# Patient Record
Sex: MALE | Race: WHITE | NOT HISPANIC OR LATINO | Employment: FULL TIME | URBAN - NONMETROPOLITAN AREA
[De-identification: names, ages, dates, MRNs, and addresses within clinical notes are randomized per-mention and may not be internally consistent; named-entity substitution may affect disease eponyms.]

---

## 2017-03-22 ENCOUNTER — OFFICE VISIT (OUTPATIENT)
Dept: FAMILY MEDICINE CLINIC | Facility: CLINIC | Age: 57
End: 2017-03-22

## 2017-03-22 VITALS
DIASTOLIC BLOOD PRESSURE: 82 MMHG | HEIGHT: 73 IN | RESPIRATION RATE: 12 BRPM | OXYGEN SATURATION: 98 % | BODY MASS INDEX: 27.04 KG/M2 | TEMPERATURE: 97.8 F | SYSTOLIC BLOOD PRESSURE: 126 MMHG | HEART RATE: 81 BPM | WEIGHT: 204 LBS

## 2017-03-22 DIAGNOSIS — Z98.890 H/O COLONOSCOPY: ICD-10-CM

## 2017-03-22 DIAGNOSIS — Z28.21 INFLUENZA VACCINE REFUSED: ICD-10-CM

## 2017-03-22 DIAGNOSIS — Z71.6 ENCOUNTER FOR SMOKING CESSATION COUNSELING: ICD-10-CM

## 2017-03-22 DIAGNOSIS — F17.200 TOBACCO DEPENDENCE: ICD-10-CM

## 2017-03-22 DIAGNOSIS — I10 ESSENTIAL HYPERTENSION: Primary | ICD-10-CM

## 2017-03-22 DIAGNOSIS — N52.8 OTHER MALE ERECTILE DYSFUNCTION: ICD-10-CM

## 2017-03-22 DIAGNOSIS — Z12.11 SCREENING FOR MALIGNANT NEOPLASM OF COLON: ICD-10-CM

## 2017-03-22 DIAGNOSIS — Z28.21 REFUSES TETANUS, DIPHTHERIA, AND ACELLULAR PERTUSSIS (TDAP) VACCINATION: ICD-10-CM

## 2017-03-22 PROBLEM — N52.9 ERECTILE DYSFUNCTION: Status: ACTIVE | Noted: 2017-03-22

## 2017-03-22 PROCEDURE — 99406 BEHAV CHNG SMOKING 3-10 MIN: CPT | Performed by: NURSE PRACTITIONER

## 2017-03-22 PROCEDURE — 99214 OFFICE O/P EST MOD 30 MIN: CPT | Performed by: NURSE PRACTITIONER

## 2017-03-22 RX ORDER — LISINOPRIL AND HYDROCHLOROTHIAZIDE 20; 12.5 MG/1; MG/1
1 TABLET ORAL DAILY
Qty: 90 TABLET | Refills: 1 | Status: SHIPPED | OUTPATIENT
Start: 2017-03-22 | End: 2017-09-27 | Stop reason: SDUPTHER

## 2017-03-22 RX ORDER — SILDENAFIL 100 MG/1
100 TABLET, FILM COATED ORAL DAILY PRN
Qty: 10 TABLET | Refills: 5 | Status: SHIPPED | OUTPATIENT
Start: 2017-03-22 | End: 2017-09-27 | Stop reason: SDUPTHER

## 2017-03-22 NOTE — PROGRESS NOTES
Chief Complaint   Patient presents with   • Hypertension     refill      Allergies   Allergen Reactions   • Penicillins Rash     Childhood allergy     HPI: Michael Issa is a 57 y.o. male presents  today for follow up for HTN.  Says that his bp has been running good, denies any chest pain, shortness of breath or cough.  Says he will return for labs end next week when he gets off shutdown.   Has ED stable with sildenafil.     Past Medical History:   Diagnosis Date   • Allergic rhinitis    • Hx of chest pain    • Hx of compression fracture of spine    • Hypertension      Past Surgical History:   Procedure Laterality Date   • KNEE SURGERY  2014    rt knee     Social History     Social History   • Marital status: Single     Spouse name: N/A   • Number of children: N/A   • Years of education: N/A     Social History Main Topics   • Smoking status: Current Every Day Smoker     Types: Cigarettes   • Smokeless tobacco: Never Used   • Alcohol use Yes      Comment: occassional   • Drug use: No   • Sexual activity: Defer     Other Topics Concern   • None     Social History Narrative     Family History   Problem Relation Age of Onset   • No Known Problems Mother    • Diabetes Father    • Heart disease Father    • No Known Problems Sister    • No Known Problems Brother    • No Known Problems Daughter    • No Known Problems Maternal Grandmother    • No Known Problems Maternal Grandfather    • No Known Problems Paternal Grandmother    • No Known Problems Paternal Grandfather    • Hypertension Brother    • No Known Problems Brother        Current Outpatient Prescriptions on File Prior to Visit   Medication Sig Dispense Refill   • Ascorbic Acid (VITAMIN C PO) Take  by mouth.     • Cetirizine HCl (ZYRTEC ALLERGY PO) Take  by mouth. OTC     • glucosamine-chondroitin 500-400 MG capsule capsule Take 1 capsule by mouth 2 (two) times a day with meals.     • lisinopril-hydrochlorothiazide (ZESTORETIC) 20-12.5 MG per tablet Take 1 tablet  "by mouth daily. 90 tablet 1   • Omega-3 Fatty Acids (OMEGA 3 PO) Take  by mouth.       No current facility-administered medications on file prior to visit.         REVIEW OF SYMPTOMS: (Positives bolded)  General:  weight loss, fever, chills, night sweats, fatigue, appetite loss  HEENT:  blurry vision, eye pain, eye discharge, dry eyes, decreased vision  Respiratory: shortness of breath, cough, hemoptysis, wheezing, pleurisy,   Cardiovascular:  chest pain, PND, palpitation, edema, orthopnea, syncope, swelling of extremities  Gastro: Nausea, vomiting, diarrhea, hematemesis, abdominal pain, constipation  Genito: hematuria, dysuria, glycosuria, hesitancy, frequency, incontinence  \"All other systems reviewed and negative, except as listed above.”      OBJECTIVE:  Constitutional:  Appearance-No acute distress, Consistent with stated age. Orientation- Oriented x 3, alert Posture-Not doubled over. Gait-Normal pace, normal arm movement. Posture- Normal Build and Nutrition-Well developed and well nourished.  General- Patient is pleasant and cooperative with the interview and exam.    Integumentary: General-No rashes, ulcers or lesions. No edema.  Palpation- Normal skin moisture/turgor. Skin is warm to touch, no increased warmth. Capillary refill is normal bilateral Upper and lower extremity.     Head/Neck: Head- normocephalic and atraumatic.  Neck- without visible/palpable lumps or pulsations.  Palpation- No bony tenderness about head/neck along frontal, occiptial, temporal, parietal, mastoid, jawline, zygoma, orbit or any other location.  NO temporal artery tenderness. No TMJ tenderness. Normal cervical ROM.   Neck Supple.  Thyroid-No thyromegaly, no nodules    Eye: Bilaterally PERRLA, EOMI.  No discharge.  Upper and lower eyelids are normal. Sclera/conjunctiva normal without discharge. Cornea is normal and clear. Lens is normal.  Eyeball appears normal. No ciliary flushing, no conjunctival injection.    ENMT:  Pinna- normal " without tenderness or erythema.  External auditory canal Left- normal without erythema or discharge, no excessive cerumen. External auditory canal Right-normal without erythema or discharge, no excessive cerumen. TM left- Grey/pearly, normal light reflex and anatomy TM Right- Grey/pearly, normal light reflex and anatomy Hearing Assessment-normal to conversational speech at 2-5 feet.  Nose and sinus-No sinus tenderness along frontal/maxillary region. External appearance normal and midline. Nares- bilateral quiet airflow, no discharge. Nasal mucosa- No bleeding noted and no ulcerations observed. Pink, moist. Turbinates non boggy. Lips- normal color, moist without cracks/lesions Oral Cavity/Palate- hard/soft palate intact without lesions, oral mucosa pink and moist. Dentition assessed and discussed appropriate oral care. Tongue normal midline.  Oropharynx- no pharyngeal erythema, Uvula midline. No post nasal drip. No exudate. Salivary glands- Non tender to palpation    CHEST/LUNG: Inspection- symmetric chest wall no pectus deformity. Normal effort, no distress, no use of accessory muscles. Palpation- nontender sternum, ribline.  No abnormal pulsations. Auscultation- Breath sounds normal throughout all lung fields.  Normal tracheal sounds, Normal bronchial sounds overlying sternum, Bronchovessicular sounds normal between scapulae posteriorly, Normal vessicular breath sounds heard throughout periphery. Lungs are clear today. Adventitious sounds- No wheezes, rales, rhonchi.     CARDIOVASCULAR:  Carotid artery- normal, no bruits or abnormal pulsations. Jugular vein- no pulsations. Palpation/Percussion- Normal PMI, no palpable thrill  Auscultation- Regular rate and rhythm. No murmur noted in sitting, supine positions. Extremities- no digital clubbing, cyanosis, edema, increased warmth.    ABDOMEN: Inspection- normal and no visible pulsations. Normal contour. Auscultation- Bowel sounds normal, no abdominal bruits.  Palpation/Percussion- soft, non-tender, no rebound tenderness, no rigidity (guarding), no jar tenderness, no masses.  Liver-no hepatomegaly, Spleen - no splenomegaly, Hernias- none. Rectal not examined.     Peripheral Vascular: Upper extremity Left- Normal temperature with pink nailbeds and no ulcerations.  Upper extremity Right- Normal temperature with pink nailbeds and no ulcerations.  Lower extremity- Normal temperature with pink nailbeds and no ulcerations. DP pulses 2+ bilaterally.  Pedal hair intact.  Normal capillary refill. Edema- No edema.    Musculoskeletal: Generalized-No generalized swelling or edema of extremities, no digital clubbing or cyanosis, neurovascularly intact all four extremities.  Upper extremity- Symmetrical posture.  No visible deformity.  Normal sensation along medial and lateral upper extremity proximally and distally.  NO tenderness overlying shoulder, lateral/medial epicondyle.  5/5 and strength 5/5 bilateral UE.  Elbow palpated, no tenderness overlying olecranon.  Normal supination, pronation to active/passive ROM and to resisted rotation. Bicep insertion/tricep insertion appear normal without obvious pathology. Rotator cuff evaluated and intact.  Normal wrist ROM bilaterally. Normal hand movement, intrinsic muscles of hands normal. No tenderness to palpation of hands/wrists/elbows.  Lower extremity- Not tender to palpation, no pain, no swelling, edema or erythema of surrounding tissue, normal strength and tone.  Normal appearing hip ROM bilaterally without pain.  Knee ROM normal at 0-120 degrees. No tenderness overlying trochanters, no tenderness about patella, quad tendon, patellar tendon.  No tenderness at tibial tuberosity. Ankle normal ROM not tender to palpation along medial/lateral malleolus. Normal movement of toes, no tenderness bilateral feet/toes.  Normal foot type. Calves symmetrical.  Stretching demonstrated today.  Spine/Ribs- No deformities, masses or tenderness, no  known fractures, normal strength, Normal ROM. Normal stability No tenderness along C/T/L spine.  Normal appearing ROM about spine.      Neurological: General- Moves all 4 extremities symmetrically. Symmetrical face and body posture. Cranial nerves- individually evaluated II-XII and intact. PERRLA, Normal EOMI, visual/special senses appear intact, Face is symmetrical and normal sensation/movement, normal tongue, normal strength/posture of neck musculature. Balance- Romberg intact.  Reflexes- ntact with DTR 2+ patellar, Achilles, bicep, brachial,tricep. Ankle clonus normal with 2 beats.  Strength- 5/5 bilateral UE and LE. Soft touch- intact bilateral UE and LE.  Temperature sensation- intact bilateral UE and LE. Cerebellar testing-Rapid alternating movements intact.  Heel shin intact. Able to walk normal gait, normal heel toe walking. Neck- supple.        Neuropsych: Oriented- Person, place, time. (AAOx3), Mood/affect- normal and congruent. Able to articulate well. Speech-Normal speech, normal rate, normal tone, normal use of language, volume and coherence.  Thought content- normal with ability to perform basic computations and apply abstract thought/reason. Associations- intact, no SI/HI, no hallucinations, delusions, obsessions.  Judgment/insight- Appropriate. Memory-Recall intact, remote and recent memory intact. Knowledge- Age appropriate fund of knowledge, concentration and attention span normal.    Lymphatic: Head/Neck- normal size and non tender to palpation. Axillary- Head and neck LN are normal size and non tender to palpation. Femoral and Inguinal- normal size and non tender to palpation.      Assessment/Plan:  Michael was seen today for hypertension.    Diagnoses and all orders for this visit:    Essential hypertension  -     lisinopril-hydrochlorothiazide (ZESTORETIC) 20-12.5 MG per tablet; Take 1 tablet by mouth Daily.  -     CBC & Differential  -     Comprehensive Metabolic Panel  -     Lipid  Panel    Other male erectile dysfunction        -     PSA screening for malignant neoplasm colon  -     sildenafil (VIAGRA) 100 MG tablet; Take 1 tablet by mouth Daily As Needed for erectile dysfunction.    Tobacco dependence:  Does not want to quit smoking at this time.        - Tobacco abuse: Smoking Cessation discussed today for  >3 minutes.  I advised the patient regarding the risks of continued tobacco use.  We discussed methods in which to succeed at smoking cessation.  Handouts were offered to the patient regarding tobacco cessation. The patient has expressed a willingness to quit.  We discussed benefits/risks of oral medications to include Chantix/Wellbutrin. Discussed benefits and risks to nicotine patches, gum. Discussed no benefit and no recommendation at this time to switch to E. Cigarettes as health hazards are not yet known.  We discussed lifestyle changes, discussed BH cessation class and handout offered to patient today.  Discussed to consider ration technique to quit with time (Each day set out the number of cigarettes that you allow yourself to smoke.  Each day decrease this number by 1 cigarrette until you get to a point that you feel you need another cigarette.  You can hold at that level x 1 week.  Continue to decrease until you either are tobacco free or until you cannot decline any further). When you cannot decrease this any further you can return and we can discussed medication options again.  Initial goal with reduction technique is 25% in 3 months.   Http://smokefree.gov/smokefreetxt/ and www.heart.org      Preventative Maintenance  Encounter for smoking cessation counseling  Comments:  Done 3/22/17    Refuses tetanus, diphtheria, and acellular pertussis (TDaP) vaccination  Comments:  Declined 3/22/17    Influenza vaccine refused  Comments:  Declined 3/22/17    H/O colonoscopy  Comments:  Done, Says it was in [place] 2013 or 2014: normal      1. HTN: Suspect Essential HTN.Good BP control is  encouraged with Goal BP based on JNC 8 guidelines 2014 <140/90 for patients with known cardiac disease and diabetes. (RAMOS. 2014:322 (5):507-520. doi:10.1001/ramos.2013.24808): general population <60 yr old goal BP <140/90 and for those >60 <150/90.  For patients of all ages with Diabetes, CKD, Known CAD <140/90. Recommended to the patient to obtain electronic home BP machine with upper arm blood pressure cuff and to check regularly as instructed.  Keep BP log and bring to subsequent visits. Stable, at goal.  a. LABS: CBC, CMP  b. Recommend if you do not have a home BP machine to obtain an electronic machine with arm blood pressure cuff.      c. Monitor BP over the next week and keep log to bring back to office. Discussed medication therapy however pt wants to try to control with diet exercise. .  Your provider  has recommended self-monitoring of your blood pressure.  If you do not have a blood pressure cuff you may purchase one from the local pharmacy.  You may ask the pharmacist which brand and model they recommend.  Obtain your blood pressure measurement at least 2x per week.  You should also check your blood pressure if you experience any symptoms of blurred visit, dizziness or headache.  Please record all blood pressure measurements and bring them to next office visit.  If you have any questions about the accuracy of your blood pressure machine please bring it in to the office and our staff will be happy to check accuracy.   d. Encouraged to eat a low sodium heart healthy diet  e. Offered handout on HTN educational topics.  These were provided if patient requested these today.  f. MEDS: as listed below  g. Risks/benefits of current and new medications discussed with the patient and or family today.  The patient/family are aware and accept that if there any side effects they should call or return to clinic as soon as possible.  Appropriate F/U discussed for topics addressed today. All questions were answered to  the  satisfactory state of patient/family.  Should symptoms fail to improve or worsen they agree to call or return to clinic or to go to the ER. Education handouts were offered on any new Rx if requested.  Discussed the importance of following up with any needed screening tests/labs/specialist appointments and any requested follow-up recommended by me today.  Importance of maintaining follow-up discussed and patient accepts that missed appointments can delay diagnosis and potentially lead to worsening of conditions.  h. ADA diet encouraged      Return in about 6 months (around 9/22/2017).    Mansi Zapata, DNP, APRN-BC  03/22/2017

## 2017-03-22 NOTE — PATIENT INSTRUCTIONS
Steps to Quit Smoking   Smoking tobacco can be harmful to your health and can affect almost every organ in your body. Smoking puts you, and those around you, at risk for developing many serious chronic diseases. Quitting smoking is difficult, but it is one of the best things that you can do for your health. It is never too late to quit.  WHAT ARE THE BENEFITS OF QUITTING SMOKING?  When you quit smoking, you lower your risk of developing serious diseases and conditions, such as:  · Lung cancer or lung disease, such as COPD.  · Heart disease.  · Stroke.  · Heart attack.  · Infertility.  · Osteoporosis and bone fractures.  Additionally, symptoms such as coughing, wheezing, and shortness of breath may get better when you quit. You may also find that you get sick less often because your body is stronger at fighting off colds and infections. If you are pregnant, quitting smoking can help to reduce your chances of having a baby of low birth weight.  HOW DO I GET READY TO QUIT?  When you decide to quit smoking, create a plan to make sure that you are successful. Before you quit:  · Pick a date to quit. Set a date within the next two weeks to give you time to prepare.  · Write down the reasons why you are quitting. Keep this list in places where you will see it often, such as on your bathroom mirror or in your car or wallet.  · Identify the people, places, things, and activities that make you want to smoke (triggers) and avoid them. Make sure to take these actions:    Throw away all cigarettes at home, at work, and in your car.    Throw away smoking accessories, such as ashtrays and lighters.    Clean your car and make sure to empty the ashtray.    Clean your home, including curtains and carpets.  · Tell your family, friends, and coworkers that you are quitting. Support from your loved ones can make quitting easier.  · Talk with your health care provider about your options for quitting smoking.  · Find out what treatment  "options are covered by your health insurance.  WHAT STRATEGIES CAN I USE TO QUIT SMOKING?   Talk with your healthcare provider about different strategies to quit smoking. Some strategies include:  · Quitting smoking altogether instead of gradually lessening how much you smoke over a period of time. Research shows that quitting \"cold turkey\" is more successful than gradually quitting.  · Attending in-person counseling to help you build problem-solving skills. You are more likely to have success in quitting if you attend several counseling sessions. Even short sessions of 10 minutes can be effective.  · Finding resources and support systems that can help you to quit smoking and remain smoke-free after you quit. These resources are most helpful when you use them often. They can include:    Online chats with a counselor.    Telephone quitlines.    Printed self-help materials.    Support groups or group counseling.    Text messaging programs.    Mobile phone applications.  · Taking medicines to help you quit smoking. (If you are pregnant or breastfeeding, talk with your health care provider first.) Some medicines contain nicotine and some do not. Both types of medicines help with cravings, but the medicines that include nicotine help to relieve withdrawal symptoms. Your health care provider may recommend:    Nicotine patches, gum, or lozenges.    Nicotine inhalers or sprays.    Non-nicotine medicine that is taken by mouth.  Talk with your health care provider about combining strategies, such as taking medicines while you are also receiving in-person counseling. Using these two strategies together makes you more likely to succeed in quitting than if you used either strategy on its own.  If you are pregnant or breastfeeding, talk with your health care provider about finding counseling or other support strategies to quit smoking. Do not take medicine to help you quit smoking unless told to do so by your health care " provider.  WHAT THINGS CAN I DO TO MAKE IT EASIER TO QUIT?  Quitting smoking might feel overwhelming at first, but there is a lot that you can do to make it easier. Take these important actions:  · Reach out to your family and friends and ask that they support and encourage you during this time. Call telephone quitlines, reach out to support groups, or work with a counselor for support.  · Ask people who smoke to avoid smoking around you.  · Avoid places that trigger you to smoke, such as bars, parties, or smoke-break areas at work.  · Spend time around people who do not smoke.  · Lessen stress in your life, because stress can be a smoking trigger for some people. To lessen stress, try:    Exercising regularly.    Deep-breathing exercises.    Yoga.    Meditating.    Performing a body scan. This involves closing your eyes, scanning your body from head to toe, and noticing which parts of your body are particularly tense. Purposefully relax the muscles in those areas.  · Download or purchase mobile phone or tablet apps (applications) that can help you stick to your quit plan by providing reminders, tips, and encouragement. There are many free apps, such as QuitGuide from the CDC (Centers for Disease Control and Prevention). You can find other support for quitting smoking (smoking cessation) through smokefree.gov and other websites.  HOW WILL I FEEL WHEN I QUIT SMOKING?  Within the first 24 hours of quitting smoking, you may start to feel some withdrawal symptoms. These symptoms are usually most noticeable 2-3 days after quitting, but they usually do not last beyond 2-3 weeks. Changes or symptoms that you might experience include:  · Mood swings.  · Restlessness, anxiety, or irritation.  · Difficulty concentrating.  · Dizziness.  · Strong cravings for sugary foods in addition to nicotine.  · Mild weight gain.  · Constipation.  · Nausea.  · Coughing or a sore throat.  · Changes in how your medicines work in your  body.  · A depressed mood.  · Difficulty sleeping (insomnia).  After the first 2-3 weeks of quitting, you may start to notice more positive results, such as:  · Improved sense of smell and taste.  · Decreased coughing and sore throat.  · Slower heart rate.  · Lower blood pressure.  · Clearer skin.  · The ability to breathe more easily.  · Fewer sick days.  Quitting smoking is very challenging for most people. Do not get discouraged if you are not successful the first time. Some people need to make many attempts to quit before they achieve long-term success. Do your best to stick to your quit plan, and talk with your health care provider if you have any questions or concerns.     This information is not intended to replace advice given to you by your health care provider. Make sure you discuss any questions you have with your health care provider.     Document Released: 12/12/2002 Document Revised: 05/03/2016 Document Reviewed: 05/03/2016  better. Interactive Patient Education ©2016 [employer]

## 2017-06-27 DIAGNOSIS — I10 ESSENTIAL HYPERTENSION: ICD-10-CM

## 2017-06-27 RX ORDER — LISINOPRIL AND HYDROCHLOROTHIAZIDE 20; 12.5 MG/1; MG/1
TABLET ORAL
Qty: 90 TABLET | Refills: 0 | OUTPATIENT
Start: 2017-06-27

## 2017-09-08 ENCOUNTER — OFFICE VISIT (OUTPATIENT)
Dept: FAMILY MEDICINE CLINIC | Facility: CLINIC | Age: 57
End: 2017-09-08

## 2017-09-08 VITALS
HEART RATE: 77 BPM | SYSTOLIC BLOOD PRESSURE: 122 MMHG | RESPIRATION RATE: 12 BRPM | BODY MASS INDEX: 25.58 KG/M2 | TEMPERATURE: 97.7 F | HEIGHT: 73 IN | WEIGHT: 193 LBS | DIASTOLIC BLOOD PRESSURE: 74 MMHG | OXYGEN SATURATION: 98 %

## 2017-09-08 DIAGNOSIS — K46.9 HERNIA: ICD-10-CM

## 2017-09-08 DIAGNOSIS — J40 BRONCHITIS: ICD-10-CM

## 2017-09-08 DIAGNOSIS — J01.90 ACUTE NON-RECURRENT SINUSITIS, UNSPECIFIED LOCATION: Primary | ICD-10-CM

## 2017-09-08 PROCEDURE — 99214 OFFICE O/P EST MOD 30 MIN: CPT | Performed by: NURSE PRACTITIONER

## 2017-09-08 PROCEDURE — 96372 THER/PROPH/DIAG INJ SC/IM: CPT | Performed by: NURSE PRACTITIONER

## 2017-09-08 RX ORDER — DEXAMETHASONE SODIUM PHOSPHATE 10 MG/ML
10 INJECTION INTRAMUSCULAR; INTRAVENOUS ONCE
Status: COMPLETED | OUTPATIENT
Start: 2017-09-08 | End: 2017-09-08

## 2017-09-08 RX ORDER — AZITHROMYCIN 250 MG/1
TABLET, FILM COATED ORAL
Qty: 6 TABLET | Refills: 0 | Status: SHIPPED | OUTPATIENT
Start: 2017-09-08 | End: 2017-09-18

## 2017-09-08 RX ADMIN — DEXAMETHASONE SODIUM PHOSPHATE 10 MG: 10 INJECTION INTRAMUSCULAR; INTRAVENOUS at 10:51

## 2017-09-08 NOTE — PROGRESS NOTES
Chief Complaint   Patient presents with   • URI   • Hernia     Allergies   Allergen Reactions   • Penicillins Rash     Childhood allergy     HPI: Michael Issa is a 57 y.o. male presents today with complaints of being sick for 3 weeks, and getting progressively worse. It started out allergies and he has been taking zyrtec, clariten and nothing wo. rks.  Now her is hoarse, blowing green and just feels bad.  Last weekend he was in bed all weekend and that got better but it hit again.   Also has a hernia that keeps pouching out in the right groin Has chronic problem of HTN stable with lisinopril-hctz, ED stable with sidenafil, hyperlipidemia with omega 3 fatty acids             Past Medical History:   Diagnosis Date   • Allergic rhinitis    • Hx of chest pain    • Hx of compression fracture of spine    • Hypertension      Past Surgical History:   Procedure Laterality Date   • KNEE SURGERY  2014    rt knee     Social History     Social History   • Marital status: Single     Spouse name: N/A   • Number of children: N/A   • Years of education: N/A     Social History Main Topics   • Smoking status: Current Every Day Smoker     Types: Cigarettes   • Smokeless tobacco: Never Used   • Alcohol use Yes      Comment: occassional   • Drug use: No   • Sexual activity: Defer     Other Topics Concern   • None     Social History Narrative     Family History   Problem Relation Age of Onset   • No Known Problems Mother    • Diabetes Father    • Heart disease Father    • No Known Problems Sister    • No Known Problems Brother    • No Known Problems Daughter    • No Known Problems Maternal Grandmother    • No Known Problems Maternal Grandfather    • No Known Problems Paternal Grandmother    • No Known Problems Paternal Grandfather    • Hypertension Brother    • No Known Problems Brother        Current Outpatient Prescriptions on File Prior to Visit   Medication Sig Dispense Refill   • Ascorbic Acid (VITAMIN C PO) Take  by mouth.     •  "Cetirizine HCl (ZYRTEC ALLERGY PO) Take  by mouth. OTC     • glucosamine-chondroitin 500-400 MG capsule capsule Take 1 capsule by mouth 2 (two) times a day with meals.     • lisinopril-hydrochlorothiazide (ZESTORETIC) 20-12.5 MG per tablet Take 1 tablet by mouth Daily. 90 tablet 1   • Omega-3 Fatty Acids (OMEGA 3 PO) Take  by mouth.     • sildenafil (VIAGRA) 100 MG tablet Take 1 tablet by mouth Daily As Needed for erectile dysfunction. 10 tablet 5     No current facility-administered medications on file prior to visit.       ROS:  REVIEW OF SYMPTOMS: (Positives bolded)  General:  weight loss, fever, chills, night sweats, fatigue, appetite loss  HEENT:  sore throat tinnitus, bloody nose, hearing loss, sinus pain/pressure, ear pain/pressure.   Respiratory: shortness of breath, cough, hemoptysis, wheezing, pleurisy,   Cardiovascular:  chest pain, PND, palpitation, edema, orthopnea, syncope, swelling of extremities  Gastro: Nausea, vomiting, diarrhea, hematemesis, abdominal pain, constipation  Neuro:  Migraine, numbness, ataxia, tremor, vertigo, weakness, memory loss, Irritability, dizziness  Allergic/immune: allergic rhinitis, hay fever, asthma, hives  \"All other systems reviewed and negative, except as listed above.”      OBJECTIVE:  Constitutional:  Alert, oriented x 3, well developed, well nourished. Consistent with stated age. Not in acute distress.  Has normal posture. Gait and station normal. .  Behavior appropriate. Patient is pleasant and cooperative with the interview and exam.    Skin: No rashes, no visible scars or suspicious moles noted.  Skin is warm to touch. Normal appropriate skin turgor.  Capillary refill is normal bilateral Upper and lower extremity.     Head/Neck: Head is normocephalic and atraumatic.  Neck without visible/palpable lumps.  No thyromegaly.    Eye: Bilaterally EOMI.  PERRLA.  Sclera/conjunctiva is normal, no discharge. Cornea is normal and clear. Lens is normal.  Eyeball normal. Upper " eyelid normal.  Lower eyelid normal.   Abnormal: allergic shiners, maira-orbital puffiness    OROPHARYNX: without redness or post nasal drip, no exudate, no irregularities, tonsils normal  Mucosa pink and moist, posterior pharynx erythematous,  Dentition average for age. No obvious dental carries. No lesions. Tongue normal.    EARS: Bilateral auditory canal normal, without redness or cerumen impaction. Bilateral Tympanic membrane Normal, pearly gray with good cone of light and landmarks.  Hearing grossly intact to normal conversation.     NOSE: Purulent drainage, mucosa is erythematous, edematous and congested, nares patent    SINUS:  Frontal and maxillary sinus tenderness on palpation.      CHEST/LUNG: No use of accessory muscles, chest non-tender on palpation.  Breath sounds normal throughout all lung fields.  No wheezes, rales, rhonchi.    CARDIOVASCULAR:  Inspection: Carotid artery bilateral normal, no bruits, pulse regular.  Palpation/Percussion Bilateral normal pulsations.  Auscultation: Regular rate and rhythm. No murmur noted in sitting, supine, standing or squatting positions.    LYMPH: Cervical Nodes-normal, size; non-tender to palpation. Axillary Nodes- normal size; non-tender to palpation.     Assessment:  Michael was seen today for uri and hernia.    Diagnoses and all orders for this visit:    Acute non-recurrent sinusitis, unspecified location    Bronchitis  -     dexamethasone (DECADRON) injection 10 mg; Inject 1 mL into the shoulder, thigh, or buttocks 1 (One) Time.  -     azithromycin (ZITHROMAX) 250 MG tablet; Take 2 tablets the first day, then 1 tablet daily for 4 days.    Hernia  -     Ambulatory Referral to General Surgery    Definition:  Acute, subacute, or chronic inflammation of the mucous membranes that line the paranasal                       sinuses and concomitant inflammation of nasal mucosa  Acute rhinosinusitis: abrupt onset of infection with duration of symptoms <4  weeks    Pathogenesis:    • Most common viruses are more common than bacteria  • Streptococcus pneumoniae; penicillin-resistant S. pneumonia is becoming common   • Haemophilus influenza (may be B-lactaase producing)  • Moraxella catarrhalis (may be B-lactamase producing) prevalence greater among children than adults  • Streptococcus pyogenes  • Other: Streptococcus species, chlamydia pneumonia, starpylococcus aureus, fungi            Differential:   URI, Allergic rhinitis, neoplasms, dental abscess, [place], Foreign body    Diagnostic: none typically     • Adjunctive Therapy  a. Intranasal saline irrigation with either physiologic or hypertonic saline is recommended  b. Intranasal corticosteroids in addition to antibiotics  c. Netti pot  d. Good handwashing  e. If smoke-recommend smoking cessation  f.  Humidify the air; steam inhalation and warm compresses often help relieve pressure  g. Increase fluid intake  h. Sleep with bed elevated  i. Avoid allergens and excessively dry heat  j. Avoid swimming/diving and air travel during acute period  k. Avoid use of antihistamines unless there is an allergic basis   l. Teach proper application of nasal sprays           Return in about 1 week (around 9/15/2017), or if symptoms worsen or fail to improve.    Mansi Zapata, DNP, APRN-BC  09/08/2017

## 2017-09-18 ENCOUNTER — APPOINTMENT (OUTPATIENT)
Dept: PREADMISSION TESTING | Facility: HOSPITAL | Age: 57
End: 2017-09-18

## 2017-09-18 ENCOUNTER — HOSPITAL ENCOUNTER (OUTPATIENT)
Dept: GENERAL RADIOLOGY | Facility: HOSPITAL | Age: 57
Discharge: HOME | End: 2017-09-18
Admitting: SPECIALIST

## 2017-09-18 VITALS
BODY MASS INDEX: 26.11 KG/M2 | SYSTOLIC BLOOD PRESSURE: 125 MMHG | HEIGHT: 73 IN | HEART RATE: 60 BPM | DIASTOLIC BLOOD PRESSURE: 84 MMHG | WEIGHT: 197 LBS | OXYGEN SATURATION: 100 %

## 2017-09-18 LAB
ALBUMIN SERPL-MCNC: 4 G/DL (ref 3.5–5)
ALBUMIN/GLOB SERPL: 1.4 G/DL (ref 1.1–2.5)
ALP SERPL-CCNC: 37 U/L (ref 24–120)
ALT SERPL W P-5'-P-CCNC: 39 U/L (ref 0–54)
ANION GAP SERPL CALCULATED.3IONS-SCNC: 9 MMOL/L (ref 4–13)
AST SERPL-CCNC: 33 U/L (ref 7–45)
BASOPHILS # BLD AUTO: 0.07 10*3/MM3 (ref 0–0.2)
BASOPHILS NFR BLD AUTO: 1 % (ref 0–2)
BILIRUB SERPL-MCNC: 0.4 MG/DL (ref 0.1–1)
BUN BLD-MCNC: 17 MG/DL (ref 5–21)
BUN/CREAT SERPL: 22.4 (ref 7–25)
CALCIUM SPEC-SCNC: 8.8 MG/DL (ref 8.4–10.4)
CHLORIDE SERPL-SCNC: 102 MMOL/L (ref 98–110)
CO2 SERPL-SCNC: 29 MMOL/L (ref 24–31)
CREAT BLD-MCNC: 0.76 MG/DL (ref 0.5–1.4)
DEPRECATED RDW RBC AUTO: 43.9 FL (ref 40–54)
EOSINOPHIL # BLD AUTO: 0.11 10*3/MM3 (ref 0–0.7)
EOSINOPHIL NFR BLD AUTO: 1.6 % (ref 0–4)
ERYTHROCYTE [DISTWIDTH] IN BLOOD BY AUTOMATED COUNT: 13.1 % (ref 12–15)
GFR SERPL CREATININE-BSD FRML MDRD: 106 ML/MIN/1.73
GLOBULIN UR ELPH-MCNC: 2.8 GM/DL
GLUCOSE BLD-MCNC: 92 MG/DL (ref 70–100)
HCT VFR BLD AUTO: 41 % (ref 40–52)
HGB BLD-MCNC: 13.6 G/DL (ref 14–18)
IMM GRANULOCYTES # BLD: 0.01 10*3/MM3 (ref 0–0.03)
IMM GRANULOCYTES NFR BLD: 0.1 % (ref 0–5)
LYMPHOCYTES # BLD AUTO: 1.72 10*3/MM3 (ref 0.72–4.86)
LYMPHOCYTES NFR BLD AUTO: 24.5 % (ref 15–45)
MCH RBC QN AUTO: 30.8 PG (ref 28–32)
MCHC RBC AUTO-ENTMCNC: 33.2 G/DL (ref 33–36)
MCV RBC AUTO: 92.8 FL (ref 82–95)
MONOCYTES # BLD AUTO: 0.79 10*3/MM3 (ref 0.19–1.3)
MONOCYTES NFR BLD AUTO: 11.3 % (ref 4–12)
NEUTROPHILS # BLD AUTO: 4.32 10*3/MM3 (ref 1.87–8.4)
NEUTROPHILS NFR BLD AUTO: 61.5 % (ref 39–78)
PLATELET # BLD AUTO: 248 10*3/MM3 (ref 130–400)
PMV BLD AUTO: 11 FL (ref 6–12)
POTASSIUM BLD-SCNC: 5.1 MMOL/L (ref 3.5–5.3)
PROT SERPL-MCNC: 6.8 G/DL (ref 6.3–8.7)
RBC # BLD AUTO: 4.42 10*6/MM3 (ref 4.8–5.9)
SODIUM BLD-SCNC: 140 MMOL/L (ref 135–145)
WBC NRBC COR # BLD: 7.02 10*3/MM3 (ref 4.8–10.8)

## 2017-09-18 PROCEDURE — 93010 ELECTROCARDIOGRAM REPORT: CPT | Performed by: INTERNAL MEDICINE

## 2017-09-18 PROCEDURE — 80053 COMPREHEN METABOLIC PANEL: CPT | Performed by: SPECIALIST

## 2017-09-18 PROCEDURE — 71020 HC CHEST PA AND LATERAL: CPT

## 2017-09-18 PROCEDURE — 93005 ELECTROCARDIOGRAM TRACING: CPT

## 2017-09-18 PROCEDURE — 85025 COMPLETE CBC W/AUTO DIFF WBC: CPT | Performed by: SPECIALIST

## 2017-09-18 PROCEDURE — 36415 COLL VENOUS BLD VENIPUNCTURE: CPT

## 2017-09-18 NOTE — DISCHARGE INSTRUCTIONS
DAY OF SURGERY INSTRUCTIONS    Bilateral Preperitoneal Inguinal Hernia Repair Laparoscopic With Mesh-Bilateral    Rambo Church MD    Admission Date:   9/20/2017    ARRIVAL TIME: AS DIRECTED BY OFFICE    DAY OF SURGERY TAKE ONLY THESE MEDICATIONS: NONE        BEFORE YOU COME TO THE HOSPITAL  (Pre-op instructions)  • Do not eat, drink, smoke or chew gum after midnight the night before surgery.  This also includes no mints.  • Morning of surgery take only the medicines you have been instructed with a sip of water unless otherwise instructed  by your physician.  • Do not shave, wear makeup or dark nail polish.  • Remove all jewelry including rings.  • Leave anything you consider valuable at home.  • Leave your suitcase in the car until after your surgery.  • Bring the following with you if applicable:  o Picture ID and insurance, Medicare or Medicaid cards  o Co-pay/deductible required by insurance (cash, check, credit card)  o Copy of advance directive, living will or power-of- documents if not brought to PAT  o CPAP or BIPAP mask and tubing  o Relaxation aids (MP3 player, book, magazine)  • On the day of surgery check in at registration located at the main entrance of the hospital.       Outpatient Surgery Guidelines, Adult  Outpatient procedures are those for which the person having the procedure is allowed to go home the same day as the procedure. Various procedures are done on an outpatient basis. You should follow some general guidelines if you will be having an outpatient procedure.  LET YOUR HEALTH CARE PROVIDER KNOW ABOUT:  · Any allergies you have.  · All medicines you are taking, including vitamins, herbs, eye drops, creams, and over-the-counter medicines.  · Previous problems you or members of your family have had with the use of anesthetics.  · Any blood disorders you have.  · Previous surgeries you have had.  · Medical conditions you have.  RISKS AND COMPLICATIONS  Your health care provider  will discuss possible risks and complications with you before surgery. Common risks and complications include:    · Problems due to the use of anesthetics.  · Blood loss and replacement (does not apply to minor surgical procedures).  · Temporary increase in pain due to surgery.  · Uncorrected pain or problems that the surgery was meant to correct.  · Infection.  · New damage.  BEFORE THE PROCEDURE  · Ask your health care provider about changing or stopping your regular medicines. You may need to stop taking certain medicines in the days or weeks before the procedure.  · Stop smoking at least 2 weeks before surgery. This lowers your risk for complications during and after surgery. Ask your health care provider for help with this if needed.  · Eat your usual meals and a light supper the day before surgery. Continue fluid intake. Do not drink alcohol.  · Do not eat or drink after midnight the night before your surgery.   · Arrange for someone to take you home and to stay with you for 24 hours after the procedure. Medicine given for your procedure may affect your ability to drive or to care for yourself.  · Call your health care provider's office if you develop an illness or problem that may prevent you from safely having your procedure.  AFTER THE PROCEDURE  After surgery, you will be taken to a recovery area, where your progress will be monitored. If there are no complications, you will be allowed to go home when you are awake, stable, and taking fluids well. You may have numbness around the surgical site. Healing will take some time. You will have tenderness at the surgical site and may have some swelling and bruising. You may also have some nausea.  HOME CARE INSTRUCTIONS  · Do not drive for 24 hours, or as directed by your health care provider. Do not drive while taking prescription pain medicines.  · Do not drink alcohol for 24 hours.  · Do not make important decisions or sign legal documents for 24 hours.  · You  may resume a normal diet and activities as directed.  · Do not lift anything heavier than 10 pounds (4.5 kg) or play contact sports until your health care provider says it is okay.  · Change your bandages (dressings) as directed.  · Only take over-the-counter or prescription medicines as directed by your health care provider.  · Follow up with your health care provider as directed.  SEEK MEDICAL CARE IF:  · You have increased bleeding (more than a small spot) from the surgical site.  · You have redness, swelling, or increasing pain in the wound.  · You see pus coming from the wound.  · You have a fever.  · You notice a bad smell coming from the wound or dressing.  · You feel lightheaded or faint.  · You develop a rash.  · You have trouble breathing.  · You develop allergies.  MAKE SURE YOU:  · Understand these instructions.  · Will watch your condition.  · Will get help right away if you are not doing well or get worse.     This information is not intended to replace advice given to you by your health care provider. Make sure you discuss any questions you have with your health care provider.     Document Released: 09/12/2002 Document Revised: 05/03/2016 Document Reviewed: 05/22/2014  [employer] Interactive Patient Education ©2016 [employer]       Fall Prevention in Hospitals, Adult  As a hospital patient, your condition and the treatments you receive can increase your risk for falls. Some additional risk factors for falls in a hospital include:  · Being in an unfamiliar environment.  · Being on bed rest.  · Your surgery.  · Taking certain medicines.  · Your tubing requirements, such as intravenous (IV) therapy or catheters.  It is important that you learn how to decrease fall risks while at the hospital. Below are important tips that can help prevent falls.  SAFETY TIPS FOR PREVENTING FALLS  Talk about your risk of falling.  · Ask your health care provider why you are at risk for falling. Is it your medicine,  illness, tubing placement, or something else?  · Make a plan with your health care provider to keep you safe from falls.  · Ask your health care provider or pharmacist about side effects of your medicines. Some medicines can make you dizzy or affect your coordination.  Ask for help.  · Ask for help before getting out of bed. You may need to press your call button.  · Ask for assistance in getting safely to the toilet.  · Ask for a walker or cane to be put at your bedside. Ask that most of the side rails on your bed be placed up before your health care provider leaves the room.  · Ask family or friends to sit with you.  · Ask for things that are out of your reach, such as your glasses, hearing aids, telephone, bedside table, or call button.  Follow these tips to avoid falling:  · Stay lying or seated, rather than standing, while waiting for help.  · Wear rubber-soled slippers or shoes whenever you walk in the hospital.  · Avoid quick, sudden movements.  ¨ Change positions slowly.  ¨ Sit on the side of your bed before standing.  ¨ Stand up slowly and wait before you start to walk.  · Let your health care provider know if there is a spill on the floor.  · Pay careful attention to the medical equipment, electrical cords, and tubes around you.  · When you need help, use your call button by your bed or in the bathroom. Wait for one of your health care providers to help you.  · If you feel dizzy or unsure of your footing, return to bed and wait for assistance.  · Avoid being distracted by the TV, telephone, or another person in your room.  · Do not lean or support yourself on rolling objects, such as IV poles or bedside tables.     This information is not intended to replace advice given to you by your health care provider. Make sure you discuss any questions you have with your health care provider.     Document Released: 12/15/2001 Document Revised: 01/08/2016 Document Reviewed: 08/25/2013  [employer] Patient  Education ©2016 [employer]       Surgical Site Infections FAQs  What is a Surgical Site Infection (SSI)?  A surgical site infection is an infection that occurs after surgery in the part of the body where the surgery took place. Most patients who have surgery do not develop an infection. However, infections develop in about 1 to 3 out of every 100 patients who have surgery.  Some of the common symptoms of a surgical site infection are:  · Redness and pain around the area where you had surgery  · Drainage of cloudy fluid from your surgical wound  · Fever  Can SSIs be treated?  Yes. Most surgical site infections can be treated with antibiotics. The antibiotic given to you depends on the bacteria (germs) causing the infection. Sometimes patients with SSIs also need another surgery to treat the infection.  What are some of the things that hospitals are doing to prevent SSIs?  To prevent SSIs, doctors, nurses, and other healthcare providers:  · Clean their hands and arms up to their elbows with an antiseptic agent just before the surgery.  · Clean their hands with soap and water or an alcohol-based hand rub before and after caring for each patient.  · May remove some of your hair immediately before your surgery using electric clippers if the hair is in the same area where the procedure will occur. They should not shave you with a razor.  · Wear special hair covers, masks, gowns, and gloves during surgery to keep the surgery area clean.  · Give you antibiotics before your surgery starts. In most cases, you should get antibiotics within 60 minutes before the surgery starts and the antibiotics should be stopped within 24 hours after surgery.  · Clean the skin at the site of your surgery with a special soap that kills germs.  What can I do to help prevent SSIs?  Before your surgery:  · Tell your doctor about other medical problems you may have. Health problems such as allergies, diabetes, and obesity could affect your  surgery and your treatment.  · Quit smoking. Patients who smoke get more infections. Talk to your doctor about how you can quit before your surgery.  · Do not shave near where you will have surgery. Shaving with a razor can irritate your skin and make it easier to develop an infection.  At the time of your surgery:  · Speak up if someone tries to shave you with a razor before surgery. Ask why you need to be shaved and talk with your surgeon if you have any concerns.  · Ask if you will get antibiotics before surgery.  After your surgery:  · Make sure that your healthcare providers clean their hands before examining you, either with soap and water or an alcohol-based hand rub.  · If you do not see your providers clean their hands, please ask them to do so.  · Family and friends who visit you should not touch the surgical wound or dressings.  · Family and friends should clean their hands with soap and water or an alcohol-based hand rub before and after visiting you. If you do not see them clean their hands, ask them to clean their hands.  What do I need to do when I go home from the hospital?  · Before you go home, your doctor or nurse should explain everything you need to know about taking care of your wound. Make sure you understand how to care for your wound before you leave the hospital.  · Always clean your hands before and after caring for your wound.  · Before you go home, make sure you know who to contact if you have questions or problems after you get home.  · If you have any symptoms of an infection, such as redness and pain at the surgery site, drainage, or fever, call your doctor immediately.  If you have additional questions, please ask your doctor or nurse.  Developed and co-sponsored by The Society for Healthcare Epidemiology of [place] (SHEA); Infectious Diseases Society of [place] (IDSA); American Hospital Association; Association for Professionals in Infection Control and Epidemiology (APIC); [facility]  for Disease Control and Prevention (CDC); and The Joint Commission.     This information is not intended to replace advice given to you by your health care provider. Make sure you discuss any questions you have with your health care provider.     Document Released: 12/23/2014 Document Revised: 01/08/2016 Document Reviewed: 03/02/2016  [employer] Interactive Patient Education ©2016 [employer]       [facility]  CHG 4% Patient Instruction Sheet    Preparing the Skin Before Surgery  Preparing or “prepping” skin before surgery can reduce the risk of infection at the surgical site. To make the process easier,[facility] has chosen 4% Chlorhexidine Gluconate (CHG) antiseptic solution.   The steps below outline the prepping process and should be carefully followed.                                                                                                                                                      Prep the skin at the following time(s):                                                      We recommend you shower the night before surgery, and again the morning of surgery with the 4% CHG antiseptic solution using half of the bottle and a cloth each time.  Dress in clean clothes/sleepwear after showering.  See instructions below for application.          Do not apply any lotions or moisturizers.       Do not shave the area to be prepped for at least 2 days prior to surgery.    Clipping the hair may be done immediately prior to your surgery at the hospital    if needed.    Directions:  Thoroughly rinse your body with water.  Apply 4% CHG to a cloth and wash skin gently, paying special attention to the operative site.  Rinse again thoroughly.  Once you have begun using this product do not apply anything else to your skin. If itching or redness persists, rinse affected areas and discontinue use.    When using this product:  • Keep out of eyes, ears, and mouth.  • If solution should contact these areas, rinse  out promptly and thoroughly with water.  • For external use only.  • Do not use in genital area, on your face or head.      PATIENT/FAMILY/RESPONSIBLE PARTY VERBALIZES UNDERSTANDING OF ABOVE EDUCATION.  COPY OF PAIN SCALE GIVEN AND REVIEWED WITH VERBALIZED UNDERSTANDING.

## 2017-09-20 ENCOUNTER — ANESTHESIA (OUTPATIENT)
Dept: PERIOP | Facility: HOSPITAL | Age: 57
End: 2017-09-20

## 2017-09-20 ENCOUNTER — ANESTHESIA EVENT (OUTPATIENT)
Dept: PERIOP | Facility: HOSPITAL | Age: 57
End: 2017-09-20

## 2017-09-20 ENCOUNTER — HOSPITAL ENCOUNTER (OUTPATIENT)
Facility: HOSPITAL | Age: 57
Setting detail: HOSPITAL OUTPATIENT SURGERY
Discharge: HOME | End: 2017-09-20
Attending: SPECIALIST | Admitting: SPECIALIST

## 2017-09-20 VITALS
RESPIRATION RATE: 16 BRPM | SYSTOLIC BLOOD PRESSURE: 133 MMHG | HEART RATE: 71 BPM | DIASTOLIC BLOOD PRESSURE: 89 MMHG | OXYGEN SATURATION: 94 % | TEMPERATURE: 98.1 F

## 2017-09-20 DIAGNOSIS — J98.4 LUNG ABNORMALITY: Primary | ICD-10-CM

## 2017-09-20 PROCEDURE — C1781 MESH (IMPLANTABLE): HCPCS | Performed by: SPECIALIST

## 2017-09-20 PROCEDURE — 25010000002 MIDAZOLAM PER 1 MG: Performed by: ANESTHESIOLOGY

## 2017-09-20 PROCEDURE — 25010000002 VANCOMYCIN PER 500 MG: Performed by: SPECIALIST

## 2017-09-20 PROCEDURE — 25010000002 DEXAMETHASONE PER 1 MG: Performed by: ANESTHESIOLOGY

## 2017-09-20 PROCEDURE — 25010000002 SUCCINYLCHOLINE PER 20 MG: Performed by: NURSE ANESTHETIST, CERTIFIED REGISTERED

## 2017-09-20 PROCEDURE — 25010000002 DEXAMETHASONE PER 1 MG: Performed by: NURSE ANESTHETIST, CERTIFIED REGISTERED

## 2017-09-20 PROCEDURE — 25010000002 KETOROLAC TROMETHAMINE PER 15 MG: Performed by: NURSE ANESTHETIST, CERTIFIED REGISTERED

## 2017-09-20 PROCEDURE — 25010000002 MORPHINE SULFATE (PF) 2 MG/ML SOLUTION: Performed by: ANESTHESIOLOGY

## 2017-09-20 PROCEDURE — 25010000002 PROPOFOL 10 MG/ML EMULSION: Performed by: NURSE ANESTHETIST, CERTIFIED REGISTERED

## 2017-09-20 PROCEDURE — 25010000002 ONDANSETRON PER 1 MG: Performed by: NURSE ANESTHETIST, CERTIFIED REGISTERED

## 2017-09-20 PROCEDURE — 25010000002 FENTANYL CITRATE (PF) 100 MCG/2ML SOLUTION: Performed by: ANESTHESIOLOGY

## 2017-09-20 PROCEDURE — 25010000002 HYDROMORPHONE PER 4 MG: Performed by: ANESTHESIOLOGY

## 2017-09-20 DEVICE — BARD MESH
Type: IMPLANTABLE DEVICE | Status: FUNCTIONAL
Brand: BARD MESH

## 2017-09-20 RX ORDER — DOCUSATE SODIUM 250 MG
250 CAPSULE ORAL 2 TIMES DAILY PRN
Qty: 100 CAPSULE | Refills: 4 | Status: SHIPPED | OUTPATIENT
Start: 2017-09-20 | End: 2018-04-23

## 2017-09-20 RX ORDER — MIDAZOLAM HYDROCHLORIDE 1 MG/ML
1 INJECTION INTRAMUSCULAR; INTRAVENOUS
Status: DISCONTINUED | OUTPATIENT
Start: 2017-09-20 | End: 2017-09-20 | Stop reason: HOSPADM

## 2017-09-20 RX ORDER — HYDROCODONE BITARTRATE AND ACETAMINOPHEN 7.5; 325 MG/1; MG/1
1 TABLET ORAL EVERY 4 HOURS PRN
Qty: 40 TABLET | Refills: 0 | Status: SHIPPED | OUTPATIENT
Start: 2017-09-20 | End: 2018-01-22

## 2017-09-20 RX ORDER — ONDANSETRON 2 MG/ML
INJECTION INTRAMUSCULAR; INTRAVENOUS AS NEEDED
Status: DISCONTINUED | OUTPATIENT
Start: 2017-09-20 | End: 2017-09-20 | Stop reason: SURG

## 2017-09-20 RX ORDER — PROPOFOL 10 MG/ML
VIAL (ML) INTRAVENOUS AS NEEDED
Status: DISCONTINUED | OUTPATIENT
Start: 2017-09-20 | End: 2017-09-20 | Stop reason: SURG

## 2017-09-20 RX ORDER — ROCURONIUM BROMIDE 10 MG/ML
INJECTION, SOLUTION INTRAVENOUS AS NEEDED
Status: DISCONTINUED | OUTPATIENT
Start: 2017-09-20 | End: 2017-09-20 | Stop reason: SURG

## 2017-09-20 RX ORDER — ONDANSETRON HCL 8 MG
8 TABLET ORAL EVERY 8 HOURS PRN
Qty: 10 TABLET | Refills: 1 | Status: SHIPPED | OUTPATIENT
Start: 2017-09-20 | End: 2018-01-22

## 2017-09-20 RX ORDER — MORPHINE SULFATE 2 MG/ML
2 INJECTION, SOLUTION INTRAMUSCULAR; INTRAVENOUS
Status: DISCONTINUED | OUTPATIENT
Start: 2017-09-20 | End: 2017-09-20 | Stop reason: HOSPADM

## 2017-09-20 RX ORDER — LABETALOL HYDROCHLORIDE 5 MG/ML
5 INJECTION, SOLUTION INTRAVENOUS
Status: DISCONTINUED | OUTPATIENT
Start: 2017-09-20 | End: 2017-09-20 | Stop reason: HOSPADM

## 2017-09-20 RX ORDER — HYDROCODONE BITARTRATE AND ACETAMINOPHEN 7.5; 325 MG/1; MG/1
1 TABLET ORAL ONCE AS NEEDED
Status: DISCONTINUED | OUTPATIENT
Start: 2017-09-20 | End: 2017-09-20 | Stop reason: HOSPADM

## 2017-09-20 RX ORDER — ONDANSETRON 2 MG/ML
4 INJECTION INTRAMUSCULAR; INTRAVENOUS ONCE AS NEEDED
Status: DISCONTINUED | OUTPATIENT
Start: 2017-09-20 | End: 2017-09-20 | Stop reason: HOSPADM

## 2017-09-20 RX ORDER — METHYLCELLULOSE 2 G/19G
2 POWDER, FOR SOLUTION ORAL DAILY
Qty: 60 G | Refills: 6 | Status: SHIPPED | OUTPATIENT
Start: 2017-09-20 | End: 2017-10-20

## 2017-09-20 RX ORDER — DEXTROSE MONOHYDRATE 25 G/50ML
12.5 INJECTION, SOLUTION INTRAVENOUS AS NEEDED
Status: DISCONTINUED | OUTPATIENT
Start: 2017-09-20 | End: 2017-09-20 | Stop reason: HOSPADM

## 2017-09-20 RX ORDER — DEXAMETHASONE SODIUM PHOSPHATE 4 MG/ML
INJECTION, SOLUTION INTRA-ARTICULAR; INTRALESIONAL; INTRAMUSCULAR; INTRAVENOUS; SOFT TISSUE AS NEEDED
Status: DISCONTINUED | OUTPATIENT
Start: 2017-09-20 | End: 2017-09-20 | Stop reason: SURG

## 2017-09-20 RX ORDER — SODIUM CHLORIDE 9 MG/ML
INJECTION, SOLUTION INTRAVENOUS CONTINUOUS PRN
Status: DISCONTINUED | OUTPATIENT
Start: 2017-09-20 | End: 2017-09-20 | Stop reason: HOSPADM

## 2017-09-20 RX ORDER — SODIUM CHLORIDE, SODIUM LACTATE, POTASSIUM CHLORIDE, CALCIUM CHLORIDE 600; 310; 30; 20 MG/100ML; MG/100ML; MG/100ML; MG/100ML
1000 INJECTION, SOLUTION INTRAVENOUS CONTINUOUS
Status: DISCONTINUED | OUTPATIENT
Start: 2017-09-20 | End: 2017-09-20 | Stop reason: HOSPADM

## 2017-09-20 RX ORDER — BUPIVACAINE HYDROCHLORIDE AND EPINEPHRINE 5; 5 MG/ML; UG/ML
INJECTION, SOLUTION PERINEURAL AS NEEDED
Status: DISCONTINUED | OUTPATIENT
Start: 2017-09-20 | End: 2017-09-20 | Stop reason: HOSPADM

## 2017-09-20 RX ORDER — SODIUM CHLORIDE, SODIUM LACTATE, POTASSIUM CHLORIDE, CALCIUM CHLORIDE 600; 310; 30; 20 MG/100ML; MG/100ML; MG/100ML; MG/100ML
9 INJECTION, SOLUTION INTRAVENOUS CONTINUOUS
Status: DISCONTINUED | OUTPATIENT
Start: 2017-09-20 | End: 2017-09-20 | Stop reason: HOSPADM

## 2017-09-20 RX ORDER — MEPERIDINE HYDROCHLORIDE 25 MG/ML
12.5 INJECTION INTRAMUSCULAR; INTRAVENOUS; SUBCUTANEOUS
Status: DISCONTINUED | OUTPATIENT
Start: 2017-09-20 | End: 2017-09-20 | Stop reason: HOSPADM

## 2017-09-20 RX ORDER — LIDOCAINE HYDROCHLORIDE 20 MG/ML
INJECTION, SOLUTION INFILTRATION; PERINEURAL AS NEEDED
Status: DISCONTINUED | OUTPATIENT
Start: 2017-09-20 | End: 2017-09-20 | Stop reason: SURG

## 2017-09-20 RX ORDER — SODIUM CHLORIDE 0.9 % (FLUSH) 0.9 %
1-10 SYRINGE (ML) INJECTION AS NEEDED
Status: DISCONTINUED | OUTPATIENT
Start: 2017-09-20 | End: 2017-09-20 | Stop reason: HOSPADM

## 2017-09-20 RX ORDER — DEXAMETHASONE SODIUM PHOSPHATE 4 MG/ML
4 INJECTION, SOLUTION INTRA-ARTICULAR; INTRALESIONAL; INTRAMUSCULAR; INTRAVENOUS; SOFT TISSUE ONCE AS NEEDED
Status: COMPLETED | OUTPATIENT
Start: 2017-09-20 | End: 2017-09-20

## 2017-09-20 RX ORDER — MIDAZOLAM HYDROCHLORIDE 1 MG/ML
2 INJECTION INTRAMUSCULAR; INTRAVENOUS
Status: DISCONTINUED | OUTPATIENT
Start: 2017-09-20 | End: 2017-09-20 | Stop reason: HOSPADM

## 2017-09-20 RX ORDER — SUCCINYLCHOLINE CHLORIDE 20 MG/ML
INJECTION INTRAMUSCULAR; INTRAVENOUS AS NEEDED
Status: DISCONTINUED | OUTPATIENT
Start: 2017-09-20 | End: 2017-09-20 | Stop reason: SURG

## 2017-09-20 RX ORDER — HYDRALAZINE HYDROCHLORIDE 20 MG/ML
5 INJECTION INTRAMUSCULAR; INTRAVENOUS
Status: DISCONTINUED | OUTPATIENT
Start: 2017-09-20 | End: 2017-09-20 | Stop reason: HOSPADM

## 2017-09-20 RX ORDER — NALOXONE HCL 0.4 MG/ML
0.4 VIAL (ML) INJECTION AS NEEDED
Status: DISCONTINUED | OUTPATIENT
Start: 2017-09-20 | End: 2017-09-20 | Stop reason: HOSPADM

## 2017-09-20 RX ORDER — KETOROLAC TROMETHAMINE 30 MG/ML
INJECTION, SOLUTION INTRAMUSCULAR; INTRAVENOUS AS NEEDED
Status: DISCONTINUED | OUTPATIENT
Start: 2017-09-20 | End: 2017-09-20 | Stop reason: SURG

## 2017-09-20 RX ORDER — DIPHENHYDRAMINE HYDROCHLORIDE 50 MG/ML
12.5 INJECTION INTRAMUSCULAR; INTRAVENOUS
Status: DISCONTINUED | OUTPATIENT
Start: 2017-09-20 | End: 2017-09-20 | Stop reason: HOSPADM

## 2017-09-20 RX ORDER — IPRATROPIUM BROMIDE AND ALBUTEROL SULFATE 2.5; .5 MG/3ML; MG/3ML
3 SOLUTION RESPIRATORY (INHALATION) ONCE AS NEEDED
Status: DISCONTINUED | OUTPATIENT
Start: 2017-09-20 | End: 2017-09-20 | Stop reason: HOSPADM

## 2017-09-20 RX ORDER — FENTANYL CITRATE 50 UG/ML
25 INJECTION, SOLUTION INTRAMUSCULAR; INTRAVENOUS
Status: DISCONTINUED | OUTPATIENT
Start: 2017-09-20 | End: 2017-09-20 | Stop reason: HOSPADM

## 2017-09-20 RX ORDER — SODIUM CHLORIDE 0.9 % (FLUSH) 0.9 %
3 SYRINGE (ML) INJECTION AS NEEDED
Status: DISCONTINUED | OUTPATIENT
Start: 2017-09-20 | End: 2017-09-20 | Stop reason: HOSPADM

## 2017-09-20 RX ADMIN — HYDROCODONE BITARTRATE AND ACETAMINOPHEN 1 TABLET: 7.5; 325 TABLET ORAL at 17:03

## 2017-09-20 RX ADMIN — VANCOMYCIN HYDROCHLORIDE 1000 MG: 1 INJECTION, POWDER, LYOPHILIZED, FOR SOLUTION INTRAVENOUS at 13:09

## 2017-09-20 RX ADMIN — ROCURONIUM BROMIDE 5 MG: 10 INJECTION INTRAVENOUS at 13:13

## 2017-09-20 RX ADMIN — ONDANSETRON HYDROCHLORIDE 4 MG: 2 SOLUTION INTRAMUSCULAR; INTRAVENOUS at 14:40

## 2017-09-20 RX ADMIN — SODIUM CHLORIDE, POTASSIUM CHLORIDE, SODIUM LACTATE AND CALCIUM CHLORIDE 1000 ML: 600; 310; 30; 20 INJECTION, SOLUTION INTRAVENOUS at 11:05

## 2017-09-20 RX ADMIN — HYDROMORPHONE HYDROCHLORIDE 0.5 MG: 1 INJECTION, SOLUTION INTRAMUSCULAR; INTRAVENOUS; SUBCUTANEOUS at 15:44

## 2017-09-20 RX ADMIN — LIDOCAINE HYDROCHLORIDE 60 MG: 20 INJECTION, SOLUTION INFILTRATION; PERINEURAL at 13:13

## 2017-09-20 RX ADMIN — MORPHINE SULFATE 2 MG: 2 INJECTION, SOLUTION INTRAMUSCULAR; INTRAVENOUS at 16:05

## 2017-09-20 RX ADMIN — FENTANYL CITRATE 150 MCG: 50 INJECTION, SOLUTION INTRAMUSCULAR; INTRAVENOUS at 13:13

## 2017-09-20 RX ADMIN — PROPOFOL 150 MG: 10 INJECTION, EMULSION INTRAVENOUS at 13:10

## 2017-09-20 RX ADMIN — LIDOCAINE HYDROCHLORIDE 0.5 ML: 10 INJECTION, SOLUTION EPIDURAL; INFILTRATION; INTRACAUDAL; PERINEURAL at 11:04

## 2017-09-20 RX ADMIN — HYDROMORPHONE HYDROCHLORIDE 0.5 MG: 1 INJECTION, SOLUTION INTRAMUSCULAR; INTRAVENOUS; SUBCUTANEOUS at 15:28

## 2017-09-20 RX ADMIN — HYDROMORPHONE HYDROCHLORIDE 0.5 MG: 1 INJECTION, SOLUTION INTRAMUSCULAR; INTRAVENOUS; SUBCUTANEOUS at 15:39

## 2017-09-20 RX ADMIN — KETOROLAC TROMETHAMINE 30 MG: 30 INJECTION, SOLUTION INTRAMUSCULAR at 14:57

## 2017-09-20 RX ADMIN — FENTANYL CITRATE 100 MCG: 50 INJECTION, SOLUTION INTRAMUSCULAR; INTRAVENOUS at 13:15

## 2017-09-20 RX ADMIN — DEXAMETHASONE SODIUM PHOSPHATE 4 MG: 4 INJECTION, SOLUTION INTRAMUSCULAR; INTRAVENOUS at 14:40

## 2017-09-20 RX ADMIN — SUCCINYLCHOLINE CHLORIDE 80 MG: 20 INJECTION, SOLUTION INTRAMUSCULAR; INTRAVENOUS at 13:10

## 2017-09-20 RX ADMIN — MIDAZOLAM HYDROCHLORIDE 2 MG: 1 INJECTION, SOLUTION INTRAMUSCULAR; INTRAVENOUS at 12:38

## 2017-09-20 RX ADMIN — ROCURONIUM BROMIDE 35 MG: 10 INJECTION INTRAVENOUS at 13:19

## 2017-09-20 RX ADMIN — DEXAMETHASONE SODIUM PHOSPHATE 4 MG: 4 INJECTION, SOLUTION INTRAMUSCULAR; INTRAVENOUS at 12:38

## 2017-09-20 RX ADMIN — SODIUM CHLORIDE, POTASSIUM CHLORIDE, SODIUM LACTATE AND CALCIUM CHLORIDE: 600; 310; 30; 20 INJECTION, SOLUTION INTRAVENOUS at 14:15

## 2017-09-20 RX ADMIN — HYDROMORPHONE HYDROCHLORIDE 0.5 MG: 1 INJECTION, SOLUTION INTRAMUSCULAR; INTRAVENOUS; SUBCUTANEOUS at 15:33

## 2017-09-20 NOTE — PLAN OF CARE
Problem: Perioperative Period (Adult)  Goal: Signs and Symptoms of Listed Potential Problems Will be Absent or Manageable (Perioperative Period)  Outcome: Ongoing (interventions implemented as appropriate)    09/20/17 8695   Perioperative Period   Problems Assessed (Perioperative Period) pain;perioperative injury;infection;situational response   Problems Present (Perioperative Period) situational response

## 2017-09-20 NOTE — OP NOTE
PREPERITONEAL HERNIA REPAIR LAPAROSCOPIC  Procedure Note    Michael Issa  9/20/2017    Pre-op Diagnosis:   BILATERAL INGUINAL HERNIAS    Post-op Diagnosis:     Bilateral inguinal hernia    Procedure/CPT® Codes:      Procedure(s):  BILATERAL PREPERITONEAL INGUINAL HERNIA REPAIR LAPAROSCOPIC WITH MESH    Surgeon(s):  Rambo Church MD    Anesthesia: General    Staff:   Circulator: Leonard Mehta RN; Scooby Park RN; Pat Velazquez RN  Scrub Person: Jacquelin Gonzalez; Elodia Brantley  Assistant: Owen Arthur; Elodia Serrano    Estimated Blood Loss: 10 mL    Specimens:                * No specimens in log *      Drains:       [REMOVED] Urethral Catheter 09/20/17 1321 16 10 10 (Removed)   Removed 09/20/17 1502   Irrigation Return clear;yellow 9/20/2017  1:44 PM   Irrigation Ease no resistance met 9/20/2017  1:44 PM   Foreskin circumcised 9/20/2017  1:44 PM   Catheter care done Yes 9/20/2017  1:44 PM   Tolerance no signs/symptoms of discomfort 9/20/2017  1:44 PM   Urine Output (mL) 25 9/20/2017  1:44 PM       Indications: Mr. Michael Issa is a 57-year-old gentleman who has a increasingly symptomatic right inguinal hernia.  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  No history of any incarceration no history of any abnormalities on the left but there is a very small defect on the left.  With the above problem we discussed options available he desires a laparoscopic bilateral preperitoneal hernia in treatment of these hernias is aware the risk and benefits and with full knowledge of this an apparent understanding he gives his informed consent for surgery.    Findings: Laparoscopic bilateral preperitoneal hernia accomplished there was no indirect component on the right or left there is a moderate size direct inguinal hernia on the right and a small  hernia on the left.  Bilateral preperitoneal hernia accomplished.  No cord lipoma.    Complications: Blood loss less than 25 mL    Procedure:Patient was placed in a supine position in the surgical suite and after a Daniels catheter had been placed, a timeout had been accomplished the area was scrubbed prepped and draped with alcohol and Betadine.  Following this and Ioban was applied a infraumbilical incision was completed incising through the skin and subcutaneous tissue to the fascia.  Once of the fascia the fascia was opened and the rectus muscle was identified.  A retro-rectus, preperitoneal dissection was begun dissection was completed retrorectus to the pubic symphysis with the [place] instrument and then a blunt dissection was further completed with the scope.  Following this a dissecting balloon was placed in this cavity and 35 pumps were used under direct visualization.  With the balloon in the preperitoneal area we then evacuated this and change to a structural balloon and insufflation with CO2 was completed to maintain the abdominal pressure between 10 and 14 mm.  Following this using the scope to dissect were able to place a 5 mm trocar lateral to the umbilicus lateral to the inferior epigastric vessels and then using the 5 mm trocar and a 10 mm scope were able to dissect the left side.  In the same fashion we placed a 5 mm trocar lateral to the umbilicus lateral to the inferior epigastric vessels and then with these 2  5 mm trochars and one 10 the procedure began.  Initially we dissected on the right side dissecting superior to the anterior superior iliac spine medially to the pubis inferior to the umbilicus looking down superior to the umbilicus looking down.  There was no indirect component noted on the right or left, and we stayed in a preperitoneal fashion.  With the right side dissected we then changed focused to the left side once again dissecting medially to the pubis laterally to the anterior  "superior iliac spine, inferior to the umbilicus looking down and superior to the umbilicus.  There was no indirect component on the left there was a small direct inguinal hernia there is no cord lipoma.  With this accomplished a 4-1/2 inch x 6\" piece of Marlex mesh was then cut to be placed on either side was placed and antibiotic irrigation and the left side was attacked first.  The 41/2 inch by 6 inch piece was deployed in the preperitoneal area he was affixed at the pubic symphysis and at the Grant's ligament ×3.  The superior margin was tagged and replaced in the inferior aspect was tucked under the peritoneal reflection.  This completed attention was then turned toward the right side once again a 4.5\" by 6 inch piece of Marlex mesh was brought into the field and it was affixed medially to the pubic symphysis medial and inferior to Grant's ligament and superior to the posterior rectus muscle.  With both areas deployed the inferior aspect of the mesh was then placed underneath the peritoneal reflection and under direct visualization with a grasper on the inferior aspect to prevent any movement the scope was removed and the CO2 was released.  With this accomplished excess gas was relieved trochars were removed the infraumbilical fascial defect was closed using 4 figure-of-eight sutures of 0 Vicryl the deep dermis was reapproximated using simple inverted interrupted sutures of 3-0 Vicryl and the skin was enclosed using a running subcuticular suture of 4-0 Vicryl.  Following this Mastisol, Steri-Strips, 2 x 2 and Tegaderm applied the patient was then extubated and transferred to the recovery room in good condition.    Rambo Church MD     Date: 9/20/2017  Time: 3:14 PM    EMR Dragon/Transcription disclaimer: Much of this encounter note is an electronic transcription/translation of spoken language to printed text. The electronic translation of spoken language may permit erroneous, or at times, nonsensical words " or phrases to be inadvertently transcribed; although I have reviewed the note for such errors, some may still exist.

## 2017-09-20 NOTE — ANESTHESIA PROCEDURE NOTES
Airway  Urgency: elective    Airway not difficult    General Information and Staff    Patient location during procedure: OR  CRNA: RICKEY ARGUELLES    Indications and Patient Condition  Indications for airway management: airway protection    Preoxygenated: yes  Mask difficulty assessment: 1 - vent by mask    Final Airway Details  Final airway type: endotracheal airway      Successful airway: ETT    Successful intubation technique: direct laryngoscopy  Endotracheal tube insertion site: oral  Blade: Ashleigh  Blade size: 3.5.  ETT size: 7.5 mm  Cormack-Lehane Classification: grade I - full view of glottis  Placement verified by: chest auscultation and capnometry   Measured from: gums  ETT to gums (cm): 22  Number of attempts at approach: 1

## 2017-09-20 NOTE — PLAN OF CARE
Problem: Patient Care Overview (Adult)  Goal: Plan of Care Review  Outcome: Ongoing (interventions implemented as appropriate)    09/20/17 7548   Coping/Psychosocial Response Interventions   Plan Of Care Reviewed With patient   Patient Care Overview   Progress improving   Outcome Evaluation   Outcome Summary/Follow up Plan patient states pain iis tolerable, pacu discharge critera met         Problem: Perioperative Period (Adult)  Goal: Signs and Symptoms of Listed Potential Problems Will be Absent or Manageable (Perioperative Period)  Outcome: Ongoing (interventions implemented as appropriate)

## 2017-09-20 NOTE — DISCHARGE INSTRUCTIONS

## 2017-09-20 NOTE — PLAN OF CARE
Problem: Patient Care Overview (Adult)  Goal: Plan of Care Review  Outcome: Outcome(s) achieved Date Met:  09/20/17 09/20/17 5541   Coping/Psychosocial Response Interventions   Plan Of Care Reviewed With patient;spouse   Patient Care Overview   Progress improving   Outcome Evaluation   Outcome Summary/Follow up Plan Patient meets discharge criteria and is ready for discharge.       Goal: Adult Individualization and Mutuality  Outcome: Outcome(s) achieved Date Met:  09/20/17  Goal: Discharge Needs Assessment  Outcome: Outcome(s) achieved Date Met:  09/20/17    Problem: Perioperative Period (Adult)  Goal: Signs and Symptoms of Listed Potential Problems Will be Absent or Manageable (Perioperative Period)  Outcome: Outcome(s) achieved Date Met:  09/20/17

## 2017-09-20 NOTE — PLAN OF CARE
Problem: Patient Care Overview (Adult)  Goal: Plan of Care Review  Outcome: Ongoing (interventions implemented as appropriate)    09/20/17 1235   Coping/Psychosocial Response Interventions   Plan Of Care Reviewed With patient   Patient Care Overview   Progress no change

## 2017-09-20 NOTE — ANESTHESIA PREPROCEDURE EVALUATION
Anesthesia Evaluation     Patient summary reviewed   no history of anesthetic complications:  NPO Solid Status: > 8 hours       Airway   Mallampati: II  TM distance: >3 FB  Neck ROM: full  Dental    (+) upper dentures    Pulmonary    (+) a smoker,   (-) COPD, asthma, sleep apnea  Cardiovascular   Exercise tolerance: excellent (>7 METS)    ECG reviewed    (+) hypertension,   (-) pacemaker, past MI, angina, cardiac stents      Neuro/Psych  (-) seizures, TIA, CVA  GI/Hepatic/Renal/Endo    (-) GERD, liver disease, no renal disease, diabetes    Musculoskeletal     Abdominal    Substance History      OB/GYN          Other                                        Anesthesia Plan    ASA 2     general     intravenous induction   Anesthetic plan and risks discussed with patient.

## 2017-09-21 NOTE — ANESTHESIA POSTPROCEDURE EVALUATION
Patient: Michael Issa    Procedure Summary     Date Anesthesia Start Anesthesia Stop Room / Location    09/20/17 1313 1509 [facility] OR 09 / [facility] OR       Procedure Diagnosis Surgeon Provider    BILATERAL PREPERITONEAL INGUINAL HERNIA REPAIR LAPAROSCOPIC WITH MESH (Bilateral Abdomen) (BILATERAL INGUINAL HERNIAS) MD Elodia Schroeder [facility], CRNA          Anesthesia Type: general  Last vitals  BP        Temp        Pulse       Resp        SpO2          Post Anesthesia Care and Evaluation      Comments: Patient discharged from PACU prior to anesthesia evaluation based on Narayan Score.  For details, see RN note.     /89 (BP Location: Right arm, Patient Position: Lying)  Pulse 71  Temp 98.1 °F (36.7 °C)  Resp 16  SpO2 94%

## 2017-09-25 ENCOUNTER — HOSPITAL ENCOUNTER (OUTPATIENT)
Dept: CT IMAGING | Facility: HOSPITAL | Age: 57
Discharge: HOME | End: 2017-09-25
Attending: SPECIALIST | Admitting: SPECIALIST

## 2017-09-25 DIAGNOSIS — J98.4 LUNG ABNORMALITY: ICD-10-CM

## 2017-09-25 LAB — CREAT BLDA-MCNC: 0.9 MG/DL (ref 0.6–1.3)

## 2017-09-25 PROCEDURE — 82565 ASSAY OF CREATININE: CPT

## 2017-09-25 PROCEDURE — 71260 CT THORAX DX C+: CPT

## 2017-09-25 PROCEDURE — 0 IOPAMIDOL 61 % SOLUTION: Performed by: SPECIALIST

## 2017-09-25 RX ADMIN — IOPAMIDOL 100 ML: 612 INJECTION, SOLUTION INTRAVENOUS at 15:00

## 2017-09-27 ENCOUNTER — OFFICE VISIT (OUTPATIENT)
Dept: FAMILY MEDICINE CLINIC | Facility: CLINIC | Age: 57
End: 2017-09-27

## 2017-09-27 VITALS
RESPIRATION RATE: 12 BRPM | SYSTOLIC BLOOD PRESSURE: 114 MMHG | TEMPERATURE: 98.1 F | WEIGHT: 190.8 LBS | HEIGHT: 73 IN | DIASTOLIC BLOOD PRESSURE: 74 MMHG | HEART RATE: 65 BPM | OXYGEN SATURATION: 95 % | BODY MASS INDEX: 25.29 KG/M2

## 2017-09-27 DIAGNOSIS — N52.8 OTHER MALE ERECTILE DYSFUNCTION: ICD-10-CM

## 2017-09-27 DIAGNOSIS — I10 ESSENTIAL HYPERTENSION: ICD-10-CM

## 2017-09-27 PROCEDURE — 99496 TRANSJ CARE MGMT HIGH F2F 7D: CPT | Performed by: NURSE PRACTITIONER

## 2017-09-27 RX ORDER — SILDENAFIL 100 MG/1
100 TABLET, FILM COATED ORAL DAILY PRN
Qty: 10 TABLET | Refills: 5 | Status: SHIPPED | OUTPATIENT
Start: 2017-09-27 | End: 2018-04-23 | Stop reason: SDUPTHER

## 2017-09-27 RX ORDER — LISINOPRIL AND HYDROCHLOROTHIAZIDE 20; 12.5 MG/1; MG/1
1 TABLET ORAL DAILY
Qty: 90 TABLET | Refills: 1 | Status: SHIPPED | OUTPATIENT
Start: 2017-09-27 | End: 2018-03-27 | Stop reason: SDUPTHER

## 2017-09-28 DIAGNOSIS — R74.8 ELEVATED LIVER ENZYMES: Primary | ICD-10-CM

## 2017-09-28 LAB
ALBUMIN SERPL-MCNC: 4 G/DL (ref 3.5–5)
ALBUMIN/GLOB SERPL: 1.4 G/DL (ref 1.1–2.5)
ALP SERPL-CCNC: 49 U/L (ref 24–120)
ALT SERPL-CCNC: 62 U/L (ref 0–54)
AST SERPL-CCNC: 48 U/L (ref 7–45)
BASOPHILS # BLD AUTO: 0.06 10*3/MM3 (ref 0–0.2)
BASOPHILS NFR BLD AUTO: 0.8 % (ref 0–2)
BILIRUB SERPL-MCNC: 0.4 MG/DL (ref 0.1–1)
BUN SERPL-MCNC: 15 MG/DL (ref 5–21)
BUN/CREAT SERPL: 17.9 (ref 7–25)
CALCIUM SERPL-MCNC: 9.1 MG/DL (ref 8.4–10.4)
CHLORIDE SERPL-SCNC: 100 MMOL/L (ref 98–110)
CHOLEST SERPL-MCNC: 165 MG/DL (ref 130–200)
CO2 SERPL-SCNC: 31 MMOL/L (ref 24–31)
CREAT SERPL-MCNC: 0.84 MG/DL (ref 0.5–1.4)
EOSINOPHIL # BLD AUTO: 0.51 10*3/MM3 (ref 0–0.7)
EOSINOPHIL NFR BLD AUTO: 6.7 % (ref 0–4)
ERYTHROCYTE [DISTWIDTH] IN BLOOD BY AUTOMATED COUNT: 13.7 % (ref 12–15)
GLOBULIN SER CALC-MCNC: 2.8 GM/DL
GLUCOSE SERPL-MCNC: 86 MG/DL (ref 70–100)
HCT VFR BLD AUTO: 46 % (ref 40–52)
HDLC SERPL-MCNC: 65 MG/DL
HGB BLD-MCNC: 15 G/DL (ref 14–18)
IMM GRANULOCYTES # BLD: 0.05 10*3/MM3 (ref 0–0.03)
IMM GRANULOCYTES NFR BLD: 0.7 % (ref 0–5)
LDLC SERPL CALC-MCNC: 86 MG/DL (ref 0–99)
LYMPHOCYTES # BLD AUTO: 1.51 10*3/MM3 (ref 0.72–4.86)
LYMPHOCYTES NFR BLD AUTO: 20 % (ref 15–45)
MCH RBC QN AUTO: 31.1 PG (ref 28–32)
MCHC RBC AUTO-ENTMCNC: 32.6 G/DL (ref 33–36)
MCV RBC AUTO: 95.4 FL (ref 82–95)
MONOCYTES # BLD AUTO: 0.68 10*3/MM3 (ref 0.19–1.3)
MONOCYTES NFR BLD AUTO: 9 % (ref 4–12)
NEUTROPHILS # BLD AUTO: 4.75 10*3/MM3 (ref 1.87–8.4)
NEUTROPHILS NFR BLD AUTO: 62.8 % (ref 39–78)
PLATELET # BLD AUTO: 234 10*3/MM3 (ref 130–400)
POTASSIUM SERPL-SCNC: 4.9 MMOL/L (ref 3.5–5.3)
PROT SERPL-MCNC: 6.8 G/DL (ref 6.3–8.7)
RBC # BLD AUTO: 4.82 10*6/MM3 (ref 4.8–5.9)
SODIUM SERPL-SCNC: 140 MMOL/L (ref 135–145)
TRIGL SERPL-MCNC: 71 MG/DL (ref 0–149)
VLDLC SERPL CALC-MCNC: 14.2 MG/DL
WBC # BLD AUTO: 7.56 10*3/MM3 (ref 4.8–10.8)

## 2017-10-02 PROBLEM — J84.10 CALCIFIED GRANULOMA OF LUNG (HCC): Status: ACTIVE | Noted: 2017-10-02

## 2017-10-02 PROBLEM — I71.9 AORTA ANEURYSM (HCC): Status: ACTIVE | Noted: 2017-10-02

## 2017-10-02 PROBLEM — J98.4 CALCIFIED GRANULOMA OF LUNG: Status: ACTIVE | Noted: 2017-10-02

## 2017-10-02 NOTE — PROGRESS NOTES
Chief Complaint   Patient presents with   • Hypertension     refills         Allergies   Allergen Reactions   • Penicillins Rash     Childhood allergy       HPI:  Michael Issa is a 57 y.o. male presents today for chronic problems. He had double hernia surgery 9/20/17 and says he was discharged after surgery.  He is doing fine.  Says they did a chest xray prior to surgery and it was abnormal so they did a chest ct that showed some things.   (I have pulled the reports and listed below).  He has chronic problems of HTn stable with lisinopirl/hctz and ED stable with sildenafil.        XR CHEST PA AND LATERAL- 9/18/2017 9:27 AM CST:    IMPRESSION:  1. LEFT lower lobe lung nodule. Further evaluation with CT is Recommended.    CT Chest:  IMPRESSION: 1. A calcified granuloma is seen in the LEFT lower lobe. This corresponds to the chest x-ray. There is no evidence of malignancy in the chest. 2. Aneurysmal dilation of the ascending aorta up to 4.4 cm. 3. Findings consistent with pulmonary arterial hypertension. This report was finalized on 09/25/2017 15:27 by Dr. Tim Ledezma MD.      Past Medical History:   Diagnosis Date   • Allergic rhinitis    • Arthritis    • Disease of thyroid gland     NODULE PRESENT    • Hayfever    • Hx of chest pain    • Hx of compression fracture of spine    • Hypertension    • Inguinal hernia     BILATERAL     Past Surgical History:   Procedure Laterality Date   • KNEE SURGERY  2014    rt knee MENISCUS   • PREPERITONEAL HERNIA REPAIR Bilateral 9/20/2017    Procedure: BILATERAL PREPERITONEAL INGUINAL HERNIA REPAIR LAPAROSCOPIC WITH MESH;  Surgeon: Rambo Church MD;  Location: [facility] OR;  Service:      Social History     Social History   • Marital status:      Spouse name: N/A   • Number of children: N/A   • Years of education: N/A     Social History Main Topics   • Smoking status: Former Smoker     Types: Cigarettes     Quit date: 4/18/2017   • Smokeless tobacco: Never Used   •  Alcohol use No   • Drug use: No   • Sexual activity: Defer     Other Topics Concern   • None     Social History Narrative     Family History   Problem Relation Age of Onset   • No Known Problems Mother    • Diabetes Father    • Heart disease Father    • No Known Problems Sister    • No Known Problems Brother    • No Known Problems Daughter    • No Known Problems Maternal Grandmother    • No Known Problems Maternal Grandfather    • No Known Problems Paternal Grandmother    • No Known Problems Paternal Grandfather    • Hypertension Brother    • No Known Problems Brother        Current Outpatient Prescriptions on File Prior to Visit   Medication Sig Dispense Refill   • Ascorbic Acid (VITAMIN C PO) Take 1,000 mg by mouth Daily. ON HOLD 9-15-17     • CITRUCEL oral powder Take 2 application by mouth Daily for 30 doses. 60 g 6   • docusate sodium (COLACE) 250 MG capsule Take 1 capsule by mouth 2 (Two) Times a Day As Needed for Constipation. 100 capsule 4   • HYDROcodone-acetaminophen (NORCO) 7.5-325 MG per tablet Take 1 tablet by mouth Every 4 (Four) Hours As Needed for Moderate Pain  for up to 40 doses. 40 tablet 0   • ZOFRAN 8 MG tablet Take 1 tablet by mouth Every 8 (Eight) Hours As Needed for Nausea or Vomiting for up to 10 doses. 10 tablet 1     No current facility-administered medications on file prior to visit.         REVIEW OF SYMPTOMS: (Positives bolded)  General:  weight loss, fever, chills, night sweats, fatigue, appetite loss  Respiratory: shortness of breath, cough, hemoptysis, wheezing, pleurisy,   Cardiovascular:  chest pain, PND, palpitation, edema, orthopnea, syncope, swelling of extremities  Gastro: Nausea, vomiting, diarrhea, hematemesis, abdominal pain, constipation  Genito: hematuria, dysuria, glycosuria, hesitancy, frequency, incontinence  Musckelo: Arthralgia, myalgia, muscle weakness, joint swelling, NSAID use  Skin: rash, pruritis, sores, nail changes, skin thickening, Healing incisions  Neuro:   "Migraine, numbness, ataxia, tremor, vertigo, weakness, memory loss,  \"All other systems reviewed and negative, except as listed above.”      OBJECTIVE:  Constitutional:  Appearance-No acute distress, Consistent with stated age. Orientation- Oriented x 3, alert Posture-Not doubled over. Gait-Normal pace, normal arm movement. Posture- Normal Build and Nutrition-Well developed and well nourished.  General- Patient is pleasant and cooperative with the interview and exam.    Integumentary: General-No rashes, ulcers or lesions. No edema.  Palpation- Normal skin moisture/turgor. Skin is warm to touch, no increased warmth. Capillary refill is normal bilateral Upper and lower extremity.     Head/Neck: Head- normocephalic and atraumatic.  Neck- without visible/palpable lumps or pulsations.  Palpation- No bony tenderness about head/neck along frontal, occiptial, temporal, parietal, mastoid, jawline, zygoma, orbit or any other location.  NO temporal artery tenderness. No TMJ tenderness. Normal cervical ROM.   Neck Supple.  Thyroid-No thyromegaly, no nodules    Eye: Bilaterally PERRLA, EOMI.  No discharge.  Upper and lower eyelids are normal. Sclera/conjunctiva normal without discharge. Cornea is normal and clear. Lens is normal.  Eyeball appears normal. No ciliary flushing, no conjunctival injection.    ENMT:  Pinna- normal without tenderness or erythema.  External auditory canal Left- normal without erythema or discharge, no excessive cerumen. External auditory canal Right-normal without erythema or discharge, no excessive cerumen. TM left- Grey/pearly, normal light reflex and anatomy TM Right- Grey/pearly, normal light reflex and anatomy Hearing Assessment-normal to conversational speech at 2-5 feet.  Nose and sinus-No sinus tenderness along frontal/maxillary region. External appearance normal and midline. Nares- bilateral quiet airflow, no discharge. Nasal mucosa- No bleeding noted and no ulcerations observed. Pink, moist. " Turbinates non boggy. Lips- normal color, moist without cracks/lesions Oral Cavity/Palate- hard/soft palate intact without lesions, oral mucosa pink and moist. Dentition assessed and discussed appropriate oral care. Tongue normal midline.  Oropharynx- no pharyngeal erythema, Uvula midline. No post nasal drip. No exudate. Salivary glands- Non tender to palpation    CHEST/LUNG: Inspection- symmetric chest wall no pectus deformity. Normal effort, no distress, no use of accessory muscles. Palpation- nontender sternum, ribline.  No abnormal pulsations. Auscultation- Breath sounds normal throughout all lung fields.  Normal tracheal sounds, Normal bronchial sounds overlying sternum, Bronchovessicular sounds normal between scapulae posteriorly, Normal vessicular breath sounds heard throughout periphery. Lungs are clear today. Adventitious sounds- No wheezes, rales, rhonchi.     CARDIOVASCULAR:  Carotid artery- normal, no bruits or abnormal pulsations. Jugular vein- no pulsations. Palpation/Percussion- Normal PMI, no palpable thrill  Auscultation- Regular rate and rhythm. No murmur noted in sitting, supine positions. Extremities- no digital clubbing, cyanosis, edema, increased warmth.    ABDOMEN: Inspection- Has 3 healing incisions from previous hernia surgery that have steri strips intact.  Normal contour. Auscultation- Bowel sounds normal, no abdominal bruits. Palpation/Percussion- soft, non-tender, no rebound tenderness, no rigidity (guarding), no jar tenderness, no masses.  Liver-no hepatomegaly, Spleen - no splenomegaly, Hernias- none. Rectal not examined.     Peripheral Vascular: Upper extremity Left- Normal temperature with pink nailbeds and no ulcerations.  Upper extremity Right- Normal temperature with pink nailbeds and no ulcerations.  Lower extremity- Normal temperature with pink nailbeds and no ulcerations. DP pulses 2+ bilaterally.  Pedal hair intact.  Normal capillary refill. Edema- No  edema.    Musculoskeletal: Generalized-No generalized swelling or edema of extremities, no digital clubbing or cyanosis, neurovascularly intact all four extremities.  Upper extremity- Symmetrical posture.  No visible deformity.  Normal sensation along medial and lateral upper extremity proximally and distally.  NO tenderness overlying shoulder, lateral/medial epicondyle.  5/5 and strength 5/5 bilateral UE.  Elbow palpated, no tenderness overlying olecranon.  Normal supination, pronation to active/passive ROM and to resisted rotation. Bicep insertion/tricep insertion appear normal without obvious pathology. Rotator cuff evaluated and intact.  Normal wrist ROM bilaterally. Normal hand movement, intrinsic muscles of hands normal. No tenderness to palpation of hands/wrists/elbows.  Lower extremity- Not tender to palpation, no pain, no swelling, edema or erythema of surrounding tissue, normal strength and tone.  Normal appearing hip ROM bilaterally without pain.  Knee ROM normal at 0-120 degrees. No tenderness overlying trochanters, no tenderness about patella, quad tendon, patellar tendon.  No tenderness at tibial tuberosity. Ankle normal ROM not tender to palpation along medial/lateral malleolus. Normal movement of toes, no tenderness bilateral feet/toes.  Normal foot type. Calves symmetrical.  Stretching demonstrated today.  Spine/Ribs- No deformities, masses or tenderness, no known fractures, normal strength, Normal ROM. Normal stability No tenderness along C/T/L spine.  Normal appearing ROM about spine.      Neurological: General- Moves all 4 extremities symmetrically. Symmetrical face and body posture. Cranial nerves- individually evaluated II-XII and intact. PERRLA, Normal EOMI, visual/special senses appear intact, Face is symmetrical and normal sensation/movement, normal tongue, normal strength/posture of neck musculature. Balance- Romberg intact.  Reflexes- ntact with DTR 2+ patellar, Achilles, bicep,  brachial,tricep. Ankle clonus normal with 2 beats.  Strength- 5/5 bilateral UE and LE. Soft touch- intact bilateral UE and LE.  Temperature sensation- intact bilateral UE and LE. Cerebellar testing-Rapid alternating movements intact.  Heel shin intact. Able to walk normal gait, normal heel toe walking. Neck- supple.      Neuropsych: Oriented- Person, place, time. (AAOx3), Mood/affect- normal and congruent. Able to articulate well. Speech-Normal speech, normal rate, normal tone, normal use of language, volume and coherence.  Thought content- normal with ability to perform basic computations and apply abstract thought/reason. Associations- intact, no SI/HI, no hallucinations, delusions, obsessions.  Judgment/insight- Appropriate. Memory-Recall intact, remote and recent memory intact. Knowledge- Age appropriate fund of knowledge, concentration and attention span normal.    Lymphatic: Head/Neck- normal size and non tender to palpation. Axillary- Head and neck LN are normal size and non tender to palpation. Femoral and Inguinal- normal size and non tender to palpation.      Assessment/Plan:  Michael was seen today for hypertension.    Diagnoses and all orders for this visit:    Essential hypertension  -     lisinopril-hydrochlorothiazide (ZESTORETIC) 20-12.5 MG per tablet; Take 1 tablet by mouth Daily.  -     CBC & Differential  -     Comprehensive Metabolic Panel  -     Lipid Panel    HTN: Suspect Essential HTN.Good BP control is encouraged with Goal BP based on JNC 8 guidelines 2014 <140/90 for patients with known cardiac disease and diabetes. (EVELYN. 2014:322 (5):507-520. doi:10.1001/evelyn.2013.06587): general population <60 yr old goal BP <140/90 and for those >60 <150/90.  For patients of all ages with Diabetes, CKD, Known CAD <140/90. Recommended to the patient to obtain electronic home BP machine with upper arm blood pressure cuff and to check regularly as instructed.  Keep BP log and bring to subsequent visits.  Stable, at goal.    a. Recommend if you do not have a home BP machine to obtain an electronic machine with arm blood pressure cuff.      b. Monitor BP over the next week and keep log to bring back to office. Discussed medication therapy however pt wants to try to control with diet exercise. .  Your provider  has recommended self-monitoring of your blood pressure.  If you do not have a blood pressure cuff you may purchase one from the local pharmacy.  You may ask the pharmacist which brand and model they recommend.  Obtain your blood pressure measurement at least 2x per week.  You should also check your blood pressure if you experience any symptoms of blurred visit, dizziness or headache.  Please record all blood pressure measurements and bring them to next office visit.  If you have any questions about the accuracy of your blood pressure machine please bring it in to the office and our staff will be happy to check accuracy.   c. Encouraged to eat a low sodium heart healthy diet  d. Offered handout on HTN educational topics.  These were provided if patient requested these today.  e. MEDS: as listed below  f. Risks/benefits of current and new medications discussed with the patient and or family today.  The patient/family are aware and accept that if there any side effects they should call or return to clinic as soon as possible.  Appropriate F/U discussed for topics addressed today. All questions were answered to the  satisfactory state of patient/family.  Should symptoms fail to improve or worsen they agree to call or return to clinic or to go to the ER. Education handouts were offered on any new Rx if requested.  Discussed the importance of following up with any needed screening tests/labs/specialist appointments and any requested follow-up recommended by me today.  Importance of maintaining follow-up discussed and patient accepts that missed appointments can delay diagnosis and potentially lead to worsening of  conditions.  g. ADA diet encouraged    Other male erectile dysfunction  -     sildenafil (VIAGRA) 100 MG tablet; Take 1 tablet by mouth Daily As Needed for erectile dysfunction.      Declines flu vaccine today: Discussed risks/benefits to vaccination, reviewed components of the vaccine, discussed VIS, discussed informed consent, informed consent obtained.  Patient was allowed to accept or refuse vaccine. Questions answered to satisfactory state of patient.  We reviewed typical age appropriate and seasonally appropriate vaccinations.  Reviewed immunization history and updated state vaccination form as needed    Declines Colonoscopy today:  Discussed risks/benefits to colonoscopy, Patient was allowed to accept or refuse scheduling of colonoscopy. Questions answered to satisfactory state of patient.      Return in about 6 months (around 3/27/2018), or if symptoms worsen or fail to improve.    Mansi Zapata, DNP, APRN-BC  09/27/2017

## 2017-10-30 ENCOUNTER — RESULTS ENCOUNTER (OUTPATIENT)
Dept: FAMILY MEDICINE CLINIC | Facility: CLINIC | Age: 57
End: 2017-10-30

## 2017-10-30 DIAGNOSIS — R74.8 ELEVATED LIVER ENZYMES: ICD-10-CM

## 2018-01-22 ENCOUNTER — OFFICE VISIT (OUTPATIENT)
Dept: FAMILY MEDICINE CLINIC | Facility: CLINIC | Age: 58
End: 2018-01-22

## 2018-01-22 VITALS
RESPIRATION RATE: 16 BRPM | WEIGHT: 208 LBS | DIASTOLIC BLOOD PRESSURE: 78 MMHG | HEART RATE: 78 BPM | BODY MASS INDEX: 27.57 KG/M2 | HEIGHT: 73 IN | SYSTOLIC BLOOD PRESSURE: 124 MMHG | TEMPERATURE: 98 F | OXYGEN SATURATION: 98 %

## 2018-01-22 DIAGNOSIS — Z72.0 DECLINED SMOKING CESSATION: ICD-10-CM

## 2018-01-22 DIAGNOSIS — J30.2 CHRONIC SEASONAL ALLERGIC RHINITIS, UNSPECIFIED TRIGGER: Primary | ICD-10-CM

## 2018-01-22 DIAGNOSIS — J01.00 ACUTE NON-RECURRENT MAXILLARY SINUSITIS: ICD-10-CM

## 2018-01-22 DIAGNOSIS — Z72.0 TOBACCO ABUSE: ICD-10-CM

## 2018-01-22 PROCEDURE — 99214 OFFICE O/P EST MOD 30 MIN: CPT | Performed by: NURSE PRACTITIONER

## 2018-01-22 PROCEDURE — 96372 THER/PROPH/DIAG INJ SC/IM: CPT | Performed by: NURSE PRACTITIONER

## 2018-01-22 RX ORDER — AZITHROMYCIN 250 MG/1
TABLET, FILM COATED ORAL
Qty: 6 TABLET | Refills: 0 | Status: SHIPPED | OUTPATIENT
Start: 2018-01-22 | End: 2018-04-23

## 2018-01-22 RX ORDER — FEXOFENADINE HCL AND PSEUDOEPHEDRINE HCI 180; 240 MG/1; MG/1
1 TABLET, EXTENDED RELEASE ORAL DAILY
Qty: 30 TABLET | Refills: 11 | Status: SHIPPED | OUTPATIENT
Start: 2018-01-22 | End: 2018-04-23 | Stop reason: SDUPTHER

## 2018-01-22 RX ORDER — DEXAMETHASONE SODIUM PHOSPHATE 10 MG/ML
10 INJECTION INTRAMUSCULAR; INTRAVENOUS ONCE
Status: COMPLETED | OUTPATIENT
Start: 2018-01-22 | End: 2018-01-22

## 2018-01-22 RX ADMIN — DEXAMETHASONE SODIUM PHOSPHATE 10 MG: 10 INJECTION INTRAMUSCULAR; INTRAVENOUS at 14:37

## 2018-01-22 NOTE — PROGRESS NOTES
Chief Complaint   Patient presents with   • Allergies     Requesting Rx for Allegra D   • Nasal Congestion     watery eyes        Allergies   Allergen Reactions   • Penicillins Rash     Childhood allergy       HPI:  Michael Issa is a 57 year old male here for complaints of allergies and sinusitis for the last 2 weeks.  Was taking allegra D however, pharmacist told him he can't get anymore without a prescription so he is here for that but no because he has been out, now he has a sinus infection.  Has had pain and pressure in sinuses, denies any fever or cough, does complain about head congestion.  Pt rates pain 5/10.  Chronic problems of ED stable with sildenafil, constipation stable with docusate sodium, HTN stable with lisinpril-HCTZ, and chronic seasonal allergies stable with fexofenadine-pseudoephedrine    Past Medical History:   Diagnosis Date   • Allergic rhinitis    • Arthritis    • Disease of thyroid gland     NODULE PRESENT    • Hayfever    • Hx of chest pain    • Hx of compression fracture of spine    • Hypertension    • Inguinal hernia     BILATERAL     Past Surgical History:   Procedure Laterality Date   • KNEE SURGERY  2014    rt knee MENISCUS   • PREPERITONEAL HERNIA REPAIR Bilateral 9/20/2017    Procedure: BILATERAL PREPERITONEAL INGUINAL HERNIA REPAIR LAPAROSCOPIC WITH MESH;  Surgeon: Rambo Church MD;  Location: [facility] OR;  Service:      Social History     Social History   • Marital status: Unknown     Spouse name: N/A   • Number of children: N/A   • Years of education: N/A     Social History Main Topics   • Smoking status: Former Smoker     Types: Cigarettes     Quit date: 4/18/2017   • Smokeless tobacco: Never Used   • Alcohol use No   • Drug use: No   • Sexual activity: Defer     Other Topics Concern   • None     Social History Narrative     Family History   Problem Relation Age of Onset   • No Known Problems Mother    • Diabetes Father    • Heart disease Father    • No Known Problems Sister   "  • No Known Problems Brother    • No Known Problems Daughter    • No Known Problems Maternal Grandmother    • No Known Problems Maternal Grandfather    • No Known Problems Paternal Grandmother    • No Known Problems Paternal Grandfather    • Hypertension Brother    • No Known Problems Brother        Current Outpatient Prescriptions on File Prior to Visit   Medication Sig Dispense Refill   • Ascorbic Acid (VITAMIN C PO) Take 1,000 mg by mouth Daily. ON HOLD 9-15-17     • docusate sodium (COLACE) 250 MG capsule Take 1 capsule by mouth 2 (Two) Times a Day As Needed for Constipation. 100 capsule 4   • lisinopril-hydrochlorothiazide (ZESTORETIC) 20-12.5 MG per tablet Take 1 tablet by mouth Daily. 90 tablet 1   • sildenafil (VIAGRA) 100 MG tablet Take 1 tablet by mouth Daily As Needed for erectile dysfunction. 10 tablet 5   • [DISCONTINUED] HYDROcodone-acetaminophen (NORCO) 7.5-325 MG per tablet Take 1 tablet by mouth Every 4 (Four) Hours As Needed for Moderate Pain  for up to 40 doses. 40 tablet 0   • [DISCONTINUED] ZOFRAN 8 MG tablet Take 1 tablet by mouth Every 8 (Eight) Hours As Needed for Nausea or Vomiting for up to 10 doses. 10 tablet 1     No current facility-administered medications on file prior to visit.         ROS:  REVIEW OF SYMPTOMS: (Positives bolded)  General: decreased vision, sore throat tinnitus, bloody nose, hearing loss, sinus pain/pressure, ear pain/pressure.   Respiratory: shortness of breath, cough, hemoptysis, wheezing, pleurisy,   Cardiovascular:  chest pain, PND, palpitation, edema, orthopnea, syncope, swelling of extremities  Gastro: Nausea, vomiting, diarrhea, hematemesis, abdominal pain, constipation  Neuro:  Migraine, numbness, ataxia, tremor, vertigo, weakness, memory loss, Irritability, dizziness  Allergic/immune: allergic rhinitis, hay fever, asthma, hives  \"All other systems reviewed and negative, except as listed above.”      OBJECTIVE:  Constitutional:  Alert, oriented x 3, well " developed, well nourished. Consistent with stated age. Not in acute distress.  Has normal posture. Gait and station normal. .  Behavior appropriate. Patient is pleasant and cooperative with the interview and exam.    Skin: No rashes, no visible scars or suspicious moles noted.  Skin is warm to touch. Normal appropriate skin turgor.  Capillary refill is normal bilateral Upper and lower extremity.     Head/Neck: Head is normocephalic and atraumatic.  Neck without visible/palpable lumps.  No thyromegaly.    Eye: Bilaterally EOMI.  PERRLA.  Sclera/conjunctiva is normal, no discharge. Cornea is normal and clear. Lens is normal.  Eyeball normal. Upper eyelid normal.  Lower eyelid normal.   Abnormal: allergic shiners, maira-orbital puffiness    OROPHARYNX: without redness or post nasal drip, no exudate, no irregularities, tonsils normal, Mucosa pink and moist, posterior pharynx erythematous. Dentition average for age. No obvious dental carries. No lesions. Tongue normal.    EARS: Bilateral auditory canal normal, without redness or cerumen impaction. Bilateral Tympanic membrane Normal, pearly gray with good cone of light and landmarks.  Hearing grossly intact to normal conversation.     NOSE: Purulent drainage, mucosa is erythematous, edematous and congested, nares patent    SINUS:  Frontal and maxillary sinus tenderness on palpation.       CHEST/LUNG: No use of accessory muscles, chest non-tender on palpation.  Breath sounds normal throughout all lung fields.  No wheezes, rales, rhonchi.    CARDIOVASCULAR:  Inspection: Carotid artery bilateral normal, no bruits, pulse regular.  Palpation/Percussion Bilateral normal pulsations.  Auscultation: Regular rate and rhythm. No murmur noted in sitting, supine, standing or squatting positions.    LYMPH: Cervical Nodes-normal, size; non-tender to palpation. Axillary Nodes- normal size; non-tender to palpation.     Assessment:  Michael was seen today for allergies and nasal  congestion.    Diagnoses and all orders for this visit:    Chronic seasonal allergic rhinitis, unspecified trigger  -     fexofenadine-pseudoephedrine (ALLEGRA-D 24) 180-240 MG per 24 hr tablet; Take 1 tablet by mouth Daily.  -     dexamethasone (DECADRON) injection 10 mg; Inject 1 mL into the shoulder, thigh, or buttocks 1 (One) Time.    Acute non-recurrent maxillary sinusitis  -     azithromycin (ZITHROMAX) 250 MG tablet; Take 2 tablets the first day, then 1 tablet daily for 4 days.    Smoking Cessation: Declines smoking    Definition:  Acute, subacute, or chronic inflammation of the mucous membranes that line the paranasal sinuses and concomitant inflammation of nasal mucosa    Pathogenesis:    • Most common viruses are more common than bacteria  • Streptococcus pneumoniae; penicillin-resistant S. pneumonia is becoming common   • Haemophilus influenza (may be B-lactaase producing)  • Moraxella catarrhalis (may be B-lactamase producing) prevalence greater among children than adults  • Streptococcus pyogenes  • Other: Streptococcus species, chlamydia pneumonia, starpylococcus aureus, fungi            Differential:   URI, Allergic rhinitis, neoplasms, dental abscess, [place], Foreign body    Diagnostic: none typically            • Adjunctive Therapy  a. Intranasal saline irrigation with either physiologic or hypertonic saline is recommended  b. Intranasal corticosteroids in addition to antibiotics  c. Netti pot  d. Good handwashing  e. If smoke-recommend smoking cessation  f.  Humidify the air; steam inhalation and warm compresses often help relieve pressure  g. Increase fluid intake  h. Sleep with bed elevated  i. Avoid allergens and excessively dry heat  j. Avoid swimming/diving and air travel during acute period  k. Avoid use of antihistamines unless there is an allergic basis   l. Teach proper application of nasal sprays      I spoke with the patient about the benefits and risks associated with antibiotic therapy;  the benefits include treating a bacterial infection, preventing the spread of disease, and minimizing serious complications associated with bacterial diseases; the risks include antibiotic resistance, allergic reactions, oral and/ or vaginal candidia, and GI related side effects such as an upset stomach and diarrhea, as well as placing the patient at an increase risk for C-diff; verbal acknowledgement of these risk were obtained; based on the patients complaint and clinical finding, no antibiotics are required at this time.             Return in about 1 week (around 1/29/2018), or if symptoms worsen or fail to improve.    Mansi Zapata, DNP, APRN-BC  01/22/2018

## 2018-01-22 NOTE — PATIENT INSTRUCTIONS

## 2018-03-27 DIAGNOSIS — I10 ESSENTIAL HYPERTENSION: ICD-10-CM

## 2018-03-27 RX ORDER — LISINOPRIL AND HYDROCHLOROTHIAZIDE 20; 12.5 MG/1; MG/1
1 TABLET ORAL DAILY
Qty: 30 TABLET | Refills: 0 | Status: SHIPPED | OUTPATIENT
Start: 2018-03-27 | End: 2018-04-23 | Stop reason: SDUPTHER

## 2018-03-27 NOTE — TELEPHONE ENCOUNTER
Pt needs to be seen for chronic visit and labs.  Will give him 30 days to get in here since I will be on vacation

## 2018-04-23 ENCOUNTER — OFFICE VISIT (OUTPATIENT)
Dept: FAMILY MEDICINE CLINIC | Facility: CLINIC | Age: 58
End: 2018-04-23

## 2018-04-23 VITALS
RESPIRATION RATE: 18 BRPM | HEART RATE: 81 BPM | DIASTOLIC BLOOD PRESSURE: 84 MMHG | HEIGHT: 73 IN | TEMPERATURE: 97.6 F | BODY MASS INDEX: 28.49 KG/M2 | SYSTOLIC BLOOD PRESSURE: 110 MMHG | WEIGHT: 215 LBS | OXYGEN SATURATION: 99 %

## 2018-04-23 DIAGNOSIS — I10 ESSENTIAL HYPERTENSION: Primary | ICD-10-CM

## 2018-04-23 DIAGNOSIS — N52.8 OTHER MALE ERECTILE DYSFUNCTION: ICD-10-CM

## 2018-04-23 DIAGNOSIS — E66.3 OVERWEIGHT (BMI 25.0-29.9): ICD-10-CM

## 2018-04-23 DIAGNOSIS — J30.1 CHRONIC SEASONAL ALLERGIC RHINITIS DUE TO POLLEN: ICD-10-CM

## 2018-04-23 PROCEDURE — 99214 OFFICE O/P EST MOD 30 MIN: CPT | Performed by: NURSE PRACTITIONER

## 2018-04-23 PROCEDURE — 96372 THER/PROPH/DIAG INJ SC/IM: CPT | Performed by: NURSE PRACTITIONER

## 2018-04-23 RX ORDER — FEXOFENADINE HCL AND PSEUDOEPHEDRINE HCI 180; 240 MG/1; MG/1
1 TABLET, EXTENDED RELEASE ORAL DAILY
Qty: 30 TABLET | Refills: 6 | Status: SHIPPED | OUTPATIENT
Start: 2018-04-23 | End: 2019-01-02 | Stop reason: SDUPTHER

## 2018-04-23 RX ORDER — DEXAMETHASONE SODIUM PHOSPHATE 10 MG/ML
10 INJECTION INTRAMUSCULAR; INTRAVENOUS ONCE
Status: COMPLETED | OUTPATIENT
Start: 2018-04-23 | End: 2018-04-23

## 2018-04-23 RX ORDER — SILDENAFIL 100 MG/1
100 TABLET, FILM COATED ORAL DAILY PRN
Qty: 10 TABLET | Refills: 6 | Status: SHIPPED | OUTPATIENT
Start: 2018-04-23 | End: 2019-02-01

## 2018-04-23 RX ORDER — LISINOPRIL AND HYDROCHLOROTHIAZIDE 20; 12.5 MG/1; MG/1
1 TABLET ORAL DAILY
Qty: 30 TABLET | Refills: 6 | Status: SHIPPED | OUTPATIENT
Start: 2018-04-23 | End: 2018-12-03 | Stop reason: SDUPTHER

## 2018-04-23 RX ORDER — FLUTICASONE PROPIONATE 50 MCG
2 SPRAY, SUSPENSION (ML) NASAL DAILY
Qty: 1 BOTTLE | Refills: 5 | Status: SHIPPED | OUTPATIENT
Start: 2018-04-23 | End: 2019-01-02 | Stop reason: SDUPTHER

## 2018-04-23 RX ADMIN — DEXAMETHASONE SODIUM PHOSPHATE 10 MG: 10 INJECTION INTRAMUSCULAR; INTRAVENOUS at 10:23

## 2018-04-23 NOTE — PATIENT INSTRUCTIONS
How to Take Your Blood Pressure  Blood pressure is a measurement of how strongly your blood is pressing against the walls of your arteries. Arteries are blood vessels that carry blood from your heart throughout your body. Your health care provider takes your blood pressure at each office visit. You can also take your own blood pressure at home with a blood pressure machine. You may need to take your own blood pressure:  · To confirm a diagnosis of high blood pressure (hypertension).  · To monitor your blood pressure over time.  · To make sure your blood pressure medicine is working.  Supplies needed:  To take your blood pressure, you will need a blood pressure machine. You can buy a blood pressure machine, or blood pressure monitor, at most [email or web address] or online. There are several types of home blood pressure monitors. When choosing one, consider the following:  · Choose a monitor that has an arm cuff.  · Choose a monitor that wraps snugly around your upper arm. You should be able to fit only one finger between your arm and the cuff.  · Do not choose a monitor that measures your blood pressure from your wrist or finger.  Your health care provider can suggest a reliable monitor that will meet your needs.  How to prepare  To get the most accurate reading, avoid the following for 30 minutes before you check your blood pressure:  · Drinking caffeine.  · Drinking alcohol.  · Eating.  · Smoking.  · Exercising.  Five minutes before you check your blood pressure:  · Empty your bladder.  · Sit quietly without talking in a dining chair, rather than in a soft couch or armchair.  How to take your blood pressure  To check your blood pressure, follow the instructions in the manual that came with your blood pressure monitor. If you have a digital blood pressure monitor, the instructions may be as follows:  1. Sit up straight.  2. Place your feet on the floor. Do not cross your ankles or legs.  3. Rest your left arm at the level of  "your heart on a table or desk or on the arm of a chair.  4. Pull up your shirt sleeve.  5. Wrap the blood pressure cuff around the upper part of your left arm, 1 inch (2.5 cm) above your elbow. It is best to wrap the cuff around bare skin.  6. Fit the cuff snugly around your arm. You should be able to place only one finger between the cuff and your arm.  7. Position the cord inside the groove of your elbow.  8. Press the power button.  9. Sit quietly while the cuff inflates and deflates.  10. Read the digital reading on the monitor screen and write it down (record it).  11. Wait 2-3 minutes, then repeat the steps, starting at step 1.  What does my blood pressure reading mean?  A blood pressure reading consists of a higher number over a lower number. Ideally, your blood pressure should be below 120/80. The first (\"top\") number is called the systolic pressure. It is a measure of the pressure in your arteries as your heart beats. The second (\"bottom\") number is called the diastolic pressure. It is a measure of the pressure in your arteries as the heart relaxes.  Blood pressure is classified into four stages. The following are the stages for adults who do not have a short-term serious illness or a chronic condition. Systolic pressure and diastolic pressure are measured in a unit called mm Hg.  Normal   · Systolic pressure: below 120.  · Diastolic pressure: below 80.  Elevated   · Systolic pressure: 120-129.  · Diastolic pressure: below 80.  Hypertension stage 1   · Systolic pressure: 130-139.  · Diastolic pressure: 80-89.  Hypertension stage 2   · Systolic pressure: 140 or above.  · Diastolic pressure: 90 or above.  You can have prehypertension or hypertension even if only the systolic or only the diastolic number in your reading is higher than normal.  Follow these instructions at home:  · Check your blood pressure as often as recommended by your health care provider.  · Take your monitor to the next appointment with " "your health care provider to make sure:  ¨ That you are using it correctly.  ¨ That it provides accurate readings.  · Be sure you understand what your goal blood pressure numbers are.  · Tell your health care provider if you are having any side effects from blood pressure medicine.  Contact a health care provider if:  · Your blood pressure is consistently high.  Get help right away if:  · Your systolic blood pressure is higher than 180.  · Your diastolic blood pressure is higher than 110.  This information is not intended to replace advice given to you by your health care provider. Make sure you discuss any questions you have with your health care provider.  Document Released: 05/26/2017 Document Revised: 08/08/2017 Document Reviewed: 05/26/2017  [employer] Interactive Patient Education © 2017 [employer]  DASH Eating Plan  DASH stands for \"Dietary Approaches to Stop Hypertension.\" The DASH eating plan is a healthy eating plan that has been shown to reduce high blood pressure (hypertension). It may also reduce your risk for type 2 diabetes, heart disease, and stroke. The DASH eating plan may also help with weight loss.  What are tips for following this plan?  General guidelines   · Avoid eating more than 2,300 mg (milligrams) of salt (sodium) a day. If you have hypertension, you may need to reduce your sodium intake to 1,500 mg a day.  · Limit alcohol intake to no more than 1 drink a day for nonpregnant women and 2 drinks a day for men. One drink equals 12 oz of beer, 5 oz of wine, or 1½ oz of hard liquor.  · Work with your health care provider to maintain a healthy body weight or to lose weight. Ask what an ideal weight is for you.  · Get at least 30 minutes of exercise that causes your heart to beat faster (aerobic exercise) most days of the week. Activities may include walking, swimming, or biking.  · Work with your health care provider or diet and nutrition specialist (dietitian) to adjust your eating plan to " "your individual calorie needs.  Reading food labels   · Check food labels for the amount of sodium per serving. Choose foods with less than 5 percent of the Daily Value of sodium. Generally, foods with less than 300 mg of sodium per serving fit into this eating plan.  · To find whole grains, look for the word \"whole\" as the first word in the ingredient list.  Shopping   · Buy products labeled as \"low-sodium\" or \"no salt added.\"  · Buy fresh foods. Avoid canned foods and premade or frozen meals.  Cooking   · Avoid adding salt when cooking. Use salt-free seasonings or herbs instead of table salt or sea salt. Check with your health care provider or pharmacist before using salt substitutes.  · Do not harrison foods. Cook foods using healthy methods such as baking, boiling, grilling, and broiling instead.  · Cook with heart-healthy oils, such as olive, canola, soybean, or sunflower oil.  Meal planning     · Eat a balanced diet that includes:  ¨ 5 or more servings of fruits and vegetables each day. At each meal, try to fill half of your plate with fruits and vegetables.  ¨ Up to 6-8 servings of whole grains each day.  ¨ Less than 6 oz of lean meat, poultry, or fish each day. A 3-oz serving of meat is about the same size as a deck of cards. One egg equals 1 oz.  ¨ 2 servings of low-fat dairy each day.  ¨ A serving of nuts, seeds, or beans 5 times each week.  ¨ Heart-healthy fats. Healthy fats called Omega-3 fatty acids are found in foods such as flaxseeds and coldwater fish, like sardines, salmon, and mackerel.  · Limit how much you eat of the following:  ¨ Canned or prepackaged foods.  ¨ Food that is high in trans fat, such as fried foods.  ¨ Food that is high in saturated fat, such as fatty meat.  ¨ Sweets, desserts, sugary drinks, and other foods with added sugar.  ¨ Full-fat dairy products.  · Do not salt foods before eating.  · Try to eat at least 2 vegetarian meals each week.  · Eat more home-cooked food and less " restaurant, buffet, and fast food.  · When eating at a restaurant, ask that your food be prepared with less salt or no salt, if possible.  What foods are recommended?  The items listed may not be a complete list. Talk with your dietitian about what dietary choices are best for you.  Grains   Whole-grain or whole-wheat bread. Whole-grain or whole-wheat pasta. Brown rice. Oatmeal. Quinoa. Bulgur. Whole-grain and low-sodium cereals. Alanna bread. Low-fat, low-sodium crackers. Whole-wheat flour tortillas.  Vegetables   Fresh or frozen vegetables (raw, steamed, roasted, or grilled). Low-sodium or reduced-sodium tomato and vegetable juice. Low-sodium or reduced-sodium tomato sauce and tomato paste. Low-sodium or reduced-sodium canned vegetables.  Fruits   All fresh, dried, or frozen fruit. Canned fruit in natural juice (without added sugar).  Meat and other protein foods   Skinless chicken or turkey. Ground chicken or turkey. Pork with fat trimmed off. Fish and seafood. Egg whites. Dried beans, peas, or lentils. Unsalted nuts, nut butters, and seeds. Unsalted canned beans. Lean cuts of beef with fat trimmed off. Low-sodium, lean deli meat.  Dairy   Low-fat (1%) or fat-free (skim) milk. Fat-free, low-fat, or reduced-fat cheeses. Nonfat, low-sodium ricotta or cottage cheese. Low-fat or nonfat yogurt. Low-fat, low-sodium cheese.  Fats and oils   Soft margarine without trans fats. Vegetable oil. Low-fat, reduced-fat, or light mayonnaise and salad dressings (reduced-sodium). Canola, safflower, olive, soybean, and sunflower oils. Avocado.  Seasoning and other foods   Herbs. Spices. Seasoning mixes without salt. Unsalted popcorn and pretzels. Fat-free sweets.  What foods are not recommended?  The items listed may not be a complete list. Talk with your dietitian about what dietary choices are best for you.  Grains   Baked goods made with fat, such as croissants, muffins, or some breads. Dry pasta or rice meal packs.  Vegetables    Creamed or fried vegetables. Vegetables in a cheese sauce. Regular canned vegetables (not low-sodium or reduced-sodium). Regular canned tomato sauce and paste (not low-sodium or reduced-sodium). Regular tomato and vegetable juice (not low-sodium or reduced-sodium). Pickles. Olives.  Fruits   Canned fruit in a light or heavy syrup. Fried fruit. Fruit in cream or butter sauce.  Meat and other protein foods   Fatty cuts of meat. Ribs. Fried meat. Haney. Sausage. Bologna and other processed lunch meats. Salami. Fatback. Hotdogs. Bratwurst. Salted nuts and seeds. Canned beans with added salt. Canned or smoked fish. Whole eggs or egg yolks. Chicken or turkey with skin.  Dairy   Whole or 2% milk, cream, and half-and-half. Whole or full-fat cream cheese. Whole-fat or sweetened yogurt. Full-fat cheese. Nondairy creamers. Whipped toppings. Processed cheese and cheese spreads.  Fats and oils   Butter. Stick margarine. Lard. Shortening. Ghee. Haney fat. Tropical oils, such as coconut, palm kernel, or palm oil.  Seasoning and other foods   Salted popcorn and pretzels. Onion salt, garlic salt, seasoned salt, table salt, and sea salt. Worcestershire sauce. Tartar sauce. Barbecue sauce. Teriyaki sauce. Soy sauce, including reduced-sodium. Steak sauce. Canned and packaged gravies. Fish sauce. Oyster sauce. Cocktail sauce. Horseradish that you find on the shelf. Ketchup. Mustard. Meat flavorings and tenderizers. Bouillon cubes. Hot sauce and Tabasco sauce. Premade or packaged marinades. Premade or packaged taco seasonings. Relishes. Regular salad dressings.  Where to find more information:  · National [employer], [employer], and [employer] [place]: www.nhlbi.nih.gov  · American Heart Association: www.heart.org  Summary  · The DASH eating plan is a healthy eating plan that has been shown to reduce high blood pressure (hypertension). It may also reduce your risk for type 2 diabetes, heart disease, and stroke.  · With the DASH eating plan, you  should limit salt (sodium) intake to 2,300 mg a day. If you have hypertension, you may need to reduce your sodium intake to 1,500 mg a day.  · When on the DASH eating plan, aim to eat more fresh fruits and vegetables, whole grains, lean proteins, low-fat dairy, and heart-healthy fats.  · Work with your health care provider or diet and nutrition specialist (dietitian) to adjust your eating plan to your individual calorie needs.  This information is not intended to replace advice given to you by your health care provider. Make sure you discuss any questions you have with your health care provider.  Document Released: 12/06/2012 Document Revised: 12/11/2017 Document Reviewed: 12/11/2017  [employer] Interactive Patient Education © 2017 [employer]  Allergic Rhinitis  Allergic rhinitis is when the mucous membranes in the nose respond to allergens. Allergens are particles in the air that cause your body to have an allergic reaction. This causes you to release allergic antibodies. Through a chain of events, these eventually cause you to release histamine into the blood stream. Although meant to protect the body, it is this release of histamine that causes your discomfort, such as frequent sneezing, congestion, and an itchy, runny nose.  What are the causes?  Seasonal allergic rhinitis (hay fever) is caused by pollen allergens that may come from grasses, trees, and weeds. Year-round allergic rhinitis (perennial allergic rhinitis) is caused by allergens such as house dust mites, pet dander, and mold spores.  What are the signs or symptoms?  · Nasal stuffiness (congestion).  · Itchy, runny nose with sneezing and tearing of the eyes.  How is this diagnosed?  Your health care provider can help you determine the allergen or allergens that trigger your symptoms. If you and your health care provider are unable to determine the allergen, skin or blood testing may be used. Your health care provider will diagnose your condition after  taking your health history and performing a physical exam. Your health care provider may assess you for other related conditions, such as asthma, pink eye, or an ear infection.  How is this treated?  Allergic rhinitis does not have a cure, but it can be controlled by:  · Medicines that block allergy symptoms. These may include allergy shots, nasal sprays, and oral antihistamines.  · Avoiding the allergen.  Hay fever may often be treated with antihistamines in pill or nasal spray forms. Antihistamines block the effects of histamine. There are over-the-counter medicines that may help with nasal congestion and swelling around the eyes. Check with your health care provider before taking or giving this medicine.  If avoiding the allergen or the medicine prescribed do not work, there are many new medicines your health care provider can prescribe. Stronger medicine may be used if initial measures are ineffective. Desensitizing injections can be used if medicine and avoidance does not work. Desensitization is when a patient is given ongoing shots until the body becomes less sensitive to the allergen. Make sure you follow up with your health care provider if problems continue.  Follow these instructions at home:  It is not possible to completely avoid allergens, but you can reduce your symptoms by taking steps to limit your exposure to them. It helps to know exactly what you are allergic to so that you can avoid your specific triggers.  Contact a health care provider if:  · You have a fever.  · You develop a cough that does not stop easily (persistent).  · You have shortness of breath.  · You start wheezing.  · Symptoms interfere with normal daily activities.  This information is not intended to replace advice given to you by your health care provider. Make sure you discuss any questions you have with your health care provider.  Document Released: 09/12/2002 Document Revised: 08/18/2017 Document Reviewed:  08/25/2014  [employer] Interactive Patient Education © 2017 [employer]

## 2018-04-23 NOTE — PROGRESS NOTES
"Chief Complaint   Patient presents with   • Allergies     Patient is here today for allergies that started yesterday evening with sneezing and runny nose.   • Med Refill     Patient is here for medication refills for sildenafil, allegra D and lisinopril-hydrochlorothiazide.      HISTORY/ HPI:   Michael Issa is a 58 y.o.  male who presents today for a follow-up of chronic conditions, hypertension and chronic seasonal allergic rhinitis; reports no change in health status from previous visit; is compliant with currently prescribed medication(s); reports allergy to penicillin; overall believes health status to be \"good\"; former smoker; denies use of ETOH or illicit drugs; denies recent illnesses and has no additional concerns at this time.    HYPERTENSION  Currently takes lisinopril- HCTZ for treatment of hypertension; take medications as prescribed without missed doses; reports no episodes of hypotension or any additional adverse effects from medication(s); check blood pressure at home occasionally; unsure of last reading; blood pressure is reported to be well controlled at home; blood pressure today is 110/84; weight today is 215 LBS with a BMI of 28.4; does not exercise daily; attempts to eat a well balanced diet; attempt to avoid adding salt to food; consumes 2 cups of coffee daily; last eye exam was approximately 9 month(s) ago; reports no intermittent symptoms; denies chest pain, chest pressure/discomfort, claudication, cough, dyspnea, exertional chest pressure/discomfort, fatigue, irregular heart beat, lower extremity edema, near-syncope, orthopnea, palpitations, paroxysmal nocturnal dyspnea, syncope, tachypnea and weight gain.     ALLERGIC RHINITIS  Michael Issa is here for evaluation of chronic allergic rhinitis. Patient's symptoms include clear rhinorrhea, itchy eyes, itchy nose, pressure sensation in ears, sneezing and watery eyes. These symptoms are seasonal.  Current triggers include exposure " to pollens, dust and mold. The patient has been suffering from these symptoms for approximately 3 years. The patient has tried prescription antihistamines with excellent relief of symptoms.    Michael Issa  has a past medical history of Allergic; Allergic rhinitis; Arthritis; Disease of thyroid gland; Hayfever; chest pain; compression fracture of spine; Hypertension; and Inguinal hernia.    Allergies   Allergen Reactions   • Penicillins Rash     Childhood allergy       Current Outpatient Prescriptions:   •  Ascorbic Acid (VITAMIN C PO), Take 1,000 mg by mouth Daily. ON HOLD 9-15-17, Disp: , Rfl:   •  fexofenadine-pseudoephedrine (ALLEGRA-D 24) 180-240 MG per 24 hr tablet, Take 1 tablet by mouth Daily., Disp: 30 tablet, Rfl: 11  •  lisinopril-hydrochlorothiazide (PRINZIDE,ZESTORETIC) 20-12.5 MG per tablet, TAKE 1 TABLET BY MOUTH DAILY., Disp: 30 tablet, Rfl: 0  •  sildenafil (VIAGRA) 100 MG tablet, Take 1 tablet by mouth Daily As Needed for erectile dysfunction., Disp: 10 tablet, Rfl: 5  Past Medical History:   Diagnosis Date   • Allergic    • Allergic rhinitis    • Arthritis    • Disease of thyroid gland     NODULE PRESENT    • Hayfever    • Hx of chest pain    • Hx of compression fracture of spine    • Hypertension    • Inguinal hernia     BILATERAL     Past Surgical History:   Procedure Laterality Date   • KNEE SURGERY  2014    rt knee MENISCUS   • PREPERITONEAL HERNIA REPAIR Bilateral 9/20/2017    Procedure: BILATERAL PREPERITONEAL INGUINAL HERNIA REPAIR LAPAROSCOPIC WITH MESH;  Surgeon: Rambo Church MD;  Location: [facility] OR;  Service:      Social History     Social History   • Marital status: Unknown     Social History Main Topics   • Smoking status: Former Smoker     Types: Cigarettes     Quit date: 4/18/2017   • Smokeless tobacco: Never Used   • Alcohol use No   • Drug use: No   • Sexual activity: Defer     Other Topics Concern   • Not on file     Family History   Problem Relation Age of Onset   • No  Known Problems Mother    • Diabetes Father    • Heart disease Father    • No Known Problems Sister    • No Known Problems Brother    • No Known Problems Daughter    • No Known Problems Maternal Grandmother    • No Known Problems Maternal Grandfather    • No Known Problems Paternal Grandmother    • No Known Problems Paternal Grandfather    • Hypertension Brother    • No Known Problems Brother      Family history, surgical history, past medical history, Allergies and meds reviewed with patient today and updated in [facility] EMR.     ROS:  Review of Systems   Constitutional: Negative.  Negative for activity change, appetite change, chills, diaphoresis, fatigue, fever and unexpected weight change.   HENT: Positive for ear pain (right ear pressure), rhinorrhea and sneezing. Negative for congestion, ear discharge, facial swelling, hearing loss, mouth sores, postnasal drip, sinus pain, sinus pressure, sore throat, tinnitus and trouble swallowing.    Eyes: Positive for discharge (clear) and itching. Negative for photophobia, pain, redness and visual disturbance.   Respiratory: Negative.  Negative for apnea, cough, chest tightness, wheezing and stridor.    Cardiovascular: Negative.  Negative for chest pain, palpitations and leg swelling.   Gastrointestinal: Negative.  Negative for abdominal distention, abdominal pain, blood in stool, constipation, diarrhea, nausea and vomiting.   Endocrine: Negative.  Negative for cold intolerance, heat intolerance and polyuria.   Genitourinary: Negative.  Negative for decreased urine volume, difficulty urinating, frequency, hematuria and urgency.   Musculoskeletal: Negative.  Negative for arthralgias, joint swelling and myalgias.   Skin: Negative.  Negative for color change, pallor, rash and wound.   Allergic/Immunologic: Positive for environmental allergies (chronic).   Neurological: Negative.  Negative for dizziness, syncope, weakness, light-headedness and headaches.   Hematological: Negative.  " Does not bruise/bleed easily.   Psychiatric/Behavioral: Negative.  Negative for agitation and dysphoric mood.   All other systems reviewed and are negative.    OBJECTIVE:  Vitals:    04/23/18 0940   BP: 110/84   BP Location: Left arm   Patient Position: Sitting   Cuff Size: Adult   Pulse: 81   Resp: 18   Temp: 97.6 °F (36.4 °C)   TempSrc: Oral   SpO2: 99%   Weight: 97.5 kg (215 lb)   Height: 185.4 cm (73\")     Physical Exam   Constitutional: He is oriented to person, place, and time. He appears well-developed and well-nourished. He is cooperative.  Non-toxic appearance. He does not have a sickly appearance. He does not appear ill. No distress.   Weight 215 LBS; BMI 28.4   HENT:   Head: Normocephalic.   Right Ear: Hearing, tympanic membrane, external ear and ear canal normal. No drainage, swelling or tenderness. No foreign bodies. No mastoid tenderness. Tympanic membrane is not injected, not scarred, not perforated, not erythematous, not retracted and not bulging. No middle ear effusion. No decreased hearing is noted.   Left Ear: Hearing, tympanic membrane, external ear and ear canal normal. No drainage, swelling or tenderness. No foreign bodies. No mastoid tenderness. Tympanic membrane is not injected, not scarred, not perforated, not erythematous, not retracted and not bulging.  No middle ear effusion. No decreased hearing is noted.   Nose: Mucosal edema (boggy) and rhinorrhea (clear) present. Right sinus exhibits no maxillary sinus tenderness and no frontal sinus tenderness. Left sinus exhibits no maxillary sinus tenderness and no frontal sinus tenderness.   Mouth/Throat: Uvula is midline and mucous membranes are normal. No oral lesions. No uvula swelling or dental caries. Posterior oropharyngeal erythema (mildly injected) present. No oropharyngeal exudate, posterior oropharyngeal edema or tonsillar abscesses. Tonsils are 0 on the right. Tonsils are 0 on the left.   Eyes: EOM and lids are normal. Pupils are " equal, round, and reactive to light. Right eye exhibits no discharge, no exudate and no hordeolum. No foreign body present in the right eye. Left eye exhibits no discharge, no exudate and no hordeolum. No foreign body present in the left eye. Right conjunctiva is injected. Right conjunctiva has no hemorrhage. Left conjunctiva is injected. Left conjunctiva has no hemorrhage. No scleral icterus. Right eye exhibits no nystagmus. Left eye exhibits no nystagmus.   Neck: Trachea normal and full passive range of motion without pain. Neck supple. No tracheal tenderness, no spinous process tenderness and no muscular tenderness present. No no neck rigidity. Normal range of motion present. No Brudzinski's sign noted. No thyroid mass and no thyromegaly present.   Cardiovascular: Normal rate, regular rhythm, normal heart sounds and normal pulses.  Exam reveals no gallop, no distant heart sounds and no friction rub.    No murmur heard.  Pulses:       Carotid pulses are 2+ on the right side, and 2+ on the left side.       Radial pulses are 2+ on the right side, and 2+ on the left side.        Posterior tibial pulses are 2+ on the right side, and 2+ on the left side.   Pulmonary/Chest: Effort normal and breath sounds normal. No respiratory distress. He has no decreased breath sounds. He has no wheezes. He has no rhonchi. He has no rales. He exhibits no tenderness.   Lymphadenopathy:     He has no cervical adenopathy.   Neurological: He is alert and oriented to person, place, and time. No sensory deficit. GCS eye subscore is 4. GCS verbal subscore is 5. GCS motor subscore is 6.   Skin: Skin is warm, dry and intact. Capillary refill takes less than 2 seconds. No rash noted. He is not diaphoretic. No cyanosis. No pallor. Nails show no clubbing.   Psychiatric: He has a normal mood and affect. His speech is normal and behavior is normal. Thought content normal.   Nursing note and vitals reviewed.    ASSESSMENT/ PLAN:  Michael was seen  today for allergies and med refill.    Diagnoses and all orders for this visit:    Essential hypertension  -     lisinopril-hydrochlorothiazide (PRINZIDE,ZESTORETIC) 20-12.5 MG per tablet; Take 1 tablet by mouth Daily.  -     Comprehensive Metabolic Panel  -     CBC Auto Differential    Chronic seasonal allergic rhinitis due to pollen  -     fexofenadine-pseudoephedrine (ALLEGRA-D 24) 180-240 MG per 24 hr tablet; Take 1 tablet by mouth Daily.  -     fluticasone (FLONASE) 50 MCG/ACT nasal spray; 2 sprays into each nostril Daily.  -     dexamethasone (DECADRON) injection 10 mg; Inject 1 mL into the shoulder, thigh, or buttocks 1 (One) Time.    Other male erectile dysfunction  -     sildenafil (VIAGRA) 100 MG tablet; Take 1 tablet by mouth Daily As Needed for erectile dysfunction.    Overweight (BMI 25.0-29.9)  -     Lipid Panel    Orders Placed Today:   New Medications Ordered This Visit   Medications   • fexofenadine-pseudoephedrine (ALLEGRA-D 24) 180-240 MG per 24 hr tablet     Sig: Take 1 tablet by mouth Daily.     Dispense:  30 tablet     Refill:  6   • lisinopril-hydrochlorothiazide (PRINZIDE,ZESTORETIC) 20-12.5 MG per tablet     Sig: Take 1 tablet by mouth Daily.     Dispense:  30 tablet     Refill:  6   • sildenafil (VIAGRA) 100 MG tablet     Sig: Take 1 tablet by mouth Daily As Needed for erectile dysfunction.     Dispense:  10 tablet     Refill:  6   • fluticasone (FLONASE) 50 MCG/ACT nasal spray     Si sprays into each nostril Daily.     Dispense:  1 bottle     Refill:  5   • dexamethasone (DECADRON) injection 10 mg      Management Plan:      1.  HTN  Essential HTN. Good BP control is encouraged with Goal BP based on JNC 8 guidelines:  For the general population <60 yr old goal BP <140/90 and for those >60 <150/90.  For patients of all ages with Diabetes, CKD, Known CAD the goal is <140/90. Reviewed typical first line agents to include thiazide diuretic or ace-I or ARB or CCB alone or in combo. At goal  yes.  Rx provided today for Lisinopril- HCTZ.  Use and the potential adverse effects of this medication were discussed in detail.  For any concerns of adverse effects of this medication I advised the patient to return to the clinic or seek immediate care at a local ED of choice.  I recommended to the patient to obtain electronic home BP machine with upper arm blood pressure cuff and to check regularly as instructed.  Keep BP log and bring to subsequent visits.  Checking BP at home can help to lower BP.  Certain medications and substances can increase BP and should be avoided: NSAIDS, caffeine, decongestants, nicotine.   Routine labs ordered today, CBC, CMP, and Lipid panel; the patient will be notified of these results; additional labs and/ or medications may be required.  Follow-up as scheduled with RODERICK INGRAM in 6 months; return to the clinic as needed for any new or worsening conditions.     2.  Chronic seasonal allergic rhinitis  Acute on chronic allergic rhinitis versus early rhinosinusitis by history and exam. Discussed antibiotics not typically recommended for this process.  Rx provided today for Allegra D and Flonase.  Use and the potential adverse effects of this medication were discussed in detail.  For any concerns of adverse effects of this medication I advised the patient to return to the clinic or seek immediate care at a local ED of choice.  Discussed pros and cons of IM steroids.  DECADRON, dexamethasone, methylprednisolone- Pt notified of potential pros/risks of steroid treatment including rapid improvement of condition; allergic reaction, psychologic reaction (depression, anxiety & insomnia), hyperglycemia, skin change at injection site (color, dimpling), muscle weakness.  Pt is aware they may refuse treatment.  IM Decadron provided in office today.   Hand washing encouraged.  Any pets in the home should remain outside of the bedroom of the patient.  Can use cool mist humidifier in room of  sleeping to moisten air.  Return to the clinic if symptoms continue or worsen.    3.  Overweight (BMI 28.4)  The patient is ill today, we will continue to educate the patient on the topics of diet and exercise with the goal of weight loss and preventative illness with each scheduled appointment.  Information about the DASH diet provided in AVS.    Risks/benefits of current and new medications discussed with the patient and or family today.  The patient/family are aware and accept that if there any side effects they should call or return to clinic as soon as possible.  Appropriate F/U discussed for topics addressed today. All questions were answered to the satisfactory state of patient/family.  Should symptoms fail to improve or worsen they agree to call or return to clinic or to go to the ER. Education handouts were offered on any new Rx if requested.  Discussed the importance of following up with any needed screening tests/labs/specialist appointments and any requested follow-up recommended by me today.  Importance of maintaining follow-up discussed and patient accepts that missed appointments can delay diagnosis and potentially lead to worsening of conditions.    An After Visit Summary was printed and given to the patient at discharge.    Follow-up: Return in about 6 months (around 10/23/2018), or if symptoms worsen or fail to improve, for Recheck.    Yamil Orourke, APRN 4/23/2018 9:53 AM  This note was electronically signed.

## 2018-04-24 LAB
ALBUMIN SERPL-MCNC: 4.7 G/DL (ref 3.5–5.5)
ALBUMIN/GLOB SERPL: 1.6 {RATIO} (ref 1.2–2.2)
ALP SERPL-CCNC: 44 IU/L (ref 39–117)
ALT SERPL-CCNC: 48 IU/L (ref 0–44)
AST SERPL-CCNC: 33 IU/L (ref 0–40)
BASOPHILS # BLD AUTO: 0.1 X10E3/UL (ref 0–0.2)
BASOPHILS NFR BLD AUTO: 1 %
BILIRUB SERPL-MCNC: 0.3 MG/DL (ref 0–1.2)
BUN SERPL-MCNC: 15 MG/DL (ref 6–24)
BUN/CREAT SERPL: 16 (ref 9–20)
CALCIUM SERPL-MCNC: 9.5 MG/DL (ref 8.7–10.2)
CHLORIDE SERPL-SCNC: 98 MMOL/L (ref 96–106)
CHOLEST SERPL-MCNC: 175 MG/DL (ref 100–199)
CO2 SERPL-SCNC: 25 MMOL/L (ref 18–29)
CREAT SERPL-MCNC: 0.96 MG/DL (ref 0.76–1.27)
EOSINOPHIL # BLD AUTO: 0.2 X10E3/UL (ref 0–0.4)
EOSINOPHIL NFR BLD AUTO: 2 %
ERYTHROCYTE [DISTWIDTH] IN BLOOD BY AUTOMATED COUNT: 13.4 % (ref 12.3–15.4)
GFR SERPLBLD CREATININE-BSD FMLA CKD-EPI: 102 ML/MIN/1.73
GFR SERPLBLD CREATININE-BSD FMLA CKD-EPI: 88 ML/MIN/1.73
GLOBULIN SER CALC-MCNC: 3 G/DL (ref 1.5–4.5)
GLUCOSE SERPL-MCNC: 114 MG/DL (ref 65–99)
HCT VFR BLD AUTO: 45.1 % (ref 37.5–51)
HDLC SERPL-MCNC: 55 MG/DL
HGB BLD-MCNC: 15.4 G/DL (ref 13–17.7)
IMM GRANULOCYTES # BLD: 0 X10E3/UL (ref 0–0.1)
IMM GRANULOCYTES NFR BLD: 0 %
LDLC SERPL CALC-MCNC: 101 MG/DL (ref 0–99)
LYMPHOCYTES # BLD AUTO: 1.9 X10E3/UL (ref 0.7–3.1)
LYMPHOCYTES NFR BLD AUTO: 28 %
MCH RBC QN AUTO: 30.7 PG (ref 26.6–33)
MCHC RBC AUTO-ENTMCNC: 34.1 G/DL (ref 31.5–35.7)
MCV RBC AUTO: 90 FL (ref 79–97)
MONOCYTES # BLD AUTO: 0.6 X10E3/UL (ref 0.1–0.9)
MONOCYTES NFR BLD AUTO: 9 %
NEUTROPHILS # BLD AUTO: 4 X10E3/UL (ref 1.4–7)
NEUTROPHILS NFR BLD AUTO: 60 %
PLATELET # BLD AUTO: 262 X10E3/UL (ref 150–379)
POTASSIUM SERPL-SCNC: 4.3 MMOL/L (ref 3.5–5.2)
PROT SERPL-MCNC: 7.7 G/DL (ref 6–8.5)
RBC # BLD AUTO: 5.01 X10E6/UL (ref 4.14–5.8)
SODIUM SERPL-SCNC: 141 MMOL/L (ref 134–144)
TRIGL SERPL-MCNC: 96 MG/DL (ref 0–149)
VLDLC SERPL CALC-MCNC: 19 MG/DL (ref 5–40)
WBC # BLD AUTO: 6.8 X10E3/UL (ref 3.4–10.8)

## 2018-04-28 DIAGNOSIS — I10 ESSENTIAL HYPERTENSION: ICD-10-CM

## 2018-04-30 RX ORDER — LISINOPRIL AND HYDROCHLOROTHIAZIDE 20; 12.5 MG/1; MG/1
1 TABLET ORAL DAILY
Qty: 30 TABLET | Refills: 0 | OUTPATIENT
Start: 2018-04-30

## 2018-10-06 DIAGNOSIS — N52.8 OTHER MALE ERECTILE DYSFUNCTION: ICD-10-CM

## 2018-10-08 RX ORDER — SILDENAFIL 100 MG/1
100 TABLET, FILM COATED ORAL DAILY PRN
Qty: 10 TABLET | Refills: 5 | Status: SHIPPED | OUTPATIENT
Start: 2018-10-08 | End: 2019-02-01 | Stop reason: SDUPTHER

## 2018-12-03 DIAGNOSIS — I10 ESSENTIAL HYPERTENSION: ICD-10-CM

## 2018-12-05 RX ORDER — LISINOPRIL AND HYDROCHLOROTHIAZIDE 20; 12.5 MG/1; MG/1
1 TABLET ORAL DAILY
Qty: 14 TABLET | Refills: 0 | Status: SHIPPED | OUTPATIENT
Start: 2018-12-05 | End: 2018-12-28 | Stop reason: SDUPTHER

## 2018-12-28 DIAGNOSIS — I10 ESSENTIAL HYPERTENSION: ICD-10-CM

## 2018-12-28 RX ORDER — LISINOPRIL AND HYDROCHLOROTHIAZIDE 20; 12.5 MG/1; MG/1
1 TABLET ORAL DAILY
Qty: 14 TABLET | Refills: 0 | Status: SHIPPED | OUTPATIENT
Start: 2018-12-28 | End: 2019-01-02 | Stop reason: SDUPTHER

## 2019-01-02 ENCOUNTER — OFFICE VISIT (OUTPATIENT)
Dept: FAMILY MEDICINE CLINIC | Facility: CLINIC | Age: 59
End: 2019-01-02

## 2019-01-02 VITALS
DIASTOLIC BLOOD PRESSURE: 94 MMHG | BODY MASS INDEX: 29 KG/M2 | RESPIRATION RATE: 18 BRPM | HEIGHT: 73 IN | HEART RATE: 75 BPM | WEIGHT: 218.8 LBS | OXYGEN SATURATION: 98 % | TEMPERATURE: 97.8 F | SYSTOLIC BLOOD PRESSURE: 141 MMHG

## 2019-01-02 DIAGNOSIS — J30.1 CHRONIC SEASONAL ALLERGIC RHINITIS DUE TO POLLEN: ICD-10-CM

## 2019-01-02 DIAGNOSIS — E04.1 THYROID NODULE: ICD-10-CM

## 2019-01-02 DIAGNOSIS — Z12.5 SCREENING FOR MALIGNANT NEOPLASM OF PROSTATE: Primary | ICD-10-CM

## 2019-01-02 DIAGNOSIS — I10 ESSENTIAL HYPERTENSION: ICD-10-CM

## 2019-01-02 PROCEDURE — 99214 OFFICE O/P EST MOD 30 MIN: CPT | Performed by: NURSE PRACTITIONER

## 2019-01-02 RX ORDER — FEXOFENADINE HCL AND PSEUDOEPHEDRINE HCI 180; 240 MG/1; MG/1
1 TABLET, EXTENDED RELEASE ORAL DAILY
Qty: 30 TABLET | Refills: 6 | Status: SHIPPED | OUTPATIENT
Start: 2019-01-02 | End: 2019-02-01 | Stop reason: SDUPTHER

## 2019-01-02 RX ORDER — FEXOFENADINE HCL AND PSEUDOEPHEDRINE HCI 180; 240 MG/1; MG/1
1 TABLET, EXTENDED RELEASE ORAL DAILY
Qty: 30 TABLET | Refills: 6 | Status: SHIPPED | OUTPATIENT
Start: 2019-01-02 | End: 2019-01-02 | Stop reason: SDUPTHER

## 2019-01-02 RX ORDER — FLUTICASONE PROPIONATE 50 MCG
2 SPRAY, SUSPENSION (ML) NASAL DAILY
Qty: 1 BOTTLE | Refills: 5 | Status: SHIPPED | OUTPATIENT
Start: 2019-01-02 | End: 2019-02-01 | Stop reason: SDUPTHER

## 2019-01-02 RX ORDER — LISINOPRIL AND HYDROCHLOROTHIAZIDE 20; 12.5 MG/1; MG/1
1 TABLET ORAL DAILY
Qty: 90 TABLET | Refills: 1 | Status: SHIPPED | OUTPATIENT
Start: 2019-01-02 | End: 2019-02-01 | Stop reason: SDUPTHER

## 2019-01-02 NOTE — PROGRESS NOTES
Chief Complaint   Patient presents with   • Hypertension     Pt is here for followup and  medication refills.    He brought in a copy of labs.          HPI  Michael Issa is a 58 y.o. male presents today for follow of on chronic issues of hypertension.   Patient is concerned with a previous nodule on left side of neck, that was being followed by Dr. Malin for 4 years.  Patient stated that he biopsied it and just routinely checked it.      HTN  Currently takes lisinopril-hctz.  Takes medications as prescribed without missed doses, reports no episodes of hypotension or any additional adverse effects from medications.  Patient reports BP's at home runs 120-130's systolic, 80's diastolic. Denies chest pain/chest pressure or discomfort, shortness of breath with exertion, headaches, palpitations, irregular heart beat, tachycardia, lower extremity edema, orthopnea, near-syncope.    Chronic problems: hypertension stable with lisinopril/hctz, ED stable with sildenafil    PCP:  Mansi Zapata, DNP, APRN    Allergies   Allergen Reactions   • Penicillins Rash     Childhood allergy       Past Medical History:   Diagnosis Date   • Allergic    • Allergic rhinitis    • Arthritis    • Disease of thyroid gland     NODULE PRESENT    • Hayfever    • Hx of chest pain    • Hx of compression fracture of spine    • Hypertension    • Inguinal hernia     BILATERAL       Past Surgical History:   Procedure Laterality Date   • KNEE SURGERY  2014    rt knee MENISCUS   • PREPERITONEAL HERNIA REPAIR Bilateral 9/20/2017    Procedure: BILATERAL PREPERITONEAL INGUINAL HERNIA REPAIR LAPAROSCOPIC WITH MESH;  Surgeon: Rambo Church MD;  Location: [facility] OR;  Service:        Social History     Socioeconomic History   • Marital status: Unknown     Spouse name: Not on file   • Number of children: Not on file   • Years of education: Not on file   • Highest education level: Not on file   Tobacco Use   • Smoking status: Former Smoker     Types:  Cigarettes     Last attempt to quit: 2017     Years since quittin.7   • Smokeless tobacco: Never Used   Substance and Sexual Activity   • Alcohol use: No   • Drug use: No   • Sexual activity: Defer       Family History   Problem Relation Age of Onset   • No Known Problems Mother    • Diabetes Father    • Heart disease Father    • No Known Problems Sister    • No Known Problems Brother    • No Known Problems Daughter    • No Known Problems Maternal Grandmother    • No Known Problems Maternal Grandfather    • No Known Problems Paternal Grandmother    • No Known Problems Paternal Grandfather    • Hypertension Brother    • No Known Problems Brother        Current Outpatient Medications on File Prior to Visit   Medication Sig Dispense Refill   • Ascorbic Acid (VITAMIN C PO) Take 1,000 mg by mouth Daily. ON HOLD 9-15-17     • fexofenadine-pseudoephedrine (ALLEGRA-D 24) 180-240 MG per 24 hr tablet Take 1 tablet by mouth Daily. 30 tablet 6   • fluticasone (FLONASE) 50 MCG/ACT nasal spray 2 sprays into each nostril Daily. 1 bottle 5   • lisinopril-hydrochlorothiazide (PRINZIDE,ZESTORETIC) 20-12.5 MG per tablet Take 1 tablet by mouth Daily. 14 tablet 0   • sildenafil (VIAGRA) 100 MG tablet Take 1 tablet by mouth Daily As Needed for erectile dysfunction. 10 tablet 6   • sildenafil (VIAGRA) 100 MG tablet TAKE 1 TABLET BY MOUTH DAILY AS NEEDED FOR ERECTILE DYSFUNCTION. 10 tablet 5     No current facility-administered medications on file prior to visit.         REVIEW OF SYMPTOMS: (Positives bolded)  General:  weight loss, fever, chills, night sweats, fatigue, appetite loss  HEENT:  blurry vision, eye pain, eye discharge, dry eyes, decreased vision, sore throat tinnitus  Respiratory: shortness of breath, cough, hemoptysis, wheezing, pleurisy,   Cardiovascular:  chest pain, PND, palpitation, edema, orthopnea, syncope, swelling of extremities  Gastro: Nausea, vomiting, diarrhea, hematemesis, abdominal pain,  "constipation  Genito: hematuria, dysuria, glycosuria, hesitancy, frequency, incontinence  Musckelo: Arthralgia, myalgia, muscle weakness, joint swelling, NSAID use  Skin: rash, pruritis, sores, nail changes, skin thickening, change in wart/mole, itching, rash, new lesions, pruritus, nail changes  Neuro:  Migraine, numbness, ataxia, tremor, vertigo, weakness, memory loss  \"All other systems reviewed and negative, except as listed above.”      OBJECTIVE:  Constitutional:  Appearance-No acute distress, Consistent with stated age. Orientation- Oriented x 3, alert Posture-Not doubled over. Gait-Normal pace, normal arm movement. Posture- Normal Build and Nutrition-Well developed and well nourished.  General- Patient is pleasant and cooperative with the interview and exam.    Integumentary: General-No rashes, ulcers or lesions. No edema.  Palpation- Normal skin moisture/turgor. Skin is warm to touch, no increased warmth. Capillary refill is normal bilateral Upper and lower extremity.     Head/Neck: Head- normocephalic and atraumatic.  Neck- without visible/palpable lumps or pulsations.  Palpation- No bony tenderness about head/neck along frontal, occiptial, temporal, parietal, mastoid, jawline, zygoma, orbit or any other location.  NO temporal artery tenderness. No TMJ tenderness. Normal cervical ROM.   Neck Supple.  Thyroid-No thyromegaly, no nodules palpated at this time.    Eye: Bilaterally PERRLA, EOMI.  No discharge.  Upper and lower eyelids are normal. Sclera/conjunctiva normal without discharge. Cornea is normal and clear. Lens is normal.  Eyeball appears normal. No ciliary flushing, no conjunctival injection.    ENMT:  Pinna- normal without tenderness or erythema.  External auditory canal Left- normal without erythema or discharge, no excessive cerumen. External auditory canal Right-normal without erythema or discharge, no excessive cerumen. TM left- Grey/pearly, normal light reflex and anatomy TM Right- " [place]/pearly, normal light reflex and anatomy Hearing Assessment-normal to conversational speech at 2-5 feet.  Nose and sinus-No sinus tenderness along frontal/maxillary region. External appearance normal and midline. Nares- bilateral quiet airflow, no discharge. Nasal mucosa- No bleeding noted and no ulcerations observed. Pink, moist. Turbinates non boggy. Lips- normal color, moist without cracks/lesions Oral Cavity/Palate- hard/soft palate intact without lesions, oral mucosa pink and moist. Dentition assessed and discussed appropriate oral care. Tongue normal midline.  Oropharynx- no pharyngeal erythema, Uvula midline. No post nasal drip. No exudate. Salivary glands- Non tender to palpation    CHEST/LUNG: Inspection- symmetric chest wall no pectus deformity. Normal effort, no distress, no use of accessory muscles. Palpation- nontender sternum, ribline.  No abnormal pulsations. Auscultation- Breath sounds normal throughout all lung fields.  Normal tracheal sounds, Normal bronchial sounds overlying sternum, Bronchovessicular sounds normal between scapulae posteriorly, Normal vessicular breath sounds heard throughout periphery. Lungs are clear today. Adventitious sounds- No wheezes, rales, rhonchi.     CARDIOVASCULAR:  Carotid artery- normal, no bruits or abnormal pulsations. Jugular vein- no pulsations. Palpation/Percussion- Normal PMI, no palpable thrill  Auscultation- Regular rate and rhythm. No murmur noted in sitting, supine positions. Extremities- no digital clubbing, cyanosis, edema, increased warmth.    ABDOMEN: Inspection- normal and no visible pulsations. Normal contour. Auscultation- Bowel sounds normal, no abdominal bruits. Palpation/Percussion- soft, non-tender, no rebound tenderness, no rigidity (guarding), no jar tenderness, no masses.  Liver-no hepatomegaly, Spleen - no splenomegaly, Hernias- none. Rectal not examined.     Peripheral Vascular: Upper extremity Left- Normal temperature with pink nailbeds  and no ulcerations.  Upper extremity Right- Normal temperature with pink nailbeds and no ulcerations.  Lower extremity- Normal temperature with pink nailbeds and no ulcerations. DP pulses 2+ bilaterally.  Pedal hair intact.  Normal capillary refill. Edema- No edema.    Musculoskeletal:  digital clubbing or cyanosis, neurovascularly intact all four extremities.  Upper extremity- Symmetrical posture.  No visible deformity.  Normal sensation along medial and lateral upper extremity proximally and distally.  NO tenderness overlying shoulder, lateral/medial epicondyle.  5/5 and strength 5/5 bilateral UE.  Elbow palpated, no tenderness overlying olecranon.  Normal supination, pronation to active/passive ROM and to resisted rotation. Bicep insertion/tricep insertion appear normal without obvious pathology. Rotator cuff evaluated and intact.  Normal wrist ROM bilaterally. Normal hand movement, intrinsic muscles of hands normal. No tenderness to palpation of hands/wrists/elbows.  Lower extremity- Not tender to palpation, no pain, no swelling, edema or erythema of surrounding tissue, normal strength and tone.  Normal appearing hip ROM bilaterally without pain.  Knee ROM normal at 0-120 degrees. No tenderness overlying trochanters, no tenderness about patella, quad tendon, patellar tendon.  No tenderness at tibial tuberosity. Ankle normal ROM not tender to palpation along medial/lateral malleolus. Normal movement of toes, no tenderness bilateral feet/toes.  Normal foot type. Calves symmetrical.  Stretching demonstrated today.  Spine/Ribs- No deformities, masses or tenderness, no known fractures, normal strength, Normal ROM. Normal stability No tenderness along C/T/L spine.  Normal appearing ROM about spine.      Neurological: General- Moves all 4 extremities symmetrically. Symmetrical face and body posture. Cranial nerves- individually evaluated II-XII and intact. PERRLA, Normal EOMI, visual/special senses appear intact,  Face is symmetrical and normal sensation/movement, normal tongue, normal strength/posture of neck musculature. Balance- Romberg intact.  Reflexes- ntact with DTR 2+ patellar, Achilles, bicep, brachial,tricep. Ankle clonus normal with 2 beats.  Strength- 5/5 bilateral UE and LE. Soft touch- intact bilateral UE and LE.  Temperature sensation- intact bilateral UE and LE. Cerebellar testing-Rapid alternating movements intact.  Heel shin intact. Able to walk normal gait, normal heel toe walking. Neck- supple.        Neuropsych: Oriented- Person, place, time. (AAOx3), Mood/affect- normal and congruent. Able to articulate well. Speech-Normal speech, normal rate, normal tone, normal use of language, volume and coherence.  Thought content- normal with ability to perform basic computations and apply abstract thought/reason. Associations- intact, no SI/HI, no hallucinations, delusions, obsessions.  Judgment/insight- Appropriate. Memory-Recall intact, remote and recent memory intact. Knowledge- Age appropriate fund of knowledge, concentration and attention span normal.      Assessment/Plan:  Michael was seen today for hypertension.    Diagnoses and all orders for this visit:    Screening for malignant neoplasm of prostate  -     PSA SCREENING    Essential hypertension  -     lisinopril-hydrochlorothiazide (PRINZIDE,ZESTORETIC) 20-12.5 MG per tablet; Take 1 tablet by mouth Daily.    Chronic seasonal allergic rhinitis due to pollen  -     fluticasone (FLONASE) 50 MCG/ACT nasal spray; 2 sprays into the nostril(s) as directed by provider Daily.  -     Discontinue: fexofenadine-pseudoephedrine (ALLEGRA-D 24) 180-240 MG per 24 hr tablet; Take 1 tablet by mouth Daily.  -     fexofenadine-pseudoephedrine (ALLEGRA-D 24) 180-240 MG per 24 hr tablet; Take 1 tablet by mouth Daily.    Thyroid nodule  -     TSH    Pt had labs at work and I reviewed    Management plan:  Take medications as prescribed; return to the clinic of new or worsening  concerns.       Risks/benefits of current and new medications discussed with the patient and or family today.  The patient/family are aware and accept that if there any side effects they should call or return to clinic as soon as possible.  Appropriate F/U discussed for topics addressed today. All questions were answered to the satisfactory state of patient/family.  Should symptoms fail to improve or worsen they agree to call or return to clinic or to go to the ER. Education handouts were offered on any new Rx if requested.  Discussed the importance of following up with any needed screening tests/labs/specialist appointments and any requested follow-up recommended by me today.  Importance of maintaining follow-up discussed and patient accepts that missed appointments can delay diagnosis and potentially lead to worsening of conditions.    Return in about 1 month (around 2/2/2019) for Recheck hypertension, ED.  Mansi Zapata, DNP, APRN-BC    1/2/2019

## 2019-01-02 NOTE — PATIENT INSTRUCTIONS
"Managing Your Hypertension  Hypertension is commonly called high blood pressure. This is when the force of your blood pressing against the walls of your arteries is too strong. Arteries are blood vessels that carry blood from your heart throughout your body. Hypertension forces the heart to work harder to pump blood, and may cause the arteries to become narrow or stiff. Having untreated or uncontrolled hypertension can cause heart attack, stroke, kidney disease, and other problems.  What are blood pressure readings?  A blood pressure reading consists of a higher number over a lower number. Ideally, your blood pressure should be below 120/80. The first (\"top\") number is called the systolic pressure. It is a measure of the pressure in your arteries as your heart beats. The second (\"bottom\") number is called the diastolic pressure. It is a measure of the pressure in your arteries as the heart relaxes.  What does my blood pressure reading mean?  Blood pressure is classified into four stages. Based on your blood pressure reading, your health care provider may use the following stages to determine what type of treatment you need, if any. Systolic pressure and diastolic pressure are measured in a unit called mm Hg.  Normal  · Systolic pressure: below 120.  · Diastolic pressure: below 80.  Elevated  · Systolic pressure: 120-129.  · Diastolic pressure: below 80.  Hypertension stage 1  · Systolic pressure: 130-139.  · Diastolic pressure: 80-89.  Hypertension stage 2  · Systolic pressure: 140 or above.  · Diastolic pressure: 90 or above.  What health risks are associated with hypertension?  Managing your hypertension is an important responsibility. Uncontrolled hypertension can lead to:  · A heart attack.  · A stroke.  · A weakened blood vessel (aneurysm).  · Heart failure.  · Kidney damage.  · Eye damage.  · Metabolic syndrome.  · Memory and concentration problems.    What changes can I make to manage my " hypertension?  Hypertension can be managed by making lifestyle changes and possibly by taking medicines. Your health care provider will help you make a plan to bring your blood pressure within a normal range.  Eating and drinking  · Eat a diet that is high in fiber and potassium, and low in salt (sodium), added sugar, and fat. An example eating plan is called the DASH (Dietary Approaches to Stop Hypertension) diet. To eat this way:  ? Eat plenty of fresh fruits and vegetables. Try to fill half of your plate at each meal with fruits and vegetables.  ? Eat whole grains, such as whole wheat pasta, brown rice, or whole grain bread. Fill about one quarter of your plate with whole grains.  ? Eat low-fat diary products.  ? Avoid fatty cuts of meat, processed or cured meats, and poultry with skin. Fill about one quarter of your plate with lean proteins such as fish, chicken without skin, beans, eggs, and tofu.  ? Avoid premade and processed foods. These tend to be higher in sodium, added sugar, and fat.  · Reduce your daily sodium intake. Most people with hypertension should eat less than 1,500 mg of sodium a day.  · Limit alcohol intake to no more than 1 drink a day for nonpregnant women and 2 drinks a day for men. One drink equals 12 oz of beer, 5 oz of wine, or 1½ oz of hard liquor.  Lifestyle  · Work with your health care provider to maintain a healthy body weight, or to lose weight. Ask what an ideal weight is for you.  · Get at least 30 minutes of exercise that causes your heart to beat faster (aerobic exercise) most days of the week. Activities may include walking, swimming, or biking.  · Include exercise to strengthen your muscles (resistance exercise), such as weight lifting, as part of your weekly exercise routine. Try to do these types of exercises for 30 minutes at least 3 days a week.  · Do not use any products that contain nicotine or tobacco, such as cigarettes and e-cigarettes. If you need help quitting, ask  your health care provider.  · Control any long-term (chronic) conditions you have, such as high cholesterol or diabetes.  Monitoring  · Monitor your blood pressure at home as told by your health care provider. Your personal target blood pressure may vary depending on your medical conditions, your age, and other factors.  · Have your blood pressure checked regularly, as often as told by your health care provider.  Working with your health care provider  · Review all the medicines you take with your health care provider because there may be side effects or interactions.  · Talk with your health care provider about your diet, exercise habits, and other lifestyle factors that may be contributing to hypertension.  · Visit your health care provider regularly. Your health care provider can help you create and adjust your plan for managing hypertension.  Will I need medicine to control my blood pressure?  Your health care provider may prescribe medicine if lifestyle changes are not enough to get your blood pressure under control, and if:  · Your systolic blood pressure is 130 or higher.  · Your diastolic blood pressure is 80 or higher.    Take medicines only as told by your health care provider. Follow the directions carefully. Blood pressure medicines must be taken as prescribed. The medicine does not work as well when you skip doses. Skipping doses also puts you at risk for problems.  Contact a health care provider if:  · You think you are having a reaction to medicines you have taken.  · You have repeated (recurrent) headaches.  · You feel dizzy.  · You have swelling in your ankles.  · You have trouble with your vision.  Get help right away if:  · You develop a severe headache or confusion.  · You have unusual weakness or numbness, or you feel faint.  · You have severe pain in your chest or abdomen.  · You vomit repeatedly.  · You have trouble breathing.  Summary  · Hypertension is when the force of blood pumping through  your arteries is too strong. If this condition is not controlled, it may put you at risk for serious complications.  · Your personal target blood pressure may vary depending on your medical conditions, your age, and other factors. For most people, a normal blood pressure is less than 120/80.  · Hypertension is managed by lifestyle changes, medicines, or both. Lifestyle changes include weight loss, eating a healthy, low-sodium diet, exercising more, and limiting alcohol.  This information is not intended to replace advice given to you by your health care provider. Make sure you discuss any questions you have with your health care provider.  Document Released: 09/11/2013 Document Revised: 11/15/2017 Document Reviewed: 11/15/2017  [employer] Interactive Patient Education © 2018 [employer]

## 2019-01-03 LAB
PSA SERPL-MCNC: 0.71 NG/ML (ref 0–4)
TSH SERPL DL<=0.005 MIU/L-ACNC: 1.74 MIU/ML (ref 0.47–4.68)

## 2019-02-01 ENCOUNTER — OFFICE VISIT (OUTPATIENT)
Dept: FAMILY MEDICINE CLINIC | Facility: CLINIC | Age: 59
End: 2019-02-01

## 2019-02-01 VITALS
BODY MASS INDEX: 30.35 KG/M2 | TEMPERATURE: 98.9 F | OXYGEN SATURATION: 98 % | HEART RATE: 78 BPM | HEIGHT: 71 IN | SYSTOLIC BLOOD PRESSURE: 126 MMHG | DIASTOLIC BLOOD PRESSURE: 82 MMHG | RESPIRATION RATE: 18 BRPM | WEIGHT: 216.8 LBS

## 2019-02-01 DIAGNOSIS — N52.8 OTHER MALE ERECTILE DYSFUNCTION: ICD-10-CM

## 2019-02-01 DIAGNOSIS — J30.1 CHRONIC SEASONAL ALLERGIC RHINITIS DUE TO POLLEN: ICD-10-CM

## 2019-02-01 DIAGNOSIS — I10 ESSENTIAL HYPERTENSION: Primary | ICD-10-CM

## 2019-02-01 PROBLEM — E66.09 CLASS 1 OBESITY DUE TO EXCESS CALORIES WITH SERIOUS COMORBIDITY AND BODY MASS INDEX (BMI) OF 30.0 TO 30.9 IN ADULT: Status: ACTIVE | Noted: 2018-04-23

## 2019-02-01 PROBLEM — E66.811 CLASS 1 OBESITY DUE TO EXCESS CALORIES WITH SERIOUS COMORBIDITY AND BODY MASS INDEX (BMI) OF 30.0 TO 30.9 IN ADULT: Status: ACTIVE | Noted: 2018-04-23

## 2019-02-01 PROCEDURE — 99213 OFFICE O/P EST LOW 20 MIN: CPT | Performed by: NURSE PRACTITIONER

## 2019-02-01 RX ORDER — FLUTICASONE PROPIONATE 50 MCG
2 SPRAY, SUSPENSION (ML) NASAL DAILY
Qty: 1 BOTTLE | Refills: 5 | Status: SHIPPED | OUTPATIENT
Start: 2019-02-01 | End: 2021-07-27 | Stop reason: SDUPTHER

## 2019-02-01 RX ORDER — FEXOFENADINE HCL AND PSEUDOEPHEDRINE HCI 180; 240 MG/1; MG/1
1 TABLET, EXTENDED RELEASE ORAL DAILY
Qty: 30 TABLET | Refills: 6 | Status: SHIPPED | OUTPATIENT
Start: 2019-02-01 | End: 2019-07-31 | Stop reason: SDUPTHER

## 2019-02-01 RX ORDER — LISINOPRIL AND HYDROCHLOROTHIAZIDE 20; 12.5 MG/1; MG/1
1 TABLET ORAL DAILY
Qty: 90 TABLET | Refills: 1 | Status: SHIPPED | OUTPATIENT
Start: 2019-02-01 | End: 2019-07-31 | Stop reason: SDUPTHER

## 2019-02-01 RX ORDER — SILDENAFIL 100 MG/1
100 TABLET, FILM COATED ORAL DAILY PRN
Qty: 10 TABLET | Refills: 5 | Status: SHIPPED | OUTPATIENT
Start: 2019-02-01 | End: 2019-04-01 | Stop reason: SDUPTHER

## 2019-02-01 NOTE — PROGRESS NOTES
Chief Complaint   Patient presents with   • Hypertension     pt presents for       HPI  Michael Issa is a 58 y.o. male presents today for follow up for HTN, ED, seasonal allergies.     Chronic problems: of ED stable with sildenafil, constipation stable with docusate sodium, HTN stable with lisinpril-HCTZ, and chronic seasonal allergies stable with fexofenadine-pseudoephedrine     PCP:  Mansi Zapata, DNP, APRN    Allergies   Allergen Reactions   • Penicillins Rash     Childhood allergy       Past Medical History:   Diagnosis Date   • Allergic    • Allergic rhinitis    • Arthritis    • Disease of thyroid gland     NODULE PRESENT    • Hayfever    • Hx of chest pain    • Hx of compression fracture of spine    • Hypertension    • Inguinal hernia     BILATERAL       Past Surgical History:   Procedure Laterality Date   • KNEE SURGERY      rt knee MENISCUS   • PREPERITONEAL HERNIA REPAIR Bilateral 2017    Procedure: BILATERAL PREPERITONEAL INGUINAL HERNIA REPAIR LAPAROSCOPIC WITH MESH;  Surgeon: Rambo Church MD;  Location: [facility] OR;  Service:      Social History     Socioeconomic History   • Marital status: Unknown     Spouse name: Not on file   • Number of children: Not on file   • Years of education: Not on file   • Highest education level: Not on file   Tobacco Use   • Smoking status: Former Smoker     Types: Cigarettes     Last attempt to quit: 2017     Years since quittin.7   • Smokeless tobacco: Never Used   Substance and Sexual Activity   • Alcohol use: No   • Drug use: No   • Sexual activity: Defer     Family History   Problem Relation Age of Onset   • No Known Problems Mother    • Diabetes Father    • Heart disease Father    • No Known Problems Sister    • No Known Problems Brother    • No Known Problems Daughter    • No Known Problems Maternal Grandmother    • No Known Problems Maternal Grandfather    • No Known Problems Paternal Grandmother    • No Known Problems Paternal  "Grandfather    • Hypertension Brother    • No Known Problems Brother      Current Outpatient Medications on File Prior to Visit   Medication Sig Dispense Refill   • Ascorbic Acid (VITAMIN C PO) Take 1,000 mg by mouth Daily. ON HOLD 9-15-17     • fexofenadine-pseudoephedrine (ALLEGRA-D 24) 180-240 MG per 24 hr tablet Take 1 tablet by mouth Daily. 30 tablet 6   • fluticasone (FLONASE) 50 MCG/ACT nasal spray 2 sprays into the nostril(s) as directed by provider Daily. (Patient taking differently: 2 sprays into the nostril(s) as directed by provider Daily. Pt taking as needed) 1 bottle 5   • lisinopril-hydrochlorothiazide (PRINZIDE,ZESTORETIC) 20-12.5 MG per tablet Take 1 tablet by mouth Daily. 90 tablet 1   • sildenafil (VIAGRA) 100 MG tablet TAKE 1 TABLET BY MOUTH DAILY AS NEEDED FOR ERECTILE DYSFUNCTION. 10 tablet 5   • [DISCONTINUED] sildenafil (VIAGRA) 100 MG tablet Take 1 tablet by mouth Daily As Needed for erectile dysfunction. 10 tablet 6     No current facility-administered medications on file prior to visit.         REVIEW OF SYMPTOMS: (Positives bolded)all negative  General:  weight loss, fever, chills, night sweats, fatigue, appetite loss  HEENT:  blurry vision, eye pain, eye discharge, dry eyes, decreased vision  Respiratory: shortness of breath, cough, hemoptysis, wheezing, pleurisy,   Cardiovascular:  chest pain, PND, palpitation, edema, orthopnea, syncope, swelling of extremities  Gastro: Nausea, vomiting, diarrhea, hematemesis, abdominal pain, constipation  Genito: hematuria, dysuria, glycosuria, hesitancy, frequency, incontinence  Musckelo: Arthralgia, myalgia, muscle weakness, joint swelling, NSAID use  Skin: rash, pruritis, sores, nail changes, skin thickening, change in wart/mole, itching, rash, new lesions, pruritus, nail changes  Neuro:  Migraine, numbness, ataxia, tremor, vertigo, weakness, memory loss  \"All other systems reviewed and negative, except as listed " above.”      OBJECTIVE:  Constitutional:  Appearance-No acute distress, Consistent with stated age. Orientation- Oriented x 3, alert Posture-Not doubled over. Gait-Normal pace, normal arm movement. Posture- Normal Build and Nutrition-Well developed and well nourished.  General- Patient is pleasant and cooperative with the interview and exam.    Integumentary: General-No rashes, ulcers or lesions. No edema.  Palpation- Normal skin moisture/turgor. Skin is warm to touch, no increased warmth. Capillary refill is normal bilateral Upper and lower extremity.     Head/Neck: Head- normocephalic and atraumatic.  Neck- without visible/palpable lumps or pulsations.  Palpation- No bony tenderness about head/neck along frontal, occiptial, temporal, parietal, mastoid, jawline, zygoma, orbit or any other location.  NO temporal artery tenderness. No TMJ tenderness. Normal cervical ROM.   Neck Supple.  Thyroid-No thyromegaly, no nodules    Eye: Bilaterally PERRLA, EOMI.  No discharge.  Upper and lower eyelids are normal. Sclera/conjunctiva normal without discharge. Cornea is normal and clear. Lens is normal.  Eyeball appears normal. No ciliary flushing, no conjunctival injection.    ENMT:  Pinna- normal without tenderness or erythema.  External auditory canal Left- normal without erythema or discharge, no excessive cerumen. External auditory canal Right-normal without erythema or discharge, no excessive cerumen. TM left- Grey/pearly, normal light reflex and anatomy TM Right- Grey/pearly, normal light reflex and anatomy Hearing Assessment-normal to conversational speech at 2-5 feet.  Nose and sinus-No sinus tenderness along frontal/maxillary region. External appearance normal and midline. Nares- bilateral quiet airflow, no discharge. Nasal mucosa- No bleeding noted and no ulcerations observed. Pink, moist. Turbinates non boggy. Lips- normal color, moist without cracks/lesions Oral Cavity/Palate- hard/soft palate intact without  lesions, oral mucosa pink and moist. Dentition assessed and discussed appropriate oral care. Tongue normal midline.  Oropharynx- no pharyngeal erythema, Uvula midline. No post nasal drip. No exudate. Salivary glands- Non tender to palpation    CHEST/LUNG: Inspection- symmetric chest wall no pectus deformity. Normal effort, no distress, no use of accessory muscles. Palpation- nontender sternum, ribline.  No abnormal pulsations. Auscultation- Breath sounds normal throughout all lung fields.  Normal tracheal sounds, Normal bronchial sounds overlying sternum, Bronchovessicular sounds normal between scapulae posteriorly, Normal vessicular breath sounds heard throughout periphery. Lungs are clear today. Adventitious sounds- No wheezes, rales, rhonchi.     CARDIOVASCULAR:  Carotid artery- normal, no bruits or abnormal pulsations. Jugular vein- no pulsations. Palpation/Percussion- Normal PMI, no palpable thrill  Auscultation- Regular rate and rhythm. No murmur noted in sitting, supine positions. Extremities- no digital clubbing, cyanosis, edema, increased warmth.    ABDOMEN: Inspection- normal and no visible pulsations. Normal contour. Auscultation- Bowel sounds normal, no abdominal bruits. Palpation/Percussion- soft, non-tender, no rebound tenderness, no rigidity (guarding), no jar tenderness, no masses.  Liver-no hepatomegaly, Spleen - no splenomegaly, Hernias- none. Rectal not examined.     Peripheral Vascular: Upper extremity Left- Normal temperature with pink nailbeds and no ulcerations.  Upper extremity Right- Normal temperature with pink nailbeds and no ulcerations.  Lower extremity- Normal temperature with pink nailbeds and no ulcerations. DP pulses 2+ bilaterally.  Pedal hair intact.  Normal capillary refill. Edema- No edema.    Neurological: General- Moves all 4 extremities symmetrically. Symmetrical face and body posture. Cranial nerves- individually evaluated II-XII and intact. PERRLA, Normal EOMI,  visual/special senses appear intact, Face is symmetrical and normal sensation/movement, normal tongue, normal strength/posture of neck musculature. Balance- Romberg intact.  Reflexes- ntact with DTR 2+ patellar, Achilles, bicep, brachial,tricep. Ankle clonus normal with 2 beats.  Strength- 5/5 bilateral UE and LE. Soft touch- intact bilateral UE and LE.  Temperature sensation- intact bilateral UE and LE. Cerebellar testing-Rapid alternating movements intact.  Heel shin intact. Able to walk normal gait, normal heel toe walking. Neck- supple.      Neuropsych: Oriented- Person, place, time. (AAOx3), Mood/affect- normal and congruent. Able to articulate well. Speech-Normal speech, normal rate, normal tone, normal use of language, volume and coherence.  Thought content- normal with ability to perform basic computations and apply abstract thought/reason. Associations- intact, no SI/HI, no hallucinations, delusions, obsessions.  Judgment/insight- Appropriate. Memory-Recall intact, remote and recent memory intact. Knowledge- Age appropriate fund of knowledge, concentration and attention span normal.    Lymphatic: Head/Neck- normal size and non tender to palpation. Axillary- Head and neck LN are normal size and non tender to palpation. Femoral and Inguinal- normal size and non tender to palpation.      Assessment/Plan:  Michael was seen today for hypertension.    Diagnoses and all orders for this visit:    Essential hypertension  -     lisinopril-hydrochlorothiazide (PRINZIDE,ZESTORETIC) 20-12.5 MG per tablet; Take 1 tablet by mouth Daily.    Chronic seasonal allergic rhinitis due to pollen  -     fluticasone (FLONASE) 50 MCG/ACT nasal spray; 2 sprays into the nostril(s) as directed by provider Daily.  -     fexofenadine-pseudoephedrine (ALLEGRA-D 24) 180-240 MG per 24 hr tablet; Take 1 tablet by mouth Daily.    Other male erectile dysfunction  -     sildenafil (VIAGRA) 100 MG tablet; Take 1 tablet by mouth Daily As Needed  for erectile dysfunction.    Did not draw labs, brought labs in from [facility]    Morbid obesity:  BMI:   30.2    Weight:    216    Follow up plan:  Lose at least 3 pounds before next chronic visit, exercise at least 3 times a week for 30 minute increments, eat baked instead of fried foods, and eat healthier     -  Documentation of education   The patient BMI is outside this range and we recommended/discussed today to utilize a diet/exercise program to get back into the appropriate range.  Federal guidelines recommend that people under the age of 65 should have a BMI of 18.5-24.9  Food diary:  Bring to next visit  tial step is to document everything that is consumed. Pt's that have a food diary  Lose 2x the weight  by keeping track of foods.   Choose one bad food weekly and eliminate it from your diet.  Replace with one healthy food  Exercise diary: Goal over next 2-4 weeks is walk 30 minutes per day 5 days per week at pace difficult to hold conversation.   Drink more water, less soda.   Cut back on portion sizes.   Today we encouraged roughly a 1 lb per week weight loss with initial goal of 5% weight loss.  Avoid fast foods, eat more salads  If eating out for a meal, consider cutting food in half and placing into a to go container.  Individually portion any foods coming into the home   Use smaller plates  Drink 12 ozof water 30 minutes before meal as way to suppress appetite.  Discussed Contrave, metformin, topamax, Belviq and the cost of medications  Encouraged internet programs or self help books  Set  Goals that are realistic   Educated on ways to measure food  Baseball: 1 cup good for green salad, frozen yogurt, medium piece of fruit  Handful:  ½ cup good cut fruit, cooked vegetables, pasta, rice  Egg:  ¼ cup good for dried fruit  Deck of cards: 3 ounces good for meat and poultry  Check book:  3 ounces grilled fish  Plate Method:  reduce plate size to 9 inch dinner plate.  Half of plate should be filled with  non-starchy vegetables (broccoli, lettuce, cauliflower, tomatoes), ¼ plate with lean source of protein (lean chicken, turkey, fish), remaining ½ with whole grains (brown rice, potato, whole grain breads  Avoid liquid calories (regular soda, juice, coffee with cream)  Focus  on water, seltzer water and other non-calorie drinks  Replace regular sugar with non-caloric sweeteners  Avoid skipping meals: plan small regular meals throughout the day in order to keep your hunger controlled  Consider using meal replacements if unable to plan a healthy meal (protein shake, high protein bar)  Replace all white bread with whole wheat/whole grain alternative  Swap regular salad dressings (mayonnaise, butter, or low fat or fat free alternative)  Avoid high fat, high calorie, high carbohydrate snakes (cookies, pastries, cakes)  Snack on fruits, low fat dairy (yogurt, cottage cheese)            Management plan:  Take medications as prescribed; return to the clinic of new or worsening concerns.       Risks/benefits of current and new medications discussed with the patient and or family today.  The patient/family are aware and accept that if there any side effects they should call or return to clinic as soon as possible.  Appropriate F/U discussed for topics addressed today. All questions were answered to the satisfactory state of patient/family.  Should symptoms fail to improve or worsen they agree to call or return to clinic or to go to the ER. Education handouts were offered on any new Rx if requested.  Discussed the importance of following up with any needed screening tests/labs/specialist appointments and any requested follow-up recommended by me today.  Importance of maintaining follow-up discussed and patient accepts that missed appointments can delay diagnosis and potentially lead to worsening of conditions.    Return in about 6 months (around 8/1/2019) for Recheck HTN, ED.    Mansi Zapata, DNP, APRN  2/1/2019

## 2019-04-01 ENCOUNTER — OFFICE VISIT (OUTPATIENT)
Dept: FAMILY MEDICINE CLINIC | Facility: CLINIC | Age: 59
End: 2019-04-01

## 2019-04-01 VITALS
TEMPERATURE: 98.2 F | OXYGEN SATURATION: 99 % | SYSTOLIC BLOOD PRESSURE: 142 MMHG | BODY MASS INDEX: 30.99 KG/M2 | DIASTOLIC BLOOD PRESSURE: 80 MMHG | RESPIRATION RATE: 20 BRPM | HEART RATE: 78 BPM | WEIGHT: 222.2 LBS

## 2019-04-01 DIAGNOSIS — N52.8 OTHER MALE ERECTILE DYSFUNCTION: ICD-10-CM

## 2019-04-01 DIAGNOSIS — J01.00 ACUTE NON-RECURRENT MAXILLARY SINUSITIS: Primary | ICD-10-CM

## 2019-04-01 PROCEDURE — 99214 OFFICE O/P EST MOD 30 MIN: CPT | Performed by: NURSE PRACTITIONER

## 2019-04-01 PROCEDURE — 96372 THER/PROPH/DIAG INJ SC/IM: CPT | Performed by: NURSE PRACTITIONER

## 2019-04-01 RX ORDER — SILDENAFIL 100 MG/1
100 TABLET, FILM COATED ORAL DAILY PRN
Qty: 30 TABLET | Refills: 5 | Status: SHIPPED | OUTPATIENT
Start: 2019-04-01 | End: 2019-08-07 | Stop reason: SDUPTHER

## 2019-04-01 RX ORDER — AZITHROMYCIN 250 MG/1
TABLET, FILM COATED ORAL
Qty: 6 TABLET | Refills: 0 | Status: SHIPPED | OUTPATIENT
Start: 2019-04-01 | End: 2019-07-31

## 2019-04-01 RX ORDER — METHYLPREDNISOLONE ACETATE 40 MG/ML
40 INJECTION, SUSPENSION INTRA-ARTICULAR; INTRALESIONAL; INTRAMUSCULAR; SOFT TISSUE ONCE
Status: COMPLETED | OUTPATIENT
Start: 2019-04-01 | End: 2019-04-01

## 2019-04-01 RX ADMIN — METHYLPREDNISOLONE ACETATE 40 MG: 40 INJECTION, SUSPENSION INTRA-ARTICULAR; INTRALESIONAL; INTRAMUSCULAR; SOFT TISSUE at 10:48

## 2019-04-01 NOTE — PROGRESS NOTES
CC: sinus infection    Kirit Issa is a 59 yr old here for complaints of seasonal allergies that progressively worse over the last 2 weeks and then 1 week ago developed sinus pain and pressure with no relief with allergy meds. Denies fever or chills, has had a lot of phelgm.   He wants to send the viagra to [facility] because he can get 30 pills for the same amt he pays for 8 pills here. He has problems maintaining an erection    Chronic problems include:  Seasonal allergies normally controlled with fexofenadine, fluticasone, ED stable with viagra, and HTN stable with lisinopril-hctz.        Sinusitis   This is a new problem. The current episode started in the past 7 days. The problem has been gradually worsening since onset. There has been no fever. His pain is at a severity of 0/10. He is experiencing no pain. Associated symptoms include chills, congestion, coughing, a hoarse voice, sinus pressure, sneezing and a sore throat. Past treatments include oral decongestants. The treatment provided mild relief.        The following portions of the patient's history were reviewed and updated as appropriate: allergies, current medications, past family history, past medical history, past social history, past surgical history and problem list.    Review of Systems   Constitutional: Positive for chills.   HENT: Positive for congestion, hoarse voice, sinus pressure, sneezing and sore throat.    Respiratory: Positive for cough.    Genitourinary: Negative.    Neurological: Negative.    Hematological: Negative for adenopathy. Does not bruise/bleed easily.   All other systems reviewed and are negative.      Objective   Physical Exam   Constitutional: He is oriented to person, place, and time. Vital signs are normal. He appears well-developed and well-nourished. He is cooperative.   HENT:   Head: Normocephalic and atraumatic.   Right Ear: Hearing, tympanic membrane, external ear and ear canal normal.   Left Ear: Hearing, tympanic  membrane, external ear and ear canal normal.   Nose: Mucosal edema and sinus tenderness present. Right sinus exhibits maxillary sinus tenderness. Left sinus exhibits maxillary sinus tenderness.   Mouth/Throat: Uvula is midline and oropharynx is clear and moist.   Cardiovascular: Normal rate, regular rhythm and normal heart sounds.   Pulmonary/Chest: Effort normal and breath sounds normal.   Lymphadenopathy:        Head (right side): No submental, no submandibular and no tonsillar adenopathy present.        Head (left side): No submental, no submandibular and no tonsillar adenopathy present.   Neurological: He is alert and oriented to person, place, and time. He has normal strength. No cranial nerve deficit or sensory deficit.   Skin: Skin is warm, dry and intact.   Psychiatric: He has a normal mood and affect. His speech is normal and behavior is normal. Judgment and thought content normal. Cognition and memory are normal.   Nursing note and vitals reviewed.        Assessment/Plan   Michael was seen today for sinus problem.    Diagnoses and all orders for this visit:    Acute non-recurrent maxillary sinusitis  -     methylPREDNISolone acetate (DEPO-medrol) injection 40 mg  -     azithromycin (ZITHROMAX) 250 MG tablet; Take 2 tablets the first day, then 1 tablet daily for 4 days.    Other male erectile dysfunction  -     sildenafil (VIAGRA) 100 MG tablet; Take 1 tablet by mouth Daily As Needed for erectile dysfunction.      Return in about 1 week (around 4/8/2019), or if symptoms worsen or fail to improve.    Mansi Zapata, JESU, APRN-BC  04/01/2019

## 2019-04-01 NOTE — PATIENT INSTRUCTIONS

## 2019-07-31 ENCOUNTER — OFFICE VISIT (OUTPATIENT)
Dept: FAMILY MEDICINE CLINIC | Facility: CLINIC | Age: 59
End: 2019-07-31

## 2019-07-31 VITALS
DIASTOLIC BLOOD PRESSURE: 84 MMHG | TEMPERATURE: 98.3 F | WEIGHT: 210.6 LBS | HEART RATE: 81 BPM | HEIGHT: 71 IN | BODY MASS INDEX: 29.48 KG/M2 | OXYGEN SATURATION: 98 % | RESPIRATION RATE: 18 BRPM | SYSTOLIC BLOOD PRESSURE: 134 MMHG

## 2019-07-31 DIAGNOSIS — J30.1 CHRONIC SEASONAL ALLERGIC RHINITIS DUE TO POLLEN: ICD-10-CM

## 2019-07-31 DIAGNOSIS — I10 ESSENTIAL HYPERTENSION: ICD-10-CM

## 2019-07-31 PROCEDURE — 99214 OFFICE O/P EST MOD 30 MIN: CPT | Performed by: NURSE PRACTITIONER

## 2019-07-31 RX ORDER — LISINOPRIL AND HYDROCHLOROTHIAZIDE 20; 12.5 MG/1; MG/1
1 TABLET ORAL DAILY
Qty: 90 TABLET | Refills: 1 | Status: SHIPPED | OUTPATIENT
Start: 2019-07-31 | End: 2020-04-15

## 2019-07-31 RX ORDER — FEXOFENADINE HCL AND PSEUDOEPHEDRINE HCI 180; 240 MG/1; MG/1
1 TABLET, EXTENDED RELEASE ORAL DAILY
Qty: 30 TABLET | Refills: 6 | Status: SHIPPED | OUTPATIENT
Start: 2019-07-31 | End: 2019-11-25 | Stop reason: SDUPTHER

## 2019-08-01 ENCOUNTER — RESULTS ENCOUNTER (OUTPATIENT)
Dept: FAMILY MEDICINE CLINIC | Facility: CLINIC | Age: 59
End: 2019-08-01

## 2019-08-01 DIAGNOSIS — I10 ESSENTIAL HYPERTENSION: ICD-10-CM

## 2019-08-07 DIAGNOSIS — N52.8 OTHER MALE ERECTILE DYSFUNCTION: ICD-10-CM

## 2019-08-08 ENCOUNTER — PATIENT MESSAGE (OUTPATIENT)
Dept: FAMILY MEDICINE CLINIC | Facility: CLINIC | Age: 59
End: 2019-08-08

## 2019-08-08 RX ORDER — SILDENAFIL 100 MG/1
100 TABLET, FILM COATED ORAL DAILY PRN
Qty: 30 TABLET | Refills: 4 | Status: SHIPPED | OUTPATIENT
Start: 2019-08-08 | End: 2019-12-11 | Stop reason: SDUPTHER

## 2019-08-08 NOTE — PROGRESS NOTES
This is to remind you that you have lab test that have been ordered during your last office visit. You will need to be fasting for the test so only water to drink 8 hours prior to having the lab collected. The lab is available Monday through Friday 7:30-4:30. You will not need an appointment to have lab.     Thank You

## 2019-11-25 ENCOUNTER — OFFICE VISIT (OUTPATIENT)
Dept: FAMILY MEDICINE CLINIC | Facility: CLINIC | Age: 59
End: 2019-11-25

## 2019-11-25 VITALS
WEIGHT: 225.4 LBS | OXYGEN SATURATION: 99 % | TEMPERATURE: 97.6 F | RESPIRATION RATE: 18 BRPM | HEART RATE: 78 BPM | DIASTOLIC BLOOD PRESSURE: 88 MMHG | BODY MASS INDEX: 31.56 KG/M2 | HEIGHT: 71 IN | SYSTOLIC BLOOD PRESSURE: 138 MMHG

## 2019-11-25 DIAGNOSIS — J30.1 CHRONIC SEASONAL ALLERGIC RHINITIS DUE TO POLLEN: ICD-10-CM

## 2019-11-25 DIAGNOSIS — J30.2 SEASONAL ALLERGIES: Primary | ICD-10-CM

## 2019-11-25 PROCEDURE — 99213 OFFICE O/P EST LOW 20 MIN: CPT | Performed by: NURSE PRACTITIONER

## 2019-11-25 PROCEDURE — 96372 THER/PROPH/DIAG INJ SC/IM: CPT | Performed by: NURSE PRACTITIONER

## 2019-11-25 RX ORDER — FEXOFENADINE HCL AND PSEUDOEPHEDRINE HCI 180; 240 MG/1; MG/1
1 TABLET, EXTENDED RELEASE ORAL DAILY
Qty: 30 TABLET | Refills: 6 | Status: SHIPPED | OUTPATIENT
Start: 2019-11-25 | End: 2020-07-28 | Stop reason: SDUPTHER

## 2019-11-25 RX ORDER — DEXAMETHASONE SODIUM PHOSPHATE 10 MG/ML
10 INJECTION INTRAMUSCULAR; INTRAVENOUS ONCE
Status: COMPLETED | OUTPATIENT
Start: 2019-11-25 | End: 2019-11-25

## 2019-11-25 RX ORDER — OLOPATADINE HYDROCHLORIDE 1 MG/ML
1 SOLUTION/ DROPS OPHTHALMIC 2 TIMES DAILY
Qty: 1 EACH | Refills: 5 | Status: SHIPPED | OUTPATIENT
Start: 2019-11-25 | End: 2020-07-28 | Stop reason: SDUPTHER

## 2019-11-25 RX ORDER — FEXOFENADINE HCL AND PSEUDOEPHEDRINE HCI 180; 240 MG/1; MG/1
1 TABLET, EXTENDED RELEASE ORAL DAILY
Qty: 30 TABLET | Refills: 6 | Status: SHIPPED | OUTPATIENT
Start: 2019-11-25 | End: 2019-11-25 | Stop reason: SDUPTHER

## 2019-11-25 RX ADMIN — DEXAMETHASONE SODIUM PHOSPHATE 10 MG: 10 INJECTION INTRAMUSCULAR; INTRAVENOUS at 08:21

## 2019-11-25 NOTE — PATIENT INSTRUCTIONS
Allergies, Adult  An allergy is when your body's defense system (immune system) overreacts to an otherwise harmless substance (allergen) that you breathe in or eat or something that touches your skin. When you come into contact with something that you are allergic to, your immune system produces certain proteins (antibodies). These proteins cause cells to release chemicals (histamines) that trigger the symptoms of an allergic reaction.  Allergies often affect the nasal passages (allergic rhinitis), eyes (allergic conjunctivitis), skin (atopic dermatitis), and stomach. Allergies can be mild or severe. Allergies cannot spread from person to person (are not contagious). They can develop at any age and may be outgrown.  What increases the risk?  You may be at greater risk of allergies if other people in your family have allergies.  What are the signs or symptoms?  Symptoms depend on what type of allergy you have. They may include:  · Runny, stuffy nose.  · Sneezing.  · Itchy mouth, ears, or throat.  · Postnasal drip.  · Sore throat.  · Itchy, red, watery, or puffy eyes.  · Skin rash or hives.  · Stomach pain.  · Vomiting.  · Diarrhea.  · Bloating.  · Wheezing or coughing.  People with a severe allergy to food, medicine, or an insect bite may have a life-threatening allergic reaction (anaphylaxis). Symptoms of anaphylaxis include:  · Hives.  · Itching.  · Flushed face.  · Swollen lips, tongue, or mouth.  · Tight or swollen throat.  · Chest pain or tightness in the chest.  · Trouble breathing or shortness of breath.  · Rapid heartbeat.  · Dizziness or fainting.  · Vomiting.  · Diarrhea.  · Pain in the abdomen.  How is this diagnosed?  This condition is diagnosed based on:  · Your symptoms.  · Your family and medical history.  · A physical exam.  You may need to see a health care provider who specializes in treating allergies (allergist). You may also have tests, including:  · Skin tests to see which allergens are causing  your symptoms, such as:  ? Skin prick test. In this test, your skin is pricked with a tiny needle and exposed to small amounts of possible allergens to see if your skin reacts.  ? Intradermal skin test. In this test, a small amount of allergen is injected under your skin to see if your skin reacts.  ? Patch test. In this test, a small amount of allergen is placed on your skin and then your skin is covered with a bandage. Your health care provider will check your skin after a couple of days to see if a rash has developed.  · Blood tests.  · Challenges tests. In this test, you inhale a small amount of allergen by mouth to see if you have an allergic reaction.  You may also be asked to:  · Keep a food diary. A food diary is a record of all the foods and drinks you have in a day and any symptoms you experience.  · Practice an elimination diet. An elimination diet involves eliminating specific foods from your diet and then adding them back in one by one to find out if a certain food causes an allergic reaction.  How is this treated?  Treatment for allergies depends on your symptoms. Treatment may include:  · Cold compresses to soothe itching and swelling.  · Eye drops.  · Nasal sprays.  · Using a saline spray or container (neti pot) to flush out the nose (nasal irrigation). These methods can help clear away mucus and keep the nasal passages moist.  · Using a humidifier.  · Oral antihistamines or other medicines to block allergic reaction and inflammation.  · Skin creams to treat rashes or itching.  · Diet changes to eliminate food allergy triggers.  · Repeated exposure to tiny amounts of allergens to build up a tolerance and prevent future allergic reactions (immunotherapy). These include:  ? Allergy shots.  ? Oral treatment. This involves taking small doses of an allergen under the tongue (sublingual immunotherapy).  · Emergency epinephrine injection (auto-injector) in case of an allergic emergency. This is a  self-injectable, pre-measured medicine that must be given within the first few minutes of a serious allergic reaction.  Follow these instructions at home:              · Avoid known allergens whenever possible.  · If you suffer from airborne allergens, wash out your nose daily. You can do this with a saline spray or a neti pot to flush out your nose (nasal irrigation).  · Take over-the-counter and prescription medicines only as told by your health care provider.  · Keep all follow-up visits as told by your health care provider. This is important.  · If you are at risk of a severe allergic reaction (anaphylaxis), keep your auto-injector with you at all times.  · If you have ever had anaphylaxis, wear a medical alert bracelet or necklace that states you have a severe allergy.  Contact a health care provider if:  · Your symptoms do not improve with treatment.  Get help right away if:  · You have symptoms of anaphylaxis, such as:  ? Swollen mouth, tongue, or throat.  ? Pain or tightness in your chest.  ? Trouble breathing or shortness of breath.  ? Dizziness or fainting.  ? Severe abdominal pain, vomiting, or diarrhea.  This information is not intended to replace advice given to you by your health care provider. Make sure you discuss any questions you have with your health care provider.  Document Released: 03/12/2004 Document Revised: 04/18/2018 Document Reviewed: 07/05/2017  [email or web address] Interactive Patient Education © 2019 [employer]

## 2019-12-11 DIAGNOSIS — N52.8 OTHER MALE ERECTILE DYSFUNCTION: ICD-10-CM

## 2019-12-12 RX ORDER — SILDENAFIL 100 MG/1
100 TABLET, FILM COATED ORAL DAILY PRN
Qty: 30 TABLET | Refills: 3 | Status: SHIPPED | OUTPATIENT
Start: 2019-12-12 | End: 2020-04-28 | Stop reason: SDUPTHER

## 2019-12-12 NOTE — TELEPHONE ENCOUNTER
Pt requesting refill   LOV 11/25/19 for allergies- 7/31/19 for 6months  LAST RX: 8/8/19 FOR 4 MONTHS

## 2019-12-14 DIAGNOSIS — J30.1 CHRONIC SEASONAL ALLERGIC RHINITIS DUE TO POLLEN: ICD-10-CM

## 2019-12-16 RX ORDER — FEXOFENADINE HYDROCHLORIDE AND PSEUDOEPHEDRINE HYDROCHLORIDE 180; 240 MG/1; MG/1
TABLET, FILM COATED, EXTENDED RELEASE ORAL
Qty: 30 TABLET | Refills: 6 | OUTPATIENT
Start: 2019-12-16

## 2020-04-15 DIAGNOSIS — I10 ESSENTIAL HYPERTENSION: ICD-10-CM

## 2020-04-15 RX ORDER — LISINOPRIL AND HYDROCHLOROTHIAZIDE 20; 12.5 MG/1; MG/1
TABLET ORAL
Qty: 90 TABLET | Refills: 1 | Status: SHIPPED | OUTPATIENT
Start: 2020-04-15 | End: 2020-07-28 | Stop reason: SDUPTHER

## 2020-04-28 DIAGNOSIS — N52.8 OTHER MALE ERECTILE DYSFUNCTION: ICD-10-CM

## 2020-04-28 RX ORDER — SILDENAFIL 100 MG/1
100 TABLET, FILM COATED ORAL DAILY PRN
Qty: 30 TABLET | Refills: 3 | Status: SHIPPED | OUTPATIENT
Start: 2020-04-28 | End: 2020-07-28 | Stop reason: SDUPTHER

## 2020-04-28 NOTE — TELEPHONE ENCOUNTER
LOV: 11/25/2019 allergies  LRX:12/12/19  Nxt apt:not schd was due in jan 2020 was a no show    Please advise if pt needs and apt

## 2020-04-28 NOTE — TELEPHONE ENCOUNTER
PHARMACY REP CALLED REQUESTING A REFILL ON PT'S  - sildenafil (VIAGRA) 100 MG tablet    PHARMACY: [facility] [address] [address] - [phone] [facility] [phone]   [phone]

## 2020-05-01 DIAGNOSIS — J30.2 SEASONAL ALLERGIES: ICD-10-CM

## 2020-05-01 RX ORDER — OLOPATADINE HYDROCHLORIDE 1 MG/ML
SOLUTION/ DROPS OPHTHALMIC
Qty: 5 ML | Refills: 5 | OUTPATIENT
Start: 2020-05-01

## 2020-07-24 DIAGNOSIS — N52.8 OTHER MALE ERECTILE DYSFUNCTION: ICD-10-CM

## 2020-07-28 ENCOUNTER — OFFICE VISIT (OUTPATIENT)
Dept: FAMILY MEDICINE CLINIC | Facility: CLINIC | Age: 60
End: 2020-07-28

## 2020-07-28 VITALS
DIASTOLIC BLOOD PRESSURE: 84 MMHG | TEMPERATURE: 96.9 F | BODY MASS INDEX: 31 KG/M2 | HEART RATE: 83 BPM | OXYGEN SATURATION: 100 % | WEIGHT: 221.4 LBS | HEIGHT: 71 IN | SYSTOLIC BLOOD PRESSURE: 126 MMHG | RESPIRATION RATE: 18 BRPM

## 2020-07-28 DIAGNOSIS — I10 ESSENTIAL HYPERTENSION: ICD-10-CM

## 2020-07-28 DIAGNOSIS — J30.2 SEASONAL ALLERGIES: ICD-10-CM

## 2020-07-28 DIAGNOSIS — J30.1 CHRONIC SEASONAL ALLERGIC RHINITIS DUE TO POLLEN: ICD-10-CM

## 2020-07-28 DIAGNOSIS — N52.8 OTHER MALE ERECTILE DYSFUNCTION: ICD-10-CM

## 2020-07-28 PROCEDURE — 99214 OFFICE O/P EST MOD 30 MIN: CPT | Performed by: NURSE PRACTITIONER

## 2020-07-28 RX ORDER — SILDENAFIL 100 MG/1
100 TABLET, FILM COATED ORAL DAILY PRN
Qty: 30 TABLET | Refills: 2 | OUTPATIENT
Start: 2020-07-28

## 2020-07-28 RX ORDER — OLOPATADINE HYDROCHLORIDE 1 MG/ML
1 SOLUTION/ DROPS OPHTHALMIC 2 TIMES DAILY
Qty: 1 EACH | Refills: 5 | Status: SHIPPED | OUTPATIENT
Start: 2020-07-28 | End: 2022-06-23 | Stop reason: SDUPTHER

## 2020-07-28 RX ORDER — LISINOPRIL AND HYDROCHLOROTHIAZIDE 20; 12.5 MG/1; MG/1
1 TABLET ORAL DAILY
Qty: 90 TABLET | Refills: 1 | Status: SHIPPED | OUTPATIENT
Start: 2020-07-28 | End: 2021-01-27 | Stop reason: SDUPTHER

## 2020-07-28 RX ORDER — SILDENAFIL 100 MG/1
100 TABLET, FILM COATED ORAL DAILY PRN
Qty: 30 TABLET | Refills: 3 | Status: SHIPPED | OUTPATIENT
Start: 2020-07-28 | End: 2020-12-11 | Stop reason: SDUPTHER

## 2020-07-28 RX ORDER — FEXOFENADINE HCL AND PSEUDOEPHEDRINE HCI 180; 240 MG/1; MG/1
1 TABLET, EXTENDED RELEASE ORAL DAILY
Qty: 30 TABLET | Refills: 6 | Status: SHIPPED | OUTPATIENT
Start: 2020-07-28 | End: 2021-01-27 | Stop reason: SDUPTHER

## 2020-12-11 DIAGNOSIS — N52.8 OTHER MALE ERECTILE DYSFUNCTION: ICD-10-CM

## 2020-12-11 RX ORDER — SILDENAFIL 100 MG/1
100 TABLET, FILM COATED ORAL DAILY PRN
Qty: 30 TABLET | Refills: 3 | Status: SHIPPED | OUTPATIENT
Start: 2020-12-11 | End: 2021-01-27 | Stop reason: SDUPTHER

## 2021-01-27 ENCOUNTER — OFFICE VISIT (OUTPATIENT)
Dept: FAMILY MEDICINE CLINIC | Facility: CLINIC | Age: 61
End: 2021-01-27

## 2021-01-27 VITALS
WEIGHT: 238 LBS | OXYGEN SATURATION: 98 % | HEIGHT: 71 IN | HEART RATE: 81 BPM | RESPIRATION RATE: 18 BRPM | BODY MASS INDEX: 33.32 KG/M2 | SYSTOLIC BLOOD PRESSURE: 120 MMHG | TEMPERATURE: 96.4 F | DIASTOLIC BLOOD PRESSURE: 90 MMHG

## 2021-01-27 DIAGNOSIS — M25.542 ARTHRALGIA OF BOTH HANDS: Primary | ICD-10-CM

## 2021-01-27 DIAGNOSIS — M25.541 ARTHRALGIA OF BOTH HANDS: Primary | ICD-10-CM

## 2021-01-27 DIAGNOSIS — I10 ESSENTIAL HYPERTENSION: ICD-10-CM

## 2021-01-27 DIAGNOSIS — N52.8 OTHER MALE ERECTILE DYSFUNCTION: ICD-10-CM

## 2021-01-27 DIAGNOSIS — J30.1 CHRONIC SEASONAL ALLERGIC RHINITIS DUE TO POLLEN: ICD-10-CM

## 2021-01-27 PROCEDURE — 99214 OFFICE O/P EST MOD 30 MIN: CPT | Performed by: NURSE PRACTITIONER

## 2021-01-27 RX ORDER — FEXOFENADINE HCL AND PSEUDOEPHEDRINE HCI 180; 240 MG/1; MG/1
1 TABLET, EXTENDED RELEASE ORAL DAILY
Qty: 30 TABLET | Refills: 6 | Status: SHIPPED | OUTPATIENT
Start: 2021-01-27 | End: 2021-07-27 | Stop reason: SDUPTHER

## 2021-01-27 RX ORDER — LISINOPRIL AND HYDROCHLOROTHIAZIDE 20; 12.5 MG/1; MG/1
1 TABLET ORAL DAILY
Qty: 90 TABLET | Refills: 1 | Status: SHIPPED | OUTPATIENT
Start: 2021-01-27 | End: 2021-07-27 | Stop reason: SDUPTHER

## 2021-01-27 RX ORDER — SILDENAFIL 100 MG/1
100 TABLET, FILM COATED ORAL DAILY PRN
Qty: 30 TABLET | Refills: 3 | Status: SHIPPED | OUTPATIENT
Start: 2021-01-27 | End: 2021-06-01

## 2021-01-27 NOTE — PROGRESS NOTES
Chief Complaint  Hypertension (6 month follow-up), Allergies (Med refill), and Erectile Dysfunction (Med refill)    Subjective      Michael Issa presents to [facility] for   Follow for chornic problems  Says bp has been normal but today the bottom number is up to 90.  He says he attributes it to him runnin late for the appt.  Denies any fever chills, nausea, vomiging or Diarrhea.  Says he feels pretty good except his hands, hips, knees, elbows which he believes is arthritis. Says most days his hands areswollen at the knuckles.  Still swollen today.       Hypertension  This is a chronic problem. The current episode started more than 1 year ago. The problem is unchanged. The problem is controlled. Pertinent negatives include no blurred vision, chest pain or neck pain. There are no associated agents to hypertension. Risk factors for coronary artery disease include family history and male gender. Past treatments include ACE inhibitors, diuretics and lifestyle changes. Current antihypertension treatment includes ACE inhibitors, lifestyle changes and diuretics. The current treatment provides moderate improvement. There are no compliance problems.  There is no history of angina, kidney disease, CAD/MI, CVA, heart failure, left ventricular hypertrophy, PVD or retinopathy. There is no history of coarctation of the aorta, hyperaldosteronism, hypercortisolism, pheochromocytoma, renovascular disease or sleep apnea.   Erectile Dysfunction  This is a chronic problem. The current episode started more than 1 year ago. The problem is unchanged. The nature of his difficulty is achieving erection and maintaining erection. Non-physiologic factors contributing to erectile dysfunction are a decreased libido. He reports no anxiety or performance anxiety. He reports his erection duration to be less than 1 minute. Irritative symptoms do not include nocturia or urgency. Obstructive symptoms do not include  "dribbling, incomplete emptying, an intermittent stream, a slower stream, straining or a weak stream. Nothing aggravates the symptoms. Past treatments include sildenafil and tadalafil. The treatment provided no relief. He has been using treatment for 6 to 12 months. He has had no adverse reactions caused by medications. Risk factors include hypertension.   Joint Swelling  This is a new problem. The current episode started more than 1 month ago. The problem occurs constantly. The problem has been gradually worsening. Associated symptoms include arthralgias and joint swelling. Pertinent negatives include no chest pain, congestion, nausea, neck pain, swollen glands or urinary symptoms. Nothing aggravates the symptoms. He has tried nothing for the symptoms. The treatment provided no relief.       Chronic probles: htn controlled with lisinopril-hctz, ED stable with viagra, seasonal allergies stable with fenofenadine-pseudosephedrine     Objective   Vital Signs:   /90 (BP Location: Left arm, Patient Position: Sitting, Cuff Size: Adult)   Pulse 81   Temp 96.4 °F (35.8 °C) (Infrared)   Resp 18   Ht 180.3 cm (71\")   Wt 108 kg (238 lb)   SpO2 98%   BMI 33.19 kg/m²       Physical Exam  Vitals signs and nursing note reviewed.   Constitutional:       General: He is awake.      Appearance: Normal appearance. He is well-developed and well-groomed. He is obese.   HENT:      Head: Normocephalic and atraumatic.      Right Ear: Hearing, tympanic membrane, ear canal and external ear normal.      Left Ear: Hearing, tympanic membrane, ear canal and external ear normal.      Nose: Nose normal.      Mouth/Throat:      Lips: Pink.      Pharynx: Oropharynx is clear.   Cardiovascular:      Rate and Rhythm: Normal rate and regular rhythm.      Heart sounds: Normal heart sounds.   Pulmonary:      Effort: Pulmonary effort is normal.      Breath sounds: Normal breath sounds and air entry.   Abdominal:      General: Abdomen is flat. " Bowel sounds are normal.      Palpations: Abdomen is soft.   Musculoskeletal:      Left hand: He exhibits decreased range of motion and tenderness. Normal strength noted. He exhibits no finger abduction and no wrist extension trouble.        Hands:       Right lower leg: No edema.      Left lower leg: No edema.   Lymphadenopathy:      Head:      Right side of head: No submental, submandibular or tonsillar adenopathy.      Left side of head: No submental, submandibular or tonsillar adenopathy.   Skin:     General: Skin is warm and dry.      Capillary Refill: Capillary refill takes less than 2 seconds.   Neurological:      Mental Status: He is alert.      Cranial Nerves: Cranial nerves are intact.      Sensory: Sensation is intact.      Motor: Motor function is intact.      Coordination: Coordination is intact.      Gait: Gait is intact.   Psychiatric:         Behavior: Behavior is cooperative.             Assessment and Plan  Diagnoses and all orders for this visit:    1. Arthralgia of both hands (Primary)  -     Sedimentation Rate  -     C-reactive protein  -     PENG  -     diclofenac (VOLTAREN) 50 MG EC tablet; Take 1 tablet by mouth 2 (Two) Times a Day.  Dispense: 180 tablet; Refill: 1    2. Essential hypertension  -     lisinopril-hydrochlorothiazide (PRINZIDE,ZESTORETIC) 20-12.5 MG per tablet; Take 1 tablet by mouth Daily.  Dispense: 90 tablet; Refill: 1    3. Chronic seasonal allergic rhinitis due to pollen  -     fexofenadine-pseudoephedrine (ALLEGRA-D 24) 180-240 MG per 24 hr tablet; Take 1 tablet by mouth Daily.  Dispense: 30 tablet; Refill: 6    4. Other male erectile dysfunction  -     sildenafil (VIAGRA) 100 MG tablet; Take 1 tablet by mouth Daily As Needed for Erectile Dysfunction.  Dispense: 30 tablet; Refill: 3        -    ED:  Has difficulty maintaining an erection        -     Continue viagra asprescrd      Follow Up     Return in about 6 months (around 7/27/2021) for Recheck chronic.  Patient was  given instructions and counseling regarding his condition or for health maintenance advice. Please see specific information pulled into the AVS if appropriate.     Electronically signed by Mansi Zapata DNP, APRN, 01/29/21, 7:20 AM CST.

## 2021-01-27 NOTE — PATIENT INSTRUCTIONS
Arthritis  Arthritis means joint pain. It can also mean joint disease. A joint is a place where bones come together. There are more than 100 types of arthritis.  What are the causes?  This condition may be caused by:  · Wear and tear of a joint. This is the most common cause.  · A lot of acid in the blood, which leads to pain in the joint (gout).  · Pain and swelling (inflammation) in a joint.  · Infection of a joint.  · Injuries in the joint.  · A reaction to medicines (allergy).  In some cases, the cause may not be known.  What are the signs or symptoms?  Symptoms of this condition include:  · Redness at a joint.  · Swelling at a joint.  · Stiffness at a joint.  · Warmth coming from the joint.  · A fever.  · A feeling of being sick.  How is this treated?  This condition may be treated with:  · Treating the cause, if it is known.  · Rest.  · Raising (elevating) the joint.  · Putting cold or hot packs on the joint.  · Medicines to treat symptoms and reduce pain and swelling.  · Shots of medicines (cortisone) into the joint.  You may also be told to make changes in your life, such as doing exercises and losing weight.  Follow these instructions at home:  Medicines  · Take over-the-counter and prescription medicines only as told by your doctor.  · Do not take aspirin for pain if your doctor says that you may have gout.  Activity  · Rest your joint if your doctor tells you to.  · Avoid activities that make the pain worse.  · Exercise your joint regularly as told by your doctor. Try doing exercises like:  ? Swimming.  ? Water aerobics.  ? Biking.  ? Walking.  Managing pain, stiffness, and swelling         · If told, put ice on the affected area.  ? Put ice in a plastic bag.  ? Place a towel between your skin and the bag.  ? Leave the ice on for 20 minutes, 2-3 times per day.  · If your joint is swollen, raise (elevate) it above the level of your heart if told by your doctor.  · If your joint feels stiff in the morning,  try taking a warm shower.  · If told, put heat on the affected area. Do this as often as told by your doctor. Use the heat source that your doctor recommends, such as a moist heat pack or a heating pad. If you have diabetes, do not apply heat without asking your doctor. To apply heat:  ? Place a towel between your skin and the heat source.  ? Leave the heat on for 20-30 minutes.  ? Remove the heat if your skin turns bright red. This is very important if you are unable to feel pain, heat, or cold. You may have a greater risk of getting burned.  General instructions  · Do not use any products that contain nicotine or tobacco, such as cigarettes, e-cigarettes, and chewing tobacco. If you need help quitting, ask your doctor.  · Keep all follow-up visits as told by your doctor. This is important.  Contact a doctor if:  · The pain gets worse.  · You have a fever.  Get help right away if:  · You have very bad pain in your joint.  · You have swelling in your joint.  · Your joint is red.  · Many joints become painful and swollen.  · You have very bad back pain.  · Your leg is very weak.  · You cannot control your pee (urine) or poop (stool).  Summary  · Arthritis means joint pain. It can also mean joint disease. A joint is a place where bones come together.  · The most common cause of this condition is wear and tear of a joint.  · Symptoms of this condition include redness, swelling, or stiffness of the joint.  · This condition is treated with rest, raising the joint, medicines, and putting cold or hot packs on the joint.  · Follow your doctor's instructions about medicines, activity, exercises, and other home care treatments.  This information is not intended to replace advice given to you by your health care provider. Make sure you discuss any questions you have with your health care provider.  Document Revised: 11/25/2019 Document Reviewed: 11/25/2019  Elsevier Patient Education © 2020 [employer]    Hypertension,  "Adult  High blood pressure (hypertension) is when the force of blood pumping through the arteries is too strong. The arteries are the blood vessels that carry blood from the heart throughout the body. Hypertension forces the heart to work harder to pump blood and may cause arteries to become narrow or stiff. Untreated or uncontrolled hypertension can cause a heart attack, heart failure, a stroke, kidney disease, and other problems.  A blood pressure reading consists of a higher number over a lower number. Ideally, your blood pressure should be below 120/80. The first (\"top\") number is called the systolic pressure. It is a measure of the pressure in your arteries as your heart beats. The second (\"bottom\") number is called the diastolic pressure. It is a measure of the pressure in your arteries as the heart relaxes.  What are the causes?  The exact cause of this condition is not known. There are some conditions that result in or are related to high blood pressure.  What increases the risk?  Some risk factors for high blood pressure are under your control. The following factors may make you more likely to develop this condition:  · Smoking.  · Having type 2 diabetes mellitus, high cholesterol, or both.  · Not getting enough exercise or physical activity.  · Being overweight.  · Having too much fat, sugar, calories, or salt (sodium) in your diet.  · Drinking too much alcohol.  Some risk factors for high blood pressure may be difficult or impossible to change. Some of these factors include:  · Having chronic kidney disease.  · Having a family history of high blood pressure.  · Age. Risk increases with age.  · Race. You may be at higher risk if you are .  · Gender. Men are at higher risk than women before age 45. After age 65, women are at higher risk than men.  · Having obstructive sleep apnea.  · Stress.  What are the signs or symptoms?  High blood pressure may not cause symptoms. Very high blood " pressure (hypertensive crisis) may cause:  · Headache.  · Anxiety.  · Shortness of breath.  · Nosebleed.  · Nausea and vomiting.  · Vision changes.  · Severe chest pain.  · Seizures.  How is this diagnosed?  This condition is diagnosed by measuring your blood pressure while you are seated, with your arm resting on a flat surface, your legs uncrossed, and your feet flat on the floor. The cuff of the blood pressure monitor will be placed directly against the skin of your upper arm at the level of your heart. It should be measured at least twice using the same arm. Certain conditions can cause a difference in blood pressure between your right and left arms.  Certain factors can cause blood pressure readings to be lower or higher than normal for a short period of time:  · When your blood pressure is higher when you are in a health care provider's office than when you are at home, this is called white coat hypertension. Most people with this condition do not need medicines.  · When your blood pressure is higher at home than when you are in a health care provider's office, this is called masked hypertension. Most people with this condition may need medicines to control blood pressure.  If you have a high blood pressure reading during one visit or you have normal blood pressure with other risk factors, you may be asked to:  · Return on a different day to have your blood pressure checked again.  · Monitor your blood pressure at home for 1 week or longer.  If you are diagnosed with hypertension, you may have other blood or imaging tests to help your health care provider understand your overall risk for other conditions.  How is this treated?  This condition is treated by making healthy lifestyle changes, such as eating healthy foods, exercising more, and reducing your alcohol intake. Your health care provider may prescribe medicine if lifestyle changes are not enough to get your blood pressure under control, and if:  · Your  systolic blood pressure is above 130.  · Your diastolic blood pressure is above 80.  Your personal target blood pressure may vary depending on your medical conditions, your age, and other factors.  Follow these instructions at home:  Eating and drinking    · Eat a diet that is high in fiber and potassium, and low in sodium, added sugar, and fat. An example eating plan is called the DASH (Dietary Approaches to Stop Hypertension) diet. To eat this way:  ? Eat plenty of fresh fruits and vegetables. Try to fill one half of your plate at each meal with fruits and vegetables.  ? Eat whole grains, such as whole-wheat pasta, brown rice, or whole-grain bread. Fill about one fourth of your plate with whole grains.  ? Eat or drink low-fat dairy products, such as skim milk or low-fat yogurt.  ? Avoid fatty cuts of meat, processed or cured meats, and poultry with skin. Fill about one fourth of your plate with lean proteins, such as fish, chicken without skin, beans, eggs, or tofu.  ? Avoid pre-made and processed foods. These tend to be higher in sodium, added sugar, and fat.  · Reduce your daily sodium intake. Most people with hypertension should eat less than 1,500 mg of sodium a day.  · Do not drink alcohol if:  ? Your health care provider tells you not to drink.  ? You are pregnant, may be pregnant, or are planning to become pregnant.  · If you drink alcohol:  ? Limit how much you use to:  § 0-1 drink a day for women.  § 0-2 drinks a day for men.  ? Be aware of how much alcohol is in your drink. In the [place] one drink equals one 12 oz bottle of beer (355 mL), one 5 oz glass of wine (148 mL), or one 1½ oz glass of hard liquor (44 mL).  Lifestyle    · Work with your health care provider to maintain a healthy body weight or to lose weight. Ask what an ideal weight is for you.  · Get at least 30 minutes of exercise most days of the week. Activities may include walking, swimming, or biking.  · Include exercise to strengthen your  muscles (resistance exercise), such as Pilates or lifting weights, as part of your weekly exercise routine. Try to do these types of exercises for 30 minutes at least 3 days a week.  · Do not use any products that contain nicotine or tobacco, such as cigarettes, e-cigarettes, and chewing tobacco. If you need help quitting, ask your health care provider.  · Monitor your blood pressure at home as told by your health care provider.  · Keep all follow-up visits as told by your health care provider. This is important.  Medicines  · Take over-the-counter and prescription medicines only as told by your health care provider. Follow directions carefully. Blood pressure medicines must be taken as prescribed.  · Do not skip doses of blood pressure medicine. Doing this puts you at risk for problems and can make the medicine less effective.  · Ask your health care provider about side effects or reactions to medicines that you should watch for.  Contact a health care provider if you:  · Think you are having a reaction to a medicine you are taking.  · Have headaches that keep coming back (recurring).  · Feel dizzy.  · Have swelling in your ankles.  · Have trouble with your vision.  Get help right away if you:  · Develop a severe headache or confusion.  · Have unusual weakness or numbness.  · Feel faint.  · Have severe pain in your chest or abdomen.  · Vomit repeatedly.  · Have trouble breathing.  Summary  · Hypertension is when the force of blood pumping through your arteries is too strong. If this condition is not controlled, it may put you at risk for serious complications.  · Your personal target blood pressure may vary depending on your medical conditions, your age, and other factors. For most people, a normal blood pressure is less than 120/80.  · Hypertension is treated with lifestyle changes, medicines, or a combination of both. Lifestyle changes include losing weight, eating a healthy, low-sodium diet, exercising more, and  limiting alcohol.  This information is not intended to replace advice given to you by your health care provider. Make sure you discuss any questions you have with your health care provider.  Document Revised: 08/28/2019 Document Reviewed: 08/28/2019  Elsevier Patient Education © 2020 [employer]

## 2021-01-28 LAB
ANA SER QL: POSITIVE
CRP SERPL-MCNC: <0.3 MG/DL (ref 0–0.5)
DSDNA AB SER-ACNC: 29 IU/ML (ref 0–9)
ERYTHROCYTE [SEDIMENTATION RATE] IN BLOOD BY WESTERGREN METHOD: 3 MM/HR (ref 0–20)
Lab: ABNORMAL

## 2021-02-10 DIAGNOSIS — M25.50 ARTHRALGIA, UNSPECIFIED JOINT: ICD-10-CM

## 2021-02-10 DIAGNOSIS — R76.8 ANA POSITIVE: Primary | ICD-10-CM

## 2021-04-02 ENCOUNTER — OFFICE VISIT (OUTPATIENT)
Dept: FAMILY MEDICINE CLINIC | Facility: CLINIC | Age: 61
End: 2021-04-02

## 2021-04-02 VITALS
DIASTOLIC BLOOD PRESSURE: 88 MMHG | SYSTOLIC BLOOD PRESSURE: 122 MMHG | TEMPERATURE: 98.6 F | OXYGEN SATURATION: 98 % | BODY MASS INDEX: 29.16 KG/M2 | HEIGHT: 73 IN | WEIGHT: 220 LBS | HEART RATE: 68 BPM | RESPIRATION RATE: 16 BRPM

## 2021-04-02 DIAGNOSIS — J30.1 CHRONIC SEASONAL ALLERGIC RHINITIS DUE TO POLLEN: Primary | ICD-10-CM

## 2021-04-02 PROCEDURE — 99213 OFFICE O/P EST LOW 20 MIN: CPT | Performed by: NURSE PRACTITIONER

## 2021-04-02 PROCEDURE — 96372 THER/PROPH/DIAG INJ SC/IM: CPT | Performed by: NURSE PRACTITIONER

## 2021-04-02 RX ORDER — VALACYCLOVIR HYDROCHLORIDE 1 G/1
TABLET, FILM COATED ORAL
COMMUNITY
Start: 2021-03-11 | End: 2021-07-27 | Stop reason: SDUPTHER

## 2021-04-02 RX ORDER — DEXAMETHASONE SODIUM PHOSPHATE 10 MG/ML
10 INJECTION INTRAMUSCULAR; INTRAVENOUS ONCE
Status: COMPLETED | OUTPATIENT
Start: 2021-04-02 | End: 2021-04-02

## 2021-04-02 RX ADMIN — DEXAMETHASONE SODIUM PHOSPHATE 10 MG: 10 INJECTION INTRAMUSCULAR; INTRAVENOUS at 15:34

## 2021-05-29 DIAGNOSIS — N52.8 OTHER MALE ERECTILE DYSFUNCTION: ICD-10-CM

## 2021-06-01 RX ORDER — SILDENAFIL 100 MG/1
100 TABLET, FILM COATED ORAL DAILY PRN
Qty: 30 TABLET | Refills: 2 | Status: SHIPPED | OUTPATIENT
Start: 2021-06-01 | End: 2021-07-27 | Stop reason: SDUPTHER

## 2021-07-27 ENCOUNTER — OFFICE VISIT (OUTPATIENT)
Dept: FAMILY MEDICINE CLINIC | Facility: CLINIC | Age: 61
End: 2021-07-27

## 2021-07-27 VITALS
HEIGHT: 73 IN | BODY MASS INDEX: 29.69 KG/M2 | RESPIRATION RATE: 16 BRPM | WEIGHT: 224 LBS | OXYGEN SATURATION: 97 % | HEART RATE: 76 BPM | DIASTOLIC BLOOD PRESSURE: 74 MMHG | SYSTOLIC BLOOD PRESSURE: 105 MMHG | TEMPERATURE: 97.9 F

## 2021-07-27 DIAGNOSIS — N52.8 OTHER MALE ERECTILE DYSFUNCTION: ICD-10-CM

## 2021-07-27 DIAGNOSIS — B00.1 FEVER BLISTER: ICD-10-CM

## 2021-07-27 DIAGNOSIS — I10 ESSENTIAL HYPERTENSION: Primary | ICD-10-CM

## 2021-07-27 DIAGNOSIS — J30.1 CHRONIC SEASONAL ALLERGIC RHINITIS DUE TO POLLEN: ICD-10-CM

## 2021-07-27 PROCEDURE — 99214 OFFICE O/P EST MOD 30 MIN: CPT | Performed by: NURSE PRACTITIONER

## 2021-07-27 RX ORDER — FEXOFENADINE HCL AND PSEUDOEPHEDRINE HCI 180; 240 MG/1; MG/1
1 TABLET, EXTENDED RELEASE ORAL DAILY
Qty: 30 TABLET | Refills: 6 | Status: SHIPPED | OUTPATIENT
Start: 2021-07-27 | End: 2022-01-31 | Stop reason: SDUPTHER

## 2021-07-27 RX ORDER — LISINOPRIL AND HYDROCHLOROTHIAZIDE 20; 12.5 MG/1; MG/1
1 TABLET ORAL DAILY
Qty: 90 TABLET | Refills: 1 | Status: SHIPPED | OUTPATIENT
Start: 2021-07-27 | End: 2022-01-31 | Stop reason: SDUPTHER

## 2021-07-27 RX ORDER — VALACYCLOVIR HYDROCHLORIDE 1 G/1
2000 TABLET, FILM COATED ORAL 2 TIMES DAILY
Qty: 15 TABLET | Refills: 1 | Status: SHIPPED | OUTPATIENT
Start: 2021-07-27 | End: 2022-01-31 | Stop reason: SDUPTHER

## 2021-07-27 RX ORDER — FLUTICASONE PROPIONATE 50 MCG
2 SPRAY, SUSPENSION (ML) NASAL DAILY
Qty: 16 G | Refills: 5 | Status: SHIPPED | OUTPATIENT
Start: 2021-07-27 | End: 2022-06-23 | Stop reason: SDUPTHER

## 2021-07-27 RX ORDER — DEXAMETHASONE SODIUM PHOSPHATE 10 MG/ML
10 INJECTION INTRAMUSCULAR; INTRAVENOUS ONCE
Status: DISCONTINUED | OUTPATIENT
Start: 2021-07-27 | End: 2021-08-10

## 2021-07-27 RX ORDER — SILDENAFIL 100 MG/1
100 TABLET, FILM COATED ORAL DAILY PRN
Qty: 30 TABLET | Refills: 2 | Status: SHIPPED | OUTPATIENT
Start: 2021-07-27 | End: 2021-09-08

## 2021-07-27 NOTE — PROGRESS NOTES
Chief Complaint  Hypertension (Follow up)    Subjective    Michael Issa presents to [facility]  HTN, ED, seasonal allergies, here for chronic visit. BP has been in control, he does struggle with allergies and takes allegra D when necessary, with flonase.  Has ED stable with viagra, and fever blisters prn wants refill on his valtrex. Denies any chest pain, shortness of breath or palpitations    Hypertension  This is a chronic problem. The current episode started more than 1 year ago. The problem has been gradually improving since onset. The problem is controlled. Pertinent negatives include no blurred vision, chest pain, neck pain, orthopnea, PND or shortness of breath. There are no associated agents to hypertension. Risk factors for coronary artery disease include male gender. Past treatments include ACE inhibitors and diuretics. Current antihypertension treatment includes ACE inhibitors and diuretics. The current treatment provides mild improvement. There are no compliance problems.  There is no history of kidney disease, heart failure or retinopathy.   Erectile Dysfunction  This is a chronic problem. The current episode started more than 1 year ago. The problem has been gradually improving since onset. The nature of his difficulty is maintaining erection. Non-physiologic factors contributing to erectile dysfunction are a decreased libido and performance anxiety. He reports no anxiety. Irritative symptoms do not include frequency, nocturia or urgency. Obstructive symptoms do not include dribbling, an intermittent stream or straining. Pertinent negatives include no chills, genital pain or hesitancy. Nothing aggravates the symptoms. Past treatments include sildenafil. The treatment provided moderate relief. He has been using treatment for 6 to 12 months. He has had no adverse reactions caused by medications. There are no known risk factors.     The following portions of the  "patient's history were reviewed and updated as appropriate: allergies, current medications, past family history, past medical history, past social history, past surgical history and problem list.    BOLD indicates positive   General:  weight loss, fever, chills, appetite loss, ED  SKIN: change in wart/mole, itching, rash, new lesions, nail changes  HEENT:   ear pain, sore throat, sinus pressure, blurry vision, eye pain, dry eyes, tinnitus  Respiratory: cough, difficulty breathing, wheezing, hemoptysis   Cardiovascular:  chest pain, shortness of breath, swelling of extremities, syncope  Gastro: abdominal pain, constipation, nausea, vomiting, diarrhea, hematemesis  Genito: hematuria, dysuria, glycosuria, hesitancy, frequency, incontinence  Hematology: Denies bruising, denies bleeding gums, denies blood clots, denies enlarged lymph nodes  Allergic/immune: Denies allergic rhinitis, hay fever, asthma, COPD, hives  \"All other systems reviewed and negative, except as listed above.”    Chronic problems: htn well controlled with lisinopril;-hctz, seasonal allergies stable with allegra d and flonase, ED stable with viagra, fever blisters stable with valtrex, generalized joint pain stable with diclofenac    Objective   Vital Signs:   /74 (BP Location: Left arm, Patient Position: Sitting, Cuff Size: Adult)   Pulse 76   Temp 97.9 °F (36.6 °C) (Infrared)   Resp 16   Ht 185.4 cm (73\") Comment: Per patient  Wt 102 kg (224 lb)   SpO2 97%   BMI 29.55 kg/m²       Physical Exam  Vitals and nursing note reviewed.   Constitutional:       General: He is awake.      Appearance: Normal appearance. He is well-developed and well-groomed.   HENT:      Head: Normocephalic and atraumatic.      Right Ear: Hearing, tympanic membrane, ear canal and external ear normal.      Left Ear: Hearing, tympanic membrane, ear canal and external ear normal.      Nose: Nose normal.      Mouth/Throat:      Lips: Pink.      Pharynx: Oropharynx is " "clear.   Cardiovascular:      Rate and Rhythm: Normal rate and regular rhythm.      Heart sounds: Normal heart sounds.   Pulmonary:      Effort: Pulmonary effort is normal.      Breath sounds: Normal breath sounds and air entry.   Abdominal:      General: Abdomen is flat. Bowel sounds are normal.      Palpations: Abdomen is soft.   Musculoskeletal:      Right lower leg: No edema.      Left lower leg: No edema.   Lymphadenopathy:      Head:      Right side of head: No submental, submandibular or tonsillar adenopathy.      Left side of head: No submental or tonsillar adenopathy.   Skin:     General: Skin is warm and dry.      Capillary Refill: Capillary refill takes less than 2 seconds.   Neurological:      Mental Status: He is alert.      Sensory: Sensation is intact.      Motor: Motor function is intact.      Coordination: Coordination is intact.      Gait: Gait is intact.   Psychiatric:         Attention and Perception: Attention and perception normal.         Mood and Affect: Mood and affect normal.         Speech: Speech normal.         Behavior: Behavior normal. Behavior is cooperative.         Thought Content: Thought content normal.         Cognition and Memory: Cognition normal.         Judgment: Judgment normal.            Assessment and Plan  Diagnoses and all orders for this visit:    1. Essential hypertension (Primary)  -     lisinopril-hydrochlorothiazide (PRINZIDE,ZESTORETIC) 20-12.5 MG per tablet; Take 1 tablet by mouth Daily.  Dispense: 90 tablet; Refill: 1  -     Cancel: CBC (No Diff)  -     Cancel: Comprehensive metabolic panel  -     Cancel: Lipid panel  -     Pt states, \"he had labs at work and they are scanned in the chart\"    2. Chronic seasonal allergic rhinitis due to pollen  -     fexofenadine-pseudoephedrine (ALLEGRA-D 24) 180-240 MG per 24 hr tablet; Take 1 tablet by mouth Daily.  Dispense: 30 tablet; Refill: 6  -     fluticasone (FLONASE) 50 MCG/ACT nasal spray; 2 sprays into the " nostril(s) as directed by provider Daily.  Dispense: 16 g; Refill: 5  -     dexamethasone (DECADRON) injection 10 mg    3. Fever blister  -     valACYclovir (VALTREX) 1000 MG tablet; Take 2 tablets by mouth 2 (Two) Times a Day.  Dispense: 15 tablet; Refill: 1    4. Other male erectile dysfunction  -     sildenafil (VIAGRA) 100 MG tablet; Take 1 tablet by mouth Daily As Needed for Erectile Dysfunction.  Dispense: 30 tablet; Refill: 2      I spent 13 minutes caring for Michael on this date of service. This time includes time spent by me in the following activities:preparing for the visit, performing a medically appropriate examination and/or evaluation , counseling and educating the patient/family/caregiver, ordering medications, tests, or procedures, documenting information in the medical record and care coordination  Follow Up   Return in about 6 months (around 1/27/2022) for Recheck chronic.  Patient was given instructions and counseling regarding his condition or for health maintenance advice. Please see specific information pulled into the AVS if appropriate.     Electronically signed by Mansi Zapata, JESU, APRN, 07/27/21, 3:41 PM CDT.

## 2021-08-02 ENCOUNTER — TELEPHONE (OUTPATIENT)
Dept: FAMILY MEDICINE CLINIC | Facility: CLINIC | Age: 61
End: 2021-08-02

## 2021-08-02 NOTE — TELEPHONE ENCOUNTER
Caller: Michael Issa    Relationship: Self    Best call back number: [phone]    What is the best time to reach you: ANYTIME    Who are you requesting to speak with (clinical staff, provider,  specific staff member): CLINICAL STAFF OR PROVIDER    Do you know the name of the person who called: DELTA    What was the call regarding: WANTING TO HAVE A COLONOSCOPY SCHEDULED WITHOUT HAVING TO HAVE A PHYSICAL   PATIENT CANCELED PHYSICAL APPOINTMENT FOR Wednesday 8/4/2021    Do you require a callback: YES

## 2021-08-03 DIAGNOSIS — Z12.11 SCREEN FOR COLON CANCER: Primary | ICD-10-CM

## 2021-08-10 ENCOUNTER — OFFICE VISIT (OUTPATIENT)
Dept: FAMILY MEDICINE CLINIC | Facility: CLINIC | Age: 61
End: 2021-08-10

## 2021-08-10 VITALS
WEIGHT: 228.6 LBS | DIASTOLIC BLOOD PRESSURE: 79 MMHG | HEIGHT: 73 IN | TEMPERATURE: 98.4 F | OXYGEN SATURATION: 99 % | RESPIRATION RATE: 20 BRPM | BODY MASS INDEX: 30.3 KG/M2 | HEART RATE: 81 BPM | SYSTOLIC BLOOD PRESSURE: 129 MMHG

## 2021-08-10 DIAGNOSIS — J30.1 CHRONIC SEASONAL ALLERGIC RHINITIS DUE TO POLLEN: Primary | ICD-10-CM

## 2021-08-10 DIAGNOSIS — J30.1 SEASONAL ALLERGIC RHINITIS DUE TO POLLEN: ICD-10-CM

## 2021-08-10 PROCEDURE — 96372 THER/PROPH/DIAG INJ SC/IM: CPT | Performed by: NURSE PRACTITIONER

## 2021-08-10 PROCEDURE — 99213 OFFICE O/P EST LOW 20 MIN: CPT | Performed by: NURSE PRACTITIONER

## 2021-08-10 RX ORDER — DEXAMETHASONE SODIUM PHOSPHATE 10 MG/ML
10 INJECTION INTRAMUSCULAR; INTRAVENOUS ONCE
Status: COMPLETED | OUTPATIENT
Start: 2021-08-10 | End: 2021-08-10

## 2021-08-10 RX ADMIN — DEXAMETHASONE SODIUM PHOSPHATE 10 MG: 10 INJECTION INTRAMUSCULAR; INTRAVENOUS at 10:41

## 2021-08-10 NOTE — PROGRESS NOTES
"Chief Complaint  URI (watery eyes and runny nose )    Subjective          Michael Issa presents to [facility] for allergy symptoms.   History of Present Illness  Rhinorrhea, eyes water, irritated cough  x2 weeks  Mowing more than usual  Has severe allergies  Has tried otc antihistamines  Reports a steroid shot usually helps. Last one about 4 months ago.     Objective   Vital Signs:   /79 (BP Location: Left arm, Patient Position: Sitting, Cuff Size: Adult)   Pulse 81   Temp 98.4 °F (36.9 °C) (Temporal)   Resp 20   Ht 185.4 cm (73\") Comment: patient reports  Wt 104 kg (228 lb 9.6 oz)   SpO2 99%   BMI 30.16 kg/m²     Physical Exam  Vitals and nursing note reviewed.   Constitutional:       General: He is not in acute distress.     Appearance: He is well-developed.   HENT:      Head: Normocephalic and atraumatic.      Right Ear: Tympanic membrane and ear canal normal.      Left Ear: Tympanic membrane and ear canal normal.      Nose: Rhinorrhea present.      Right Sinus: No maxillary sinus tenderness or frontal sinus tenderness.      Left Sinus: No maxillary sinus tenderness or frontal sinus tenderness.      Mouth/Throat:      Pharynx: Uvula midline. No uvula swelling.   Eyes:      General: Lids are normal.      Conjunctiva/sclera:      Right eye: Right conjunctiva is injected. Exudate (clear tearing) present.      Left eye: Left conjunctiva is injected. Exudate (clear tearing) present.   Neck:      Thyroid: No thyromegaly.      Trachea: No tracheal deviation.   Cardiovascular:      Rate and Rhythm: Normal rate and regular rhythm.      Heart sounds: Normal heart sounds.   Pulmonary:      Effort: Pulmonary effort is normal.      Breath sounds: Wheezing (scattered upper lobes) present.   Musculoskeletal:      Cervical back: Neck supple.   Lymphadenopathy:      Cervical: No cervical adenopathy.   Skin:     General: Skin is warm and dry.   Neurological:      Mental Status: He " is alert.   Psychiatric:         Behavior: Behavior normal.        Result Review :                 Assessment and Plan    Diagnoses and all orders for this visit:    1. Chronic seasonal allergic rhinitis due to pollen (Primary)  -     dexamethasone (DECADRON) injection 10 mg    2. Seasonal allergic rhinitis due to pollen  -     dexamethasone (DECADRON) injection 10 mg      Call office if symptoms not improving or worsening on Friday and I will send steroid and antibiotic if mucous becomes thicker or discolored.         Follow Up   Return in about 4 days (around 8/14/2021), or if symptoms worsen or fail to improve.  Patient was given instructions and counseling regarding his condition or for health maintenance advice. Please see specific information pulled into the AVS if appropriate.

## 2021-08-19 ENCOUNTER — TELEPHONE (OUTPATIENT)
Dept: GASTROENTEROLOGY | Facility: CLINIC | Age: 61
End: 2021-08-19

## 2021-08-19 NOTE — TELEPHONE ENCOUNTER
Mary Anne, he has an appointment with me on Monday, August 24, this is for colonoscopy.  He can be fast track as long as no problems and no blood thinners.  He had a colonoscopy by Dr. Bauman 2014 with a polyp (tubular adenomatous.

## 2021-08-23 ENCOUNTER — TELEPHONE (OUTPATIENT)
Dept: GASTROENTEROLOGY | Facility: CLINIC | Age: 61
End: 2021-08-23

## 2021-08-23 ENCOUNTER — PREP FOR SURGERY (OUTPATIENT)
Dept: OTHER | Facility: HOSPITAL | Age: 61
End: 2021-08-23

## 2021-08-23 DIAGNOSIS — Z86.010 HISTORY OF COLON POLYPS: Primary | ICD-10-CM

## 2021-08-23 PROBLEM — Z86.0100 HISTORY OF COLON POLYPS: Status: ACTIVE | Noted: 2021-08-23

## 2021-08-23 RX ORDER — SODIUM PICOSULFATE, MAGNESIUM OXIDE, AND ANHYDROUS CITRIC ACID 10; 3.5; 12 MG/160ML; G/160ML; G/160ML
2 LIQUID ORAL ONCE
Qty: 320 ML | Refills: 0 | Status: SHIPPED | OUTPATIENT
Start: 2021-08-23 | End: 2021-08-23

## 2021-08-31 ENCOUNTER — TELEPHONE (OUTPATIENT)
Dept: GASTROENTEROLOGY | Facility: CLINIC | Age: 61
End: 2021-08-31

## 2021-09-04 DIAGNOSIS — N52.8 OTHER MALE ERECTILE DYSFUNCTION: ICD-10-CM

## 2021-09-08 RX ORDER — SILDENAFIL 100 MG/1
100 TABLET, FILM COATED ORAL DAILY PRN
Qty: 30 TABLET | Refills: 1 | Status: SHIPPED | OUTPATIENT
Start: 2021-09-08 | End: 2021-10-29

## 2021-09-23 ENCOUNTER — HOSPITAL ENCOUNTER (OUTPATIENT)
Facility: HOSPITAL | Age: 61
Setting detail: HOSPITAL OUTPATIENT SURGERY
Discharge: HOME | End: 2021-09-23
Attending: INTERNAL MEDICINE | Admitting: INTERNAL MEDICINE

## 2021-09-23 ENCOUNTER — ANESTHESIA EVENT (OUTPATIENT)
Dept: GASTROENTEROLOGY | Facility: HOSPITAL | Age: 61
End: 2021-09-23

## 2021-09-23 ENCOUNTER — ANESTHESIA (OUTPATIENT)
Dept: GASTROENTEROLOGY | Facility: HOSPITAL | Age: 61
End: 2021-09-23

## 2021-09-23 ENCOUNTER — TELEPHONE (OUTPATIENT)
Dept: GASTROENTEROLOGY | Facility: CLINIC | Age: 61
End: 2021-09-23

## 2021-09-23 VITALS
DIASTOLIC BLOOD PRESSURE: 80 MMHG | HEART RATE: 63 BPM | RESPIRATION RATE: 20 BRPM | TEMPERATURE: 98 F | SYSTOLIC BLOOD PRESSURE: 109 MMHG | WEIGHT: 221 LBS | BODY MASS INDEX: 29.29 KG/M2 | HEIGHT: 73 IN | OXYGEN SATURATION: 100 %

## 2021-09-23 DIAGNOSIS — Z86.010 HISTORY OF COLON POLYPS: ICD-10-CM

## 2021-09-23 PROCEDURE — 45378 DIAGNOSTIC COLONOSCOPY: CPT | Performed by: INTERNAL MEDICINE

## 2021-09-23 PROCEDURE — 25010000002 PROPOFOL 10 MG/ML EMULSION: Performed by: NURSE ANESTHETIST, CERTIFIED REGISTERED

## 2021-09-23 RX ORDER — PROPOFOL 10 MG/ML
VIAL (ML) INTRAVENOUS AS NEEDED
Status: DISCONTINUED | OUTPATIENT
Start: 2021-09-23 | End: 2021-09-23 | Stop reason: SURG

## 2021-09-23 RX ORDER — LIDOCAINE HYDROCHLORIDE 20 MG/ML
INJECTION, SOLUTION EPIDURAL; INFILTRATION; INTRACAUDAL; PERINEURAL AS NEEDED
Status: DISCONTINUED | OUTPATIENT
Start: 2021-09-23 | End: 2021-09-23 | Stop reason: SURG

## 2021-09-23 RX ORDER — SODIUM CHLORIDE 0.9 % (FLUSH) 0.9 %
10 SYRINGE (ML) INJECTION AS NEEDED
Status: DISCONTINUED | OUTPATIENT
Start: 2021-09-23 | End: 2021-09-23 | Stop reason: HOSPADM

## 2021-09-23 RX ORDER — SODIUM CHLORIDE 9 MG/ML
500 INJECTION, SOLUTION INTRAVENOUS CONTINUOUS PRN
Status: DISCONTINUED | OUTPATIENT
Start: 2021-09-23 | End: 2021-09-23 | Stop reason: HOSPADM

## 2021-09-23 RX ORDER — ONDANSETRON 2 MG/ML
4 INJECTION INTRAMUSCULAR; INTRAVENOUS ONCE AS NEEDED
Status: DISCONTINUED | OUTPATIENT
Start: 2021-09-23 | End: 2021-09-23 | Stop reason: HOSPADM

## 2021-09-23 RX ADMIN — LIDOCAINE HYDROCHLORIDE 100 MG: 20 INJECTION, SOLUTION EPIDURAL; INFILTRATION; INTRACAUDAL; PERINEURAL at 08:27

## 2021-09-23 RX ADMIN — PROPOFOL 400 MG: 10 INJECTION, EMULSION INTRAVENOUS at 08:27

## 2021-09-23 RX ADMIN — SODIUM CHLORIDE 500 ML: 9 INJECTION, SOLUTION INTRAVENOUS at 07:43

## 2021-09-23 NOTE — ANESTHESIA POSTPROCEDURE EVALUATION
Patient: Michael Issa    Procedure Summary     Date: 09/23/21 Room / Location: [facility] ENDOSCOPY 4 / [facility]    Anesthesia Start: 0818 Anesthesia Stop: 0853    Procedure: COLONOSCOPY (N/A ) Diagnosis:       History of colon polyps      (History of colon polyps [Z86.010])    Surgeons: Eduardo Neil MD Provider: Woody Barnett CRNA    Anesthesia Type: MAC ASA Status: 2          Anesthesia Type: MAC    Vitals  Vitals Value Taken Time   /80 09/23/21 0911   Temp     Pulse 62 09/23/21 0912   Resp 20 09/23/21 0910   SpO2 99 % 09/23/21 0912   Vitals shown include unvalidated device data.        Post Anesthesia Care and Evaluation    Patient location during evaluation: PHASE II  Patient participation: complete - patient participated  Level of consciousness: awake  Pain management: adequate  Airway patency: patent  Anesthetic complications: No anesthetic complications  Respiratory status: acceptable  Hydration status: acceptable

## 2021-09-23 NOTE — ANESTHESIA PREPROCEDURE EVALUATION
Anesthesia Evaluation     Patient summary reviewed   no history of anesthetic complications:  NPO Solid Status: > 8 hours  NPO Liquid Status: > 8 hours           Airway   Mallampati: I  TM distance: >3 FB  Neck ROM: full  No difficulty expected  Dental    (+) upper dentures    Pulmonary - negative pulmonary ROS and normal exam   Cardiovascular - normal exam  Exercise tolerance: excellent (>7 METS)    (+) hypertension well controlled less than 2 medications,       Neuro/Psych- negative ROS  GI/Hepatic/Renal/Endo - negative ROS   (-)  obesity, morbid obesity    Musculoskeletal     Abdominal  - normal exam   Substance History      OB/GYN          Other   arthritis,                      Anesthesia Plan    ASA 2     MAC     intravenous induction     Anesthetic plan, all risks, benefits, and alternatives have been provided, discussed and informed consent has been obtained with: patient.

## 2021-09-23 NOTE — H&P
[facility]  Pre Procedure History & Physical    Chief Complaint:   Colon polyps    Subjective     HPI:   The patient has a history of colon polyps who presents for exam.  He was found to have adenomatous polyps on colonoscopy exam by Dr. Bauman at [facility] in 2014.  He is asymptomatic at this time.    Past Medical History:   Past Medical History:   Diagnosis Date   • Arthritis    • Disease of thyroid gland     NODULE PRESENT    • Hx of chest pain    • Hypertension        Past Surgical History:  Past Surgical History:   Procedure Laterality Date   • KNEE SURGERY  2014    rt knee MENISCUS   • PREPERITONEAL HERNIA REPAIR Bilateral 9/20/2017    Procedure: BILATERAL PREPERITONEAL INGUINAL HERNIA REPAIR LAPAROSCOPIC WITH MESH;  Surgeon: Rambo Church MD;  Location: [facility];  Service:        Family History:  Family History   Problem Relation Age of Onset   • Alzheimer's disease Mother    • Diabetes Father    • Heart disease Father    • No Known Problems Sister    • No Known Problems Brother    • No Known Problems Daughter    • No Known Problems Maternal Grandmother    • No Known Problems Maternal Grandfather    • No Known Problems Paternal Grandmother    • No Known Problems Paternal Grandfather    • Hypertension Brother    • No Known Problems Brother        Social History:   reports that he quit smoking about 4 years ago. His smoking use included cigarettes. He has never used smokeless tobacco. He reports that he does not drink alcohol and does not use drugs.    Medications:   Prior to Admission medications    Medication Sig Start Date End Date Taking? Authorizing Provider   diclofenac (VOLTAREN) 50 MG EC tablet Take 1 tablet by mouth 2 (Two) Times a Day. 1/27/21  Yes Mansi Zapata DNP, APRN   fexofenadine-pseudoephedrine (ALLEGRA-D 24) 180-240 MG per 24 hr tablet Take 1 tablet by mouth Daily. 7/27/21  Yes Mansi Zapata DNP, APRN   fluticasone (FLONASE) 50 MCG/ACT nasal spray 2  "sprays into the nostril(s) as directed by provider Daily. 7/27/21  Yes Mansi Zapata DNP, APRN   lisinopril-hydrochlorothiazide (PRINZIDE,ZESTORETIC) 20-12.5 MG per tablet Take 1 tablet by mouth Daily. 7/27/21  Yes Mansi Zapata DNP, APRN   olopatadine (Patanol) 0.1 % ophthalmic solution Administer 1 drop to both eyes 2 (Two) Times a Day. 7/28/20  Yes Edmundo Baker APRN   valACYclovir (VALTREX) 1000 MG tablet Take 2 tablets by mouth 2 (Two) Times a Day. 7/27/21  Yes Mansi Zapata DNP, APRN   Ascorbic Acid (VITAMIN C PO) Take 1,000 mg by mouth Daily. ON HOLD 9-15-17    ProviderZahraa MD   sildenafil (VIAGRA) 100 MG tablet Take 1 tablet by mouth daily as needed for erectile dysfunction 9/8/21   Mansi Zapata DNP, APRN       Allergies:  Penicillins    ROS:    General: Weight stable  Resp: No SOA  Cardiovascular: No CP    Objective     Blood pressure 138/87, pulse 82, temperature 98 °F (36.7 °C), temperature source Temporal, resp. rate 18, height 185.4 cm (73\"), weight 100 kg (221 lb), SpO2 97 %.    Physical Exam   Constitutional: Pt is oriented to person, place, and in no distress.   Cardiovascular: Normal rate, regular rhythm.    Pulmonary/Chest: Effort normal. No respiratory distress.   Abdominal:  Non-distended.  Psychiatric: Mood, memory, affect and judgment appear normal.     Assessment/Plan     Diagnosis:  Colon polyps    Anticipated Surgical Procedure:  Colonoscopy    The risks, benefits, and alternatives of this procedure have been discussed with the patient or the responsible party- the patient understands and agrees to proceed.      EMR Dragon/transcription disclaimer:  Much of this encounter note is electronic transcription/translation of spoken language to printed text.  The electronic translation of spoken language may be erroneous, or at times, nonsensical words or phrases may be inadvertently transcribed.  Although I have reviewed the note for such errors, some may " still exist.

## 2021-10-29 DIAGNOSIS — N52.8 OTHER MALE ERECTILE DYSFUNCTION: ICD-10-CM

## 2021-10-29 RX ORDER — SILDENAFIL 100 MG/1
100 TABLET, FILM COATED ORAL DAILY PRN
Qty: 30 TABLET | Refills: 0 | Status: SHIPPED | OUTPATIENT
Start: 2021-10-29 | End: 2022-01-31 | Stop reason: SDUPTHER

## 2021-10-29 NOTE — TELEPHONE ENCOUNTER
Rx Refill Note  Requested Prescriptions     Pending Prescriptions Disp Refills   • sildenafil (VIAGRA) 100 MG tablet [Pharmacy Med Name: SILDENAFIL 100 MG TABLET] 30 tablet 0     Sig: Take 1 tablet by mouth daily as needed for erectile dysfunction      Last office visit with prescribing clinician: 7/27/2021      Next office visit with prescribing clinician: 1/28/2022  Last refill: 9/8/2021    Pippa Arce MA  10/29/21, 15:25 CDT

## 2022-01-31 ENCOUNTER — OFFICE VISIT (OUTPATIENT)
Dept: FAMILY MEDICINE CLINIC | Facility: CLINIC | Age: 62
End: 2022-01-31

## 2022-01-31 VITALS
WEIGHT: 228.6 LBS | HEART RATE: 98 BPM | TEMPERATURE: 97.8 F | BODY MASS INDEX: 30.3 KG/M2 | SYSTOLIC BLOOD PRESSURE: 142 MMHG | DIASTOLIC BLOOD PRESSURE: 84 MMHG | RESPIRATION RATE: 16 BRPM | HEIGHT: 73 IN | OXYGEN SATURATION: 98 %

## 2022-01-31 DIAGNOSIS — B00.1 FEVER BLISTER: ICD-10-CM

## 2022-01-31 DIAGNOSIS — N52.8 OTHER MALE ERECTILE DYSFUNCTION: ICD-10-CM

## 2022-01-31 DIAGNOSIS — I10 ESSENTIAL HYPERTENSION: Primary | ICD-10-CM

## 2022-01-31 DIAGNOSIS — J30.1 CHRONIC SEASONAL ALLERGIC RHINITIS DUE TO POLLEN: ICD-10-CM

## 2022-01-31 PROCEDURE — 99213 OFFICE O/P EST LOW 20 MIN: CPT | Performed by: NURSE PRACTITIONER

## 2022-01-31 RX ORDER — FEXOFENADINE HCL AND PSEUDOEPHEDRINE HCI 180; 240 MG/1; MG/1
1 TABLET, EXTENDED RELEASE ORAL DAILY
Qty: 30 TABLET | Refills: 6 | Status: SHIPPED | OUTPATIENT
Start: 2022-01-31 | End: 2022-06-23 | Stop reason: SDUPTHER

## 2022-01-31 RX ORDER — SILDENAFIL 100 MG/1
100 TABLET, FILM COATED ORAL DAILY PRN
Qty: 30 TABLET | Refills: 0 | Status: SHIPPED | OUTPATIENT
Start: 2022-01-31 | End: 2022-01-31 | Stop reason: SDUPTHER

## 2022-01-31 RX ORDER — LISINOPRIL AND HYDROCHLOROTHIAZIDE 20; 12.5 MG/1; MG/1
1 TABLET ORAL DAILY
Qty: 90 TABLET | Refills: 1 | Status: SHIPPED | OUTPATIENT
Start: 2022-01-31 | End: 2022-06-23 | Stop reason: SDUPTHER

## 2022-01-31 RX ORDER — SILDENAFIL 100 MG/1
100 TABLET, FILM COATED ORAL DAILY PRN
Qty: 30 TABLET | Refills: 0 | Status: SHIPPED | OUTPATIENT
Start: 2022-01-31 | End: 2022-02-11

## 2022-01-31 RX ORDER — VALACYCLOVIR HYDROCHLORIDE 1 G/1
2000 TABLET, FILM COATED ORAL 2 TIMES DAILY
Qty: 15 TABLET | Refills: 1 | Status: SHIPPED | OUTPATIENT
Start: 2022-01-31 | End: 2022-06-23 | Stop reason: SDUPTHER

## 2022-01-31 NOTE — PROGRESS NOTES
Chief Complaint  Hypertension (follow up )    Subjective          Michael Issa presents to [facility]  Follow up for htn, ed, and allergies.  Says that he gets his annual work physical and blood work march 1 and will bring his labs to me to review    Hypertension  This is a chronic problem. The current episode started in the past 7 days. The problem has been gradually improving since onset. The problem is controlled. Pertinent negatives include no blurred vision, chest pain, neck pain, orthopnea, PND or shortness of breath. There are no associated agents to hypertension. Risk factors for coronary artery disease include male gender and obesity. Past treatments include ACE inhibitors and diuretics. Current antihypertension treatment includes ACE inhibitors and diuretics. The current treatment provides mild improvement. There are no compliance problems.  There is no history of kidney disease, heart failure or retinopathy.   Erectile Dysfunction  The current episode started more than 1 year ago. The problem has been gradually improving since onset. The nature of his difficulty is achieving erection and maintaining erection. He reports no anxiety, decreased libido or performance anxiety. He reports his erection duration to be less than 1 minute. Irritative symptoms do not include frequency, nocturia or urgency. Obstructive symptoms include dribbling. Obstructive symptoms do not include an intermittent stream or straining. Nothing aggravates the symptoms. Past treatments include sildenafil. The treatment provided moderate relief. He has been using treatment for 1 to 2 years. He has had no adverse reactions caused by medications. There are no known risk factors.     The following portions of the patient's history were reviewed and updated as appropriate: allergies, current medications, past family history, past medical history, past social history, past surgical history and problem  "list.      Objective   Vital Signs:   /84 (BP Location: Left arm, Patient Position: Sitting, Cuff Size: Large Adult)   Pulse 98   Temp 97.8 °F (36.6 °C) (Infrared)   Resp 16   Ht 185.4 cm (73\") Comment: pt reported.  Wt 104 kg (228 lb 9.6 oz)   SpO2 98%   BMI 30.16 kg/m²     Physical Exam  Vitals and nursing note reviewed.   Constitutional:       General: He is awake.      Appearance: Normal appearance. He is well-developed and well-groomed.   HENT:      Head: Normocephalic and atraumatic.      Right Ear: Hearing, tympanic membrane, ear canal and external ear normal.      Left Ear: Hearing, tympanic membrane, ear canal and external ear normal.      Nose: Nose normal.      Mouth/Throat:      Lips: Pink.      Pharynx: Oropharynx is clear.   Eyes:      General: Lids are normal.      Conjunctiva/sclera: Conjunctivae normal.   Cardiovascular:      Rate and Rhythm: Normal rate and regular rhythm.      Heart sounds: Normal heart sounds.   Pulmonary:      Effort: Pulmonary effort is normal.      Breath sounds: Normal breath sounds and air entry.   Musculoskeletal:      Cervical back: Full passive range of motion without pain.      Right lower leg: No edema.      Left lower leg: No edema.   Lymphadenopathy:      Head:      Right side of head: No submental, submandibular or tonsillar adenopathy.      Left side of head: No submental, submandibular or tonsillar adenopathy.   Skin:     General: Skin is warm and dry.   Neurological:      Mental Status: He is alert.      Sensory: Sensation is intact.      Motor: Motor function is intact.      Coordination: Coordination is intact.      Gait: Gait is intact.   Psychiatric:         Attention and Perception: Attention and perception normal.         Mood and Affect: Mood and affect normal.         Speech: Speech normal.         Behavior: Behavior normal. Behavior is cooperative.         Thought Content: Thought content normal.         Cognition and Memory: Cognition and " memory normal.         Judgment: Judgment normal.        Result Review :                 Assessment and Plan    Diagnoses and all orders for this visit:    1. Essential hypertension (Primary)  -     lisinopril-hydrochlorothiazide (PRINZIDE,ZESTORETIC) 20-12.5 MG per tablet; Take 1 tablet by mouth Daily.  Dispense: 90 tablet; Refill: 1    2. Chronic seasonal allergic rhinitis due to pollen  -     fexofenadine-pseudoephedrine (ALLEGRA-D 24) 180-240 MG per 24 hr tablet; Take 1 tablet by mouth Daily.  Dispense: 30 tablet; Refill: 6    3. Other male erectile dysfunction  -     sildenafil (VIAGRA) 100 MG tablet; Take 1 tablet by mouth Daily As Needed for Erectile Dysfunction.  Dispense: 30 tablet; Refill: 0    4. Fever blister  -     valACYclovir (VALTREX) 1000 MG tablet; Take 2 tablets by mouth 2 (Two) Times a Day.  Dispense: 15 tablet; Refill: 1      I spent 14 minutes caring for Michael on this date of service. This time includes time spent by me in the following activities:preparing for the visit, performing a medically appropriate examination and/or evaluation , counseling and educating the patient/family/caregiver, ordering medications, tests, or procedures, documenting information in the medical record and care coordination  Follow Up   Return in about 6 months (around 7/31/2022) for Recheck.  Patient was given instructions and counseling regarding his condition or for health maintenance advice. Please see specific information pulled into the AVS if appropriate.     Electronically signed by Mansi Zapata, JESU, APRN, 01/31/22, 4:52 PM CST.

## 2022-02-10 DIAGNOSIS — N52.8 OTHER MALE ERECTILE DYSFUNCTION: ICD-10-CM

## 2022-02-11 RX ORDER — SILDENAFIL 100 MG/1
100 TABLET, FILM COATED ORAL DAILY PRN
Qty: 30 TABLET | Refills: 1 | Status: SHIPPED | OUTPATIENT
Start: 2022-02-11 | End: 2022-03-08 | Stop reason: SDUPTHER

## 2022-02-11 NOTE — TELEPHONE ENCOUNTER
Rx Refill Note  Requested Prescriptions     Pending Prescriptions Disp Refills   • sildenafil (VIAGRA) 100 MG tablet [Pharmacy Med Name: SILDENAFIL 100 MG TABLET] 30 tablet 1     Sig: Take 1 tablet by mouth Daily As Needed for Erectile Dysfunction.      Last office visit with prescribing clinician: 1/31/2022      Next office visit with prescribing clinician: 8/1/2022            Carmen Mcdonald MA  02/11/22, 08:49 CST

## 2022-03-07 DIAGNOSIS — N52.8 OTHER MALE ERECTILE DYSFUNCTION: ICD-10-CM

## 2022-03-08 RX ORDER — SILDENAFIL 100 MG/1
100 TABLET, FILM COATED ORAL DAILY PRN
Qty: 30 TABLET | Refills: 0 | OUTPATIENT
Start: 2022-03-08

## 2022-03-08 RX ORDER — SILDENAFIL 100 MG/1
100 TABLET, FILM COATED ORAL DAILY PRN
Qty: 30 TABLET | Refills: 3 | Status: SHIPPED | OUTPATIENT
Start: 2022-03-08 | End: 2022-04-12 | Stop reason: SDUPTHER

## 2022-03-08 NOTE — TELEPHONE ENCOUNTER
Too early to refill.     Rx Refill Note  Requested Prescriptions     Pending Prescriptions Disp Refills   • sildenafil (VIAGRA) 100 MG tablet [Pharmacy Med Name: SILDENAFIL 100 MG TABLET] 30 tablet 0     Sig: Take 1 tablet by mouth daily as needed for erectile dysfunction      Last office visit with prescribing clinician: 1/31/2022      Next office visit with prescribing clinician: 8/1/2022            Amber Zhou LPN  03/08/22, 11:30 CST

## 2022-04-12 DIAGNOSIS — N52.8 OTHER MALE ERECTILE DYSFUNCTION: ICD-10-CM

## 2022-04-12 RX ORDER — SILDENAFIL 100 MG/1
100 TABLET, FILM COATED ORAL DAILY PRN
Qty: 90 TABLET | Refills: 1 | Status: SHIPPED | OUTPATIENT
Start: 2022-04-12 | End: 2022-06-23 | Stop reason: SDUPTHER

## 2022-04-12 NOTE — TELEPHONE ENCOUNTER
Patient requesting refill to sent to [facility].     Rx Refill Note  Requested Prescriptions     Pending Prescriptions Disp Refills   • sildenafil (VIAGRA) 100 MG tablet 30 tablet 3     Sig: Take 1 tablet by mouth Daily As Needed for Erectile Dysfunction.      Last office visit with prescribing clinician: 1/31/2022      Next office visit with prescribing clinician: 8/1/2022    Pippa Arce MA  04/12/22, 13:43 CDT

## 2022-06-23 ENCOUNTER — OFFICE VISIT (OUTPATIENT)
Dept: FAMILY MEDICINE CLINIC | Facility: CLINIC | Age: 62
End: 2022-06-23

## 2022-06-23 VITALS
WEIGHT: 220 LBS | BODY MASS INDEX: 29.16 KG/M2 | RESPIRATION RATE: 18 BRPM | OXYGEN SATURATION: 97 % | HEIGHT: 73 IN | SYSTOLIC BLOOD PRESSURE: 128 MMHG | HEART RATE: 85 BPM | TEMPERATURE: 97.9 F | DIASTOLIC BLOOD PRESSURE: 78 MMHG

## 2022-06-23 DIAGNOSIS — J30.2 SEASONAL ALLERGIES: ICD-10-CM

## 2022-06-23 DIAGNOSIS — B00.1 FEVER BLISTER: ICD-10-CM

## 2022-06-23 DIAGNOSIS — I10 ESSENTIAL HYPERTENSION: ICD-10-CM

## 2022-06-23 DIAGNOSIS — M25.541 ARTHRALGIA OF BOTH HANDS: ICD-10-CM

## 2022-06-23 DIAGNOSIS — N52.8 OTHER MALE ERECTILE DYSFUNCTION: ICD-10-CM

## 2022-06-23 DIAGNOSIS — J30.1 CHRONIC SEASONAL ALLERGIC RHINITIS DUE TO POLLEN: Primary | ICD-10-CM

## 2022-06-23 DIAGNOSIS — M25.542 ARTHRALGIA OF BOTH HANDS: ICD-10-CM

## 2022-06-23 PROCEDURE — 99214 OFFICE O/P EST MOD 30 MIN: CPT | Performed by: NURSE PRACTITIONER

## 2022-06-23 PROCEDURE — 96372 THER/PROPH/DIAG INJ SC/IM: CPT | Performed by: NURSE PRACTITIONER

## 2022-06-23 RX ORDER — LISINOPRIL AND HYDROCHLOROTHIAZIDE 20; 12.5 MG/1; MG/1
1 TABLET ORAL DAILY
Qty: 90 TABLET | Refills: 1 | Status: SHIPPED | OUTPATIENT
Start: 2022-06-23 | End: 2023-02-15

## 2022-06-23 RX ORDER — FEXOFENADINE HCL AND PSEUDOEPHEDRINE HCI 180; 240 MG/1; MG/1
1 TABLET, EXTENDED RELEASE ORAL DAILY
Qty: 30 TABLET | Refills: 6 | Status: SHIPPED | OUTPATIENT
Start: 2022-06-23

## 2022-06-23 RX ORDER — OLOPATADINE HYDROCHLORIDE 1 MG/ML
1 SOLUTION/ DROPS OPHTHALMIC 2 TIMES DAILY
Qty: 1 EACH | Refills: 5 | Status: SHIPPED | OUTPATIENT
Start: 2022-06-23

## 2022-06-23 RX ORDER — FLUTICASONE PROPIONATE 50 MCG
2 SPRAY, SUSPENSION (ML) NASAL DAILY
Qty: 16 G | Refills: 5 | Status: SHIPPED | OUTPATIENT
Start: 2022-06-23

## 2022-06-23 RX ORDER — DEXAMETHASONE SODIUM PHOSPHATE 10 MG/ML
10 INJECTION INTRAMUSCULAR; INTRAVENOUS ONCE
Status: COMPLETED | OUTPATIENT
Start: 2022-06-23 | End: 2022-06-23

## 2022-06-23 RX ORDER — VALACYCLOVIR HYDROCHLORIDE 1 G/1
2000 TABLET, FILM COATED ORAL 2 TIMES DAILY
Qty: 15 TABLET | Refills: 1 | Status: SHIPPED | OUTPATIENT
Start: 2022-06-23

## 2022-06-23 RX ORDER — SILDENAFIL 100 MG/1
100 TABLET, FILM COATED ORAL DAILY PRN
Qty: 90 TABLET | Refills: 1 | Status: SHIPPED | OUTPATIENT
Start: 2022-06-23 | End: 2022-12-30

## 2022-06-23 RX ADMIN — DEXAMETHASONE SODIUM PHOSPHATE 10 MG: 10 INJECTION INTRAMUSCULAR; INTRAVENOUS at 16:04

## 2022-06-23 NOTE — PROGRESS NOTES
"Chief Complaint  Allergies (Pt c/o watery eyes, sneezing and runny nose since last night.  )    Subjective    {Problem List  Visit Diagnosis   Encounters  Notes  Medications  Labs  Result Review Imaging  Media :23}    Michael Issa presents to [facility]  History of Present Illness  Patient presents today for watery eyes, sneezing and runny nose. Symptoms started last night after patient mowed the lawn. He states that he has chronic seasonal allergies. Patient took OTC allergy medication last week; but has not tried any medication for current symptoms. He last used his Flonase today, he states he irregularly uses this medication. He no longer takes Allegra D because it is no longer efficacious.He used oloptadine today which provided relief for a few hours; states this is the first time he has used them in months. Patient reports he gets a steroid injection twice yearly for this issue which typically alleviates his symptoms.     Kirit is requested refills on all of his medications today. All chronic medical conditions are stable. His most recent lab work was done in April at his work. He will bring us a copy of his results.       Objective   Vital Signs:  /78 (BP Location: Left arm, Patient Position: Sitting, Cuff Size: Adult)   Pulse 85   Temp 97.9 °F (36.6 °C) (Infrared)   Resp 18   Ht 185.4 cm (73\") Comment: per patient  Wt 99.8 kg (220 lb)   SpO2 97%   BMI 29.03 kg/m²   Estimated body mass index is 29.03 kg/m² as calculated from the following:    Height as of this encounter: 185.4 cm (73\").    Weight as of this encounter: 99.8 kg (220 lb).    BMI is >= 25 and <30. (Overweight) The following options were offered after discussion;: exercise counseling/recommendations and nutrition counseling/recommendations      Physical Exam  Constitutional:       General: He is not in acute distress.     Appearance: Normal appearance. He is not ill-appearing.   HENT:      " Head: Normocephalic and atraumatic.      Right Ear: Hearing, ear canal and external ear normal. No middle ear effusion. Tympanic membrane is not bulging.      Left Ear: Hearing, ear canal and external ear normal.  No middle ear effusion. Tympanic membrane is not bulging.      Nose: Congestion and rhinorrhea present.      Mouth/Throat:      Lips: Pink.      Mouth: Mucous membranes are moist.   Eyes:      Conjunctiva/sclera:      Right eye: Right conjunctiva is injected.      Left eye: Left conjunctiva is injected.      Comments: Watery discharge and scleral injection bilateral    Cardiovascular:      Rate and Rhythm: Normal rate.      Pulses: Normal pulses.      Heart sounds: Normal heart sounds.   Pulmonary:      Effort: Pulmonary effort is normal.   Musculoskeletal:         General: Normal range of motion.   Skin:     General: Skin is warm and dry.      Capillary Refill: Capillary refill takes less than 2 seconds.   Neurological:      Mental Status: He is alert and oriented to person, place, and time.   Psychiatric:         Thought Content: Thought content normal.        Result Review :                Assessment and Plan   Diagnoses and all orders for this visit:    1. Chronic seasonal allergic rhinitis due to pollen (Primary)  -     dexamethasone (DECADRON) injection 10 mg  -     fexofenadine-pseudoephedrine (ALLEGRA-D 24) 180-240 MG per 24 hr tablet; Take 1 tablet by mouth Daily.  Dispense: 30 tablet; Refill: 6  -     fluticasone (FLONASE) 50 MCG/ACT nasal spray; 2 sprays into the nostril(s) as directed by provider Daily.  Dispense: 16 g; Refill: 5    2. Other male erectile dysfunction  -     sildenafil (VIAGRA) 100 MG tablet; Take 1 tablet by mouth Daily As Needed for Erectile Dysfunction.  Dispense: 90 tablet; Refill: 1    3. Arthralgia of both hands  -     diclofenac (VOLTAREN) 50 MG EC tablet; Take 1 tablet by mouth 2 (Two) Times a Day.  Dispense: 180 tablet; Refill: 1    4. Essential hypertension  -      lisinopril-hydrochlorothiazide (PRINZIDE,ZESTORETIC) 20-12.5 MG per tablet; Take 1 tablet by mouth Daily.  Dispense: 90 tablet; Refill: 1    5. Fever blister  -     valACYclovir (VALTREX) 1000 MG tablet; Take 2 tablets by mouth 2 (Two) Times a Day.  Dispense: 15 tablet; Refill: 1    6. Seasonal allergies  -     olopatadine (Patanol) 0.1 % ophthalmic solution; Administer 1 drop to both eyes 2 (Two) Times a Day.  Dispense: 1 each; Refill: 5             Follow Up   Return in about 6 months (around 12/23/2022) for Recheck.  Patient was given instructions and counseling regarding his condition or for health maintenance advice. Please see specific information pulled into the AVS if appropriate.

## 2022-07-11 ENCOUNTER — TELEPHONE (OUTPATIENT)
Dept: FAMILY MEDICINE CLINIC | Facility: CLINIC | Age: 62
End: 2022-07-11

## 2022-10-23 ENCOUNTER — APPOINTMENT (OUTPATIENT)
Dept: GENERAL RADIOLOGY | Facility: HOSPITAL | Age: 62
End: 2022-10-23

## 2022-10-23 ENCOUNTER — HOSPITAL ENCOUNTER (EMERGENCY)
Facility: HOSPITAL | Age: 62
Discharge: HOME | End: 2022-10-23
Attending: STUDENT IN AN ORGANIZED HEALTH CARE EDUCATION/TRAINING PROGRAM | Admitting: STUDENT IN AN ORGANIZED HEALTH CARE EDUCATION/TRAINING PROGRAM

## 2022-10-23 ENCOUNTER — APPOINTMENT (OUTPATIENT)
Dept: CARDIOLOGY | Facility: HOSPITAL | Age: 62
End: 2022-10-23

## 2022-10-23 ENCOUNTER — HOSPITAL ENCOUNTER (EMERGENCY)
Facility: HOSPITAL | Age: 62
Discharge: LEFT BEFORE DISCHARGE | End: 2022-10-23

## 2022-10-23 ENCOUNTER — APPOINTMENT (OUTPATIENT)
Dept: CT IMAGING | Facility: HOSPITAL | Age: 62
End: 2022-10-23

## 2022-10-23 VITALS
DIASTOLIC BLOOD PRESSURE: 89 MMHG | TEMPERATURE: 97.5 F | SYSTOLIC BLOOD PRESSURE: 137 MMHG | BODY MASS INDEX: 27.35 KG/M2 | HEIGHT: 75 IN | OXYGEN SATURATION: 97 % | HEART RATE: 97 BPM | WEIGHT: 220 LBS | RESPIRATION RATE: 21 BRPM

## 2022-10-23 VITALS
TEMPERATURE: 99.3 F | DIASTOLIC BLOOD PRESSURE: 89 MMHG | HEIGHT: 75 IN | SYSTOLIC BLOOD PRESSURE: 139 MMHG | RESPIRATION RATE: 18 BRPM | BODY MASS INDEX: 27.35 KG/M2 | HEART RATE: 73 BPM | WEIGHT: 220 LBS | OXYGEN SATURATION: 98 %

## 2022-10-23 DIAGNOSIS — F17.200 SMOKER: ICD-10-CM

## 2022-10-23 DIAGNOSIS — Z82.49 FAMILY HISTORY OF CORONARY ARTERY DISEASE: ICD-10-CM

## 2022-10-23 DIAGNOSIS — Z86.79 HISTORY OF ESSENTIAL HYPERTENSION: ICD-10-CM

## 2022-10-23 DIAGNOSIS — R07.9 ACUTE CHEST PAIN: Primary | ICD-10-CM

## 2022-10-23 LAB
ANION GAP SERPL CALCULATED.3IONS-SCNC: 8 MMOL/L (ref 5–15)
BASOPHILS # BLD AUTO: 0.06 10*3/MM3 (ref 0–0.2)
BASOPHILS NFR BLD AUTO: 0.8 % (ref 0–1.5)
BH CV STRESS BP STAGE 1: NORMAL
BH CV STRESS BP STAGE 2: NORMAL
BH CV STRESS BP STAGE 3: NORMAL
BH CV STRESS DURATION MIN STAGE 1: 3
BH CV STRESS DURATION MIN STAGE 2: 3
BH CV STRESS DURATION MIN STAGE 3: 1
BH CV STRESS DURATION SEC STAGE 1: 0
BH CV STRESS DURATION SEC STAGE 2: 0
BH CV STRESS DURATION SEC STAGE 3: 6
BH CV STRESS GRADE STAGE 1: 10
BH CV STRESS GRADE STAGE 2: 12
BH CV STRESS GRADE STAGE 3: 14
BH CV STRESS HR STAGE 1: 110
BH CV STRESS HR STAGE 2: 129
BH CV STRESS HR STAGE 3: 139
BH CV STRESS METS STAGE 1: 5
BH CV STRESS METS STAGE 2: 7.5
BH CV STRESS METS STAGE 3: 10
BH CV STRESS PROTOCOL 1: NORMAL
BH CV STRESS RECOVERY BP: NORMAL MMHG
BH CV STRESS RECOVERY HR: 90 BPM
BH CV STRESS SPEED STAGE 1: 1.7
BH CV STRESS SPEED STAGE 2: 2.5
BH CV STRESS SPEED STAGE 3: 3.4
BH CV STRESS STAGE 1: 1
BH CV STRESS STAGE 2: 2
BH CV STRESS STAGE 3: 3
BUN SERPL-MCNC: 13 MG/DL (ref 8–23)
BUN/CREAT SERPL: 14.9 (ref 7–25)
CALCIUM SPEC-SCNC: 8.7 MG/DL (ref 8.6–10.5)
CHLORIDE SERPL-SCNC: 100 MMOL/L (ref 98–107)
CO2 SERPL-SCNC: 29 MMOL/L (ref 22–29)
CREAT SERPL-MCNC: 0.87 MG/DL (ref 0.76–1.27)
D DIMER PPP FEU-MCNC: 0.62 MCGFEU/ML (ref 0–0.5)
DEPRECATED RDW RBC AUTO: 43.4 FL (ref 37–54)
EGFRCR SERPLBLD CKD-EPI 2021: 98.8 ML/MIN/1.73
EOSINOPHIL # BLD AUTO: 0.19 10*3/MM3 (ref 0–0.4)
EOSINOPHIL NFR BLD AUTO: 2.6 % (ref 0.3–6.2)
ERYTHROCYTE [DISTWIDTH] IN BLOOD BY AUTOMATED COUNT: 12.8 % (ref 12.3–15.4)
GLUCOSE SERPL-MCNC: 121 MG/DL (ref 65–99)
HCT VFR BLD AUTO: 45.1 % (ref 37.5–51)
HGB BLD-MCNC: 14.8 G/DL (ref 13–17.7)
HOLD SPECIMEN: NORMAL
HOLD SPECIMEN: NORMAL
IMM GRANULOCYTES # BLD AUTO: 0.02 10*3/MM3 (ref 0–0.05)
IMM GRANULOCYTES NFR BLD AUTO: 0.3 % (ref 0–0.5)
LYMPHOCYTES # BLD AUTO: 2.04 10*3/MM3 (ref 0.7–3.1)
LYMPHOCYTES NFR BLD AUTO: 27.9 % (ref 19.6–45.3)
MAGNESIUM SERPL-MCNC: 1.8 MG/DL (ref 1.6–2.4)
MAXIMAL PREDICTED HEART RATE: 160 BPM
MCH RBC QN AUTO: 30.3 PG (ref 26.6–33)
MCHC RBC AUTO-ENTMCNC: 32.8 G/DL (ref 31.5–35.7)
MCV RBC AUTO: 92.4 FL (ref 79–97)
MONOCYTES # BLD AUTO: 0.63 10*3/MM3 (ref 0.1–0.9)
MONOCYTES NFR BLD AUTO: 8.6 % (ref 5–12)
NEUTROPHILS NFR BLD AUTO: 4.38 10*3/MM3 (ref 1.7–7)
NEUTROPHILS NFR BLD AUTO: 59.8 % (ref 42.7–76)
NRBC BLD AUTO-RTO: 0 /100 WBC (ref 0–0.2)
PERCENT MAX PREDICTED HR: 86.88 %
PLATELET # BLD AUTO: 228 10*3/MM3 (ref 140–450)
PMV BLD AUTO: 10.5 FL (ref 6–12)
POTASSIUM SERPL-SCNC: 3.6 MMOL/L (ref 3.5–5.2)
RBC # BLD AUTO: 4.88 10*6/MM3 (ref 4.14–5.8)
SODIUM SERPL-SCNC: 137 MMOL/L (ref 136–145)
STRESS BASELINE BP: NORMAL MMHG
STRESS BASELINE HR: 75 BPM
STRESS PERCENT HR: 102 %
STRESS POST ESTIMATED WORKLOAD: 10 METS
STRESS POST EXERCISE DUR MIN: 7 MIN
STRESS POST EXERCISE DUR SEC: 6 SEC
STRESS POST PEAK BP: NORMAL MMHG
STRESS POST PEAK HR: 139 BPM
STRESS TARGET HR: 136 BPM
TROPONIN T SERPL-MCNC: <0.01 NG/ML (ref 0–0.03)
WBC NRBC COR # BLD: 7.32 10*3/MM3 (ref 3.4–10.8)
WHOLE BLOOD HOLD COAG: NORMAL
WHOLE BLOOD HOLD COAG: NORMAL
WHOLE BLOOD HOLD SPECIMEN: NORMAL

## 2022-10-23 PROCEDURE — 93005 ELECTROCARDIOGRAM TRACING: CPT

## 2022-10-23 PROCEDURE — 36415 COLL VENOUS BLD VENIPUNCTURE: CPT

## 2022-10-23 PROCEDURE — 96374 THER/PROPH/DIAG INJ IV PUSH: CPT

## 2022-10-23 PROCEDURE — 25010000002 PERFLUTREN 6.52 MG/ML SUSPENSION: Performed by: INTERNAL MEDICINE

## 2022-10-23 PROCEDURE — 93005 ELECTROCARDIOGRAM TRACING: CPT | Performed by: STUDENT IN AN ORGANIZED HEALTH CARE EDUCATION/TRAINING PROGRAM

## 2022-10-23 PROCEDURE — 71045 X-RAY EXAM CHEST 1 VIEW: CPT

## 2022-10-23 PROCEDURE — 93010 ELECTROCARDIOGRAM REPORT: CPT | Performed by: HOSPITALIST

## 2022-10-23 PROCEDURE — 85379 FIBRIN DEGRADATION QUANT: CPT | Performed by: STUDENT IN AN ORGANIZED HEALTH CARE EDUCATION/TRAINING PROGRAM

## 2022-10-23 PROCEDURE — 93350 STRESS TTE ONLY: CPT

## 2022-10-23 PROCEDURE — 84484 ASSAY OF TROPONIN QUANT: CPT | Performed by: STUDENT IN AN ORGANIZED HEALTH CARE EDUCATION/TRAINING PROGRAM

## 2022-10-23 PROCEDURE — 84484 ASSAY OF TROPONIN QUANT: CPT

## 2022-10-23 PROCEDURE — 0 IOPAMIDOL PER 1 ML: Performed by: STUDENT IN AN ORGANIZED HEALTH CARE EDUCATION/TRAINING PROGRAM

## 2022-10-23 PROCEDURE — 83735 ASSAY OF MAGNESIUM: CPT | Performed by: STUDENT IN AN ORGANIZED HEALTH CARE EDUCATION/TRAINING PROGRAM

## 2022-10-23 PROCEDURE — 93017 CV STRESS TEST TRACING ONLY: CPT

## 2022-10-23 PROCEDURE — 99284 EMERGENCY DEPT VISIT MOD MDM: CPT

## 2022-10-23 PROCEDURE — 85025 COMPLETE CBC W/AUTO DIFF WBC: CPT | Performed by: STUDENT IN AN ORGANIZED HEALTH CARE EDUCATION/TRAINING PROGRAM

## 2022-10-23 PROCEDURE — 93350 STRESS TTE ONLY: CPT | Performed by: INTERNAL MEDICINE

## 2022-10-23 PROCEDURE — 93352 ADMIN ECG CONTRAST AGENT: CPT | Performed by: INTERNAL MEDICINE

## 2022-10-23 PROCEDURE — 99211 OFF/OP EST MAY X REQ PHY/QHP: CPT

## 2022-10-23 PROCEDURE — 93018 CV STRESS TEST I&R ONLY: CPT | Performed by: INTERNAL MEDICINE

## 2022-10-23 PROCEDURE — 71275 CT ANGIOGRAPHY CHEST: CPT

## 2022-10-23 PROCEDURE — 80048 BASIC METABOLIC PNL TOTAL CA: CPT | Performed by: STUDENT IN AN ORGANIZED HEALTH CARE EDUCATION/TRAINING PROGRAM

## 2022-10-23 RX ORDER — SODIUM CHLORIDE 0.9 % (FLUSH) 0.9 %
10 SYRINGE (ML) INJECTION AS NEEDED
Status: DISCONTINUED | OUTPATIENT
Start: 2022-10-23 | End: 2022-10-23 | Stop reason: HOSPADM

## 2022-10-23 RX ORDER — ASPIRIN 81 MG/1
324 TABLET, CHEWABLE ORAL ONCE
Status: COMPLETED | OUTPATIENT
Start: 2022-10-23 | End: 2022-10-23

## 2022-10-23 RX ORDER — NITROGLYCERIN 0.4 MG/1
0.4 TABLET SUBLINGUAL
Status: COMPLETED | OUTPATIENT
Start: 2022-10-23 | End: 2022-10-23

## 2022-10-23 RX ORDER — NITROGLYCERIN 0.4 MG/1
0.4 TABLET SUBLINGUAL
Status: DISCONTINUED | OUTPATIENT
Start: 2022-10-23 | End: 2022-10-23 | Stop reason: HOSPADM

## 2022-10-23 RX ADMIN — ASPIRIN 324 MG: 81 TABLET, CHEWABLE ORAL at 05:14

## 2022-10-23 RX ADMIN — NITROGLYCERIN 0.4 MG: 0.4 TABLET SUBLINGUAL at 10:01

## 2022-10-23 RX ADMIN — PERFLUTREN 8.48 MG: 6.52 INJECTION, SUSPENSION INTRAVENOUS at 09:24

## 2022-10-23 RX ADMIN — NITROGLYCERIN 0.4 MG: 0.4 TABLET SUBLINGUAL at 06:48

## 2022-10-23 RX ADMIN — IOPAMIDOL 80 ML: 755 INJECTION, SOLUTION INTRAVENOUS at 11:59

## 2022-10-23 RX ADMIN — NITROGLYCERIN 0.4 MG: 0.4 TABLET SUBLINGUAL at 06:36

## 2022-10-23 NOTE — ED NOTES
Patient wife to triage desk. Wife states that the pt is feeling better and is going to go home. Instructed patient to come back if needed. Pt wife voiced understanding. Patient ambulatory out of ER lobby at this time with wife.

## 2022-10-23 NOTE — ED PROVIDER NOTES
EMERGENCY DEPARTMENT HISTORY AND PHYSICAL EXAM    Patient Name: Michael Issa    Chief Complaint   Patient presents with   • Chest Pain       History of Presenting Illness:  Michael Issa is a 62 y.o. male with a history of hypertension and a family history of early coronary artery disease who presents to the emergency department due to acute episode of chest pressure.    Patient states that he was awoken from sleep about 3 to 4 hours prior to arrival with sudden onset right sternal chest pressure squeezing in nature.  No radiation.  States he still having same sensation.  Denies any shortness of breath.  No nausea vomiting or abdominal pain.  No diaphoresis.  No fevers or chills.  No leg swelling.  Patient has had a stress test in the very distant past.  He is a smoker.    Past Medical History:   Past Medical History:   Diagnosis Date   • Arthritis    • Disease of thyroid gland     NODULE PRESENT    • Hx of chest pain    • Hypertension        Past Surgical History:   Past Surgical History:   Procedure Laterality Date   • COLONOSCOPY N/A 9/23/2021    Procedure: COLONOSCOPY;  Surgeon: Eduardo Neil MD;  Location: [facility] ENDOSCOPY;  Service: Gastroenterology;  Laterality: N/A;  pre  post diverticulosis, hemorrhoids  Dr. cavazos   • KNEE SURGERY  2014    rt knee MENISCUS   • PREPERITONEAL HERNIA REPAIR Bilateral 9/20/2017    Procedure: BILATERAL PREPERITONEAL INGUINAL HERNIA REPAIR LAPAROSCOPIC WITH MESH;  Surgeon: Rambo Church MD;  Location: [facility] OR;  Service:        Family History:   Family History   Problem Relation Age of Onset   • Alzheimer's disease Mother    • Diabetes Father    • Heart disease Father    • No Known Problems Sister    • No Known Problems Brother    • No Known Problems Daughter    • No Known Problems Maternal Grandmother    • No Known Problems Maternal Grandfather    • No Known Problems Paternal Grandmother    • No Known Problems Paternal Grandfather    • Hypertension Brother    • No  "Known Problems Brother        Social History:  Daily tobacco  Denies EtOH  Denies marijuana, cocaine, or IV drugs    Allergies:   Allergies   Allergen Reactions   • Penicillins Rash     Childhood allergy       Medications:     Current Facility-Administered Medications:   •  nitroglycerin (NITROSTAT) SL tablet 0.4 mg, 0.4 mg, Sublingual, Q5 Min PRN, Woody Kelley MD  •  sodium chloride 0.9 % flush 10 mL, 10 mL, Intravenous, PRN, Woody Kelley MD  •  [COMPLETED] Insert peripheral IV, , , Once **AND** sodium chloride 0.9 % flush 10 mL, 10 mL, Intravenous, PRN, Woody Kelley MD    Current Outpatient Medications:   •  lisinopril-hydrochlorothiazide (PRINZIDE,ZESTORETIC) 20-12.5 MG per tablet, Take 1 tablet by mouth Daily., Disp: 90 tablet, Rfl: 1  •  valACYclovir (VALTREX) 1000 MG tablet, Take 2 tablets by mouth 2 (Two) Times a Day., Disp: 15 tablet, Rfl: 1  •  Ascorbic Acid (VITAMIN C PO), Take 1,000 mg by mouth Daily. ON HOLD 9-15-17, Disp: , Rfl:   •  diclofenac (VOLTAREN) 50 MG EC tablet, Take 1 tablet by mouth 2 (Two) Times a Day., Disp: 180 tablet, Rfl: 1  •  fexofenadine-pseudoephedrine (ALLEGRA-D 24) 180-240 MG per 24 hr tablet, Take 1 tablet by mouth Daily., Disp: 30 tablet, Rfl: 6  •  fluticasone (FLONASE) 50 MCG/ACT nasal spray, 2 sprays into the nostril(s) as directed by provider Daily., Disp: 16 g, Rfl: 5  •  olopatadine (Patanol) 0.1 % ophthalmic solution, Administer 1 drop to both eyes 2 (Two) Times a Day., Disp: 1 each, Rfl: 5  •  sildenafil (VIAGRA) 100 MG tablet, Take 1 tablet by mouth Daily As Needed for Erectile Dysfunction., Disp: 90 tablet, Rfl: 1    Review of Systems:  A full review of systems was obtained and is negative unless otherwise stated in HPI.    Physical Exam:  VS: /92   Pulse 60   Temp 97.5 °F (36.4 °C) (Oral)   Resp 16   Ht 190.5 cm (75\")   Wt 99.8 kg (220 lb)   SpO2 98%   BMI 27.50 kg/m²   GENERAL: Uncomfortable appearing middle-aged man sitting up in " stretcher no acute distress; well nourished, well developed, awake, alert, no acute distress, nontoxic appearing  EYES: PERRL, sclera anicteric, extra-occular movements grossly intact, symmetric lids  EARS, NOSE, MOUTH, THROAT: atraumatic external nose and ears, moist mucous membranes  NECK: Symmetric, trachea midline, no thyromegaly, no adenopathy, no meningismus  RESPIRATORY: Unlabored respiratory effort, clear to auscultation bilaterally, good air movement  CARDIOVASCULAR: No murmurs or gallops, peripheral pulses 2+ and equal in all extremities  GI: Soft, nontender, nondistended, bowel sounds present, no hepatosplenomegaly  LYMPHATIC: no lymphadenopathy  MUSCULOSKELETAL/EXTREMITIES: Extremities without obvious deformity, no cyanosis or clubbing  SKIN: warm and dry with no obvious rashes  NEUROLOGIC: moving all 4 extremities symmetrically, CN II-XII grossly intact  PSYCHIATRIC: alert, pleasant and cooperative. Appropriate mood and affect.      Labs:  Labs Reviewed   BASIC METABOLIC PANEL - Abnormal; Notable for the following components:       Result Value    Glucose 121 (*)     All other components within normal limits    Narrative:     GFR Normal >60  Chronic Kidney Disease <60  Kidney Failure <15     TROPONIN (IN-HOUSE) - Normal    Narrative:     Troponin T Reference Range:  <= 0.03 ng/mL-   Negative for AMI  >0.03 ng/mL-     Abnormal for myocardial necrosis.  Clinicians would have to utilize clinical acumen, EKG, Troponin and serial changes to determine if it is an Acute Myocardial Infarction or myocardial injury due to an underlying chronic condition.       Results may be falsely decreased if patient taking Biotin.     CBC WITH AUTO DIFFERENTIAL - Normal   TROPONIN (IN-HOUSE)   RAINBOW DRAW    Narrative:     The following orders were created for panel order [place] Draw.  Procedure                               Abnormality         Status                     ---------                               -----------          ------                     Green Top (Gel)[[id number]]                                                             Lavender Top[[id number]]                                                                Light Blue Top[[id number]]                                                                Please view results for these tests on the individual orders.   CBC AND DIFFERENTIAL    Narrative:     The following orders were created for panel order CBC & Differential.  Procedure                               Abnormality         Status                     ---------                               -----------         ------                     CBC Auto Differential[[id number]]        Normal              Final result                 Please view results for these tests on the individual orders.   GREEN TOP   LAVENDER TOP   LIGHT BLUE TOP         Radiology:   XR Chest 1 View    (Results Pending)         Medical Decision Making:  Michael Issa is a 62 y.o. male with history of hypertension who presents emergency department due to acute chest pain.    Reassuring vital signs on arrival.    I have reviewed and interpreted the EKG and it shows: NSR, rate of 66 with 1 premature ventricular complex. Normal axis and intervals. No signs of acute ischemia such as ST elevation, ST depression, or T wave inversion. No signs of Hyperkalemia, WPW, PE, Pericarditis, LV aneurysm, Brugada, Heart Block, Atrial fibrillation or A flutter.    HEART score 5 (2 for story, one for age, 3 for risk factors).     Labs were ordered and reviewed.  Negative initial troponin.  Normal able to get hemoglobin.  Normal BMP.  Repeat troponin is pending.    Imaging was ordered and reviewed.  Chest x-ray per my read with no acute abnormality.    Nursing notes were reviewed.    The patient was given 324 mg of aspirin.    Unclear exact etiology of the patient's chest pain at this time with pending cardiac stress testing.  No evidence of ST elevation myocardial  infarction based on EKG.  No evidence of NSTEMI based on additionally troponin with a repeat troponin is pending.  Given patient's risk factors would consider unstable angina.  Patient with no history of GERD sightless patient listed acid reflux.  No evidence of full consolidation pneumonia pneumothorax on my review of chest x-ray to explain his chest pain.    Given elevated heart score a stress test was ordered.  Final disposition pending results of cardiac stress testing at time of my signout to the [facility] emergency medicine attending, Dr. Ro, who is assuming care of this patient.      ED Diagnosis:  Acute chest pain; History of essential hypertension; Smoker; Family history of coronary artery disease      Disposition: Pending results of cardiac stress testing at time of my signout to the [facility] emergency medicine attending, Dr. Ro, who is assuming care of this patient          Signed:  Woody Kelley MD  Emergency Medicine Physician    Please note that portions of this note were completed with a voice recognition program.      Woody Kelley MD  10/23/22 0631

## 2022-10-23 NOTE — ED PROVIDER NOTES
Care of patient Michael Issa assumed from the previous healthcare provider.  See their note for further details.  Briefly, 62 y.o. male with PMH below presents with chest pain. HEART score 5. ECG without focal ischemic change. Getting NTG now.    Past Medical History:   Diagnosis Date   • Arthritis    • Disease of thyroid gland     NODULE PRESENT    • Hx of chest pain    • Hypertension      Vitals:    10/23/22 1207   BP:    Pulse: 97   Resp:    Temp:    SpO2: 97%         Plan at time of Handoff:   Pending ntg and reassessment  Pending second troponin  Pending serial ecg     MDM/ED Course:  -Patient sleeping on evaluation.  When I wake him up he feels a slight bit of tightness but no pain in his chest.  Second troponin is normal.  Serial EKG has been ordered.  Stress test is ordered.  Patient is agreeable to getting a stress test prior to discharge.  -Stress test was obtained and a repeat evaluation afterwards patient had chest pain.  Repeat EKG reviewed by me; shows sinus rhythm, no focal ischemic changes, no arrhythmia.  Describes it as pain which is not the worst at the top of his breath.  Repeat troponin ordered and is normal.  Nitro was given but did not seem to help the pain much.  Dimer ordered and is elevated.  CT of the chest does not show PE.  Does show thyroid nodule but this is documented as far back as 2016 and unfortunately the patient eloped before I could reassess him and have further discussion.  I was unable to locate him.  Message his PCP about this.       Gabe Ro MD  10/23/22 3795

## 2022-10-24 ENCOUNTER — TELEPHONE (OUTPATIENT)
Dept: FAMILY MEDICINE CLINIC | Facility: CLINIC | Age: 62
End: 2022-10-24

## 2022-10-24 DIAGNOSIS — E04.1 THYROID NODULE: Primary | ICD-10-CM

## 2022-10-24 NOTE — TELEPHONE ENCOUNTER
----- Message from Mansi Zapata DNP, JUAN JOSE sent at 10/24/2022  1:10 PM CDT -----  Please call the pt and tell him that the ER doc sent me a message about his ER visit and that Kirit left before he was able to discuss results.  He needs to have a thyroid ultrasound because he has a nodule.  I have ordered it for him so he needs to come and do the ultrasound  ----- Message -----  From: Gabe Ro MD  Sent: 10/23/2022  12:34 PM CDT  To: Mansi Zapata DNP, APRN    Hello.  I saw this patient in the emergency department today and unfortunately he eloped before his evaluation was complete.  He had a thyroid nodule noted on a CTA of the chest which appears to go back as far as 2016 but given that he eloped I wanted to share the results with you for follow-up.

## 2022-10-28 ENCOUNTER — TELEPHONE (OUTPATIENT)
Dept: FAMILY MEDICINE CLINIC | Facility: CLINIC | Age: 62
End: 2022-10-28

## 2022-10-28 ENCOUNTER — NURSE TRIAGE (OUTPATIENT)
Dept: OTHER | Facility: CLINIC | Age: 62
End: 2022-10-28

## 2022-10-28 ENCOUNTER — HOSPITAL ENCOUNTER (EMERGENCY)
Facility: HOSPITAL | Age: 62
Discharge: HOME | End: 2022-10-29
Admitting: FAMILY MEDICINE

## 2022-10-28 DIAGNOSIS — S80.12XA CONTUSION OF LEFT LOWER EXTREMITY, INITIAL ENCOUNTER: Primary | ICD-10-CM

## 2022-10-28 DIAGNOSIS — T14.8XXA ABRASION: ICD-10-CM

## 2022-10-28 PROCEDURE — 99282 EMERGENCY DEPT VISIT SF MDM: CPT

## 2022-10-28 NOTE — TELEPHONE ENCOUNTER
Patient called in stating that he was chopping wood and that he had a piece of wood go into his leg.  He stated that he has a gash on his leg and it is swelling.  He requested an US because he is unsure if it is infected or blood clot.   I explained we don't have an US tech here today. I advised he needed to go to ER or Urgent Care. Patient wanted to be seen and not go to ER or Urgent Care. We only have one provider and no availability so I called [facility] and they said they have no availability today.  I spoke with Provider and she stated patient needs to go to ER in case of infection and possibility of small fragments of the wood being in his leg. Patient stated he has no pieces of wood in his leg.

## 2022-10-28 NOTE — TELEPHONE ENCOUNTER
Patient called and stated that he was cutting wood 3 days ago and would like to come in today as he believes he has a blood clot in his leg and wants an ultrasound done. I let him know that our ultrasound tech is out until Monday but if he believes he has a blood clot he needs to go to the ER. He ended the call

## 2022-10-28 NOTE — TELEPHONE ENCOUNTER
Location of patient: [address] call from Bret Jones at [facility] with Red Flag Complaint. Subjective: Caller states \"He injured his leg Tuesday while splitting wood. A piece flew off and stabbed his leg. It is swollen, re\"     Current Symptoms: open     Onset: 3 days ago; sudden, rapid, worsening    Associated Symptoms: reduced activity, increased sleepiness, increased wakefulness    Pain Severity: 7/10; sharp, tender, pressure, tightness; constant, severe    Temperature: no  n/a    What has been tried: nothing    LMP: NA Pregnant: NA    Recommended disposition: Go to ED/UCC Now (Or to Office with PCP Approval)    Care advice provided, patient verbalizes understanding; denies any other questions or concerns; instructed to call back for any new or worsening symptoms. Writer provided warm transfer to Ruthann Whitlock at [facility] office for the caller for leg injury,with pain, redness, swelling, and stated he can hardly walk on it. Attention Provider: Thank you for allowing me to participate in the care of your patient. The patient was connected to triage in response to information provided to the ECC/PSC. Please do not respond through this encounter as the response is not directed to a shared pool.           Reason for Disposition   SEVERE pain (e.g., excruciating pain, unable to do any normal activities)    Protocols used: Leg Injury-ADULT-[place]

## 2022-10-29 ENCOUNTER — APPOINTMENT (OUTPATIENT)
Dept: GENERAL RADIOLOGY | Facility: HOSPITAL | Age: 62
End: 2022-10-29

## 2022-10-29 ENCOUNTER — APPOINTMENT (OUTPATIENT)
Dept: ULTRASOUND IMAGING | Facility: HOSPITAL | Age: 62
End: 2022-10-29

## 2022-10-29 VITALS
OXYGEN SATURATION: 98 % | HEIGHT: 73 IN | WEIGHT: 224 LBS | TEMPERATURE: 97.8 F | RESPIRATION RATE: 18 BRPM | SYSTOLIC BLOOD PRESSURE: 115 MMHG | DIASTOLIC BLOOD PRESSURE: 67 MMHG | BODY MASS INDEX: 29.69 KG/M2 | HEART RATE: 64 BPM

## 2022-10-29 PROCEDURE — 25010000002 TETANUS-DIPHTH-ACELL PERTUSSIS 5-2.5-18.5 LF-MCG/0.5 SUSPENSION PREFILLED SYRINGE: Performed by: NURSE PRACTITIONER

## 2022-10-29 PROCEDURE — 73590 X-RAY EXAM OF LOWER LEG: CPT

## 2022-10-29 PROCEDURE — 93971 EXTREMITY STUDY: CPT | Performed by: SURGERY

## 2022-10-29 PROCEDURE — 93971 EXTREMITY STUDY: CPT

## 2022-10-29 PROCEDURE — 90715 TDAP VACCINE 7 YRS/> IM: CPT | Performed by: NURSE PRACTITIONER

## 2022-10-29 PROCEDURE — 90471 IMMUNIZATION ADMIN: CPT | Performed by: NURSE PRACTITIONER

## 2022-10-29 RX ORDER — HYDROCODONE BITARTRATE AND ACETAMINOPHEN 5; 325 MG/1; MG/1
1 TABLET ORAL EVERY 6 HOURS PRN
Qty: 12 TABLET | Refills: 0 | Status: SHIPPED | OUTPATIENT
Start: 2022-10-29

## 2022-10-29 RX ADMIN — TETANUS TOXOID, REDUCED DIPHTHERIA TOXOID AND ACELLULAR PERTUSSIS VACCINE, ADSORBED 0.5 ML: 5; 2.5; 8; 8; 2.5 SUSPENSION INTRAMUSCULAR at 00:01

## 2022-10-31 ENCOUNTER — OFFICE VISIT (OUTPATIENT)
Dept: FAMILY MEDICINE CLINIC | Facility: CLINIC | Age: 62
End: 2022-10-31

## 2022-10-31 VITALS
WEIGHT: 224 LBS | TEMPERATURE: 97 F | HEIGHT: 73 IN | DIASTOLIC BLOOD PRESSURE: 77 MMHG | RESPIRATION RATE: 12 BRPM | SYSTOLIC BLOOD PRESSURE: 106 MMHG | OXYGEN SATURATION: 98 % | BODY MASS INDEX: 29.69 KG/M2 | HEART RATE: 69 BPM

## 2022-10-31 DIAGNOSIS — M79.605 PAIN OF LEFT LOWER EXTREMITY: Primary | ICD-10-CM

## 2022-10-31 DIAGNOSIS — M79.89 LEFT LEG SWELLING: ICD-10-CM

## 2022-10-31 PROCEDURE — 99213 OFFICE O/P EST LOW 20 MIN: CPT | Performed by: NURSE PRACTITIONER

## 2022-11-01 LAB
QT INTERVAL: 386 MS
QT INTERVAL: 414 MS
QT INTERVAL: 424 MS
QT INTERVAL: 438 MS
QTC INTERVAL: 424 MS
QTC INTERVAL: 428 MS
QTC INTERVAL: 459 MS
QTC INTERVAL: 465 MS

## 2022-12-30 DIAGNOSIS — N52.8 OTHER MALE ERECTILE DYSFUNCTION: ICD-10-CM

## 2022-12-30 RX ORDER — SILDENAFIL 100 MG/1
100 TABLET, FILM COATED ORAL DAILY PRN
Qty: 90 TABLET | Refills: 0 | Status: SHIPPED | OUTPATIENT
Start: 2022-12-30 | End: 2023-03-13

## 2022-12-30 NOTE — TELEPHONE ENCOUNTER
Rx Refill Note  Requested Prescriptions     Pending Prescriptions Disp Refills   • sildenafil (VIAGRA) 100 MG tablet [Pharmacy Med Name: Sildenafil Citrate 100mg Tablet] 90 tablet 0     Sig: Take 1 tablet by mouth daily as needed for erectile dysfunction.      Last office visit with prescribing clinician: 6/23/2022   Last telemedicine visit with prescribing clinician: Visit date not found   Next office visit with prescribing clinician: Visit date not found                         Would you like a call back once the refill request has been completed: [] Yes [] No    If the office needs to give you a call back, can they leave a voicemail: [] Yes [] No    Amber Zhou LPN  12/30/22, 15:12 CST

## 2023-02-06 ENCOUNTER — APPOINTMENT (OUTPATIENT)
Dept: GENERAL RADIOLOGY | Age: 63
End: 2023-02-06
Payer: COMMERCIAL

## 2023-02-06 ENCOUNTER — HOSPITAL ENCOUNTER (EMERGENCY)
Age: 63
Discharge: HOME | End: 2023-02-06
Payer: COMMERCIAL

## 2023-02-06 ENCOUNTER — APPOINTMENT (OUTPATIENT)
Dept: CT IMAGING | Age: 63
End: 2023-02-06
Payer: COMMERCIAL

## 2023-02-06 VITALS
BODY MASS INDEX: 26.51 KG/M2 | RESPIRATION RATE: 19 BRPM | SYSTOLIC BLOOD PRESSURE: 124 MMHG | TEMPERATURE: 98.9 F | HEIGHT: 73 IN | WEIGHT: 200 LBS | DIASTOLIC BLOOD PRESSURE: 78 MMHG | HEART RATE: 97 BPM | OXYGEN SATURATION: 96 %

## 2023-02-06 DIAGNOSIS — B34.8 RHINOVIRUS INFECTION: ICD-10-CM

## 2023-02-06 DIAGNOSIS — J18.9 PNEUMONIA OF RIGHT MIDDLE LOBE DUE TO INFECTIOUS ORGANISM: ICD-10-CM

## 2023-02-06 DIAGNOSIS — J18.9 PNEUMONIA OF RIGHT LOWER LOBE DUE TO INFECTIOUS ORGANISM: Primary | ICD-10-CM

## 2023-02-06 LAB
ADENOVIRUS BY PCR: NOT DETECTED
ALBUMIN SERPL-MCNC: 3.5 G/DL (ref 3.5–5.2)
ALP BLD-CCNC: 86 U/L (ref 40–130)
ALT SERPL-CCNC: 25 U/L (ref 5–41)
ANION GAP SERPL CALCULATED.3IONS-SCNC: 9 MMOL/L (ref 7–19)
AST SERPL-CCNC: 20 U/L (ref 5–40)
BANDED NEUTROPHILS RELATIVE PERCENT: 15 % (ref 0–5)
BASOPHILS ABSOLUTE: 0 K/UL (ref 0–0.2)
BASOPHILS RELATIVE PERCENT: 0 % (ref 0–1)
BILIRUB SERPL-MCNC: 1.6 MG/DL (ref 0.2–1.2)
BORDETELLA PARAPERTUSSIS BY PCR: NOT DETECTED
BORDETELLA PERTUSSIS BY PCR: NOT DETECTED
BUN BLDV-MCNC: 17 MG/DL (ref 8–23)
CALCIUM SERPL-MCNC: 9.1 MG/DL (ref 8.8–10.2)
CHLAMYDOPHILIA PNEUMONIAE BY PCR: NOT DETECTED
CHLORIDE BLD-SCNC: 95 MMOL/L (ref 98–111)
CO2: 29 MMOL/L (ref 22–29)
CORONAVIRUS 229E BY PCR: NOT DETECTED
CORONAVIRUS HKU1 BY PCR: NOT DETECTED
CORONAVIRUS NL63 BY PCR: NOT DETECTED
CORONAVIRUS OC43 BY PCR: NOT DETECTED
CREAT SERPL-MCNC: 1.1 MG/DL (ref 0.5–1.2)
EOSINOPHILS ABSOLUTE: 0 K/UL (ref 0–0.6)
EOSINOPHILS RELATIVE PERCENT: 0 % (ref 0–5)
GFR SERPL CREATININE-BSD FRML MDRD: >60 ML/MIN/{1.73_M2}
GLUCOSE BLD-MCNC: 148 MG/DL (ref 74–109)
HCT VFR BLD CALC: 46.8 % (ref 42–52)
HEMOGLOBIN: 15.5 G/DL (ref 14–18)
HUMAN METAPNEUMOVIRUS BY PCR: NOT DETECTED
HUMAN RHINOVIRUS/ENTEROVIRUS BY PCR: DETECTED
IMMATURE GRANULOCYTES #: 0.8 K/UL
INFLUENZA A BY PCR: NOT DETECTED
INFLUENZA B BY PCR: NOT DETECTED
INR BLD: 1.08 (ref 0.88–1.18)
LACTIC ACID: 1.9 MMOL/L (ref 0.5–1.9)
LACTIC ACID: 2.9 MMOL/L (ref 0.5–1.9)
LYMPHOCYTES ABSOLUTE: 2.8 K/UL (ref 1.1–4.5)
LYMPHOCYTES RELATIVE PERCENT: 11 % (ref 20–40)
MCH RBC QN AUTO: 30.2 PG (ref 27–31)
MCHC RBC AUTO-ENTMCNC: 33.1 G/DL (ref 33–37)
MCV RBC AUTO: 91.2 FL (ref 80–94)
MONOCYTES ABSOLUTE: 1.8 K/UL (ref 0–0.9)
MONOCYTES RELATIVE PERCENT: 7 % (ref 0–10)
MYCOPLASMA PNEUMONIAE BY PCR: NOT DETECTED
NEUTROPHILS ABSOLUTE: 20.6 K/UL (ref 1.5–7.5)
NEUTROPHILS RELATIVE PERCENT: 67 % (ref 50–65)
PARAINFLUENZA VIRUS 1 BY PCR: NOT DETECTED
PARAINFLUENZA VIRUS 2 BY PCR: NOT DETECTED
PARAINFLUENZA VIRUS 3 BY PCR: NOT DETECTED
PARAINFLUENZA VIRUS 4 BY PCR: NOT DETECTED
PDW BLD-RTO: 12.4 % (ref 11.5–14.5)
PLATELET # BLD: 230 K/UL (ref 130–400)
PLATELET SLIDE REVIEW: ADEQUATE
PMV BLD AUTO: 10.6 FL (ref 9.4–12.4)
POTASSIUM SERPL-SCNC: 4.2 MMOL/L (ref 3.5–5)
PROCALCITONIN: 1.96 NG/ML (ref 0–0.09)
PROTHROMBIN TIME: 14 SEC (ref 12–14.6)
RBC # BLD: 5.13 M/UL (ref 4.7–6.1)
RBC # BLD: NORMAL 10*6/UL
RESPIRATORY SYNCYTIAL VIRUS BY PCR: NOT DETECTED
SARS-COV-2, PCR: NOT DETECTED
SODIUM BLD-SCNC: 133 MMOL/L (ref 136–145)
TOTAL PROTEIN: 7.4 G/DL (ref 6.6–8.7)
TROPONIN: <0.01 NG/ML (ref 0–0.03)
WBC # BLD: 25.1 K/UL (ref 4.8–10.8)

## 2023-02-06 PROCEDURE — 87040 BLOOD CULTURE FOR BACTERIA: CPT

## 2023-02-06 PROCEDURE — 6360000002 HC RX W HCPCS: Performed by: PHYSICIAN ASSISTANT

## 2023-02-06 PROCEDURE — 96375 TX/PRO/DX INJ NEW DRUG ADDON: CPT

## 2023-02-06 PROCEDURE — 99285 EMERGENCY DEPT VISIT HI MDM: CPT

## 2023-02-06 PROCEDURE — 93005 ELECTROCARDIOGRAM TRACING: CPT | Performed by: PHYSICIAN ASSISTANT

## 2023-02-06 PROCEDURE — 36415 COLL VENOUS BLD VENIPUNCTURE: CPT

## 2023-02-06 PROCEDURE — 71101 X-RAY EXAM UNILAT RIBS/CHEST: CPT

## 2023-02-06 PROCEDURE — 71275 CT ANGIOGRAPHY CHEST: CPT

## 2023-02-06 PROCEDURE — 96374 THER/PROPH/DIAG INJ IV PUSH: CPT

## 2023-02-06 PROCEDURE — 83605 ASSAY OF LACTIC ACID: CPT

## 2023-02-06 PROCEDURE — 84145 PROCALCITONIN (PCT): CPT

## 2023-02-06 PROCEDURE — 80053 COMPREHEN METABOLIC PANEL: CPT

## 2023-02-06 PROCEDURE — 71275 CT ANGIOGRAPHY CHEST: CPT | Performed by: RADIOLOGY

## 2023-02-06 PROCEDURE — 85610 PROTHROMBIN TIME: CPT

## 2023-02-06 PROCEDURE — 0202U NFCT DS 22 TRGT SARS-COV-2: CPT

## 2023-02-06 PROCEDURE — 85025 COMPLETE CBC W/AUTO DIFF WBC: CPT

## 2023-02-06 PROCEDURE — 6370000000 HC RX 637 (ALT 250 FOR IP): Performed by: PHYSICIAN ASSISTANT

## 2023-02-06 PROCEDURE — 84484 ASSAY OF TROPONIN QUANT: CPT

## 2023-02-06 PROCEDURE — 96361 HYDRATE IV INFUSION ADD-ON: CPT

## 2023-02-06 PROCEDURE — 2580000003 HC RX 258: Performed by: PHYSICIAN ASSISTANT

## 2023-02-06 PROCEDURE — 6360000004 HC RX CONTRAST MEDICATION: Performed by: PHYSICIAN ASSISTANT

## 2023-02-06 PROCEDURE — 71101 X-RAY EXAM UNILAT RIBS/CHEST: CPT | Performed by: RADIOLOGY

## 2023-02-06 RX ORDER — DOXYCYCLINE HYCLATE 100 MG
100 TABLET ORAL 2 TIMES DAILY
Qty: 20 TABLET | Refills: 0 | Status: SHIPPED | OUTPATIENT
Start: 2023-02-06 | End: 2023-02-16

## 2023-02-06 RX ORDER — ACETAMINOPHEN 500 MG
1000 TABLET ORAL
Status: COMPLETED | OUTPATIENT
Start: 2023-02-06 | End: 2023-02-06

## 2023-02-06 RX ORDER — DOXYCYCLINE HYCLATE 100 MG/1
100 CAPSULE ORAL ONCE
Status: COMPLETED | OUTPATIENT
Start: 2023-02-06 | End: 2023-02-06

## 2023-02-06 RX ORDER — 0.9 % SODIUM CHLORIDE 0.9 %
1000 INTRAVENOUS SOLUTION INTRAVENOUS ONCE
Status: COMPLETED | OUTPATIENT
Start: 2023-02-06 | End: 2023-02-06

## 2023-02-06 RX ORDER — ONDANSETRON 2 MG/ML
4 INJECTION INTRAMUSCULAR; INTRAVENOUS ONCE
Status: COMPLETED | OUTPATIENT
Start: 2023-02-06 | End: 2023-02-06

## 2023-02-06 RX ORDER — MORPHINE SULFATE 2 MG/ML
2 INJECTION, SOLUTION INTRAMUSCULAR; INTRAVENOUS ONCE
Status: COMPLETED | OUTPATIENT
Start: 2023-02-06 | End: 2023-02-06

## 2023-02-06 RX ORDER — HYDROCODONE BITARTRATE AND ACETAMINOPHEN 10; 325 MG/1; MG/1
1 TABLET ORAL ONCE
Status: COMPLETED | OUTPATIENT
Start: 2023-02-06 | End: 2023-02-06

## 2023-02-06 RX ORDER — ALBUTEROL SULFATE 90 UG/1
2 AEROSOL, METERED RESPIRATORY (INHALATION) 4 TIMES DAILY PRN
Qty: 18 G | Refills: 0 | Status: SHIPPED | OUTPATIENT
Start: 2023-02-06

## 2023-02-06 RX ADMIN — IOPAMIDOL 70 ML: 755 INJECTION, SOLUTION INTRAVENOUS at 17:51

## 2023-02-06 RX ADMIN — SODIUM CHLORIDE 1000 ML: 9 INJECTION, SOLUTION INTRAVENOUS at 18:55

## 2023-02-06 RX ADMIN — ACETAMINOPHEN 1000 MG: 500 TABLET ORAL at 16:47

## 2023-02-06 RX ADMIN — HYDROCODONE BITARTRATE AND ACETAMINOPHEN 1 TABLET: 10; 325 TABLET ORAL at 21:54

## 2023-02-06 RX ADMIN — MORPHINE SULFATE 2 MG: 2 INJECTION, SOLUTION INTRAMUSCULAR; INTRAVENOUS at 19:59

## 2023-02-06 RX ADMIN — SODIUM CHLORIDE 1000 ML: 9 INJECTION, SOLUTION INTRAVENOUS at 16:50

## 2023-02-06 RX ADMIN — CEFTRIAXONE 1000 MG: 1 INJECTION, POWDER, FOR SOLUTION INTRAMUSCULAR; INTRAVENOUS at 16:51

## 2023-02-06 RX ADMIN — DOXYCYCLINE HYCLATE 100 MG: 100 CAPSULE ORAL at 18:05

## 2023-02-06 RX ADMIN — ONDANSETRON 4 MG: 2 INJECTION INTRAMUSCULAR; INTRAVENOUS at 16:50

## 2023-02-06 ASSESSMENT — PAIN DESCRIPTION - ORIENTATION
ORIENTATION: RIGHT

## 2023-02-06 ASSESSMENT — PAIN DESCRIPTION - LOCATION
LOCATION: RIB CAGE

## 2023-02-06 ASSESSMENT — ENCOUNTER SYMPTOMS
COUGH: 1
NAUSEA: 0
ABDOMINAL PAIN: 0
BLOOD IN STOOL: 0
SHORTNESS OF BREATH: 0
EYES NEGATIVE: 1
VOMITING: 0
CHEST TIGHTNESS: 0
GASTROINTESTINAL NEGATIVE: 1
SORE THROAT: 0
CONSTIPATION: 0
DIARRHEA: 0

## 2023-02-06 ASSESSMENT — PAIN SCALES - GENERAL
PAINLEVEL_OUTOF10: 9

## 2023-02-06 NOTE — Clinical Note
Russell Wren was seen and treated in our emergency department on 2/6/2023. He may return to work on 02/09/2023. If you have any questions or concerns, please don't hesitate to call.       Caryl Gifford PA-C

## 2023-02-06 NOTE — ED PROVIDER NOTES
[facility] EMERGENCY DEPT  EMERGENCY DEPARTMENT ENCOUNTER      Pt Name: Saurav Reza  MRN: [id number]  [place] 1960  Date of evaluation: 2/6/2023  Provider: Marcelo Acuña PA-C    CHIEF COMPLAINT       Chief Complaint   Patient presents with    Hemoptysis     Cough, flu positive, cough up blood x3 days         HISTORY OF PRESENT ILLNESS   (Location/Symptom, Timing/Onset, Context/Setting, Quality, Duration, Modifying Factors, Severity)  Note limiting factors. HPI      Saurav Reza is a 63 y.o. male who presents to the emergency department with hemoptysis and flulike illness. PMH significant for hypertension, seizure disorder. Patient states 3 days ago he developed what he thought was influenza symptoms with myalgias, subjective fevers with chills and diaphoresis as well as a cough. Patient states that today he began having streaky blood noted in his sputum when he coughed as well as right upper lateral chest wall pain. Patient states he has been having significant coughing episodes where he is \"coughing so hard. \"  Patient reports that the chest wall pain is not significantly changed with breath however some movements can reproduce it. Patient denies any radiation of the chest pain, left-sided chest pain, palpitations and any further chest tightness/heaviness/pressure. Patient denies shortness of breath. Patient denies nausea, vomiting, diarrhea, abdominal pain. Patient denies bleeding of any other source including epistaxis, easy gum bleeding, hematemesis, hematuria, black/bloody/tarry stools. Patient denies any known sick contact. Patient has been taking ibuprofen twice a day to assist with his myalgias with minimal relief as well as cough syrup at nighttime. Records reviewed show patient last seen in the ED on 1/28/2022 for contusion of the left lower extremity, abrasion.     Patient was seen in outpatient setting the PCP office on 10/31/2022 for pain of left lower extremity, left leg swelling. Nursing Notes were reviewed. REVIEW OF SYSTEMS    (2-9 systems for level 4, 10 or more for level 5)     Review of Systems   Constitutional:  Positive for chills, diaphoresis and fever (subjective). HENT: Negative. Negative for congestion and sore throat. Eyes: Negative. Respiratory:  Positive for cough (productive). Negative for chest tightness and shortness of breath. Reports + hemoptysis   Cardiovascular:  Positive for chest pain (right upper lateral). Negative for palpitations. Gastrointestinal: Negative. Negative for abdominal pain, blood in stool, constipation, diarrhea, nausea and vomiting. Denies hematemesis    Genitourinary: Negative. Negative for hematuria. Musculoskeletal:  Positive for myalgias. Negative for neck pain and neck stiffness. Skin: Negative. Negative for rash and wound. Neurological:  Positive for weakness (generalized). Negative for dizziness and syncope. Hematological:  Does not bruise/bleed easily. Psychiatric/Behavioral: Negative. All other systems reviewed and are negative. Except as noted above the remainder of the review of systems was reviewed and negative.        PAST MEDICAL HISTORY     Past Medical History:   Diagnosis Date    Cigarette smoker 7/2/2016    Essential hypertension 7/2/2016    Hypertension     Seizures ([facility] [address] 1986    Sinusitis     Thyroid nodule          SURGICAL HISTORY       Past Surgical History:   Procedure Laterality Date    COLONOSCOPY      KNEE ARTHROSCOPY      LYMPH NODE BIOPSY      biopsy completed on his thyroid nodule         CURRENT MEDICATIONS       Previous Medications    LISINOPRIL (PRINIVIL;ZESTRIL) 5 MG TABLET    Take 1 tablet by mouth daily       ALLERGIES     Pcn [penicillins]    FAMILY HISTORY       Family History   Problem Relation Age of Onset    Alzheimer's Disease Mother     COPD Mother     High Blood Pressure Mother     Heart Disease Father 61    High Blood Pressure Brother     Colon Polyps Brother     High Blood Pressure Brother     Colon Polyps Brother     Colon Cancer Neg Hx           SOCIAL HISTORY       Social History     Socioeconomic History    Marital status:      Spouse name: None    Number of children: None    Years of education: None    Highest education level: None   Tobacco Use    Smoking status: Every Day     Packs/day: 1.00     Years: 30.00     Pack years: 30.00     Types: Cigarettes    Tobacco comments:     has quit in 2008 started back in 2015   Substance and Sexual Activity    Alcohol use: Yes     Comment: social    Drug use: No     Comment: previous user of marijuana and meth; stopped in 2006       SCREENINGS         [place] Coma Scale  Eye Opening: Spontaneous  Best Verbal Response: Oriented  Best Motor Response: Obeys commands  [place] Coma Scale Score: 15                     CIWA Assessment  BP: 106/61  Heart Rate: 98                 PHYSICAL EXAM    (up to 7 for level 4, 8 or more for level 5)     ED Triage Vitals [02/06/23 1527]   BP Temp Temp src Heart Rate Resp SpO2 Height Weight   (!) 175/119 99.5 °F (37.5 °C) -- 89 18 95 % 6' 1\" (1.854 m) 200 lb (90.7 kg)       Physical Exam  Vitals and nursing note reviewed. Constitutional:       General: He is not in acute distress. Appearance: Normal appearance. He is well-developed, well-groomed and overweight. He is ill-appearing. He is not toxic-appearing or diaphoretic. HENT:      Head: Normocephalic. Mouth/Throat:      Mouth: Mucous membranes are moist.      Pharynx: Oropharynx is clear. No oropharyngeal exudate or posterior oropharyngeal erythema. Eyes:      General: No scleral icterus. Conjunctiva/sclera: Conjunctivae normal.      Pupils: Pupils are equal, round, and reactive to light. Cardiovascular:      Rate and Rhythm: Normal rate and regular rhythm. Pulmonary:      Effort: Pulmonary effort is normal. No respiratory distress. Breath sounds: Normal breath sounds. No wheezing.    Chest: Chest wall: Tenderness (right upper lateral chest wall with no overlying skin changes) present. Abdominal:      General: Bowel sounds are normal.      Tenderness: There is no abdominal tenderness. Musculoskeletal:         General: Normal range of motion. Cervical back: Normal range of motion and neck supple. No tenderness. Right lower leg: No edema. Left lower leg: No edema. Skin:     General: Skin is warm and dry. Coloration: Skin is not pale. Findings: No signs of injury, rash or wound. Neurological:      Mental Status: He is alert and oriented to person, place, and time. Gait: Gait normal.   Psychiatric:         Mood and Affect: Mood is anxious. Speech: Speech normal.         Behavior: Behavior normal. Behavior is cooperative. DIAGNOSTIC RESULTS     EKG: All EKG's are interpreted by the Emergency Department Physician who either signs or Co-signs this chart in the absence of a cardiologist.        RADIOLOGY:   Non-plain film images such as CT, Ultrasound and MRI are read by the radiologist. Plain radiographic images are visualized and preliminarily interpreted by the emergency physician with the below findings:        Interpretation per the Radiologist below, if available at the time of this note:    CTA PULMONARY W CONTRAST   Final Result   1. Consolidative pneumonia in right middle lobe and superior segment of right lower lobe with bilateral minimal pleural effusion and underlying subsegmental collapse- suggestive of infective etiology. 2. Mild centrilobular and paraseptal emphysematous changes. 3. No filling defect within the pulmonary arteries to the segmental branch level. Recommendation: Follow up as clinically indicated. All CT scans at this facility utilize dose modulation, iterative reconstruction, and/or weight based dosing when appropriate to reduce radiation dose to as low as reasonably achievable.     Dictated and Electronically Signed by Melissa Vicente MD, SA CERTIFIED at [id number] 08:43:38 PM EST               XR RIBS RIGHT INCLUDE CHEST (MIN 3 VIEWS)   Final Result   Impression:   No radiographic evidence of displaced right rib fracture. No evidence of   pneumothorax. Right lower lobe consolidation suggestive of pneumonia. Other etiologies not   excluded. Follow-up to resolution suggested. LABS:  Labs Reviewed   RESPIRATORY PANEL, MOLECULAR, WITH COVID-19 - Abnormal; Notable for the following components:       Result Value    Human Rhinovirus/Enterovirus by PCR DETECTED (*)     All other components within normal limits   CBC WITH AUTO DIFFERENTIAL - Abnormal; Notable for the following components:    WBC 25.1 (*)     Neutrophils % 67.0 (*)     Lymphocytes % 11.0 (*)     Neutrophils Absolute 20.6 (*)     Monocytes Absolute 1.80 (*)     Bands Relative 15 (*)     All other components within normal limits   COMPREHENSIVE METABOLIC PANEL - Abnormal; Notable for the following components:    Sodium 133 (*)     Chloride 95 (*)     Glucose 148 (*)     Total Bilirubin 1.6 (*)     All other components within normal limits   LACTIC ACID - Abnormal; Notable for the following components:    Lactic Acid 2.9 (*)     All other components within normal limits    Narrative:     [facility]  [facility]  Chemistry results called to and read back by  Elder hernandez rn/er,  02/06/2023 17:16, by XAVIER   PROCALCITONIN - Abnormal; Notable for the following components:    Procalcitonin 1.96 (*)     All other components within normal limits   CULTURE, BLOOD 1   CULTURE, BLOOD 2   PROTIME-INR   TROPONIN   LACTIC ACID       All other labs were within normal range or not returned as of this dictation.     EMERGENCY DEPARTMENT COURSE and DIFFERENTIAL DIAGNOSIS/MDM:   Vitals:    Vitals:    02/06/23 1527 02/06/23 2000   BP: (!) 175/119 106/61   Pulse: 89 98   Resp: 18 18   Temp: 99.5 °F (37.5 °C)    SpO2: 95% 93%   Weight: 200 lb (90.7 kg)    Height: 6' 1\" (1.854 m) MDM     Amount and/or Complexity of Data Reviewed  Clinical lab tests: reviewed and ordered  Tests in the radiology section of CPT®: reviewed and ordered  Tests in the medicine section of CPT®: ordered and reviewed  Decide to obtain previous medical records or to obtain history from someone other than the patient: yes  Obtain history from someone other than the patient: yes (Wife)  Review and summarize past medical records: yes    Patient Progress  Patient progress: improved        REASSESSMENT        CONSULTS:  None    PROCEDURES:  Unless otherwise noted below, none     Procedures        Donna Gonzalez is a 63 y.o. male who presents to the emergency department with hemoptysis and flulike illness. PMH significant for hypertension, seizure disorder. Work-up revealed leukocytosis 25.1, lactic acid 2.9, procalcitonin 1.96. After 2 L fluid bolus repeat lactic acid to 1.9. Respiratory panel positive for human rhinovirus and otherwise unremarkable. Troponin negative. CBC with Stable H&H and no further acute findings. CMP with hyperglycemia 148, total bilirubin 1.6, no further electrolyte disturbances, otherwise normal hepatic and normal renal function. PT/INR WNL. Chest x-ray showed: No radiographic evidence of displaced rib fracture, no evidence of pneumothorax, right lower lobe consolidation suggestive of pneumonia. Chest CTA showed: Consolidative pneumonia in the right middle lobe and superior segment of the right lower lobe with bilateral minimal pleural effusion and underlying subsegmental collapse suggestive of infective etiology, mild centrilobular and paraseptal emphysema, no filling deficit within the pulmonary arteries to the segmental branch level. Blood cultures were obtained and patient was given IV Rocephin as well as doxycycline due to intolerance to azithromycin. Patient was given a dose of morphine and had improvement in his pain.   Patient was given a 2 L fluid bolus, a dose of Zofran which resolved his nausea. Patient's initial hypertensive status resolved and throughout evaluation vitals remained stable including oxygenating well on room air. Discussed with patient need for admission for further evaluation and treatment however after shared decision making reviewed risk, benefits, and alternatives patient declined stating at this time he would prefer to trial oral antibiotics at home. Patient is alert and oriented x3, able to verbalize risk, benefits, alternatives, and continues to decline any inpatient evaluation. Discussed need for close PCP follow-up and reevaluation within the next 24 to 48 hours, strict return precautions, need for immediate return to ED should he develop any new or worsening symptoms. Patient offered admission again he continues to decline with no further questions, concerns, or any needs and is stable for discharge. FINAL IMPRESSION      1. Pneumonia of right lower lobe due to infectious organism    2. Rhinovirus infection    3. Pneumonia of right middle lobe due to infectious organism          DISPOSITION/PLAN   DISPOSITION Discharge - Pending Orders Complete 02/06/2023 09:28:40 PM      PATIENT REFERRED TO:  Arcadio Hernández, JINA - CNP  [address] [place]   [phone]    Schedule an appointment as soon as possible for a visit in 2 days      [facility] EMERGENCY DEPT  [facility]  [phone]    As needed    DISCHARGE MEDICATIONS:  New Prescriptions    ALBUTEROL SULFATE HFA (VENTOLIN HFA) 108 (90 BASE) MCG/ACT INHALER    Inhale 2 puffs into the lungs 4 times daily as needed for Wheezing    BROMPHENIRAMINE-PSEUDOEPHEDRINE 1-15 MG/5ML ELIX ELIXIR    Take 5 mLs by mouth every 6 hours as needed (cough, congestion)    DOXYCYCLINE HYCLATE (VIBRA-TABS) 100 MG TABLET    Take 1 tablet by mouth 2 times daily for 10 days     Controlled Substances Monitoring:     No flowsheet data found.     (Please note that portions of this note were completed with a voice recognition program.  Efforts were made to edit the dictations but occasionally words are mis-transcribed.)    Romelia Nieves PA-C (electronically signed)            Romelia Nieves PA-C  02/06/23 [id number]

## 2023-02-07 LAB
EKG P AXIS: 44 DEGREES
EKG P-R INTERVAL: 150 MS
EKG Q-T INTERVAL: 316 MS
EKG QRS DURATION: 90 MS
EKG QTC CALCULATION (BAZETT): 387 MS
EKG T AXIS: 47 DEGREES

## 2023-02-07 PROCEDURE — 93010 ELECTROCARDIOGRAM REPORT: CPT | Performed by: INTERNAL MEDICINE

## 2023-02-07 NOTE — DISCHARGE INSTRUCTIONS
Today you have evidence of pneumonia in your right middle and lower lobes. You also tested positive for rhinovirus. Please use the albuterol inhaler up to 4 times a day, complete your antibiotics in their entirety, and use the cough syrup at nighttime. Please follow close with your primary care provider for reevaluation within the next 24 to 48 hours. Please continue to use Tylenol for fevers. Should you develop any new or worsening symptoms please return to the ER for further evaluation.

## 2023-02-09 DIAGNOSIS — M25.541 ARTHRALGIA OF BOTH HANDS: ICD-10-CM

## 2023-02-09 DIAGNOSIS — M25.542 ARTHRALGIA OF BOTH HANDS: ICD-10-CM

## 2023-02-09 DIAGNOSIS — I10 ESSENTIAL HYPERTENSION: ICD-10-CM

## 2023-02-11 LAB
BLOOD CULTURE, ROUTINE: NORMAL
CULTURE, BLOOD 2: NORMAL

## 2023-02-14 NOTE — TELEPHONE ENCOUNTER
Rx Refill Note  Requested Prescriptions     Pending Prescriptions Disp Refills   • lisinopril-hydrochlorothiazide (PRINZIDE,ZESTORETIC) 20-12.5 MG per tablet [Pharmacy Med Name: LISINOPRIL-HCTZ 20-12.5 MG TAB] 90 tablet 1     Sig: TAKE 1 TABLET BY MOUTH EVERY DAY   • diclofenac (VOLTAREN) 50 MG EC tablet [Pharmacy Med Name: DICLOFENAC SOD EC 50 MG TAB] 180 tablet 1     Sig: TAKE 1 TABLET BY MOUTH TWICE A DAY      Last office visit with prescribing clinician: 6/23/2022   Next office visit with prescribing clinician: Visit date not found                         Would you like a call back once the refill request has been completed: [] Yes [] No    If the office needs to give you a call back, can they leave a voicemail: [] Yes [] No    Carmen Mcdonald MA  02/14/23, 14:22 CST

## 2023-02-15 ENCOUNTER — OFFICE VISIT (OUTPATIENT)
Dept: FAMILY MEDICINE CLINIC | Facility: CLINIC | Age: 63
End: 2023-02-15
Payer: COMMERCIAL

## 2023-02-15 VITALS
WEIGHT: 230 LBS | HEART RATE: 72 BPM | TEMPERATURE: 97.7 F | BODY MASS INDEX: 30.48 KG/M2 | SYSTOLIC BLOOD PRESSURE: 118 MMHG | OXYGEN SATURATION: 99 % | DIASTOLIC BLOOD PRESSURE: 79 MMHG | RESPIRATION RATE: 16 BRPM | HEIGHT: 73 IN

## 2023-02-15 DIAGNOSIS — R06.02 SHORTNESS OF BREATH: Primary | ICD-10-CM

## 2023-02-15 DIAGNOSIS — R05.9 COUGH, UNSPECIFIED TYPE: ICD-10-CM

## 2023-02-15 DIAGNOSIS — J18.1 LUNG CONSOLIDATION: ICD-10-CM

## 2023-02-15 DIAGNOSIS — J44.1 ACUTE EXACERBATION OF CHRONIC OBSTRUCTIVE PULMONARY DISEASE (COPD): ICD-10-CM

## 2023-02-15 PROCEDURE — 99214 OFFICE O/P EST MOD 30 MIN: CPT | Performed by: NURSE PRACTITIONER

## 2023-02-15 PROCEDURE — 96372 THER/PROPH/DIAG INJ SC/IM: CPT | Performed by: NURSE PRACTITIONER

## 2023-02-15 RX ORDER — LEVOFLOXACIN 500 MG/1
500 TABLET, FILM COATED ORAL DAILY
Qty: 7 TABLET | Refills: 0 | Status: SHIPPED | OUTPATIENT
Start: 2023-02-15 | End: 2023-02-22

## 2023-02-15 RX ORDER — GUAIFENESIN AND CODEINE PHOSPHATE 100; 10 MG/5ML; MG/5ML
5 SOLUTION ORAL 3 TIMES DAILY PRN
Qty: 120 ML | Refills: 0 | Status: SHIPPED | OUTPATIENT
Start: 2023-02-15

## 2023-02-15 RX ORDER — ALBUTEROL SULFATE 90 UG/1
2 AEROSOL, METERED RESPIRATORY (INHALATION) EVERY 4 HOURS PRN
Qty: 18 G | Refills: 1 | Status: SHIPPED | OUTPATIENT
Start: 2023-02-15

## 2023-02-15 RX ORDER — LISINOPRIL AND HYDROCHLOROTHIAZIDE 20; 12.5 MG/1; MG/1
TABLET ORAL
Qty: 90 TABLET | Refills: 1 | Status: SHIPPED | OUTPATIENT
Start: 2023-02-15

## 2023-02-15 RX ORDER — DEXAMETHASONE SODIUM PHOSPHATE 10 MG/ML
10 INJECTION INTRAMUSCULAR; INTRAVENOUS ONCE
Status: COMPLETED | OUTPATIENT
Start: 2023-02-15 | End: 2023-02-15

## 2023-02-15 RX ADMIN — DEXAMETHASONE SODIUM PHOSPHATE 10 MG: 10 INJECTION INTRAMUSCULAR; INTRAVENOUS at 11:05

## 2023-02-15 NOTE — PROGRESS NOTES
Chief Complaint  Pneumonia (Follow up)    Subjective        Michael Issa presents to [facility]  History of Present Illness  Presents for follow up  For complaints of chest pain, upper left shoulder pain, shortness of breath with fever up to 101.  Feels horrible.  Was seen in [facility] ER and was treated for pneumonia.  Says that he went to the ER 2/2/23 and was diagnosed with pneumonia. He had been spitting up blood for 2 days prior to going to ER. He presented to the ED with fever, hemoptysis and flu like symptoms, and shortness of breath.  His PMH includes HTN, seizure disorder., has not had a seizure since 1985.   Patient denies fever, chills, nausea, vomiting, diarrhea, abdominal pain. Patient denies bleeding of any other source including epistaxis, easy gum bleeding, hematemesis, hematuria, black/bloody/tarry stools. Patient denies any known sick contact. Patient has been taking ibuprofen twice a day to assist with his myalgias with minimal relief as well as cough syrup at nighttime.  He says that the shortness of breath has gotten considerably worse, cough is worse.  He went to work Monday and Tuesday and then today he couldn't go because the shortness of breath and cough has progressed and nothing is helping.  Denies any fever or chills today     Also is almost out of medications for htn, ed, allergies  Cough  This is a new problem. The current episode started 1 to 4 weeks ago. The problem has been gradually worsening. The problem occurs constantly. The cough is productive of purulent sputum. Associated symptoms include hemoptysis, nasal congestion, shortness of breath and wheezing. Pertinent negatives include no ear congestion, ear pain or fever. The symptoms are aggravated by lying down and exercise. Risk factors for lung disease include smoking/tobacco exposure. He has tried rest for the symptoms. The treatment provided no relief. There is no history of asthma,  "bronchiectasis, bronchitis or COPD.   Wheezing   This is a new problem. The current episode started in the past 7 days. The problem occurs intermittently. The problem has been gradually worsening. Associated symptoms include coughing, hemoptysis, shortness of breath and sputum production. Pertinent negatives include no ear pain, fever or neck pain. The symptoms are aggravated by any activity, fumes, exercise, URIs and weather changes. He has tried prescription cough suppressant for the symptoms. The treatment provided mild relief. There is no history of asthma, chronic lung disease, COPD or PE.   Shortness of Breath  This is a new problem. The current episode started in the past 7 days. The problem occurs constantly. The problem has been gradually worsening. Associated symptoms include hemoptysis, sputum production and wheezing. Pertinent negatives include no ear pain, fever, leg swelling, neck pain or orthopnea. The symptoms are aggravated by emotional upset, pollens, smoke, URIs, weather changes, lying flat and fumes. The patient has no known risk factors for DVT/PE. He has tried prescription cough suppressants and rest for the symptoms. The treatment provided mild relief. There is no history of aspirin allergies, asthma, chronic lung disease, COPD or PE.     The following portions of the patient's history were reviewed and updated as appropriate: allergies, current medications, past family history, past medical history, past social history, past surgical history and problem list.    Objective   Vital Signs:  /79 (BP Location: Left arm, Patient Position: Sitting, Cuff Size: Large Adult)   Pulse 72   Temp 97.7 °F (36.5 °C) (Infrared)   Resp 16   Ht 185.4 cm (73\") Comment: per patient  Wt 104 kg (230 lb)   SpO2 99%   BMI 30.34 kg/m²   Estimated body mass index is 30.34 kg/m² as calculated from the following:    Height as of this encounter: 185.4 cm (73\").    Weight as of this encounter: 104 kg (230 " lb).       BMI is >= 30 and <35. (Class 1 Obesity). The following options were offered after discussion;: exercise counseling/recommendations and nutrition counseling/recommendations      Physical Exam  Vitals and nursing note reviewed.   Constitutional:       General: He is awake.      Appearance: Normal appearance. He is well-developed and well-groomed.   HENT:      Head: Normocephalic and atraumatic.      Right Ear: Hearing, tympanic membrane, ear canal and external ear normal.      Left Ear: Hearing, tympanic membrane, ear canal and external ear normal.      Nose: Nose normal.      Mouth/Throat:      Lips: Pink.      Pharynx: Oropharynx is clear.   Eyes:      General: Lids are normal.      Conjunctiva/sclera: Conjunctivae normal.   Cardiovascular:      Rate and Rhythm: Normal rate and regular rhythm.      Heart sounds: Normal heart sounds.   Pulmonary:      Effort: Pulmonary effort is normal.      Breath sounds: Normal air entry. Examination of the right-upper field reveals wheezing. Examination of the left-upper field reveals wheezing. Examination of the right-middle field reveals rales. Examination of the left-middle field reveals rales. Examination of the right-lower field reveals rales. Examination of the left-lower field reveals rales. Wheezing and rales present.   Musculoskeletal:      Cervical back: Full passive range of motion without pain.      Right lower leg: No edema.      Left lower leg: No edema.   Lymphadenopathy:      Head:      Right side of head: No submental, submandibular or tonsillar adenopathy.      Left side of head: No submental, submandibular or tonsillar adenopathy.   Skin:     General: Skin is warm and dry.   Neurological:      Mental Status: He is alert.      Sensory: Sensation is intact.      Motor: Motor function is intact.      Coordination: Coordination is intact.      Gait: Gait is intact.   Psychiatric:         Attention and Perception: Attention and perception normal.          Mood and Affect: Mood and affect normal.         Speech: Speech normal.         Behavior: Behavior normal. Behavior is cooperative.         Thought Content: Thought content normal.         Cognition and Memory: Cognition and memory normal.         Judgment: Judgment normal.        Result Review :  The following data was reviewed by: Mansi Zapata, DNP, APRN on 02/15/2023:       Lab Results   Component Value Date    WBC 25.1 (H) 02/06/2023    HGB 15.5 02/06/2023    HCT 46.8 02/06/2023    MCV 91.2 02/06/2023     02/06/2023     Lab Results   Component Value Date    GLUCOSE 121 (H) 10/23/2022    BUN 13 10/23/2022    CREATININE 0.87 10/23/2022    EGFR 98.8 10/23/2022    BCR 14.9 10/23/2022    K 3.6 10/23/2022    CO2 29.0 10/23/2022    CALCIUM 8.7 10/23/2022    PROTENTOTREF 7.7 04/23/2018    ALBUMIN 4.7 04/23/2018    LABIL2 1.6 04/23/2018    AST 33 04/23/2018    ALT 48 (H) 04/23/2018         Data reviewed: Radiologic studies CTA    CTA PULMONARY W CONTRAST  Order: [id number]  Impression    1. Consolidative pneumonia in right middle lobe and superior segment of right lower lobe with bilateral minimal pleural effusion and underlying subsegmental collapse- suggestive of infective etiology.   2. Mild centrilobular and paraseptal emphysematous changes.   3. No filling defect within the pulmonary arteries to the segmental branch level.   Recommendation: Follow up as clinically indicated.   All CT scans at this facility utilize dose modulation, iterative reconstruction, and/or weight based dosing when appropriate to reduce radiation dose to as low as reasonably achievable.   Dictated and Electronically Signed by DALTON TEJADA MD, MQSA CERTIFIED at 06-Feb-2023 08:43:38 PM EST           Narrative    NO PRIOR REPORT AVAILABLE   Exam: CTA OF THE CHEST WITH CONTRAST   Clinical data: Right sided pain, worse with breath, hemoptysis, palpitations.   Technique: Axial CT angiography images through the lungs were acquired with  contrast and imaged using soft tissue and lung algorithms. Coronal, sagittal, and 3D volume reconstructions were performed. Reformatted/3D-MPR images were performed. Radiation   dose: CTDIvol =34 mGy, DLP =708.94 mGy x cm.   Limitations: Large portion of contrast is in the aorta at the time of imaging. Limited evaluation of the secondary and tertiary branches of the pulmonary vessels.   Prior studies: No prior studies submitted.   Findings:   Lungs: mild centrilobular and paraseptal emphysematous changes are seen. Consolidative patches are seen in right middle lobe and superior segment of right lower lobe. Measures 13 x 8.7 cm.   Bilateral minimal pleural effusion is seen with underlying subsegmental collapse. No pulmonary mass identified. No pneumothorax. The airway is clear.   Soft Tissues: No mediastinal, axillary or supraclavicular adenopathy isidentified.   Vascular: No filling defect within the pulmonary arteries to the segmental branch level. Unremarkable aorta. Grossly unremarkable sized heart. No definite abnormality seen on 3D reformatted images.   Bony structures: No acute or destructive abnormality. Chronic compression fracture at L2 is suspected. Less than 10% height loss.   Upper Abdomen: Limited visualization of the solid upper abdominal organs is grossly unremarkable. Small cyst medially anteriorly in the left hepatic lobe.  Exam End: 02/06/23 18:51 Last Resulted: 02/06/23 20:43   Received From: [facility]  Result Received: 02/06/23 23:09              Assessment and Plan   Diagnoses and all orders for this visit:    1. Shortness of breath (Primary)  -     XR Chest PA & Lateral  -     dexamethasone (DECADRON) injection 10 mg  -     guaiFENesin-codeine (GUAIFENESIN AC) 100-10 MG/5ML liquid; Take 5 mL by mouth 3 (Three) Times a Day As Needed for Cough.  Dispense: 120 mL; Refill: 0  -     levoFLOXacin (Levaquin) 500 MG tablet; Take 1 tablet by mouth Daily for 7 days.  Dispense: 7  tablet; Refill: 0  -     albuterol sulfate  (90 Base) MCG/ACT inhaler; Inhale 2 puffs Every 4 (Four) Hours As Needed for Wheezing.  Dispense: 18 g; Refill: 1  -     CBC (No Diff)  -     Comprehensive metabolic panel    2. Cough, unspecified type  -     XR Chest PA & Lateral  -     dexamethasone (DECADRON) injection 10 mg  -     guaiFENesin-codeine (GUAIFENESIN AC) 100-10 MG/5ML liquid; Take 5 mL by mouth 3 (Three) Times a Day As Needed for Cough.  Dispense: 120 mL; Refill: 0  -     levoFLOXacin (Levaquin) 500 MG tablet; Take 1 tablet by mouth Daily for 7 days.  Dispense: 7 tablet; Refill: 0  -     albuterol sulfate  (90 Base) MCG/ACT inhaler; Inhale 2 puffs Every 4 (Four) Hours As Needed for Wheezing.  Dispense: 18 g; Refill: 1  -     CBC (No Diff)  -     Comprehensive metabolic panel    3. Lung consolidation (HCC)  -     XR Chest PA & Lateral  -     dexamethasone (DECADRON) injection 10 mg  -     guaiFENesin-codeine (GUAIFENESIN AC) 100-10 MG/5ML liquid; Take 5 mL by mouth 3 (Three) Times a Day As Needed for Cough.  Dispense: 120 mL; Refill: 0  -     levoFLOXacin (Levaquin) 500 MG tablet; Take 1 tablet by mouth Daily for 7 days.  Dispense: 7 tablet; Refill: 0  -     albuterol sulfate  (90 Base) MCG/ACT inhaler; Inhale 2 puffs Every 4 (Four) Hours As Needed for Wheezing.  Dispense: 18 g; Refill: 1  -     CBC (No Diff)  -     Comprehensive metabolic panel    4. Acute exacerbation of chronic obstructive pulmonary disease (COPD) (HCC)  -     XR Chest PA & Lateral  -     dexamethasone (DECADRON) injection 10 mg  -     guaiFENesin-codeine (GUAIFENESIN AC) 100-10 MG/5ML liquid; Take 5 mL by mouth 3 (Three) Times a Day As Needed for Cough.  Dispense: 120 mL; Refill: 0  -     levoFLOXacin (Levaquin) 500 MG tablet; Take 1 tablet by mouth Daily for 7 days.  Dispense: 7 tablet; Refill: 0  -     albuterol sulfate  (90 Base) MCG/ACT inhaler; Inhale 2 puffs Every 4 (Four) Hours As Needed for Wheezing.   Dispense: 18 g; Refill: 1  -     CBC (No Diff)  -     Comprehensive metabolic panel      Narrative & Impression   EXAMINATION: XR CHEST PA AND LATERAL-     2/15/2023 10:46 AM CST     HISTORY: shortness of breath; R06.02-Shortness of breath; R05.9-Cough,  unspecified; J18.1-Lobar pneumonia, unspecified organism; J44.1-Chronic  obstructive pulmonary disease with (acute) exacerbation     2 view chest x-ray.     Comparison is made with 10/23/2022.     Normal heart and mediastinum.  Aortic arch calcification.     Low lung volumes with patchy basilar atelectasis.     No pneumonia or pleural effusion.     The upper lobes are clear.     Summary:  1. Hypoaeration with patchy basilar atelectasis.  This report was finalized on 02/15/2023 16:27 by Dr. Kar Delatorre MD.                Follow Up   Return in about 2 weeks (around 3/1/2023).  Patient was given instructions and counseling regarding his condition or for health maintenance advice. Please see specific information pulled into the AVS if appropriate.     Electronically signed by Mansi Zapata, JESU, APRN, 02/19/23, 11:18 PM CST.

## 2023-02-16 LAB
ALBUMIN SERPL-MCNC: 3.7 G/DL (ref 3.5–5.2)
ALBUMIN/GLOB SERPL: 1.2 G/DL
ALP SERPL-CCNC: 62 U/L (ref 39–117)
ALT SERPL-CCNC: 19 U/L (ref 1–41)
AST SERPL-CCNC: 17 U/L (ref 1–40)
BILIRUB SERPL-MCNC: 0.5 MG/DL (ref 0–1.2)
BUN SERPL-MCNC: 22 MG/DL (ref 8–23)
BUN/CREAT SERPL: 24.7 (ref 7–25)
CALCIUM SERPL-MCNC: 9.1 MG/DL (ref 8.6–10.5)
CHLORIDE SERPL-SCNC: 102 MMOL/L (ref 98–107)
CO2 SERPL-SCNC: 28.7 MMOL/L (ref 22–29)
CREAT SERPL-MCNC: 0.89 MG/DL (ref 0.76–1.27)
EGFRCR SERPLBLD CKD-EPI 2021: 98.1 ML/MIN/1.73
ERYTHROCYTE [DISTWIDTH] IN BLOOD BY AUTOMATED COUNT: 12.3 % (ref 12.3–15.4)
GLOBULIN SER CALC-MCNC: 3 GM/DL
GLUCOSE SERPL-MCNC: 133 MG/DL (ref 65–99)
HCT VFR BLD AUTO: 43 % (ref 37.5–51)
HGB BLD-MCNC: 14.7 G/DL (ref 13–17.7)
MCH RBC QN AUTO: 30 PG (ref 26.6–33)
MCHC RBC AUTO-ENTMCNC: 34.2 G/DL (ref 31.5–35.7)
MCV RBC AUTO: 87.8 FL (ref 79–97)
PLATELET # BLD AUTO: 400 10*3/MM3 (ref 140–450)
POTASSIUM SERPL-SCNC: 4.1 MMOL/L (ref 3.5–5.2)
PROT SERPL-MCNC: 6.7 G/DL (ref 6–8.5)
RBC # BLD AUTO: 4.9 10*6/MM3 (ref 4.14–5.8)
SODIUM SERPL-SCNC: 136 MMOL/L (ref 136–145)
WBC # BLD AUTO: 8.89 10*3/MM3 (ref 3.4–10.8)

## 2023-02-20 NOTE — PATIENT INSTRUCTIONS
Chronic Obstructive Pulmonary Disease  Chronic obstructive pulmonary disease (COPD) is a long-term (chronic) condition that affects the lungs. COPD is a general term that can be used to describe many different lung problems that cause lung inflammation and limit airflow, including chronic bronchitis and emphysema.  If you have COPD, your lung function will probably never return to normal. In most cases, it gets worse over time. However, there are steps you can take to slow the progression of the disease and improve your quality of life.  What are the causes?  This condition may be caused by:  Smoking. This is the most common cause.  Certain genes passed down through families.  What increases the risk?  The following factors may make you more likely to develop this condition:  Being exposed to secondhand smoke from cigarettes, pipes, or cigars.  Being exposed to chemicals and other irritants, such as fumes and dust in the work environment.  Having chronic lung conditions or infections.  What are the signs or symptoms?  Symptoms of this condition include:  Shortness of breath, especially during physical activity.  Chronic cough with a large amount of thick mucus. Sometimes, the cough may not have any mucus (dry cough).  Wheezing and rapid breathing.  Gray or bluish discoloration (cyanosis) of the skin, especially in the fingers, toes, or lips.  Feeling tired (fatigue).  Weight loss.  Chest tightness.  Frequent infections.  Episodes when breathing symptoms become much worse (exacerbations).  At the later stages of this disease, you may have swelling in the ankles, feet, or legs.  How is this diagnosed?  This condition is diagnosed based on:  Your medical history.  A physical exam.  You may also have tests, including:  Lung (pulmonary) function tests. This may include a spirometry test, which measures your ability to exhale properly.  Chest X-ray.  CT scan.  Blood tests.  How is this treated?  This condition may be  treated with:  Medicines. These may include inhaled rescue medicines to treat acute exacerbations as well as medicines that you take long-term (maintenance medicines) to prevent flare-ups of COPD.  Bronchodilators help treat COPD by dilating the airways to allow increased airflow and make your breathing more comfortable.  Steroids can reduce airway inflammation and help prevent exacerbations.  Smoking cessation. If you smoke, your health care provider may ask you to quit, and may also recommend therapy or replacement products to help you quit.  Pulmonary rehabilitation. This may involve working with a team of health care providers and specialists, such as respiratory, occupational, and physical therapists.  Exercise and physical activity. These are beneficial for nearly all people with COPD.  Nutrition therapy to gain weight, if you are underweight.  Oxygen. Supplemental oxygen therapy is only helpful if you have a low oxygen level in your blood (hypoxemia).  Lung surgery or transplant.  Palliative care. This is to help people with COPD feel comfortable when treatment is no longer working.  Follow these instructions at home:  Medicines  Take over-the-counter and prescription medicines only as told by your health care provider. This includes inhaled medicines and pills.  Talk to your health care provider before taking any cough or allergy medicines. You may need to avoid certain medicines that dry out your airways.  Lifestyle  If you smoke, the most important thing that you can do is to stop smoking. Continuing to smoke will cause the disease to progress faster.  Do not use any products that contain nicotine or tobacco. These products include cigarettes, chewing tobacco, and vaping devices, such as e-cigarettes. If you need help quitting, ask your health care provider.  Avoid exposure to things that irritate your lungs, such as smoke, chemicals, and fumes.  Stay active, but balance activity with periods of rest.  Exercise and physical activity will help you maintain your ability to do things you want to do.  Learn and use relaxation techniques to manage stress and to control your breathing.  Get the right amount of sleep and get quality sleep. Most adults need 7 or more hours per night.  Eat healthy foods. Eating smaller, more frequent meals and resting before meals may help you maintain your strength.  Controlled breathing  Learn and use controlled breathing techniques as directed by your health care provider. Controlled breathing techniques include:  Pursed lip breathing. Start by breathing in (inhaling) through your nose for 1 second. Then, purse your lips as if you were going to whistle and breathe out (exhale) through the pursed lips for 2 seconds.  Diaphragmatic breathing. Start by putting one hand on your abdomen just above your waist. Inhale slowly through your nose. The hand on your abdomen should move out. Then purse your lips and exhale slowly. You should be able to feel the hand on your abdomen moving in as you exhale.    Controlled coughing  Learn and use controlled coughing to clear mucus from your lungs. Controlled coughing is a series of short, progressive coughs. The steps of controlled coughing are:  Lean your head slightly forward.  Breathe in deeply using diaphragmatic breathing.  Try to hold your breath for 3 seconds.  Keep your mouth slightly open while coughing twice.  Spit any mucus out into a tissue.  Rest and repeat the steps once or twice as needed.  General instructions  Make sure you receive all the vaccines that your health care provider recommends, especially the pneumococcal and influenza vaccines. Preventing infection and hospitalization is very important when you have COPD.  Drink enough fluid to keep your urine pale yellow, unless you have a medical condition that requires fluid restriction.  Use oxygen therapy and pulmonary rehabilitation if told by your health care provider. If you  require home oxygen therapy, ask your health care provider whether you should purchase a pulse oximeter to measure your oxygen level at home.  Work with your health care provider to develop a COPD action plan. This will help you know what steps to take if your condition gets worse.  Keep other chronic health conditions under control as told by your health care provider.  Avoid extreme temperature and humidity changes.  Avoid contact with people who have an illness that spreads from person to person (is contagious), such as viral infections or pneumonia.  Keep all follow-up visits. This is important.  Contact a health care provider if:  You are coughing up more mucus than usual.  There is a change in the color or thickness of your mucus.  Your breathing is more labored than usual.  Your breathing is faster than usual.  You have difficulty sleeping.  You need to use your rescue medicines or inhalers more often than expected.  You have trouble doing routine activities such as getting dressed or walking around the house.  Get help right away if:  You have shortness of breath while you are resting.  You have shortness of breath that prevents you from:  Being able to talk.  Performing your usual physical activities.  You have chest pain lasting longer than 5 minutes.  Your skin color is more blue (cyanotic) than usual.  You measure low oxygen saturations for longer than 5 minutes with a pulse oximeter.  You have a fever.  You feel too tired to breathe normally.  These symptoms may represent a serious problem that is an emergency. Do not wait to see if the symptoms will go away. Get medical help right away. Call your local emergency services (911 in the [place] Do not drive yourself to the hospital.  Summary  Chronic obstructive pulmonary disease (COPD) is a long-term (chronic) condition that affects the lungs.  Your lung function will probably never return to normal. In most cases, it gets worse over time. However, there  are steps you can take to slow the progression of the disease and improve your quality of life.  Treatment for COPD may include taking medicines, quitting smoking, pulmonary rehabilitation, and changes to diet and exercise. As the disease progresses, you may need oxygen therapy, a lung transplant, or palliative care.  To help manage your condition, do not smoke, avoid exposure to things that irritate your lungs, stay up to date on all vaccines, and follow your health care provider's instructions for taking medicines.  This information is not intended to replace advice given to you by your health care provider. Make sure you discuss any questions you have with your health care provider.  Document Revised: 10/26/2021 Document Reviewed: 10/26/2021  Elsevier Patient Education © 2022 [employer]

## 2023-02-23 LAB
HBA1C MFR BLD: 5.7 % (ref 4.8–5.6)
Lab: NORMAL
WRITTEN AUTHORIZATION: NORMAL

## 2023-03-11 DIAGNOSIS — N52.8 OTHER MALE ERECTILE DYSFUNCTION: ICD-10-CM

## 2023-03-13 RX ORDER — SILDENAFIL 100 MG/1
100 TABLET, FILM COATED ORAL DAILY PRN
Qty: 90 TABLET | Refills: 0 | Status: SHIPPED | OUTPATIENT
Start: 2023-03-13

## 2023-03-13 NOTE — TELEPHONE ENCOUNTER
Rx Refill Note  Requested Prescriptions     Pending Prescriptions Disp Refills   • sildenafil (VIAGRA) 100 MG tablet [Pharmacy Med Name: Sildenafil Citrate 100mg Tablet] 90 tablet 0     Sig: Take 1 tablet by mouth daily as needed for erectile dysfunction.      Last office visit with prescribing clinician: 2/15/2023    Next office visit with prescribing clinician: Visit date not found                         Would you like a call back once the refill request has been completed: [] Yes [] No    If the office needs to give you a call back, can they leave a voicemail: [] Yes [] No    Carmen Mcdonald MA  03/13/23, 09:25 CDT

## 2023-08-08 ENCOUNTER — OFFICE VISIT (OUTPATIENT)
Dept: FAMILY MEDICINE CLINIC | Facility: CLINIC | Age: 63
End: 2023-08-08
Payer: COMMERCIAL

## 2023-08-08 VITALS
BODY MASS INDEX: 30.35 KG/M2 | DIASTOLIC BLOOD PRESSURE: 78 MMHG | RESPIRATION RATE: 18 BRPM | TEMPERATURE: 98.6 F | SYSTOLIC BLOOD PRESSURE: 131 MMHG | OXYGEN SATURATION: 99 % | HEIGHT: 73 IN | HEART RATE: 84 BPM | WEIGHT: 229 LBS

## 2023-08-08 DIAGNOSIS — R60.0 EDEMA OF LEFT LOWER LEG: ICD-10-CM

## 2023-08-08 DIAGNOSIS — N52.8 OTHER MALE ERECTILE DYSFUNCTION: ICD-10-CM

## 2023-08-08 DIAGNOSIS — L03.116 CELLULITIS OF LEFT LEG: ICD-10-CM

## 2023-08-08 DIAGNOSIS — I10 ESSENTIAL HYPERTENSION: Primary | ICD-10-CM

## 2023-08-08 DIAGNOSIS — Z91.89 AT RISK FOR DIABETES MELLITUS: ICD-10-CM

## 2023-08-08 DIAGNOSIS — B00.1 FEVER BLISTER: ICD-10-CM

## 2023-08-08 LAB
EXPIRATION DATE: ABNORMAL
HBA1C MFR BLD: 5.7 %
Lab: ABNORMAL

## 2023-08-08 RX ORDER — CLINDAMYCIN HYDROCHLORIDE 300 MG/1
300 CAPSULE ORAL 3 TIMES DAILY
Qty: 21 CAPSULE | Refills: 0 | Status: SHIPPED | OUTPATIENT
Start: 2023-08-08 | End: 2023-08-15

## 2023-08-08 RX ORDER — SILDENAFIL 100 MG/1
100 TABLET, FILM COATED ORAL DAILY PRN
Qty: 30 TABLET | Refills: 5 | Status: SHIPPED | OUTPATIENT
Start: 2023-08-08

## 2023-08-08 RX ORDER — VALACYCLOVIR HYDROCHLORIDE 1 G/1
2000 TABLET, FILM COATED ORAL 2 TIMES DAILY
Qty: 15 TABLET | Refills: 3 | Status: SHIPPED | OUTPATIENT
Start: 2023-08-08

## 2023-08-08 RX ORDER — LISINOPRIL AND HYDROCHLOROTHIAZIDE 20; 12.5 MG/1; MG/1
1 TABLET ORAL DAILY
Qty: 90 TABLET | Refills: 1 | Status: SHIPPED | OUTPATIENT
Start: 2023-08-08

## 2023-08-08 NOTE — PROGRESS NOTES
Chief Complaint  Hypertension (Pt here for follow up)    Subjective        Michael Issa presents to [facility]  History of Present Illness  The patient is here today for a follow-up on hypertension. On 11/04/2022, he was diagnosed with a contusion of the left lower extremity. He went to the emergency room because he had increasing pain and swelling after getting an abrasion to the left leg. We are talking about this left lower extremity. I am going to go ahead and get a picture of it because it is red and discolored. It is swollen today and discolored. He does work on it every day. 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 He does safely go home. If he props it up, it improves. He is also here to refill his chronic medicine for hypertension. His blood pressure today is good at 131/78 mmHg, with a pulse rate of 84 bpm.     The patient has been having issues with his left leg and left foot that started approximately 6 months ago. He was cutting wood when a piece of wood hit his left lower extremity. He went to the emergency room for it, and he was evaluated for a blood clot, but there was none. His left lower extremity still occasionally swells. The incident happened on 10/2022. He no longer has pain, but he experiences tenderness when he tries to hit his left lower extremity. Sitting at this office today, he feels fine. He denies having any pain when he puts his left foot up or pushes the gas pedal. He denies having any fever or chills. He is allergic to PENICILLINS.     The patient denies having any chest pain, shortness of breath, or palpitations.     The patient is still taking Viagra, and he is getting it from [employer] now. He gets the rest of his medications at [facility].     The patient's hemoglobin A1c approximately 5 months ago was 5.7 percent.     The following portions of the patient's history were reviewed and updated as appropriate: allergies,  "current medications, past family history, past medical history, past social history, past surgical history and problem list.    Objective   Vital Signs:  /78 (BP Location: Left arm, Patient Position: Sitting, Cuff Size: Large Adult)   Pulse 84   Temp 98.6 øF (37 øC) (Infrared)   Resp 18   Ht 185.4 cm (73\") Comment: per patient  Wt 104 kg (229 lb)   SpO2 99%   BMI 30.21 kg/mý   Estimated body mass index is 30.21 kg/mý as calculated from the following:    Height as of this encounter: 185.4 cm (73\").    Weight as of this encounter: 104 kg (229 lb).         Physical Exam  Vitals and nursing note reviewed.   Constitutional:       General: He is awake.      Appearance: Normal appearance. He is well-developed and well-groomed. He is obese.   HENT:      Head: Normocephalic and atraumatic.      Right Ear: Hearing and external ear normal.      Left Ear: Hearing and external ear normal.      Nose: Nose normal.      Mouth/Throat:      Lips: Pink.      Pharynx: Oropharynx is clear.   Eyes:      General: Lids are normal.      Conjunctiva/sclera: Conjunctivae normal.   Cardiovascular:      Rate and Rhythm: Normal rate and regular rhythm.      Heart sounds: Normal heart sounds.   Pulmonary:      Effort: Pulmonary effort is normal.      Breath sounds: Normal breath sounds and air entry.   Musculoskeletal:      Cervical back: Full passive range of motion without pain.      Right lower leg: No edema.      Left lower leg: No edema.   Lymphadenopathy:      Head:      Right side of head: No submental, submandibular or tonsillar adenopathy.      Left side of head: No submental, submandibular or tonsillar adenopathy.   Skin:     General: Skin is warm and dry.      Coloration: Skin is ashen.      Findings: Erythema present.             Comments: See image 2+ EDEMA, ERYTHEMATOUS, WITH DISCOLORATION LOOKS LIKE SECONDARY CELLULITIS     Neurological:      Mental Status: He is alert and oriented to person, place, and time.      " Sensory: Sensation is intact.      Motor: Motor function is intact.      Coordination: Coordination is intact.      Gait: Gait is intact.   Psychiatric:         Attention and Perception: Attention and perception normal.         Mood and Affect: Mood and affect normal.         Speech: Speech normal.         Behavior: Behavior normal. Behavior is cooperative.         Thought Content: Thought content normal.         Cognition and Memory: Cognition and memory normal.         Judgment: Judgment normal.      Result Review :                     Assessment and Plan   Diagnoses and all orders for this visit:    1. Essential hypertension (Primary)  -     lisinopril-hydrochlorothiazide (PRINZIDE,ZESTORETIC) 20-12.5 MG per tablet; Take 1 tablet by mouth Daily.  Dispense: 90 tablet; Refill: 1    2. At risk for diabetes mellitus  -     POC Glycosylated Hemoglobin (Hb A1C)    3. Other male erectile dysfunction  -     sildenafil (VIAGRA) 100 MG tablet; Take 1 tablet by mouth Daily As Needed for Erectile Dysfunction.  Dispense: 30 tablet; Refill: 5    4. Fever blister  -     valACYclovir (VALTREX) 1000 MG tablet; Take 2 tablets by mouth 2 (Two) Times a Day.  Dispense: 15 tablet; Refill: 3    5. Cellulitis of left leg  -     clindamycin (CLEOCIN) 300 MG capsule; Take 1 capsule by mouth 3 (Three) Times a Day for 7 days.  Dispense: 21 capsule; Refill: 0  -     Cancel: Ambulatory Referral to Vascular Surgery  -     Ambulatory Referral to Vascular Surgery    6. Edema of left lower leg  -     Cancel: Ambulatory Referral to Vascular Surgery  -     Ambulatory Referral to Vascular Surgery      POC Glycosylated Hemoglobin (Hb A1C)  Order: [id number]  Status: Edited Result - FINAL       Visible to patient: Yes (not seen)       Dx: At risk for diabetes mellitus    Specimen Information: Blood   0 Result Notes          Component  Ref Range & Units 15:36 5 mo ago   Hemoglobin A1C  % 5.7 5.70 High  R, CM   Lot Number [id number]     Expiration Date  2,401,170     [facility]                Specimen Collected: 08/08/23 15:36 CDT Last Resulted: 08/08/23 15:36 CDT                Follow Up     Return in about 6 months (around 2/8/2024).    Patient was given instructions and counseling regarding his condition or for health maintenance advice. Please see specific information pulled into the AVS if appropriate.       Answers submitted by the patient for this visit:  Primary Reason for Visit (Submitted on 8/7/2023)  What is the primary reason for your visit?: Other  Other (Submitted on 8/7/2023)  Please describe your symptoms.: Update my prescriptions.  Have you had these symptoms before?: No  How long have you been having these symptoms?: 1-4 days      Transcribed from ambient dictation for Mansi Zapata DNP, APRN by Rodrick Leiva.  08/08/23   16:25 CDT    Patient or patient representative verbalized consent to the visit recording.  I have personally performed the services described in this document as transcribed by the above individual, and it is both accurate and complete.    Electronically signed by Mansi Zapata DNP, APRNAYELI, 08/13/23, 8:46 PM CDT.

## 2023-08-08 NOTE — PROGRESS NOTES
"Chief Complaint  Hypertension (Pt here for follow up)    Subjective        Michael Issa presents to [facility]  History of Present Illness  Diagnoses and all orders for this visit:    1. Essential hypertension (Primary)  -     lisinopril-hydrochlorothiazide (PRINZIDE,ZESTORETIC) 20-12.5 MG per tablet; Take 1 tablet by mouth Daily.  Dispense: 90 tablet; Refill: 1    2. At risk for diabetes mellitus  -     POC Glycosylated Hemoglobin (Hb A1C)    3. Other male erectile dysfunction  -     sildenafil (VIAGRA) 100 MG tablet; Take 1 tablet by mouth Daily As Needed for Erectile Dysfunction.  Dispense: 30 tablet; Refill: 5    4. Fever blister  -     valACYclovir (VALTREX) 1000 MG tablet; Take 2 tablets by mouth 2 (Two) Times a Day.  Dispense: 15 tablet; Refill: 3    5. Cellulitis of left leg  -     clindamycin (CLEOCIN) 300 MG capsule; Take 1 capsule by mouth 3 (Three) Times a Day for 7 days.  Dispense: 21 capsule; Refill: 0  -     Cancel: Ambulatory Referral to Vascular Surgery  -     Ambulatory Referral to Vascular Surgery    6. Edema of left lower leg  -     Cancel: Ambulatory Referral to Vascular Surgery  -     Ambulatory Referral to Vascular Surgery    Objective   Vital Signs:  /78 (BP Location: Left arm, Patient Position: Sitting, Cuff Size: Large Adult)   Pulse 84   Temp 98.6 øF (37 øC) (Infrared)   Resp 18   Ht 185.4 cm (73\") Comment: per patient  Wt 104 kg (229 lb)   SpO2 99%   BMI 30.21 kg/mý   Estimated body mass index is 30.21 kg/mý as calculated from the following:    Height as of this encounter: 185.4 cm (73\").    Weight as of this encounter: 104 kg (229 lb).        Physical Exam  Vitals and nursing note reviewed.   Constitutional:       General: He is awake.      Appearance: Normal appearance. He is well-developed and well-groomed. He is obese.   HENT:      Head: Normocephalic and atraumatic.      Right Ear: Hearing and external ear normal.      Left Ear: " Hearing and external ear normal.      Nose: Nose normal.      Mouth/Throat:      Lips: Pink.      Pharynx: Oropharynx is clear.   Eyes:      General: Lids are normal.      Conjunctiva/sclera: Conjunctivae normal.   Cardiovascular:      Rate and Rhythm: Normal rate and regular rhythm.      Heart sounds: Normal heart sounds.   Pulmonary:      Effort: Pulmonary effort is normal.      Breath sounds: Normal breath sounds and air entry.   Musculoskeletal:      Cervical back: Full passive range of motion without pain.      Right lower leg: No edema.      Left lower leg: No edema.   Lymphadenopathy:      Head:      Right side of head: No submental, submandibular or tonsillar adenopathy.      Left side of head: No submental, submandibular or tonsillar adenopathy.   Skin:     General: Skin is warm and dry.      Coloration: Skin is ashen.      Findings: Erythema present.             Comments: See image 2+ EDEMA, ERYTHEMATOUS, WITH DISCOLORATION LOOKS LIKE SECONDARY CELLULITIS     Neurological:      Mental Status: He is alert and oriented to person, place, and time.      Sensory: Sensation is intact.      Motor: Motor function is intact.      Coordination: Coordination is intact.      Gait: Gait is intact.   Psychiatric:         Attention and Perception: Attention and perception normal.         Mood and Affect: Mood and affect normal.         Speech: Speech normal.         Behavior: Behavior normal. Behavior is cooperative.         Thought Content: Thought content normal.         Cognition and Memory: Cognition and memory normal.         Judgment: Judgment normal.      Result Review :                     Assessment and Plan   Diagnoses and all orders for this visit:    1. Essential hypertension (Primary)  -     lisinopril-hydrochlorothiazide (PRINZIDE,ZESTORETIC) 20-12.5 MG per tablet; Take 1 tablet by mouth Daily.  Dispense: 90 tablet; Refill: 1    2. At risk for diabetes mellitus  -     POC Glycosylated Hemoglobin (Hb  A1C)    3. Other male erectile dysfunction  -     sildenafil (VIAGRA) 100 MG tablet; Take 1 tablet by mouth Daily As Needed for Erectile Dysfunction.  Dispense: 30 tablet; Refill: 5    4. Fever blister  -     valACYclovir (VALTREX) 1000 MG tablet; Take 2 tablets by mouth 2 (Two) Times a Day.  Dispense: 15 tablet; Refill: 3    5. Cellulitis of left leg  -     clindamycin (CLEOCIN) 300 MG capsule; Take 1 capsule by mouth 3 (Three) Times a Day for 7 days.  Dispense: 21 capsule; Refill: 0  -     Cancel: Ambulatory Referral to Vascular Surgery  -     Ambulatory Referral to Vascular Surgery    6. Edema of left lower leg  -     Cancel: Ambulatory Referral to Vascular Surgery  -     Ambulatory Referral to Vascular Surgery    POC Glycosylated Hemoglobin (Hb A1C)  Order: [id number]  Status: Edited Result - FINAL       Visible to patient: Yes (not seen)       Dx: At risk for diabetes mellitus    Specimen Information: Blood   0 Result Notes       Component  Ref Range & Units 15:36 5 mo ago   Hemoglobin A1C  % 5.7 5.70 High  R, CM   Lot Number [id number]    Expiration Date 2,999,509    Resulting Agency [facility]              Specimen Collected: 08/08/23 15:36 CDT Last Resulted: 08/08/23 15:36 CDT                  Follow Up     Return in about 6 months (around 2/8/2024).    Patient was given instructions and counseling regarding his condition or for health maintenance advice. Please see specific information pulled into the AVS if appropriate.       Answers submitted by the patient for this visit:  Primary Reason for Visit (Submitted on 8/7/2023)  What is the primary reason for your visit?: Other  Other (Submitted on 8/7/2023)  Please describe your symptoms.: Update my prescriptions.  Have you had these symptoms before?: No  How long have you been having these symptoms?: 1-4 days

## 2023-08-30 ENCOUNTER — TELEPHONE (OUTPATIENT)
Dept: VASCULAR SURGERY | Facility: CLINIC | Age: 63
End: 2023-08-30
Payer: COMMERCIAL

## 2023-08-30 NOTE — TELEPHONE ENCOUNTER
Unable to confirm appointment with Mari informed patient that Mari will be in surgery and will be seeing Marti instead. Asked for call back if they would rather  reschedule to see Mari.

## 2023-08-31 ENCOUNTER — OFFICE VISIT (OUTPATIENT)
Dept: VASCULAR SURGERY | Facility: CLINIC | Age: 63
End: 2023-08-31
Payer: COMMERCIAL

## 2023-08-31 VITALS
SYSTOLIC BLOOD PRESSURE: 126 MMHG | HEART RATE: 76 BPM | HEIGHT: 73 IN | WEIGHT: 229 LBS | OXYGEN SATURATION: 98 % | DIASTOLIC BLOOD PRESSURE: 78 MMHG | BODY MASS INDEX: 30.35 KG/M2

## 2023-08-31 DIAGNOSIS — Z13.6 SCREENING FOR AAA (ABDOMINAL AORTIC ANEURYSM): ICD-10-CM

## 2023-08-31 DIAGNOSIS — I10 PRIMARY HYPERTENSION: ICD-10-CM

## 2023-08-31 DIAGNOSIS — I71.21 ANEURYSM OF ASCENDING AORTA WITHOUT RUPTURE: ICD-10-CM

## 2023-08-31 DIAGNOSIS — I87.323 CHRONIC VENOUS HYPERTENSION WITH INFLAMMATION INVOLVING BOTH SIDES: Primary | ICD-10-CM

## 2023-08-31 PROCEDURE — 99214 OFFICE O/P EST MOD 30 MIN: CPT | Performed by: NURSE PRACTITIONER

## 2023-08-31 NOTE — PROGRESS NOTES
08/31/2023      Mansi Zapata DNP, APRN  [address] [address]    Michael Issa  1960    Chief Complaint   Patient presents with    NEW PATIENT     Referred from Mansi Churchill for cellulitis and edema of left leg. Patient states left leg swells up when they're on it all day and the left leg has discoloration. Pt states no numbness,tingling, or pain.        Dear Mansi Zapata DNP,*:      HPI  I had the pleasure of seeing your patient Michael Issa in the office today.  Thank you kindly for this consultation.  As you recall, Michael Issa is a 63 y.o.  male who you are currently following for routine health maintenance. He has been having complaints of leg swelling with left leg discoloration.  He apparently bumped his left lower extremity in November and developed swelling and discoloration.  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  He has no complaints of claudication.  He has ascending aorta measuring 4.4 cm on CTA chest 10/23/22, which I did review in office.     Past Medical History:   Diagnosis Date    Arthritis     Disease of thyroid gland     NODULE PRESENT     Hx of chest pain     Hypertension        Past Surgical History:   Procedure Laterality Date    COLONOSCOPY N/A 9/23/2021    Procedure: COLONOSCOPY;  Surgeon: Eduardo Neil MD;  Location: [facility] ENDOSCOPY;  Service: Gastroenterology;  Laterality: N/A;  pre  post diverticulosis, hemorrhoids  Dr. zapata    KNEE SURGERY  2014    rt knee MENISCUS    PREPERITONEAL HERNIA REPAIR Bilateral 9/20/2017    Procedure: BILATERAL PREPERITONEAL INGUINAL HERNIA REPAIR LAPAROSCOPIC WITH MESH;  Surgeon: Rambo Church MD;  Location: [facility] OR;  Service:        Family History   Problem Relation Age of Onset    Alzheimer's disease Mother     Diabetes Father     Heart disease Father     No Known Problems Sister     No Known Problems Brother     No Known Problems Daughter     No Known Problems Maternal Grandmother   "   No Known Problems Maternal Grandfather     No Known Problems Paternal Grandmother     No Known Problems Paternal Grandfather     Hypertension Brother     No Known Problems Brother        Social History     Socioeconomic History    Marital status: Single   Tobacco Use    Smoking status: Former     Packs/day: 0.50     Years: 20.00     Pack years: 10.00     Types: Cigarettes     Quit date: 2017     Years since quittin.4    Smokeless tobacco: Never   Vaping Use    Vaping Use: Never used   Substance and Sexual Activity    Alcohol use: No    Drug use: No    Sexual activity: Defer       Allergies   Allergen Reactions    Penicillins Rash     Childhood allergy       Current Outpatient Medications   Medication Instructions    Ascorbic Acid (VITAMIN C PO) 1,000 mg, Oral, Daily, ON HOLD 9-15-17    lisinopril-hydrochlorothiazide (PRINZIDE,ZESTORETIC) 20-12.5 MG per tablet 1 tablet, Oral, Daily    sildenafil (VIAGRA) 100 mg, Oral, Daily PRN    valACYclovir (VALTREX) 2,000 mg, Oral, 2 Times Daily         Review of Systems   Constitutional: Negative.    HENT: Negative.     Eyes: Negative.    Respiratory: Negative.     Cardiovascular:  Positive for leg swelling.   Gastrointestinal: Negative.    Endocrine: Negative.    Genitourinary: Negative.    Musculoskeletal: Negative.    Skin:  Positive for color change.   Allergic/Immunologic: Negative.    Neurological: Negative.    Hematological: Negative.    Psychiatric/Behavioral: Negative.     All other systems reviewed and are negative.    /78   Pulse 76   Ht 185.4 cm (73\")   Wt 104 kg (229 lb)   SpO2 98%   BMI 30.21 kg/m²   Physical Exam  Vitals and nursing note reviewed.   Constitutional:       Appearance: Normal appearance. He is well-developed. He is obese.   HENT:      Head: Normocephalic and atraumatic.   Eyes:      General: No scleral icterus.     Pupils: Pupils are equal, round, and reactive to light.   Neck:      Thyroid: No thyromegaly.   Cardiovascular:    "   Rate and Rhythm: Normal rate and regular rhythm.      Heart sounds: Normal heart sounds.   Pulmonary:      Effort: Pulmonary effort is normal.      Breath sounds: Normal breath sounds.   Abdominal:      General: Bowel sounds are normal.      Palpations: Abdomen is soft.   Musculoskeletal:         General: Swelling present. Normal range of motion.      Cervical back: Normal range of motion and neck supple.   Skin:     General: Skin is warm and dry.      Comments: hyperpigmentation   Neurological:      Mental Status: He is alert and oriented to person, place, and time.   Psychiatric:         Mood and Affect: Mood normal.         Behavior: Behavior normal.         Thought Content: Thought content normal.         Judgment: Judgment normal.       No results found.    Patient Active Problem List   Diagnosis    Hypertension    Thyroid nodule    Former smoker    Family history of colonic polyps    Erectile dysfunction    Calcified granuloma of lung    Aorta aneurysm    Chronic seasonal allergic rhinitis due to pollen    Chest pressure    History of colon polyps         ICD-10-CM ICD-9-CM   1. Chronic venous hypertension with inflammation involving both sides  I87.323 459.32   2. Screening for AAA (abdominal aortic aneurysm)  Z13.6 V81.2   3. Primary hypertension  I10 401.9         Plan: After thoroughly evaluating Michael Issa, I believe the best course of action is to proceed with a venous valvular insufficiency study and a screening abdominal ultrasound.  I did recommend wearing compression stockings and instructed him on how to wear these on a daily basis.  We will see him back in 1 month to review his testing.  He does have ascending aorta measuring 4.4 cm.  I will refer to cardiothoracic surgery for continued monitoring.  I did discuss vascular risk factors as they pertain to the progression of vascular disease including controlling his hypertension.  His blood pressure stable on his current medication.  He is a  non-smoker now in 2017.  The patient can continue taking their current medication regimen as previously planned.  This was all discussed in full with complete understanding.    Thank you for allowing me to participate in the care of your patient.  Please do not hesitate with any questions or concerns.  I will keep you aware of any further encounters with Michael Issa.        Sincerely yours,         JUAN JOSE Allen

## 2023-08-31 NOTE — LETTER
September 10, 2023     Mansi Zapata DNP, APRN  [address] [place] [address]    Patient: Michael Issa   YOB: 1960   Date of Visit: 8/31/2023     Dear Mansi Zapata DNP, APRN:       Thank you for referring Michael Issa to me for evaluation. Below are the relevant portions of my assessment and plan of care.    If you have questions, please do not hesitate to call me. I look forward to following Michael along with you.         Sincerely,        JUAN JOSE Allen        CC: No Recipients    Marti Tellez APRN  09/10/23 1535  Sign when Signing Visit  08/31/2023      Mansi Zapata DNP, APRN  [address] [place] [address]    Michael Issa  1960    Chief Complaint   Patient presents with   • NEW PATIENT     Referred from Mansi Churchill for cellulitis and edema of left leg. Patient states left leg swells up when they're on it all day and the left leg has discoloration. Pt states no numbness,tingling, or pain.        Dear Mansi Zapata DNP,*:      HPI  I had the pleasure of seeing your patient Michael Issa in the office today.  Thank you kindly for this consultation.  As you recall, Michael Issa is a 63 y.o.  male who you are currently following for routine health maintenance. He has been having complaints of leg swelling with left leg discoloration.  He apparently bumped his left lower extremity in November and developed swelling and discoloration.  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  He has no complaints of claudication.  He has ascending aorta measuring 4.4 cm on CTA chest 10/23/22, which I did review in office.     Past Medical History:   Diagnosis Date   • Arthritis    • Disease of thyroid gland     NODULE PRESENT    • Hx of chest pain    • Hypertension        Past Surgical History:   Procedure Laterality Date   • COLONOSCOPY N/A 9/23/2021    Procedure: COLONOSCOPY;  Surgeon: Eduardo Neil MD;  Location: [facility]  ENDOSCOPY;  Service: Gastroenterology;  Laterality: N/A;  pre  post diverticulosis, hemorrhoids  Dr. cavazos   • KNEE SURGERY  2014    rt knee MENISCUS   • PREPERITONEAL HERNIA REPAIR Bilateral 2017    Procedure: BILATERAL PREPERITONEAL INGUINAL HERNIA REPAIR LAPAROSCOPIC WITH MESH;  Surgeon: Rambo Church MD;  Location: [facility];  Service:        Family History   Problem Relation Age of Onset   • Alzheimer's disease Mother    • Diabetes Father    • Heart disease Father    • No Known Problems Sister    • No Known Problems Brother    • No Known Problems Daughter    • No Known Problems Maternal Grandmother    • No Known Problems Maternal Grandfather    • No Known Problems Paternal Grandmother    • No Known Problems Paternal Grandfather    • Hypertension Brother    • No Known Problems Brother        Social History     Socioeconomic History   • Marital status: Single   Tobacco Use   • Smoking status: Former     Packs/day: 0.50     Years: 20.00     Pack years: 10.00     Types: Cigarettes     Quit date: 2017     Years since quittin.4   • Smokeless tobacco: Never   Vaping Use   • Vaping Use: Never used   Substance and Sexual Activity   • Alcohol use: No   • Drug use: No   • Sexual activity: Defer       Allergies   Allergen Reactions   • Penicillins Rash     Childhood allergy       Current Outpatient Medications   Medication Instructions   • Ascorbic Acid (VITAMIN C PO) 1,000 mg, Oral, Daily, ON HOLD 9-15-17   • lisinopril-hydrochlorothiazide (PRINZIDE,ZESTORETIC) 20-12.5 MG per tablet 1 tablet, Oral, Daily   • sildenafil (VIAGRA) 100 mg, Oral, Daily PRN   • valACYclovir (VALTREX) 2,000 mg, Oral, 2 Times Daily         Review of Systems   Constitutional: Negative.    HENT: Negative.     Eyes: Negative.    Respiratory: Negative.     Cardiovascular:  Positive for leg swelling.   Gastrointestinal: Negative.    Endocrine: Negative.    Genitourinary: Negative.    Musculoskeletal: Negative.    Skin:  Positive  "for color change.   Allergic/Immunologic: Negative.    Neurological: Negative.    Hematological: Negative.    Psychiatric/Behavioral: Negative.     All other systems reviewed and are negative.    /78   Pulse 76   Ht 185.4 cm (73\")   Wt 104 kg (229 lb)   SpO2 98%   BMI 30.21 kg/m²   Physical Exam  Vitals and nursing note reviewed.   Constitutional:       Appearance: Normal appearance. He is well-developed. He is obese.   HENT:      Head: Normocephalic and atraumatic.   Eyes:      General: No scleral icterus.     Pupils: Pupils are equal, round, and reactive to light.   Neck:      Thyroid: No thyromegaly.   Cardiovascular:      Rate and Rhythm: Normal rate and regular rhythm.      Heart sounds: Normal heart sounds.   Pulmonary:      Effort: Pulmonary effort is normal.      Breath sounds: Normal breath sounds.   Abdominal:      General: Bowel sounds are normal.      Palpations: Abdomen is soft.   Musculoskeletal:         General: Swelling present. Normal range of motion.      Cervical back: Normal range of motion and neck supple.   Skin:     General: Skin is warm and dry.      Comments: hyperpigmentation   Neurological:      Mental Status: He is alert and oriented to person, place, and time.   Psychiatric:         Mood and Affect: Mood normal.         Behavior: Behavior normal.         Thought Content: Thought content normal.         Judgment: Judgment normal.       No results found.    Patient Active Problem List   Diagnosis   • Hypertension   • Thyroid nodule   • Former smoker   • Family history of colonic polyps   • Erectile dysfunction   • Calcified granuloma of lung   • Aorta aneurysm   • Chronic seasonal allergic rhinitis due to pollen   • Chest pressure   • History of colon polyps         ICD-10-CM ICD-9-CM   1. Chronic venous hypertension with inflammation involving both sides  I87.323 459.32   2. Screening for AAA (abdominal aortic aneurysm)  Z13.6 V81.2   3. Primary hypertension  I10 401.9 "         Plan: After thoroughly evaluating Michael Issa, I believe the best course of action is to proceed with a venous valvular insufficiency study and a screening abdominal ultrasound.  I did recommend wearing compression stockings and instructed him on how to wear these on a daily basis.  We will see him back in 1 month to review his testing.  He does have ascending aorta measuring 4.4 cm.  I will refer to cardiothoracic surgery for continued monitoring.  I did discuss vascular risk factors as they pertain to the progression of vascular disease including controlling his hypertension.  His blood pressure stable on his current medication.  He is a non-smoker now in 2017.  The patient can continue taking their current medication regimen as previously planned.  This was all discussed in full with complete understanding.    Thank you for allowing me to participate in the care of your patient.  Please do not hesitate with any questions or concerns.  I will keep you aware of any further encounters with Michael Issa.        Sincerely yours,         JUAN JOSE Allen

## 2023-09-28 ENCOUNTER — TELEPHONE (OUTPATIENT)
Dept: VASCULAR SURGERY | Facility: CLINIC | Age: 63
End: 2023-09-28
Payer: COMMERCIAL

## 2023-09-28 ENCOUNTER — HOSPITAL ENCOUNTER (OUTPATIENT)
Dept: ULTRASOUND IMAGING | Facility: HOSPITAL | Age: 63
Discharge: HOME | End: 2023-09-28
Payer: COMMERCIAL

## 2023-09-28 DIAGNOSIS — I87.323 CHRONIC VENOUS HYPERTENSION WITH INFLAMMATION INVOLVING BOTH SIDES: ICD-10-CM

## 2023-09-28 DIAGNOSIS — Z13.6 SCREENING FOR AAA (ABDOMINAL AORTIC ANEURYSM): ICD-10-CM

## 2023-09-28 PROCEDURE — 76775 US EXAM ABDO BACK WALL LIM: CPT

## 2023-09-28 PROCEDURE — 93970 EXTREMITY STUDY: CPT

## 2023-09-28 NOTE — TELEPHONE ENCOUNTER
Call  placed to patient and informed that provider is ill and will not be in tomorrow, appointment has been cancelled and we will call in am to reschedule. Patient verbalized understanding.

## 2023-10-04 ENCOUNTER — OFFICE VISIT (OUTPATIENT)
Dept: CARDIAC SURGERY | Facility: CLINIC | Age: 63
End: 2023-10-04
Payer: COMMERCIAL

## 2023-10-04 VITALS
WEIGHT: 221.6 LBS | BODY MASS INDEX: 29.37 KG/M2 | HEIGHT: 73 IN | HEART RATE: 71 BPM | OXYGEN SATURATION: 98 % | SYSTOLIC BLOOD PRESSURE: 119 MMHG | DIASTOLIC BLOOD PRESSURE: 80 MMHG

## 2023-10-04 DIAGNOSIS — I71.9 AORTIC ANEURYSM WITHOUT RUPTURE, UNSPECIFIED PORTION OF AORTA: Primary | ICD-10-CM

## 2023-10-04 PROCEDURE — 99204 OFFICE O/P NEW MOD 45 MIN: CPT | Performed by: THORACIC SURGERY (CARDIOTHORACIC VASCULAR SURGERY)

## 2023-10-06 ENCOUNTER — PATIENT ROUNDING (BHMG ONLY) (OUTPATIENT)
Dept: CARDIOLOGY | Facility: CLINIC | Age: 63
End: 2023-10-06
Payer: COMMERCIAL

## 2023-10-06 NOTE — PROGRESS NOTES
A [employer] message has been sent to the patient for PATIENT ROUNDING with [facility].

## 2023-10-10 ENCOUNTER — HOSPITAL ENCOUNTER (OUTPATIENT)
Dept: CT IMAGING | Facility: HOSPITAL | Age: 63
Discharge: HOME | End: 2023-10-10
Admitting: THORACIC SURGERY (CARDIOTHORACIC VASCULAR SURGERY)
Payer: COMMERCIAL

## 2023-10-10 DIAGNOSIS — I71.9 AORTIC ANEURYSM WITHOUT RUPTURE, UNSPECIFIED PORTION OF AORTA: ICD-10-CM

## 2023-10-10 LAB — CREAT BLDA-MCNC: 0.9 MG/DL (ref 0.6–1.3)

## 2023-10-10 PROCEDURE — 71275 CT ANGIOGRAPHY CHEST: CPT

## 2023-10-10 PROCEDURE — 82565 ASSAY OF CREATININE: CPT

## 2023-10-10 PROCEDURE — 25510000001 IOPAMIDOL PER 1 ML: Performed by: THORACIC SURGERY (CARDIOTHORACIC VASCULAR SURGERY)

## 2023-10-10 RX ADMIN — IOPAMIDOL 100 ML: 755 INJECTION, SOLUTION INTRAVENOUS at 09:24

## 2023-10-23 NOTE — PROGRESS NOTES
Chief Complaint   Patient presents with    Aortic Aneurysm     New patient from [facility]         Subjective     History of Present Illness    Mr. Issa is a 63-year-old male with a history of hypertension and previous tobacco use presents with known thoracic aneurysm disease.  No chest pain currently.  Previous recent chest pain.  Thought secondary to accelerated hypertension.    History of granuloma.  He had been seen in [facility] recently with CTA chest performed.  Aneurysm is described.  Referrals made for further evaluation recommendations.  The patient reports having known this for some time.      Review of Systems   Constitutional:  Negative for activity change, appetite change, chills, diaphoresis, fatigue, fever and unexpected weight change.   HENT:  Negative for dental problem, hearing loss, nosebleeds, sore throat, trouble swallowing and voice change.    Eyes:  Negative for photophobia, redness and visual disturbance.   Respiratory:  Negative for apnea, cough, chest tightness, shortness of breath, wheezing and stridor.    Cardiovascular:  Positive for chest pain. Negative for palpitations and leg swelling.   Gastrointestinal:  Negative for abdominal distention, abdominal pain, blood in stool, constipation, diarrhea, nausea and vomiting.   Endocrine: Negative for cold intolerance, heat intolerance, polyphagia and polyuria.   Genitourinary:  Negative for decreased urine volume, difficulty urinating, dysuria, flank pain, frequency, hematuria and urgency.   Musculoskeletal:  Negative for arthralgias, back pain, gait problem, joint swelling, myalgias and neck pain.   Skin:  Negative for pallor, rash and wound.   Allergic/Immunologic: Negative for immunocompromised state.   Neurological:  Negative for dizziness, tremors, seizures, syncope, speech difficulty, weakness, light-headedness, numbness and headaches.   Hematological:  Does not bruise/bleed easily.   Psychiatric/Behavioral:   Negative for confusion, sleep disturbance and suicidal ideas. The patient is not nervous/anxious.           Past Medical History:   Diagnosis Date    Arthritis     Disease of thyroid gland     NODULE PRESENT     Hx of chest pain     Hypertension      Past Surgical History:   Procedure Laterality Date    COLONOSCOPY N/A 2021    Procedure: COLONOSCOPY;  Surgeon: Eduardo Neil MD;  Location: [facility] ENDOSCOPY;  Service: Gastroenterology;  Laterality: N/A;  pre  post diverticulosis, hemorrhoids  Dr. cavazos    KNEE SURGERY      rt knee MENISCUS    PREPERITONEAL HERNIA REPAIR Bilateral 2017    Procedure: BILATERAL PREPERITONEAL INGUINAL HERNIA REPAIR LAPAROSCOPIC WITH MESH;  Surgeon: Rambo Church MD;  Location: [facility] OR;  Service:      Family History   Problem Relation Age of Onset    Alzheimer's disease Mother     Diabetes Father     Heart disease Father     No Known Problems Sister     No Known Problems Brother     No Known Problems Daughter     No Known Problems Maternal Grandmother     No Known Problems Maternal Grandfather     No Known Problems Paternal Grandmother     No Known Problems Paternal Grandfather     Hypertension Brother     No Known Problems Brother      Social History     Tobacco Use    Smoking status: Former     Packs/day: 0.50     Years: 20.00     Additional pack years: 0.00     Total pack years: 10.00     Types: Cigarettes     Quit date: 2017     Years since quittin.5    Smokeless tobacco: Never   Vaping Use    Vaping Use: Never used   Substance Use Topics    Alcohol use: No    Drug use: No     Current Outpatient Medications   Medication Sig Dispense Refill    Ascorbic Acid (VITAMIN C PO) Take 1,000 mg by mouth Daily. ON HOLD 9-15-17      lisinopril-hydrochlorothiazide (PRINZIDE,ZESTORETIC) 20-12.5 MG per tablet Take 1 tablet by mouth Daily. 90 tablet 1    sildenafil (VIAGRA) 100 MG tablet Take 1 tablet by mouth Daily As Needed for Erectile Dysfunction. 30 tablet 5     "valACYclovir (VALTREX) 1000 MG tablet Take 2 tablets by mouth 2 (Two) Times a Day. 15 tablet 3     No current facility-administered medications for this visit.     Allergies:  Penicillins    Objective      Vital Signs  Visit Vitals  /80 (BP Location: Right arm, Patient Position: Sitting, Cuff Size: Adult)   Pulse 71   Ht 185.4 cm (73\")   Wt 101 kg (221 lb 9.6 oz)   SpO2 98%   BMI 29.24 kg/m²         Physical Exam  Constitutional:       Appearance: He is well-developed.   HENT:      Head: Normocephalic and atraumatic.   Eyes:      Pupils: Pupils are equal, round, and reactive to light.   Neck:      Thyroid: No thyromegaly.      Vascular: No JVD.      Trachea: No tracheal deviation.   Cardiovascular:      Rate and Rhythm: Normal rate and regular rhythm.      Heart sounds: Normal heart sounds. No murmur heard.     No friction rub. No gallop.   Pulmonary:      Effort: Pulmonary effort is normal. No respiratory distress.      Breath sounds: Normal breath sounds. No wheezing or rales.   Chest:      Chest wall: No tenderness.   Abdominal:      General: There is no distension.      Palpations: Abdomen is soft.      Tenderness: There is no abdominal tenderness.   Musculoskeletal:         General: Normal range of motion.      Cervical back: Normal range of motion and neck supple.   Lymphadenopathy:      Cervical: No cervical adenopathy.   Skin:     General: Skin is warm and dry.   Neurological:      Mental Status: He is alert and oriented to person, place, and time.      Cranial Nerves: No cranial nerve deficit.         Results Review:     Study Result    Narrative & Impression   EXAM/TECHNIQUE: CT chest angiography with 3D MIP images with IV contrast     INDICATION: Chest pain, concern for pulmonary embolus     COMPARISON: Chest CT 09/25/2017     DLP: 449 mGy cm. Automated exposure control was also utilized to  decrease patient radiation dose.     FINDINGS:     No evidence of pulmonary embolus. Main pulmonary artery is " mildly  dilated measuring 3.4 cm diameter, similar prior exam. Ectatic ascending  aorta measuring 4.4 cm diameter, similar prior study. Aortic arch  contains atherosclerotic calcifications. No pericardial effusion. Mild  coronary artery atherosclerotic calcification.     The central airways are clear. Mild atelectasis in both dependent lower  lungs. No consolidation, pleural effusion, or pneumothorax. Mild  paraseptal and centrilobular emphysema. No suspicious pulmonary nodule.  No enlarged thoracic lymph nodes.     15 mm LEFT thyroid nodule. No acute chest wall soft tissue abnormality.  Small low-density liver lesions are too small to characterize but likely  cysts. No acute osseous finding.     IMPRESSION:     1.  No evidence of pulmonary embolus.     2.  Atelectasis in both dependent lower lungs. Mild emphysema.     3.  Ectatic ascending aorta measuring 4.4 cm diameter, similar to 2017.     4.  Main pulmonary artery is dilated, which can indicate pulmonary  hypertension. Appearance is similar to a study from 2017.     5.  15 mm LEFT thyroid nodule. Recommend further characterization with  thyroid ultrasound.  This report was finalized on 10/23/2022 12:24 by Dr. Slim Lynn MD.          I reviewed the patient's new clinical results.  Discussed with patient      Assessment & Plan       Diagnoses and all orders for this visit:    1. Aortic aneurysm without rupture, unspecified portion of aorta (Primary)  -     CT angiogram chest w contrast; Future          I discussed the patients findings and my recommendations with patient.  We discussed the natural course history of aortic aneurysmal disease. We discussed the specific size of aneurysm today and potential risk of aortic complications based on available data.  Recommend a new CT angio chest as his aneurysm is greater than 4.5 cm in size based on a study that I can measure last year.  We discussed the operative treatment of aneursymal disease broadly. At this  time we discussed the recommendation to plan surveillance with CT scans. We discussed signs and symptoms of acute aortic pathology and the need to present to the emergency department for further evaluation. Lastly, we discussed the value of exercise while being mindful of a known aneurysm and the potential risk that high intensity, isometric, or valsalva type exercises presents.   Potential medical therapy including the use of a beta-blocker and perhaps other agents to accomplish strict control of pressure were discussed.     Agree with good hypertensive control.  Plan to see within a month with repeat CT angio chest.    He is a non-smoker    Many thanks for the opportunity to care for your patient.    I will continue to keep you apprised of provided care as it ensues.

## 2023-11-01 ENCOUNTER — OFFICE VISIT (OUTPATIENT)
Dept: CARDIAC SURGERY | Facility: CLINIC | Age: 63
End: 2023-11-01
Payer: COMMERCIAL

## 2023-11-01 VITALS
BODY MASS INDEX: 29.29 KG/M2 | DIASTOLIC BLOOD PRESSURE: 90 MMHG | SYSTOLIC BLOOD PRESSURE: 132 MMHG | WEIGHT: 221 LBS | HEIGHT: 73 IN | OXYGEN SATURATION: 99 % | HEART RATE: 86 BPM

## 2023-11-01 DIAGNOSIS — I71.9 AORTIC ANEURYSM WITHOUT RUPTURE, UNSPECIFIED PORTION OF AORTA: Primary | ICD-10-CM

## 2023-11-01 PROCEDURE — 99213 OFFICE O/P EST LOW 20 MIN: CPT | Performed by: THORACIC SURGERY (CARDIOTHORACIC VASCULAR SURGERY)

## 2023-11-01 NOTE — PROGRESS NOTES
"    Chief Complaint   Patient presents with    Aortic Aneurysm     Patient is here for follow up w/CT         Subjective     History of Present Illness    The patient reports he is doing well and denies any changes since his last visit on 10/04/2023.   He inquires about correcting the aneurysm and potential \"blockage\" treatment.  The patient frequently climbs and lifts over 50 pounds at work.  Since 02/2023, he split wood and cut and lifted a large tree though has stopped cutting wood now.  He inquires about exercise and running after surgery, as he enjoys jogging, running, bodyweight exercises and used to ride bikes.  He denies chest pain.  No shortness of breath.    The patient has a left thyroid nodule which was previously followed annually by a physician, possibly an otolaryngologist, at [facility] who retired approximately 7 to 9 years ago.  Mansi Zapata, DNP, APRN, ordered his last thyroid ultrasound, which was performed in 10/2022.    His father underwent bypass surgery in 1984.      Review of Systems   Constitutional:  Negative for activity change, appetite change, chills, diaphoresis, fatigue, fever and unexpected weight change.   HENT:  Negative for dental problem, hearing loss, nosebleeds, sore throat, trouble swallowing and voice change.    Eyes:  Negative for photophobia, redness and visual disturbance.   Respiratory:  Negative for apnea, cough, chest tightness, shortness of breath, wheezing and stridor.    Cardiovascular:  Negative for chest pain, palpitations and leg swelling.   Gastrointestinal:  Negative for abdominal distention, abdominal pain, blood in stool, constipation, diarrhea, nausea and vomiting.   Endocrine: Negative for cold intolerance, heat intolerance, polyphagia and polyuria.   Genitourinary:  Negative for decreased urine volume, difficulty urinating, dysuria, flank pain, frequency, hematuria and urgency.   Musculoskeletal:  Negative for arthralgias, back pain, gait problem, joint " swelling, myalgias and neck pain.   Skin:  Negative for pallor, rash and wound.   Allergic/Immunologic: Negative for immunocompromised state.   Neurological:  Negative for dizziness, tremors, seizures, syncope, speech difficulty, weakness, light-headedness, numbness and headaches.   Hematological:  Does not bruise/bleed easily.   Psychiatric/Behavioral:  Negative for confusion, sleep disturbance and suicidal ideas. The patient is not nervous/anxious.      Past Medical History:   Diagnosis Date    Arthritis     Disease of thyroid gland     NODULE PRESENT     Hx of chest pain     Hypertension      Past Surgical History:   Procedure Laterality Date    COLONOSCOPY N/A 2021    Procedure: COLONOSCOPY;  Surgeon: Eduardo Neil MD;  Location: [facility] ENDOSCOPY;  Service: Gastroenterology;  Laterality: N/A;  pre  post diverticulosis, hemorrhoids  Dr. cavazos    KNEE SURGERY      rt knee MENISCUS    PREPERITONEAL HERNIA REPAIR Bilateral 2017    Procedure: BILATERAL PREPERITONEAL INGUINAL HERNIA REPAIR LAPAROSCOPIC WITH MESH;  Surgeon: Rambo Church MD;  Location: [facility] OR;  Service:      Family History   Problem Relation Age of Onset    Alzheimer's disease Mother     Diabetes Father     Heart disease Father     No Known Problems Sister     No Known Problems Brother     No Known Problems Daughter     No Known Problems Maternal Grandmother     No Known Problems Maternal Grandfather     No Known Problems Paternal Grandmother     No Known Problems Paternal Grandfather     Hypertension Brother     No Known Problems Brother      Social History     Tobacco Use    Smoking status: Former     Packs/day: 0.50     Years: 20.00     Additional pack years: 0.00     Total pack years: 10.00     Types: Cigarettes     Quit date: 2017     Years since quittin.6    Smokeless tobacco: Never   Vaping Use    Vaping Use: Never used   Substance Use Topics    Alcohol use: No    Drug use: No     Current Outpatient  "Medications   Medication Sig Dispense Refill    Ascorbic Acid (VITAMIN C PO) Take 1,000 mg by mouth Daily. ON HOLD 9-15-17      lisinopril-hydrochlorothiazide (PRINZIDE,ZESTORETIC) 20-12.5 MG per tablet Take 1 tablet by mouth Daily. 90 tablet 1    sildenafil (VIAGRA) 100 MG tablet Take 1 tablet by mouth Daily As Needed for Erectile Dysfunction. 30 tablet 5    valACYclovir (VALTREX) 1000 MG tablet Take 2 tablets by mouth 2 (Two) Times a Day. 15 tablet 3     No current facility-administered medications for this visit.     Allergies:  Penicillins    Objective      Vital Signs  Visit Vitals  /90 (BP Location: Left arm, Patient Position: Sitting, Cuff Size: Adult)   Pulse 86   Ht 185.4 cm (73\")   Wt 100 kg (221 lb)   SpO2 99%   BMI 29.16 kg/m²         Physical Exam  Constitutional:       Appearance: He is well-developed.      Comments: No acute distress, appropriate, alert.   HENT:      Head: Normocephalic and atraumatic.   Eyes:      Pupils: Pupils are equal, round, and reactive to light.   Neck:      Thyroid: No thyromegaly.      Vascular: No JVD.      Trachea: No tracheal deviation.   Cardiovascular:      Rate and Rhythm: Normal rate and regular rhythm.      Heart sounds: Normal heart sounds. No murmur heard.     No friction rub. No gallop.   Pulmonary:      Effort: Pulmonary effort is normal. No respiratory distress.      Breath sounds: Normal breath sounds. No wheezing or rales.      Comments: Clear to auscultation bilaterally without wheezing, rubs, or rales.  Chest:      Chest wall: No tenderness.   Abdominal:      General: There is no distension.      Palpations: Abdomen is soft.      Tenderness: There is no abdominal tenderness.   Musculoskeletal:         General: Normal range of motion.      Cervical back: Normal range of motion and neck supple.      Comments:  No clubbing, cyanosis, or edema.   Lymphadenopathy:      Cervical: No cervical adenopathy.   Skin:     General: Skin is warm and dry. "   Neurological:      Mental Status: He is alert and oriented to person, place, and time.      Cranial Nerves: No cranial nerve deficit.         Results Review:     Study Result    Narrative & Impression   EXAM/TECHNIQUE: CT chest angiography with 3D MIP images with IV contrast     INDICATION: Chest pain, concern for pulmonary embolus     COMPARISON: Chest CT 09/25/2017     DLP: 449 mGy cm. Automated exposure control was also utilized to  decrease patient radiation dose.     FINDINGS:     No evidence of pulmonary embolus. Main pulmonary artery is mildly  dilated measuring 3.4 cm diameter, similar prior exam. Ectatic ascending  aorta measuring 4.4 cm diameter, similar prior study. Aortic arch  contains atherosclerotic calcifications. No pericardial effusion. Mild  coronary artery atherosclerotic calcification.     The central airways are clear. Mild atelectasis in both dependent lower  lungs. No consolidation, pleural effusion, or pneumothorax. Mild  paraseptal and centrilobular emphysema. No suspicious pulmonary nodule.  No enlarged thoracic lymph nodes.     15 mm LEFT thyroid nodule. No acute chest wall soft tissue abnormality.  Small low-density liver lesions are too small to characterize but likely  cysts. No acute osseous finding.     IMPRESSION:     1.  No evidence of pulmonary embolus.     2.  Atelectasis in both dependent lower lungs. Mild emphysema.     3.  Ectatic ascending aorta measuring 4.4 cm diameter, similar to 2017.     4.  Main pulmonary artery is dilated, which can indicate pulmonary  hypertension. Appearance is similar to a study from 2017.     5.  15 mm LEFT thyroid nodule. Recommend further characterization with  thyroid ultrasound.  This report was finalized on 10/23/2022 12:24 by Dr. Slim Lynn MD.     Study Result    Narrative & Impression   EXAMINATION:  CT ANGIOGRAM CHEST-  10/10/2023 9:14 AM CDT     HISTORY: I71.9-Aortic aneurysm of unspecified site, without rupture.     COMPARISON :  10/23/2022.     DLP: 455 mGy-cm. Automated dosage reduction technique was utilized to  decrease patient dosage.     TECHNIQUE: CT angio was performed of the chest with IV contrast.  Coronal, sagittal and 3-D reconstruction were performed.     INDEPENDENT 3-D WORKSTATION UTILIZED FOR RECONSTRUCTION: Yes. A  radiologist was present in the department.     MEDIASTINUM, HEART AND VASCULAR STRUCTURES: There is a 1.5 cm left  thyroid nodule. There is atheromatous calcification of the thoracic  aorta. There is mild coronary artery calcification. The ascending  thoracic aorta is aneurysmal at 4.8 cm. The mid aortic arch measures 3.1  cm. The descending thoracic aorta measures 3.5 cm. The main pulmonary  artery segment is dilated at 4.2 cm. A 1.2 cm short axis diameter lymph  node anterior to the right mainstem bronchus is stable. There are no  other enlarged lymph nodes. There is cardiomegaly.     LUNGS: There is paraseptal emphysema. There are scattered calcified  granulomas. There is mild linear atelectasis adjacent to the minor  fissure. There is mild dependent atelectasis.     UPPER ABDOMEN: There are scattered liver cysts. There is thickening of  the gastric wall. There is under distention of the stomach.     BONES: There are degenerative changes of the spine.        IMPRESSION:  1. Ascending thoracic aorta is aneurysmal at 4.8 cm. The remainder of  the thoracic aorta is normal caliber. There is atheromatous disease of  the thoracic aorta. There is mild coronary artery calcification. The  main pulmonary artery segment is dilated at 4.2 cm. There is  cardiomegaly.  2. Paraseptal emphysema. Scattered calcified granulomas. Linear and  dependent atelectasis.  3. A 1.5 cm left thyroid nodule. Follow-up ultrasound recommended if not  performed previously.  4. A single borderline size lymph node anterior to the right mainstem  bronchus is stable. Likely reactive lymph node.  5. Gastric wall thickening may be an artifact of  under distention.  Gastritis and other etiologies are considered. Scattered liver cysts.       This report was signed and finalized on 10/10/2023 1:52 PM CDT by Dr. Genaro Burgess MD.       CT angiogram of the chest obtained on 10/10/2023: The distal ascending aorta measured 4 cm in size. His ascending aorta measures 4.9 cm in size, and his aortic root measures 4.5 cm in size. He has (fusiform  morphology). Previously this measured 4.4 cm in size in 2022.     I reviewed the patient's new clinical results.  Discussed with patient      Assessment & Plan       Diagnoses and all orders for this visit:    1. Aortic aneurysm without rupture, unspecified portion of aorta (Primary)  -     CT angiogram chest w contrast; Future        Impression:  1. A 4.9 cm ascending aortic aneurysm  2. 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 That being said, his aneurysm has continued to grow. We discussed that aneurysmectomy guidelines suggest resection of the ascending aorta as well as root at 5 cm in size. It appears he has been lifting some weights heavier than perhaps would be suggested. We encouraged him to be more mindful about that. We discussed aortic restrictions in detail including with exercise as well as work. He will try to modify his work status at the time, as he would like to continue working.    We did review acute aortic symptoms and signs. He verbalized understanding of the aforementioned. I will plan to see him back in 6 months with a CT angiogram of the chest. Many thanks, again, for the opportunity to care for your patient.    Separately, on review of his chest CT angiogram, he has a left thyroid nodule. A thyroid ultrasound was recommended on the radiology read. On review of the record and discussion with the patient, he had been seen by apparently a physician at [facility], who has since then  retired. The patient followed up with Mansi Zapata DNP, APRN. I did message Mansi Zapata DNP, APRN, as to the recommendation, as he had a thyroid ultrasound in 10/2022 which recommended follow-up thyroid ultrasound in 1 year based upon [employer] TI-RADS 3 (mildly suspicious) guidelines.    The patient is aware and awaits further advice as to obtaining further imaging followup.    Non smoker    Transcribed from ambient dictation for Filiberto Gtz MD by Gia Emmanuel.  11/01/23   19:50 CDT    Patient or patient representative verbalized consent to the visit recording.  I have personally performed the services described in this document as transcribed by the above individual, and it is both accurate and complete.

## 2023-11-07 DIAGNOSIS — I71.9 AORTIC ANEURYSM WITHOUT RUPTURE, UNSPECIFIED PORTION OF AORTA: Primary | ICD-10-CM

## 2023-11-10 ENCOUNTER — HOSPITAL ENCOUNTER (OUTPATIENT)
Dept: CARDIOLOGY | Facility: HOSPITAL | Age: 63
Discharge: HOME | End: 2023-11-10
Payer: COMMERCIAL

## 2023-11-10 VITALS
HEIGHT: 73 IN | WEIGHT: 221 LBS | SYSTOLIC BLOOD PRESSURE: 128 MMHG | BODY MASS INDEX: 29.29 KG/M2 | DIASTOLIC BLOOD PRESSURE: 76 MMHG

## 2023-11-10 DIAGNOSIS — I71.9 AORTIC ANEURYSM WITHOUT RUPTURE, UNSPECIFIED PORTION OF AORTA: ICD-10-CM

## 2023-11-10 PROCEDURE — 93306 TTE W/DOPPLER COMPLETE: CPT

## 2023-11-11 LAB
ASCENDING AORTA: 4.8 CM
BH CV ECHO LEFT VENTRICLE GLOBAL LONGITUDINAL STRAIN: -14.6 %
BH CV ECHO MEAS - AO MAX PG: 10.5 MMHG
BH CV ECHO MEAS - AO MEAN PG: 5 MMHG
BH CV ECHO MEAS - AO ROOT DIAM: 4 CM
BH CV ECHO MEAS - AO V2 MAX: 162 CM/SEC
BH CV ECHO MEAS - AO V2 VTI: 33.8 CM
BH CV ECHO MEAS - AVA(I,D): 4 CM2
BH CV ECHO MEAS - EDV(CUBED): 91.1 ML
BH CV ECHO MEAS - EDV(MOD-SP4): 105 ML
BH CV ECHO MEAS - EF(MOD-SP4): 53.4 %
BH CV ECHO MEAS - ESV(CUBED): 39.3 ML
BH CV ECHO MEAS - ESV(MOD-SP4): 48.9 ML
BH CV ECHO MEAS - FS: 24.4 %
BH CV ECHO MEAS - IVS/LVPW: 0.86 CM
BH CV ECHO MEAS - IVSD: 1.2 CM
BH CV ECHO MEAS - LA DIMENSION: 4.2 CM
BH CV ECHO MEAS - LAT PEAK E' VEL: 7.1 CM/SEC
BH CV ECHO MEAS - LV DIASTOLIC VOL/BSA (35-75): 46.8 CM2
BH CV ECHO MEAS - LV MASS(C)D: 222.6 GRAMS
BH CV ECHO MEAS - LV MAX PG: 5.7 MMHG
BH CV ECHO MEAS - LV MEAN PG: 3 MMHG
BH CV ECHO MEAS - LV SYSTOLIC VOL/BSA (12-30): 21.8 CM2
BH CV ECHO MEAS - LV V1 MAX: 119 CM/SEC
BH CV ECHO MEAS - LV V1 VTI: 27.5 CM
BH CV ECHO MEAS - LVIDD: 4.5 CM
BH CV ECHO MEAS - LVIDS: 3.4 CM
BH CV ECHO MEAS - LVOT AREA: 4.9 CM2
BH CV ECHO MEAS - LVOT DIAM: 2.5 CM
BH CV ECHO MEAS - LVPWD: 1.4 CM
BH CV ECHO MEAS - MED PEAK E' VEL: 4.9 CM/SEC
BH CV ECHO MEAS - MR MAX PG: 94.3 MMHG
BH CV ECHO MEAS - MR MAX VEL: 484.5 CM/SEC
BH CV ECHO MEAS - MV A MAX VEL: 93.2 CM/SEC
BH CV ECHO MEAS - MV DEC SLOPE: 234 CM/SEC2
BH CV ECHO MEAS - MV E MAX VEL: 53.7 CM/SEC
BH CV ECHO MEAS - MV E/A: 0.58
BH CV ECHO MEAS - MV P1/2T: 82.9 MSEC
BH CV ECHO MEAS - MVA(P1/2T): 2.7 CM2
BH CV ECHO MEAS - PA V2 MAX: 94.5 CM/SEC
BH CV ECHO MEAS - RAP SYSTOLE: 5 MMHG
BH CV ECHO MEAS - RV MAX PG: 1.59 MMHG
BH CV ECHO MEAS - RV V1 MAX: 63 CM/SEC
BH CV ECHO MEAS - RVDD: 3.9 CM
BH CV ECHO MEAS - RVSP: 44.4 MMHG
BH CV ECHO MEAS - SI(MOD-SP4): 25 ML/M2
BH CV ECHO MEAS - SV(LVOT): 135 ML
BH CV ECHO MEAS - SV(MOD-SP4): 56.1 ML
BH CV ECHO MEAS - TAPSE (>1.6): 2.19 CM
BH CV ECHO MEAS - TR MAX PG: 39.4 MMHG
BH CV ECHO MEAS - TR MAX VEL: 314 CM/SEC
BH CV ECHO MEASUREMENTS AVERAGE E/E' RATIO: 8.95
LEFT ATRIUM VOLUME: 67.1 ML
STJ: 3.9 CM

## 2023-11-16 ENCOUNTER — NURSE TRIAGE (OUTPATIENT)
Dept: CALL CENTER | Facility: HOSPITAL | Age: 63
End: 2023-11-16
Payer: COMMERCIAL

## 2023-11-16 ENCOUNTER — TELEPHONE (OUTPATIENT)
Dept: FAMILY MEDICINE CLINIC | Facility: CLINIC | Age: 63
End: 2023-11-16
Payer: COMMERCIAL

## 2023-11-16 NOTE — TELEPHONE ENCOUNTER
----- Message from Mansi Zapata DNP, APRNAYELI sent at 11/3/2023  8:24 AM CDT -----  Hey Dr. Gtz wants him to have another ultrasound.  Can you please get him an appt to see me so we can document and get it ordered        ----- Message -----  From: Filiberto Gtz MD  Sent: 11/1/2023   5:15 PM CDT  To: Mansi Zapata DNP, APRN    Saw you today.  Thyroid nodule is noted on CT scan.  Spoke with patient he did see someone at [facility] but retired.  I saw thyroid US performed was performed last year.  He is in need of another US.  Thanks for looking at him

## 2023-11-16 NOTE — TELEPHONE ENCOUNTER
"Reason for Disposition   Requesting regular office appointment    Additional Information   Negative: [1] Caller is not with the adult (patient) AND [2] reporting urgent symptoms   Negative: Lab result questions   Negative: Medication questions   Negative: Caller can't be reached by phone   Negative: Caller has already spoken to PCP or another triager   Negative: RN needs further essential information from caller in order to complete triage    Answer Assessment - Initial Assessment Questions  1. REASON FOR CALL or QUESTION: \"What is your reason for calling today?\" or \"How can I best help you?\" or \"What question do you have that I can help answer?\"  Advised patient the office is trying to reach him to set up an appt but they are closed now.  I will route them that he called and they will try tomorrow or he can reach out to them as well.    Protocols used: Information Only Call-ADULT-    "

## 2023-11-20 ENCOUNTER — OFFICE VISIT (OUTPATIENT)
Dept: FAMILY MEDICINE CLINIC | Facility: CLINIC | Age: 63
End: 2023-11-20
Payer: COMMERCIAL

## 2023-11-20 VITALS
DIASTOLIC BLOOD PRESSURE: 70 MMHG | RESPIRATION RATE: 18 BRPM | HEIGHT: 73 IN | OXYGEN SATURATION: 97 % | SYSTOLIC BLOOD PRESSURE: 130 MMHG | HEART RATE: 89 BPM | TEMPERATURE: 97.9 F | BODY MASS INDEX: 30.06 KG/M2 | WEIGHT: 226.8 LBS

## 2023-11-20 DIAGNOSIS — E04.1 THYROID NODULE: Primary | ICD-10-CM

## 2023-11-20 PROCEDURE — 99213 OFFICE O/P EST LOW 20 MIN: CPT | Performed by: NURSE PRACTITIONER

## 2023-11-20 NOTE — PROGRESS NOTES
"Chief Complaint  Thyroid Problem (Ultrasound)    Subjective        Michael Issa presents to [facility]  History of Present Illness  The patient is a 63-year-old male who presents for a thyroid ultrasound.    Dr. Gtz placed restrictions 2 to 3 weeks ago. He has seen him twice. He had an echocardiogram done on 11/10/2023. He denies any depressed mood, fatigue, tremors, or palpitations. He has mild leg swelling. He had a colonoscopy on 09/23/2022. He was told to follow up in 5 years. He will be off work for 2 to 3 months. He had a couple of spells of sweating 4 to 5 months ago and 2 in the last year. He did not have chest pain. He knew it was something to do with his heart.    Narrative & Impression   EXAM/TECHNIQUE: US THYROID-     INDICATION: nodule; E04.1-Nontoxic single thyroid nodule     COMPARISON: None available.     FINDINGS:     RIGHT thyroid: 4.6 x 1.9 x 1.6 cm.  LEFT thyroid: 4.6 x 2.0 x 1.8 cm.  Isthmus: 3 mm.     Thyroid echotexture is homogeneous  Thyroid vascularity is normal.     1.6 cm solid isoechoic LEFT inferior thyroid nodule without definite  calcifications. ACR TR3.     4 mm cyst in the mid LEFT thyroid.     No right-sided thyroid nodule identified.     IMPRESSION:     1.6 cm solid isoechoic LEFT inferior thyroid nodule. ACR TI-RADS 3  (mildly suspicious). Based on ACR recommendations, a follow-up  ultrasound in one year is recommended.  This report was finalized on 10/25/2022 16:19 by Dr. Slim Lynn MD.     Objective   Vital Signs:  /70 (BP Location: Left arm, Patient Position: Sitting, Cuff Size: Large Adult)   Pulse 89   Temp 97.9 °F (36.6 °C)   Resp 18   Ht 185.4 cm (72.99\")   Wt 103 kg (226 lb 12.8 oz)   SpO2 97%   BMI 29.93 kg/m²   Estimated body mass index is 29.93 kg/m² as calculated from the following:    Height as of this encounter: 185.4 cm (72.99\").    Weight as of this encounter: 103 kg (226 lb 12.8 oz).               Physical " Exam  Vitals and nursing note reviewed.   Constitutional:       General: He is awake.      Appearance: Normal appearance. He is well-developed and well-groomed.   HENT:      Head: Normocephalic and atraumatic.      Right Ear: Hearing, tympanic membrane, ear canal and external ear normal.      Left Ear: Hearing, tympanic membrane, ear canal and external ear normal.      Nose: Nose normal.      Mouth/Throat:      Lips: Pink.      Pharynx: Oropharynx is clear.   Eyes:      General: Lids are normal.      Conjunctiva/sclera: Conjunctivae normal.   Cardiovascular:      Rate and Rhythm: Normal rate and regular rhythm.      Heart sounds: Normal heart sounds.   Pulmonary:      Effort: Pulmonary effort is normal.      Breath sounds: Normal breath sounds and air entry.   Musculoskeletal:      Cervical back: Full passive range of motion without pain.      Right lower leg: No edema.      Left lower leg: No edema.   Lymphadenopathy:      Head:      Right side of head: No submental, submandibular or tonsillar adenopathy.      Left side of head: No submental, submandibular or tonsillar adenopathy.   Skin:     General: Skin is warm and dry.   Neurological:      Mental Status: He is alert.      Sensory: Sensation is intact.      Motor: Motor function is intact.      Coordination: Coordination is intact.      Gait: Gait is intact.   Psychiatric:         Attention and Perception: Attention and perception normal.         Mood and Affect: Mood and affect normal.         Speech: Speech normal.         Behavior: Behavior normal. Behavior is cooperative.         Thought Content: Thought content normal.         Cognition and Memory: Cognition and memory normal.         Judgment: Judgment normal.        Result Review :  The following data was reviewed by: Mansi Zapata, JESU, APRN on 11/20/2023:    Data reviewed : Radiologic studies thyroid ultrasound  and Cardiology studies ct angiogram chest      ________________________________________________________________________________________________________________________________________________________  Narrative & Impression   EXAMINATION:  CT ANGIOGRAM CHEST-  10/10/2023 9:14 AM CDT     HISTORY: I71.9-Aortic aneurysm of unspecified site, without rupture.     COMPARISON : 10/23/2022.     DLP: 455 mGy-cm. Automated dosage reduction technique was utilized to  decrease patient dosage.     TECHNIQUE: CT angio was performed of the chest with IV contrast.  Coronal, sagittal and 3-D reconstruction were performed.     INDEPENDENT 3-D WORKSTATION UTILIZED FOR RECONSTRUCTION: Yes. A  radiologist was present in the department.     MEDIASTINUM, HEART AND VASCULAR STRUCTURES: There is a 1.5 cm left  thyroid nodule. There is atheromatous calcification of the thoracic  aorta. There is mild coronary artery calcification. The ascending  thoracic aorta is aneurysmal at 4.8 cm. The mid aortic arch measures 3.1  cm. The descending thoracic aorta measures 3.5 cm. The main pulmonary  artery segment is dilated at 4.2 cm. A 1.2 cm short axis diameter lymph  node anterior to the right mainstem bronchus is stable. There are no  other enlarged lymph nodes. There is cardiomegaly.     LUNGS: There is paraseptal emphysema. There are scattered calcified  granulomas. There is mild linear atelectasis adjacent to the minor  fissure. There is mild dependent atelectasis.     UPPER ABDOMEN: There are scattered liver cysts. There is thickening of  the gastric wall. There is under distention of the stomach.     BONES: There are degenerative changes of the spine.        IMPRESSION:  1. Ascending thoracic aorta is aneurysmal at 4.8 cm. The remainder of  the thoracic aorta is normal caliber. There is atheromatous disease of  the thoracic aorta. There is mild coronary artery calcification. The  main pulmonary artery segment is dilated at 4.2 cm. There is  cardiomegaly.  2. Paraseptal emphysema.  Scattered calcified granulomas. Linear and  dependent atelectasis.  3. A 1.5 cm left thyroid nodule. Follow-up ultrasound recommended if not  performed previously.  4. A single borderline size lymph node anterior to the right mainstem  bronchus is stable. Likely reactive lymph node.  5. Gastric wall thickening may be an artifact of under distention.  Gastritis and other etiologies are considered. Scattered liver cysts.       This report was signed and finalized on 10/10/2023 1:52 PM CDT by Dr. Genaro Burgess MD.     ----------------------------------------------------------------------------------------------------------------------------------------------------------------------------------------------------------------------------------------------------------------  Narrative & Impression   EXAM/TECHNIQUE: US THYROID-     INDICATION: nodule; E04.1-Nontoxic single thyroid nodule     COMPARISON: None available.     FINDINGS:     RIGHT thyroid: 4.6 x 1.9 x 1.6 cm.  LEFT thyroid: 4.6 x 2.0 x 1.8 cm.  Isthmus: 3 mm.     Thyroid echotexture is homogeneous  Thyroid vascularity is normal.     1.6 cm solid isoechoic LEFT inferior thyroid nodule without definite  calcifications. ACR TR3.     4 mm cyst in the mid LEFT thyroid.     No right-sided thyroid nodule identified.     IMPRESSION:     1.6 cm solid isoechoic LEFT inferior thyroid nodule. ACR TI-RADS 3  (mildly suspicious). Based on ACR recommendations, a follow-up  ultrasound in one year is recommended.  This report was finalized on 10/25/2022 16:19 by Dr. Slim Lynn MD.                  Assessment and Plan   Diagnoses and all orders for this visit:    1. Thyroid nodule (Primary)  -     US Thyroid         Pt refuses influenza immunization but will take pneumonia        Follow Up   Return in about 1 month (around 12/20/2023) for Recheck.  Patient was given instructions and counseling regarding his condition or for health maintenance advice. Please see specific  information pulled into the AVS if appropriate.     Transcribed from ambient dictation for Mansi Zapata DNP, JUAN JOSE by Lashell Rivera.  11/20/23   16:30 CST    Patient or patient representative verbalized consent to the visit recording.  I have personally performed the services described in this document as transcribed by the above individual, and it is both accurate and complete.    Electronically signed by Mansi Zapata DNP, JUAN JOSE, 11/26/23, 10:15 PM CST.

## 2023-11-21 ENCOUNTER — TELEPHONE (OUTPATIENT)
Dept: CARDIAC SURGERY | Facility: CLINIC | Age: 63
End: 2023-11-21

## 2023-11-21 NOTE — TELEPHONE ENCOUNTER
"  Caller: Michael Issa \"Kirit\"    Relationship: Self    Best call back number: [phone]    What is the best time to reach you: ANY    Who are you requesting to speak with (clinical staff, provider,  specific staff member): CLINICAL    Do you know the name of the person who called: PATIENT    What was the call regarding: PT WAS RETURNING A CALL FOR TEST RESULTS. PLEASE GIVE HIM A CALL BACK. THANK YOU    Is it okay if the provider responds through [facility]: NO          "

## 2023-11-21 NOTE — TELEPHONE ENCOUNTER
Called him back to discuss that technically based off of size of aneurysm now he does not meet threshold to consider surgical repair.  Unfortunately, he says he cannot continue to do his job without lifting over 50 pounds and at last office visit with Dr. Gtz he was given weight restriction of maximum 50 pounds.  Reports he has not told his job yet because he knows he will be sent home.  He is asking for disability paperwork to be filled out by Dr. Gtz.  Advised him that I would speak with Dr. Gtz when he returns-advised him it may be next week before I have an answer for him.  He verbalized understanding.  As far as surveillance from aneurysmal standpoint, keep planned follow-up in April with repeat imaging.

## 2023-11-21 NOTE — TELEPHONE ENCOUNTER
Reviewed with Dr. Gtz.  He would like patient to give us a formal job description for review.  Please inform patient.

## 2023-11-29 NOTE — TELEPHONE ENCOUNTER
Called patient to inform him that we have not received job description via fax yet.  He reports he has not obtained this as well because he is leery of asking for this from employer as they are going to ask questions and he is concerned he will be laid off.  Advised him I would update Dr. Gtz and we would go from there.  He appreciates the call back and says he will try to obtain it best he can.

## 2023-12-06 ENCOUNTER — TELEPHONE (OUTPATIENT)
Dept: VASCULAR SURGERY | Facility: CLINIC | Age: 63
End: 2023-12-06
Payer: COMMERCIAL

## 2023-12-13 ENCOUNTER — TELEPHONE (OUTPATIENT)
Dept: OTOLARYNGOLOGY | Facility: CLINIC | Age: 63
End: 2023-12-13
Payer: COMMERCIAL

## 2023-12-13 DIAGNOSIS — I71.9 AORTIC ANEURYSM WITHOUT RUPTURE, UNSPECIFIED PORTION OF AORTA: Primary | ICD-10-CM

## 2023-12-13 NOTE — TELEPHONE ENCOUNTER
Patient is scheduled for CTA Chest on 12/20 - arrive 0830 main entrance - npo 6 hr prior. LM for patient to return call re: this.

## 2023-12-14 NOTE — TELEPHONE ENCOUNTER
"Called and notified pt of CTA Chest scheduled for 12/20 at 0900 and instructions. Also asked about job description, to which he states he is still working on this, however he does say he is \"still working\" and \"can't do his job without lifting more than 50lbs.\" Informed him that I will update Zhanna and Dr. Gtz.  "

## 2023-12-20 ENCOUNTER — OFFICE VISIT (OUTPATIENT)
Dept: FAMILY MEDICINE CLINIC | Facility: CLINIC | Age: 63
End: 2023-12-20
Payer: COMMERCIAL

## 2023-12-20 ENCOUNTER — HOSPITAL ENCOUNTER (OUTPATIENT)
Dept: CT IMAGING | Facility: HOSPITAL | Age: 63
Discharge: HOME | End: 2023-12-20
Admitting: NURSE PRACTITIONER
Payer: COMMERCIAL

## 2023-12-20 VITALS
SYSTOLIC BLOOD PRESSURE: 131 MMHG | HEIGHT: 73 IN | HEART RATE: 72 BPM | WEIGHT: 234 LBS | TEMPERATURE: 97.3 F | RESPIRATION RATE: 18 BRPM | OXYGEN SATURATION: 98 % | DIASTOLIC BLOOD PRESSURE: 87 MMHG | BODY MASS INDEX: 31.01 KG/M2

## 2023-12-20 DIAGNOSIS — I10 ESSENTIAL HYPERTENSION: ICD-10-CM

## 2023-12-20 DIAGNOSIS — I71.9 AORTIC ANEURYSM WITHOUT RUPTURE, UNSPECIFIED PORTION OF AORTA: ICD-10-CM

## 2023-12-20 DIAGNOSIS — E04.1 THYROID NODULE: Primary | ICD-10-CM

## 2023-12-20 DIAGNOSIS — N52.8 OTHER MALE ERECTILE DYSFUNCTION: ICD-10-CM

## 2023-12-20 LAB — CREAT BLDA-MCNC: 1 MG/DL (ref 0.6–1.3)

## 2023-12-20 PROCEDURE — 25510000001 IOPAMIDOL PER 1 ML: Performed by: NURSE PRACTITIONER

## 2023-12-20 PROCEDURE — 99213 OFFICE O/P EST LOW 20 MIN: CPT | Performed by: NURSE PRACTITIONER

## 2023-12-20 PROCEDURE — 71275 CT ANGIOGRAPHY CHEST: CPT

## 2023-12-20 PROCEDURE — 82565 ASSAY OF CREATININE: CPT

## 2023-12-20 RX ORDER — SILDENAFIL 100 MG/1
100 TABLET, FILM COATED ORAL DAILY PRN
Qty: 30 TABLET | Refills: 5 | Status: SHIPPED | OUTPATIENT
Start: 2023-12-20

## 2023-12-20 RX ADMIN — IOPAMIDOL 100 ML: 755 INJECTION, SOLUTION INTRAVENOUS at 09:33

## 2023-12-20 NOTE — PROGRESS NOTES
"Chief Complaint  Thyroid nodule (Pt here for follow up)    Subjective        Michael Issa presents to [facility]  History of Present Illness  The patient is a 63-year-old male who presents for follow-up on thyroid nodule.    He has an appointment on 01/16/2024 with the ENT group.    He had a CT scan of his heart this morning. His cardiologist wanted him to wait until 04/2024 to get a job description to them. He told him that if he were going to put him on 50-pound weight restriction, he might have to get him some disability. He told them that there is no light duty at his work. He is not anxious to do open heart surgeries, but he does not like walking around. He was told that he was going to go ahead and clean his arteries out.    He does not need any of his medications filled. He needs a refill on his sildenafil. He has a full bottle of lisinopril.    The following portions of the patient's history were reviewed and updated as appropriate: allergies, current medications, past family history, past medical history, past social history, past surgical history and problem list.    Body mass index is 30.87 kg/m².    Objective   Vital Signs:  /87 (BP Location: Right arm, Patient Position: Sitting, Cuff Size: Large Adult)   Pulse 72   Temp 97.3 °F (36.3 °C) (Infrared)   Resp 18   Ht 185.4 cm (73\") Comment: per patient  Wt 106 kg (234 lb)   SpO2 98%   BMI 30.87 kg/m²   Estimated body mass index is 30.87 kg/m² as calculated from the following:    Height as of this encounter: 185.4 cm (73\").    Weight as of this encounter: 106 kg (234 lb).       Physical Exam  Vitals and nursing note reviewed.   Constitutional:       General: He is awake.      Appearance: Normal appearance. He is well-developed and well-groomed.   HENT:      Head: Normocephalic and atraumatic.      Right Ear: Hearing, tympanic membrane, ear canal and external ear normal.      Left Ear: Hearing, tympanic " membrane, ear canal and external ear normal.      Nose: Nose normal.      Mouth/Throat:      Lips: Pink.      Pharynx: Oropharynx is clear.   Eyes:      General: Lids are normal.      Conjunctiva/sclera: Conjunctivae normal.   Neck:        Comments: Thyrooid nodules noted pt had thyroid ultasound   Cardiovascular:      Rate and Rhythm: Normal rate and regular rhythm.      Heart sounds: Normal heart sounds.   Pulmonary:      Effort: Pulmonary effort is normal.      Breath sounds: Normal breath sounds and air entry.   Musculoskeletal:      Cervical back: Full passive range of motion without pain.      Right lower leg: No edema.      Left lower leg: No edema.   Lymphadenopathy:      Head:      Right side of head: No submental, submandibular or tonsillar adenopathy.      Left side of head: No submental, submandibular or tonsillar adenopathy.   Skin:     General: Skin is warm and dry.   Neurological:      Mental Status: He is alert.      Sensory: Sensation is intact.      Motor: Motor function is intact.      Coordination: Coordination is intact.      Gait: Gait is intact.   Psychiatric:         Attention and Perception: Attention and perception normal.         Mood and Affect: Mood and affect normal.         Speech: Speech normal.         Behavior: Behavior normal. Behavior is cooperative.         Thought Content: Thought content normal.         Cognition and Memory: Cognition and memory normal.         Judgment: Judgment normal.        Result Review :                   Assessment and Plan   Diagnoses and all orders for this visit:    1. Thyroid nodule (Primary)       -  keep follow up with ent    2. Aortic aneurysm without rupture, unspecified portion of aorta       -   keep appt with cardiothoracic surgeon for surgical plan    3. Essential hypertension       -    continue lisinopril-hctz as prescribed    4. Other male erectile dysfunction  -     sildenafil (VIAGRA) 100 MG tablet; Take 1 tablet by mouth Daily As Needed  for Erectile Dysfunction.  Dispense: 30 tablet; Refill: 5    Keep appt with ENT for further evaluation and treatment          Follow Up   Return in about 1 month (around 1/20/2024) for Recheck.  Patient was given instructions and counseling regarding his condition or for health maintenance advice. Please see specific information pulled into the AVS if appropriate.     Transcribed from ambient dictation for Mansi Zapata DNP, JUAN JOSE by Yovani Nettles.  12/20/23   16:12 CST    Patient or patient representative verbalized consent to the visit recording.  I have personally performed the services described in this document as transcribed by the above individual, and it is both accurate and complete.    Electronically signed by Mansi Zapata DNP, JUAN JOSE, 12/27/23, 12:48 AM CST.

## 2024-01-30 ENCOUNTER — TELEPHONE (OUTPATIENT)
Dept: VASCULAR SURGERY | Facility: CLINIC | Age: 64
End: 2024-01-30
Payer: COMMERCIAL

## 2024-01-30 NOTE — TELEPHONE ENCOUNTER
Called patient to confirm appointment on 01/31/24 at 3:15 pm. Patient confirmed date and time of appt.

## 2024-01-31 ENCOUNTER — PATIENT ROUNDING (BHMG ONLY) (OUTPATIENT)
Dept: VASCULAR SURGERY | Facility: CLINIC | Age: 64
End: 2024-01-31

## 2024-01-31 ENCOUNTER — OFFICE VISIT (OUTPATIENT)
Dept: VASCULAR SURGERY | Facility: CLINIC | Age: 64
End: 2024-01-31
Payer: COMMERCIAL

## 2024-01-31 VITALS
HEART RATE: 88 BPM | OXYGEN SATURATION: 98 % | BODY MASS INDEX: 30.75 KG/M2 | SYSTOLIC BLOOD PRESSURE: 142 MMHG | DIASTOLIC BLOOD PRESSURE: 88 MMHG | WEIGHT: 232 LBS | HEIGHT: 73 IN

## 2024-01-31 DIAGNOSIS — I10 PRIMARY HYPERTENSION: ICD-10-CM

## 2024-01-31 DIAGNOSIS — I65.23 BILATERAL CAROTID ARTERY STENOSIS: Primary | ICD-10-CM

## 2024-01-31 DIAGNOSIS — I87.323 CHRONIC VENOUS HYPERTENSION WITH INFLAMMATION INVOLVING BOTH SIDES: ICD-10-CM

## 2024-01-31 NOTE — PROGRESS NOTES
January 31, 2024    Hello, may I speak with Michael Issa?    My name is JUNE      I am  with [facility]  [address], [address]  [place] [address] [phone].    Before we get started may I verify your date of birth? 1960    I am calling to officially welcome you to our practice and ask about your recent visit. Is this a good time to talk? yes    Tell me about your visit with us. What things went well?  VISIT WENT WELL       We're always looking for ways to make our patients' experiences even better. Do you have recommendations on ways we may improve?  no    Overall were you satisfied with your first visit to our practice? yes       I appreciate you taking the time to speak with me today. Is there anything else I can do for you? no      Thank you, and have a great day.

## 2024-01-31 NOTE — PROGRESS NOTES
1/31/2024        No referring provider defined for this encounter.    Michael Issa  1960    Chief Complaint   Patient presents with    chronic venous [id number]     No complaints of vascular problems, nonsmoker       Dear No ref. provider found:      HPI  I had the pleasure of seeing your patient Michael Issa in the office today.  Thank you kindly for this consultation.  As you recall, Michael Issa is a 63 y.o.  male who you are currently following for routine health maintenance.   He denies pain or swelling at this time he does have discoloration to his left lower extremity.  He has no complaints of claudication.  He has ascending aorta measuring 4.4 cm on CTA chest 10/23/22.     Past Medical History:   Diagnosis Date    Arthritis     Disease of thyroid gland     NODULE PRESENT     Hx of chest pain     Hypertension        Past Surgical History:   Procedure Laterality Date    COLONOSCOPY N/A 9/23/2021    Procedure: COLONOSCOPY;  Surgeon: Eduardo Neil MD;  Location: [facility];  Service: Gastroenterology;  Laterality: N/A;  pre  post diverticulosis, hemorrhoids  Dr. cavazos    KNEE SURGERY  2014    rt knee MENISCUS    PREPERITONEAL HERNIA REPAIR Bilateral 9/20/2017    Procedure: BILATERAL PREPERITONEAL INGUINAL HERNIA REPAIR LAPAROSCOPIC WITH MESH;  Surgeon: Rambo Chucrh MD;  Location: [facility] OR;  Service:        Family History   Problem Relation Age of Onset    Alzheimer's disease Mother     Diabetes Father     Heart disease Father     No Known Problems Sister     No Known Problems Brother     No Known Problems Daughter     No Known Problems Maternal Grandmother     No Known Problems Maternal Grandfather     No Known Problems Paternal Grandmother     No Known Problems Paternal Grandfather     Hypertension Brother     No Known Problems Brother        Social History     Socioeconomic History    Marital status: Single   Tobacco Use    Smoking status: Former     Packs/day: 0.50     Years:  "20.00     Additional pack years: 0.00     Total pack years: 10.00     Types: Cigarettes     Quit date: 2017     Years since quittin.7    Smokeless tobacco: Never   Vaping Use    Vaping Use: Never used   Substance and Sexual Activity    Alcohol use: No    Drug use: No    Sexual activity: Defer       Allergies   Allergen Reactions    Penicillins Rash     Childhood allergy       Current Outpatient Medications   Medication Instructions    Ascorbic Acid (VITAMIN C PO) 1,000 mg, Oral, Daily, ON HOLD 9-15-17    lisinopril-hydrochlorothiazide (PRINZIDE,ZESTORETIC) 20-12.5 MG per tablet 1 tablet, Oral, Daily    sildenafil (VIAGRA) 100 mg, Oral, Daily PRN    valACYclovir (VALTREX) 2,000 mg, Oral, 2 Times Daily         Review of Systems   Constitutional: Negative.    HENT: Negative.     Eyes: Negative.    Respiratory: Negative.     Cardiovascular: Negative.    Gastrointestinal: Negative.    Endocrine: Negative.    Genitourinary: Negative.    Musculoskeletal: Negative.    Skin:  Positive for color change.   Allergic/Immunologic: Negative.    Neurological: Negative.    Hematological: Negative.    Psychiatric/Behavioral: Negative.     All other systems reviewed and are negative.      /88   Pulse 88   Ht 185.4 cm (73\")   Wt 105 kg (232 lb)   SpO2 98%   BMI 30.61 kg/m²       Physical Exam  Vitals and nursing note reviewed.   Constitutional:       Appearance: Normal appearance. He is well-developed. He is obese.   HENT:      Head: Normocephalic and atraumatic.   Eyes:      General: No scleral icterus.     Pupils: Pupils are equal, round, and reactive to light.   Neck:      Thyroid: No thyromegaly.   Cardiovascular:      Rate and Rhythm: Normal rate and regular rhythm.      Heart sounds: Normal heart sounds.   Pulmonary:      Effort: Pulmonary effort is normal.      Breath sounds: Normal breath sounds.   Abdominal:      General: Bowel sounds are normal.      Palpations: Abdomen is soft.   Musculoskeletal:         " General: No swelling. Normal range of motion.      Cervical back: Normal range of motion and neck supple.   Skin:     General: Skin is warm and dry.      Comments: hyperpigmentation   Neurological:      Mental Status: He is alert and oriented to person, place, and time.   Psychiatric:         Mood and Affect: Mood normal.         Behavior: Behavior normal.         Thought Content: Thought content normal.         Judgment: Judgment normal.         DIAGNOSTIC DATA:      Patient Active Problem List   Diagnosis    Hypertension    Thyroid nodule    Former smoker    Family history of colonic polyps    Erectile dysfunction    Calcified granuloma of lung    Aorta aneurysm    Chronic seasonal allergic rhinitis due to pollen    Chest pressure    History of colon polyps         ICD-10-CM ICD-9-CM   1. Bilateral carotid artery stenosis  I65.23 433.10     433.30   2. Primary hypertension  I10 401.9   3. Chronic venous hypertension with inflammation involving both sides  I87.323 459.32           Plan: After thoroughly evaluating Michael Contrerases, I believe the best course of action is to remain conservative from vascular surgery standpoint.  He did have noninvasive testing performed which I did review.  He does have some reflux to his left lower extremity but is asymptomatic at this time.  I did recommend wearing compression stockings and instructed him on how to wear these on a daily basis.  We will see him back in 1 year with carotid duplex for continued surveillance.  I did discuss vascular risk factors as they pertain to the progression of vascular disease including controlling his hypertension.  His blood pressure is slightly elevated at 142/88, I will have him follow-up with his primary care provider if this continues.  He is a non-smoker now in 2017.  The patient can continue taking their current medication regimen as previously planned.  This was all discussed in full with complete understanding.    Thank you for allowing  me to participate in the care of your patient.  Please do not hesitate with any questions or concerns.  I will keep you aware of any further encounters with Michael Issa.        Sincerely yours,         JUAN JOSE Walls

## 2024-02-09 ENCOUNTER — OFFICE VISIT (OUTPATIENT)
Dept: FAMILY MEDICINE CLINIC | Facility: CLINIC | Age: 64
End: 2024-02-09
Payer: COMMERCIAL

## 2024-02-09 VITALS
OXYGEN SATURATION: 99 % | SYSTOLIC BLOOD PRESSURE: 110 MMHG | BODY MASS INDEX: 30.88 KG/M2 | DIASTOLIC BLOOD PRESSURE: 82 MMHG | RESPIRATION RATE: 18 BRPM | HEART RATE: 82 BPM | HEIGHT: 73 IN | WEIGHT: 233 LBS | TEMPERATURE: 98 F

## 2024-02-09 DIAGNOSIS — J30.2 SEASONAL ALLERGIES: ICD-10-CM

## 2024-02-09 DIAGNOSIS — Z23 IMMUNIZATION DUE: ICD-10-CM

## 2024-02-09 DIAGNOSIS — I10 ESSENTIAL HYPERTENSION: Primary | ICD-10-CM

## 2024-02-09 DIAGNOSIS — N40.1 BENIGN PROSTATIC HYPERPLASIA WITH URINARY RETENTION: ICD-10-CM

## 2024-02-09 DIAGNOSIS — R33.8 BENIGN PROSTATIC HYPERPLASIA WITH URINARY RETENTION: ICD-10-CM

## 2024-02-09 RX ORDER — DEXAMETHASONE SODIUM PHOSPHATE 10 MG/ML
10 INJECTION INTRAMUSCULAR; INTRAVENOUS ONCE
Status: COMPLETED | OUTPATIENT
Start: 2024-02-09 | End: 2024-02-09

## 2024-02-09 RX ORDER — TAMSULOSIN HYDROCHLORIDE 0.4 MG/1
1 CAPSULE ORAL DAILY
Qty: 30 CAPSULE | Refills: 2 | Status: SHIPPED | OUTPATIENT
Start: 2024-02-09

## 2024-02-09 RX ADMIN — DEXAMETHASONE SODIUM PHOSPHATE 10 MG: 10 INJECTION INTRAMUSCULAR; INTRAVENOUS at 16:26

## 2024-02-09 NOTE — PROGRESS NOTES
Chief Complaint  thyroid nodule    Subjective        Michael DARLIN Issa presents to [facility]  History of Present Illness    The patient is a 63-year-old male who presents for a follow-up of a thyroid nodule.    He underwent an ultrasound of the thyroid nodule, and this demonstrated an increase in size of a 2 cm solid isoechoic left mid thyroid nodule, which is compatible with TI-RADS 3 and mildly suspicious. Continued imaging and surveillance was recommended with follow-up in 1 year. He will need a repeat ultrasound in 11/2024    The patient's blood pressure in clinic is 110/80 mmHg. HIs hypertension is chronic and he has had this for greater than 1 year. This is stable and controlled. Past treatments have included lisinopril with HCTZ. He has no compliance problems and no end organ damage. He denies any chest pain, shortness of breath, palpitations, or blurred vision. Coronary artery disease risk includes dyslipidemia, family history, and male gender. He will see Dr. Gtz in follow-up in 04/2024.     The patient has had chronic erectile dysfunction for greater than 1 year. Progression is the same. He has problems with maintaining an erection. He denies any decreased libido. He has urinary frequency and urgency during the day. He denies nocturia. He notes a slow urinary stream. He will be prescribed tamsulosin. He denies any fever or chills. There are no aggravating factors. He has had mild improvement. He has not had any adverse medication reaction. Risk factors include hypertension and BPH.    The patient has severe seasonal allergies. He complains of ocular pruritus, epiphora, and sneezing. He request a steroid injection today.     He no longer smokes.    The patient will not receive any COVID-19 vaccines. He would like to get a pneumonia vaccine today. He has never received the RSV vaccine. He had shingles vaccination approximately 10 to 11 years ago.    Objective   Vital  "Signs:  /82 (BP Location: Left arm, Patient Position: Sitting, Cuff Size: Large Adult)   Pulse 82   Temp 98 °F (36.7 °C) (Infrared)   Resp 18   Ht 185.4 cm (73\") Comment: per patient  Wt 106 kg (233 lb)   SpO2 99%   BMI 30.74 kg/m²   Estimated body mass index is 30.74 kg/m² as calculated from the following:    Height as of this encounter: 185.4 cm (73\").    Weight as of this encounter: 106 kg (233 lb).       BMI is >= 30 and <35. (Class 1 Obesity). The following options were offered after discussion;: exercise counseling/recommendations and nutrition counseling/recommendations        Physical Exam  Vitals and nursing note reviewed.   Constitutional:       General: He is awake.      Appearance: Normal appearance. He is well-developed and well-groomed.   HENT:      Head: Normocephalic and atraumatic.      Right Ear: Hearing, tympanic membrane, ear canal and external ear normal.      Left Ear: Hearing, tympanic membrane, ear canal and external ear normal.      Nose: Nose normal.      Mouth/Throat:      Lips: Pink.      Pharynx: Oropharynx is clear.   Eyes:      General: Lids are normal. Allergic shiner present. No visual field deficit or scleral icterus.     Conjunctiva/sclera: Conjunctivae normal.   Cardiovascular:      Rate and Rhythm: Normal rate and regular rhythm.      Heart sounds: Normal heart sounds.   Pulmonary:      Effort: Pulmonary effort is normal.      Breath sounds: Normal breath sounds and air entry.   Musculoskeletal:      Cervical back: Full passive range of motion without pain.      Right lower leg: No edema.      Left lower leg: No edema.   Lymphadenopathy:      Head:      Right side of head: No submental, submandibular or tonsillar adenopathy.      Left side of head: No submental, submandibular or tonsillar adenopathy.   Skin:     General: Skin is warm and dry.   Neurological:      Mental Status: He is alert.      Sensory: Sensation is intact.      Motor: Motor function is intact.      " Coordination: Coordination is intact.      Gait: Gait is intact.   Psychiatric:         Attention and Perception: Attention and perception normal.         Mood and Affect: Mood and affect normal.         Speech: Speech normal.         Behavior: Behavior normal. Behavior is cooperative.         Thought Content: Thought content normal.         Cognition and Memory: Cognition and memory normal.         Judgment: Judgment normal.        Result Review :                     Assessment and Plan   Diagnoses and all orders for this visit:    1. Essential hypertension (Primary)  -   Continue lisinopril as prescribed   -    keep bp at goal less than 140/90  -    Bring copy of cbc, cmp, lipid, A1c from labs at his work     2. Immunization due  -     Pneumococcal Conjugate Vaccine 20-Valent (PCV20)  -     “Discussed risks/benefits to vaccination, reviewed components of the vaccine, discussed VIS, discussed informed consent, informed consent obtained. Patient/Parent was allowed to accept or refuse vaccine. Questions answered to satisfactory state of patient/Parent. We reviewed typical age appropriate and seasonally appropriate vaccinations. Reviewed immunization history and updated state vaccination form as needed. Patient was counseled on Prevnar 20      3. Benign prostatic hyperplasia with urinary retention  -     tamsulosin (FLOMAX) 0.4 MG capsule 24 hr capsule; Take 1 capsule by mouth Daily.  Dispense: 30 capsule; Refill: 2    4. Seasonal allergies  -     dexAMETHasone (DECADRON) injection 10 mg        Recommend repeat cbc, cmp, lipid, A1c however, pt declines says he gets them at his annual physical wit work          Follow Up     Return in about 6 months (around 8/9/2024) for Recheck.  Patient was given instructions and counseling regarding his condition or for health maintenance advice. Please see specific information pulled into the AVS if appropriate.     Transcribed from ambient dictation for Mansi Zapata DNP,  APRN by Shirley Thompson.  02/09/24   18:27 CST    Patient or patient representative verbalized consent to the visit recording.  I have personally performed the services described in this document as transcribed by the above individual, and it is both accurate and complete.    Electronically signed by Mansi Zapata DNP, APRN, 02/11/24, 5:26 PM CST.

## 2024-02-09 NOTE — LETTER
[facility]  Vaccine Consent Form    Patient Name:  Michael Issa  Patient :  1960     Vaccine(s) Ordered    Pneumococcal Conjugate Vaccine 20-Valent (PCV20)        Screening Checklist  The following questions should be completed prior to vaccination. If you answer “yes” to any question, it does not necessarily mean you should not be vaccinated. It just means we may need to clarify or ask more questions. If a question is unclear, please ask your healthcare provider to explain it.    Yes No   Any fever or moderate to severe illness today (mild illness and/or antibiotic treatment are not contraindications)?     Do you have a history of a serious reaction to any previous vaccinations, such as anaphylaxis, encephalopathy within 7 days, Guillain-Whiteside syndrome within 6 weeks, seizure?     Have you received any live vaccine(s) (e.g MMR, MAGALYS) or any other vaccines in the last month (to ensure duplicate doses aren't given)?     Do you have an anaphylactic allergy to latex (DTaP, DTaP-IPV, Hep A, Hep B, MenB, RV, Td, Tdap), baker’s yeast (Hep B, HPV), polysorbates (RSV, nirsevimab, PCV 20, Rotavirrus, Tdap, Shingrix), or gelatin (MAGALYS, MMR)?     Do you have an anaphylactic allergy to neomycin (Rabies, MAGALYS, MMR, IPV, Hep A), polymyxin B (IPV), or streptomycin (IPV)?      Any cancer, leukemia, AIDS, or other immune system disorder? (MAGALYS, MMR, RV)     Do you have a parent, brother, or sister with an immune system problem (if immune competence of vaccine recipient clinically verified, can proceed)? (MMR, MGAALYS)     Any recent steroid treatments for >2 weeks, chemotherapy, or radiation treatment? (MAGALYS, MMR)     Have you received antibody-containing blood transfusions or IVIG in the past 11 months (recommended interval is dependent on product)? (MMR, MAGALYS)     Have you taken antiviral drugs (acyclovir, famciclovir, valacyclovir for MAGALYS) in the last 24 or 48 hours, respectively?      Are you pregnant or planning to become  "pregnant within 1 month? (MAGALYS, MMR, HPV, IPV, MenB, Abrexvy; For Hep B- refer to Engerix-B; For RSV - Abrysvo is indicated for 32-36 weeks of pregnancy from September to January)     For infants, have you ever been told your child has had intussusception or a medical emergency involving obstruction of the intestine (Rotavirus)? If not for an infant, can skip this question.         *Ordering Physicians/APC should be consulted if \"yes\" is checked by the patient or guardian above.  I have received, read, and understand the Vaccine Information Statement (VIS) for each vaccine ordered.  I have considered my or my child's health status as well as the health status of my close contacts.  I have taken the opportunity to discuss my vaccine questions with my or my child's health care provider.   I have requested that the ordered vaccine(s) be given to me or my child.  I understand the benefits and risks of the vaccines.  I understand that I should remain in the clinic for 15 minutes after receiving the vaccine(s).  _________________________________________________________  Signature of Patient or Parent/Legal Guardian ____________________  Date     "

## 2024-03-29 ENCOUNTER — TELEPHONE (OUTPATIENT)
Dept: CARDIAC SURGERY | Facility: CLINIC | Age: 64
End: 2024-03-29
Payer: COMMERCIAL

## 2024-04-19 DIAGNOSIS — I10 ESSENTIAL HYPERTENSION: ICD-10-CM

## 2024-04-19 RX ORDER — LISINOPRIL AND HYDROCHLOROTHIAZIDE 20; 12.5 MG/1; MG/1
1 TABLET ORAL DAILY
Qty: 90 TABLET | Refills: 1 | Status: SHIPPED | OUTPATIENT
Start: 2024-04-19

## 2024-04-19 NOTE — TELEPHONE ENCOUNTER
Rx Refill Note  Requested Prescriptions     Pending Prescriptions Disp Refills    lisinopril-hydrochlorothiazide (PRINZIDE,ZESTORETIC) 20-12.5 MG per tablet [Pharmacy Med Name: LISINOPRIL-HCTZ 20-12.5 MG TAB] 90 tablet 1     Sig: TAKE 1 TABLET BY MOUTH EVERY DAY      Last office visit with prescribing clinician: 2/9/2024   Last telemedicine visit with prescribing clinician: Visit date not found   Next office visit with prescribing clinician: 8/9/2024                         Would you like a call back once the refill request has been completed: [] Yes [] No    If the office needs to give you a call back, can they leave a voicemail: [] Yes [] No    Galina Koehler MA  04/19/24, 14:12 CDT

## 2024-04-24 ENCOUNTER — HOSPITAL ENCOUNTER (OUTPATIENT)
Dept: CT IMAGING | Facility: HOSPITAL | Age: 64
Discharge: HOME | End: 2024-04-24
Admitting: THORACIC SURGERY (CARDIOTHORACIC VASCULAR SURGERY)
Payer: COMMERCIAL

## 2024-04-24 ENCOUNTER — OFFICE VISIT (OUTPATIENT)
Dept: CARDIAC SURGERY | Facility: CLINIC | Age: 64
End: 2024-04-24
Payer: COMMERCIAL

## 2024-04-24 VITALS
OXYGEN SATURATION: 97 % | HEIGHT: 73 IN | BODY MASS INDEX: 30.35 KG/M2 | HEART RATE: 89 BPM | WEIGHT: 229 LBS | SYSTOLIC BLOOD PRESSURE: 112 MMHG | DIASTOLIC BLOOD PRESSURE: 70 MMHG

## 2024-04-24 DIAGNOSIS — I71.9 AORTIC ANEURYSM WITHOUT RUPTURE, UNSPECIFIED PORTION OF AORTA: ICD-10-CM

## 2024-04-24 DIAGNOSIS — I71.21 ANEURYSM OF ASCENDING AORTA WITHOUT RUPTURE: Primary | ICD-10-CM

## 2024-04-24 LAB — CREAT BLDA-MCNC: 1 MG/DL (ref 0.6–1.3)

## 2024-04-24 PROCEDURE — 99213 OFFICE O/P EST LOW 20 MIN: CPT | Performed by: THORACIC SURGERY (CARDIOTHORACIC VASCULAR SURGERY)

## 2024-04-24 PROCEDURE — 71275 CT ANGIOGRAPHY CHEST: CPT

## 2024-04-24 PROCEDURE — 25510000001 IOPAMIDOL PER 1 ML: Performed by: THORACIC SURGERY (CARDIOTHORACIC VASCULAR SURGERY)

## 2024-04-24 PROCEDURE — 82565 ASSAY OF CREATININE: CPT

## 2024-04-24 RX ADMIN — IOPAMIDOL 100 ML: 755 INJECTION, SOLUTION INTRAVENOUS at 15:15

## 2024-04-25 ENCOUNTER — TELEPHONE (OUTPATIENT)
Dept: CARDIAC SURGERY | Facility: CLINIC | Age: 64
End: 2024-04-25
Payer: COMMERCIAL

## 2024-04-25 NOTE — TELEPHONE ENCOUNTER
Called patient to see if he has seen a cardiologist before, he has not.  He has no preference on provider.  Referral placed to cardiology services for preop planning as he will need a heart catheterization.  He verbalized understanding.

## 2024-05-03 ENCOUNTER — OFFICE VISIT (OUTPATIENT)
Dept: CARDIOLOGY | Facility: CLINIC | Age: 64
End: 2024-05-03
Payer: COMMERCIAL

## 2024-05-03 VITALS
WEIGHT: 227 LBS | HEART RATE: 63 BPM | SYSTOLIC BLOOD PRESSURE: 115 MMHG | DIASTOLIC BLOOD PRESSURE: 77 MMHG | BODY MASS INDEX: 30.75 KG/M2 | HEIGHT: 72 IN

## 2024-05-03 DIAGNOSIS — R06.09 DOE (DYSPNEA ON EXERTION): ICD-10-CM

## 2024-05-03 DIAGNOSIS — I10 PRIMARY HYPERTENSION: ICD-10-CM

## 2024-05-03 DIAGNOSIS — I71.21 ANEURYSM OF ASCENDING AORTA WITHOUT RUPTURE: Primary | ICD-10-CM

## 2024-05-03 PROCEDURE — 99204 OFFICE O/P NEW MOD 45 MIN: CPT | Performed by: INTERNAL MEDICINE

## 2024-05-03 PROCEDURE — 93000 ELECTROCARDIOGRAM COMPLETE: CPT | Performed by: INTERNAL MEDICINE

## 2024-05-03 RX ORDER — SODIUM CHLORIDE 9 MG/ML
40 INJECTION, SOLUTION INTRAVENOUS AS NEEDED
OUTPATIENT
Start: 2024-05-03

## 2024-05-03 RX ORDER — ROSUVASTATIN CALCIUM 5 MG/1
5 TABLET, COATED ORAL DAILY
Qty: 90 TABLET | Refills: 3 | Status: SHIPPED | OUTPATIENT
Start: 2024-05-03

## 2024-05-03 RX ORDER — SODIUM CHLORIDE 0.9 % (FLUSH) 0.9 %
10 SYRINGE (ML) INJECTION AS NEEDED
OUTPATIENT
Start: 2024-05-03

## 2024-05-03 RX ORDER — SODIUM CHLORIDE 0.9 % (FLUSH) 0.9 %
3 SYRINGE (ML) INJECTION EVERY 12 HOURS SCHEDULED
OUTPATIENT
Start: 2024-05-03

## 2024-05-03 NOTE — PROGRESS NOTES
Michael Issa  [id number]  1960  64 y.o.  male    Referring Provider: Mansi Zapata, DNP, APRN    Reason for  Visit:  Initial visit       Subjective    Mild chronic exertional shortness of breath on exertion relieved with rest  No significant cough or wheezing    No palpitations  No associated chest pain  No significant pedal edema    No fever or chills  No significant expectoration    No hemoptysis  No presyncope or syncope    Tolerating current medications well with no untoward side effects   Compliant with prescribed medication regimen. Tries to adhere to cardiac diet.     Ex smoker   BP in clinic as below  well  BP in clinic as below      History of present illness:  Michael Issa is a 64 y.o. yo male with thoracic aortic aneurysm   who presents today for   Chief Complaint   Patient presents with    [facility]    Aortic aneurysm without rupture     Sees Dr. Gtz - needs cath prior to ct surgery   .    History  Past Medical History:   Diagnosis Date    Aneurysm October 4th 2023    Arthritis     Disease of thyroid gland     NODULE PRESENT     Hx of chest pain     Hypertension    ,   Past Surgical History:   Procedure Laterality Date    COLONOSCOPY N/A 09/23/2021    Procedure: COLONOSCOPY;  Surgeon: Eduardo Neil MD;  Location: [facility] ENDOSCOPY;  Service: Gastroenterology;  Laterality: N/A;  pre  post diverticulosis, hemorrhoids  Dr. zapata    KNEE SURGERY  2014    rt knee MENISCUS    PREPERITONEAL HERNIA REPAIR Bilateral 09/20/2017    Procedure: BILATERAL PREPERITONEAL INGUINAL HERNIA REPAIR LAPAROSCOPIC WITH MESH;  Surgeon: Rambo Church MD;  Location: [facility] OR;  Service:    ,   Family History   Problem Relation Age of Onset    Alzheimer's disease Mother     Diabetes Father     Heart disease Father     Heart attack Father     Heart failure Father     Hypertension Father     No Known Problems Sister     No Known Problems Brother     No Known Problems Daughter     No Known Problems  Maternal Grandmother     No Known Problems Maternal Grandfather     No Known Problems Paternal Grandmother     No Known Problems Paternal Grandfather     Hypertension Brother     No Known Problems Brother    ,   Social History     Tobacco Use    Smoking status: Former     Current packs/day: 0.00     Average packs/day: 0.5 packs/day for 30.0 years (15.0 ttl pk-yrs)     Types: Cigarettes     Start date: 1997     Quit date: 2017     Years since quittin.0     Passive exposure: Past    Smokeless tobacco: Never    Tobacco comments:     Ex smoker   Vaping Use    Vaping status: Never Used   Substance Use Topics    Alcohol use: Not Currently    Drug use: Not Currently   ,     Medications  Current Outpatient Medications   Medication Sig Dispense Refill    Ascorbic Acid (VITAMIN C PO) Take 1,000 mg by mouth Daily. ON HOLD 9-15-17      lisinopril-hydrochlorothiazide (PRINZIDE,ZESTORETIC) 20-12.5 MG per tablet TAKE 1 TABLET BY MOUTH EVERY DAY 90 tablet 1    sildenafil (VIAGRA) 100 MG tablet Take 1 tablet by mouth Daily As Needed for Erectile Dysfunction. 30 tablet 5    tamsulosin (FLOMAX) 0.4 MG capsule 24 hr capsule Take 1 capsule by mouth Daily. 30 capsule 2    valACYclovir (VALTREX) 1000 MG tablet Take 2 tablets by mouth 2 (Two) Times a Day. 15 tablet 3    rosuvastatin (CRESTOR) 5 MG tablet Take 1 tablet by mouth Daily. 90 tablet 3     No current facility-administered medications for this visit.       Allergies:  Penicillins    Review of Systems  Review of Systems   Constitutional: Negative.   HENT: Negative.     Eyes: Negative.    Cardiovascular:  Positive for dyspnea on exertion. Negative for chest pain, claudication, cyanosis, irregular heartbeat, leg swelling, near-syncope, orthopnea, palpitations, paroxysmal nocturnal dyspnea and syncope.   Respiratory: Negative.     Endocrine: Negative.    Hematologic/Lymphatic: Negative.    Skin: Negative.    Musculoskeletal:  Positive for arthritis, back pain and joint  "pain.   Gastrointestinal:  Negative for anorexia.   Genitourinary: Negative.    Neurological: Negative.    Psychiatric/Behavioral: Negative.         Objective     Physical Exam:  /77   Pulse 63   Ht 182.9 cm (72\")   Wt 103 kg (227 lb)   BMI 30.79 kg/m²     Physical Exam  Constitutional:       Appearance: He is well-developed.   HENT:      Head: Normocephalic.   Neck:      Vascular: Normal carotid pulses. No carotid bruit or JVD.      Trachea: No tracheal tenderness or tracheal deviation.   Cardiovascular:      Rate and Rhythm: Regular rhythm.      Pulses: Normal pulses.      Heart sounds: Normal heart sounds.   Pulmonary:      Effort: Pulmonary effort is normal.      Breath sounds: No stridor.   Abdominal:      General: There is no distension.      Palpations: Abdomen is soft.      Tenderness: There is no abdominal tenderness.   Musculoskeletal:      Cervical back: No edema.   Skin:     General: Skin is warm.   Neurological:      Mental Status: He is alert.      Cranial Nerves: No cranial nerve deficit.      Sensory: No sensory deficit.   Psychiatric:         Speech: Speech normal.         Behavior: Behavior normal.         Results Review:    MPRESSION:  1.  No change in aneurysmal dilatation of the ascending aorta measuring  4.8 cm diameter.  2.  No change in aortic root dilatation measuring 4.5 cm diameter.  3.  The main pulmonary artery is dilated, which can indicate pulmonary  hypertension.     This report was signed and finalized on 12/20/2023 10:25 AM by Dr. Slim Lynn MD.       Results for orders placed during the hospital encounter of 11/10/23    Adult Transthoracic Echo Complete W/ Cont if Necessary Per Protocol    Interpretation Summary    Left ventricular systolic function is normal. Left ventricular ejection fraction appears to be 56 - 60%.    Left ventricular wall thickness is consistent with mild concentric hypertrophy.    Left ventricular diastolic function is consistent with (grade " I) impaired relaxation.    Estimated right ventricular systolic pressure from tricuspid regurgitation is mildly elevated (35-45 mmHg).    Normal size and function of the right ventricle.    No significant (greater than mild) valvular pathology.    Mild dilation of the aortic root is present (4.0 cm). Moderate dilation of the ascending aorta is present.  (4.8 cm).        ____________________________________________________________________________________________________________________________________________  Health maintenance and recommendations    Low salt/ HTN/ Heart healthy carbohydrate restricted cardiac diet   The patient is advised to reduce or avoid caffeine or other cardiac stimulants.   Minimize or avoid  NSAID-type medications      Monitor for any signs of bleeding including red or dark stools. Fall precautions.   Advised staying uptodate with immunizations per established standard guidelines.    Offered to give patient  a copy of my notes     Questions were encouraged, asked and answered to the patient's  understanding and satisfaction. Questions if any regarding current medications and side effects, need for refills and importance of compliance to medications stressed.    Reviewed available prior notes, consults, prior visits, laboratory findings, radiology and cardiology relevant reports. Updated chart as applicable. I have reviewed the patient's medical history in detail and updated the computerized patient record as relevant.      Updated patient regarding any new or relevant abnormalities on review of records or any new findings on physical exam. Mentioned to patient about purpose of visit and desirable health short and long term goals and objectives.    Primary to monitor CBC CMP Lipid panel and TSH as applicable    ___________________________________________________________________________________________________________________________________________     ECG 12 Lead    Date/Time: 5/3/2024 11:02  AM  Performed by: Nima Nickerson MD    Authorized by: Nima Nickerson MD  Comparison: compared with previous ECG from 10/23/2022  Comparison to previous ECG: Ventricular rate changed from 74  to 63  beats per minute      Rhythm: sinus rhythm  Rate: normal  Conduction: conduction normal  ST Segments: ST segments normal  T Waves: T waves normal  QRS axis: normal  Other: no other findings    Clinical impression: normal ECG          Assessment & Plan   Diagnoses and all orders for this visit:    1. Aneurysm of ascending aorta without rupture (Primary)  -     Case Request Cath Lab: Cardiac Catheterization/Vascular Study; Standing  -     Clip bilateral groin.; Standing  -     CBC & Differential; Standing  -     Comprehensive Metabolic Panel; Standing  -     Protime-INR; Standing  -     Insert Peripheral IV; Standing  -     Saline Lock & Maintain IV Access; Standing  -     sodium chloride 0.9 % flush 3 mL  -     sodium chloride 0.9 % flush 10 mL  -     sodium chloride 0.9 % infusion 40 mL  -     sodium chloride 0.9 % bolus 200 mL  -     Case Request Cath Lab: Cardiac Catheterization/Vascular Study    2. Primary hypertension    Other orders  -     ECG 12 Lead  -     rosuvastatin (CRESTOR) 5 MG tablet; Take 1 tablet by mouth Daily.  Dispense: 90 tablet; Refill: 3          Plan      Recommend cardiac catheterization, selective coronary angiography, left ventriculography and percutaneous coronary intervention with application of arteriotomy hemostatic closure device.    I discussed cardiac catheterization, the procedure, risks (including bleeding, infection, vascular damage [including minor oozing, bruising, bleeding, and up to and including but not limited to the need for vascular surgery, emergency cardiothoracic surgery, contrast reaction, renal failure, respiratory failure, heart attack, stroke, arrhythmia and even death), benefits, and alternatives and the patient has voiced understanding and is willing to  proceed.    Adequate pre-hydration and post cardiac catheterization hydration.  Premedications as required and indicated for cardiac catheterization.    No contraindication to drug eluting stent placement if required  Preop clearance for thoracic aortic aneurysm surgery   Further recommendations pending results of cardiac catheterization     Patient expressed understanding  Encouraged and answered all questions   Discussed with the patient and all questioned fully answered. He will call me if any problems arise.   Discussed results of prior testing with patient : echo and CT chest   as well electrocardiogram from today      Orders Placed This Encounter   Procedures    ECG 12 Lead     This order was created via procedure documentation     Order Specific Question:   Release to patient     Answer:   Routine Release [[id number]]      Check BP and heart rates twice daily initially till blood pressures and heart rates under good control and then at least 3x / week,   If blood pressures continue to be well-controlled then can check week a month  at home and bring a recording for review next visit  If BP >130/85 or < 100/60 persistently over 3 reading 30 mins apart or if heart rates persistently above 100 bpm or less than 55 bpm call sooner for evaluation and advise     Start statin therapy     Keep LDL below 70 mg/dl. Monitor liver and renal functions.   Monitor CBC, CMP, TSH (as indicated) and Lipid Panel by primary       Requested Prescriptions     Signed Prescriptions Disp Refills    rosuvastatin (CRESTOR) 5 MG tablet 90 tablet 3     Sig: Take 1 tablet by mouth Daily.    Keep LDL below 70 mg/dl. Monitor liver and renal functions.   Monitor CBC, CMP, TSH (as indicated) and Lipid Panel by primary             Return in about 3 months (around 8/3/2024).

## 2024-05-03 NOTE — H&P (VIEW-ONLY)
Michael Issa  [id number]  1960  64 y.o.  male    Referring Provider: Mansi Zapata, DNP, APRN    Reason for  Visit:  Initial visit       Subjective    Mild chronic exertional shortness of breath on exertion relieved with rest  No significant cough or wheezing    No palpitations  No associated chest pain  No significant pedal edema    No fever or chills  No significant expectoration    No hemoptysis  No presyncope or syncope    Tolerating current medications well with no untoward side effects   Compliant with prescribed medication regimen. Tries to adhere to cardiac diet.     Ex smoker   BP in clinic as below  well  BP in clinic as below      History of present illness:  Michael Issa is a 64 y.o. yo male with thoracic aortic aneurysm   who presents today for   Chief Complaint   Patient presents with    [facility]    Aortic aneurysm without rupture     Sees Dr. Gtz - needs cath prior to ct surgery   .    History  Past Medical History:   Diagnosis Date    Aneurysm October 4th 2023    Arthritis     Disease of thyroid gland     NODULE PRESENT     Hx of chest pain     Hypertension    ,   Past Surgical History:   Procedure Laterality Date    COLONOSCOPY N/A 09/23/2021    Procedure: COLONOSCOPY;  Surgeon: Eduardo Neil MD;  Location: [facility] ENDOSCOPY;  Service: Gastroenterology;  Laterality: N/A;  pre  post diverticulosis, hemorrhoids  Dr. zapata    KNEE SURGERY  2014    rt knee MENISCUS    PREPERITONEAL HERNIA REPAIR Bilateral 09/20/2017    Procedure: BILATERAL PREPERITONEAL INGUINAL HERNIA REPAIR LAPAROSCOPIC WITH MESH;  Surgeon: Rambo Church MD;  Location: [facility] OR;  Service:    ,   Family History   Problem Relation Age of Onset    Alzheimer's disease Mother     Diabetes Father     Heart disease Father     Heart attack Father     Heart failure Father     Hypertension Father     No Known Problems Sister     No Known Problems Brother     No Known Problems Daughter     No Known Problems  Maternal Grandmother     No Known Problems Maternal Grandfather     No Known Problems Paternal Grandmother     No Known Problems Paternal Grandfather     Hypertension Brother     No Known Problems Brother    ,   Social History     Tobacco Use    Smoking status: Former     Current packs/day: 0.00     Average packs/day: 0.5 packs/day for 30.0 years (15.0 ttl pk-yrs)     Types: Cigarettes     Start date: 1997     Quit date: 2017     Years since quittin.0     Passive exposure: Past    Smokeless tobacco: Never    Tobacco comments:     Ex smoker   Vaping Use    Vaping status: Never Used   Substance Use Topics    Alcohol use: Not Currently    Drug use: Not Currently   ,     Medications  Current Outpatient Medications   Medication Sig Dispense Refill    Ascorbic Acid (VITAMIN C PO) Take 1,000 mg by mouth Daily. ON HOLD 9-15-17      lisinopril-hydrochlorothiazide (PRINZIDE,ZESTORETIC) 20-12.5 MG per tablet TAKE 1 TABLET BY MOUTH EVERY DAY 90 tablet 1    sildenafil (VIAGRA) 100 MG tablet Take 1 tablet by mouth Daily As Needed for Erectile Dysfunction. 30 tablet 5    tamsulosin (FLOMAX) 0.4 MG capsule 24 hr capsule Take 1 capsule by mouth Daily. 30 capsule 2    valACYclovir (VALTREX) 1000 MG tablet Take 2 tablets by mouth 2 (Two) Times a Day. 15 tablet 3    rosuvastatin (CRESTOR) 5 MG tablet Take 1 tablet by mouth Daily. 90 tablet 3     No current facility-administered medications for this visit.       Allergies:  Penicillins    Review of Systems  Review of Systems   Constitutional: Negative.   HENT: Negative.     Eyes: Negative.    Cardiovascular:  Positive for dyspnea on exertion. Negative for chest pain, claudication, cyanosis, irregular heartbeat, leg swelling, near-syncope, orthopnea, palpitations, paroxysmal nocturnal dyspnea and syncope.   Respiratory: Negative.     Endocrine: Negative.    Hematologic/Lymphatic: Negative.    Skin: Negative.    Musculoskeletal:  Positive for arthritis, back pain and joint  "pain.   Gastrointestinal:  Negative for anorexia.   Genitourinary: Negative.    Neurological: Negative.    Psychiatric/Behavioral: Negative.         Objective     Physical Exam:  /77   Pulse 63   Ht 182.9 cm (72\")   Wt 103 kg (227 lb)   BMI 30.79 kg/m²     Physical Exam  Constitutional:       Appearance: He is well-developed.   HENT:      Head: Normocephalic.   Neck:      Vascular: Normal carotid pulses. No carotid bruit or JVD.      Trachea: No tracheal tenderness or tracheal deviation.   Cardiovascular:      Rate and Rhythm: Regular rhythm.      Pulses: Normal pulses.      Heart sounds: Normal heart sounds.   Pulmonary:      Effort: Pulmonary effort is normal.      Breath sounds: No stridor.   Abdominal:      General: There is no distension.      Palpations: Abdomen is soft.      Tenderness: There is no abdominal tenderness.   Musculoskeletal:      Cervical back: No edema.   Skin:     General: Skin is warm.   Neurological:      Mental Status: He is alert.      Cranial Nerves: No cranial nerve deficit.      Sensory: No sensory deficit.   Psychiatric:         Speech: Speech normal.         Behavior: Behavior normal.         Results Review:    MPRESSION:  1.  No change in aneurysmal dilatation of the ascending aorta measuring  4.8 cm diameter.  2.  No change in aortic root dilatation measuring 4.5 cm diameter.  3.  The main pulmonary artery is dilated, which can indicate pulmonary  hypertension.     This report was signed and finalized on 12/20/2023 10:25 AM by Dr. Slim Lynn MD.       Results for orders placed during the hospital encounter of 11/10/23    Adult Transthoracic Echo Complete W/ Cont if Necessary Per Protocol    Interpretation Summary    Left ventricular systolic function is normal. Left ventricular ejection fraction appears to be 56 - 60%.    Left ventricular wall thickness is consistent with mild concentric hypertrophy.    Left ventricular diastolic function is consistent with (grade " I) impaired relaxation.    Estimated right ventricular systolic pressure from tricuspid regurgitation is mildly elevated (35-45 mmHg).    Normal size and function of the right ventricle.    No significant (greater than mild) valvular pathology.    Mild dilation of the aortic root is present (4.0 cm). Moderate dilation of the ascending aorta is present.  (4.8 cm).        ____________________________________________________________________________________________________________________________________________  Health maintenance and recommendations    Low salt/ HTN/ Heart healthy carbohydrate restricted cardiac diet   The patient is advised to reduce or avoid caffeine or other cardiac stimulants.   Minimize or avoid  NSAID-type medications      Monitor for any signs of bleeding including red or dark stools. Fall precautions.   Advised staying uptodate with immunizations per established standard guidelines.    Offered to give patient  a copy of my notes     Questions were encouraged, asked and answered to the patient's  understanding and satisfaction. Questions if any regarding current medications and side effects, need for refills and importance of compliance to medications stressed.    Reviewed available prior notes, consults, prior visits, laboratory findings, radiology and cardiology relevant reports. Updated chart as applicable. I have reviewed the patient's medical history in detail and updated the computerized patient record as relevant.      Updated patient regarding any new or relevant abnormalities on review of records or any new findings on physical exam. Mentioned to patient about purpose of visit and desirable health short and long term goals and objectives.    Primary to monitor CBC CMP Lipid panel and TSH as applicable    ___________________________________________________________________________________________________________________________________________     ECG 12 Lead    Date/Time: 5/3/2024 11:02  AM  Performed by: Nima Nickerson MD    Authorized by: Nima Nickerson MD  Comparison: compared with previous ECG from 10/23/2022  Comparison to previous ECG: Ventricular rate changed from 74  to 63  beats per minute      Rhythm: sinus rhythm  Rate: normal  Conduction: conduction normal  ST Segments: ST segments normal  T Waves: T waves normal  QRS axis: normal  Other: no other findings    Clinical impression: normal ECG          Assessment & Plan   Diagnoses and all orders for this visit:    1. Aneurysm of ascending aorta without rupture (Primary)  -     Case Request Cath Lab: Cardiac Catheterization/Vascular Study; Standing  -     Clip bilateral groin.; Standing  -     CBC & Differential; Standing  -     Comprehensive Metabolic Panel; Standing  -     Protime-INR; Standing  -     Insert Peripheral IV; Standing  -     Saline Lock & Maintain IV Access; Standing  -     sodium chloride 0.9 % flush 3 mL  -     sodium chloride 0.9 % flush 10 mL  -     sodium chloride 0.9 % infusion 40 mL  -     sodium chloride 0.9 % bolus 200 mL  -     Case Request Cath Lab: Cardiac Catheterization/Vascular Study    2. Primary hypertension    Other orders  -     ECG 12 Lead  -     rosuvastatin (CRESTOR) 5 MG tablet; Take 1 tablet by mouth Daily.  Dispense: 90 tablet; Refill: 3          Plan      Recommend cardiac catheterization, selective coronary angiography, left ventriculography and percutaneous coronary intervention with application of arteriotomy hemostatic closure device.    I discussed cardiac catheterization, the procedure, risks (including bleeding, infection, vascular damage [including minor oozing, bruising, bleeding, and up to and including but not limited to the need for vascular surgery, emergency cardiothoracic surgery, contrast reaction, renal failure, respiratory failure, heart attack, stroke, arrhythmia and even death), benefits, and alternatives and the patient has voiced understanding and is willing to  proceed.    Adequate pre-hydration and post cardiac catheterization hydration.  Premedications as required and indicated for cardiac catheterization.    No contraindication to drug eluting stent placement if required  Preop clearance for thoracic aortic aneurysm surgery   Further recommendations pending results of cardiac catheterization     Patient expressed understanding  Encouraged and answered all questions   Discussed with the patient and all questioned fully answered. He will call me if any problems arise.   Discussed results of prior testing with patient : echo and CT chest   as well electrocardiogram from today      Orders Placed This Encounter   Procedures    ECG 12 Lead     This order was created via procedure documentation     Order Specific Question:   Release to patient     Answer:   Routine Release [[id number]]      Check BP and heart rates twice daily initially till blood pressures and heart rates under good control and then at least 3x / week,   If blood pressures continue to be well-controlled then can check week a month  at home and bring a recording for review next visit  If BP >130/85 or < 100/60 persistently over 3 reading 30 mins apart or if heart rates persistently above 100 bpm or less than 55 bpm call sooner for evaluation and advise     Start statin therapy     Keep LDL below 70 mg/dl. Monitor liver and renal functions.   Monitor CBC, CMP, TSH (as indicated) and Lipid Panel by primary       Requested Prescriptions     Signed Prescriptions Disp Refills    rosuvastatin (CRESTOR) 5 MG tablet 90 tablet 3     Sig: Take 1 tablet by mouth Daily.    Keep LDL below 70 mg/dl. Monitor liver and renal functions.   Monitor CBC, CMP, TSH (as indicated) and Lipid Panel by primary             Return in about 3 months (around 8/3/2024).

## 2024-05-06 ENCOUNTER — HOSPITAL ENCOUNTER (OUTPATIENT)
Dept: CARDIOLOGY | Facility: HOSPITAL | Age: 64
Discharge: HOME | End: 2024-05-06
Payer: COMMERCIAL

## 2024-05-06 VITALS
DIASTOLIC BLOOD PRESSURE: 73 MMHG | WEIGHT: 227 LBS | HEIGHT: 72 IN | BODY MASS INDEX: 30.75 KG/M2 | SYSTOLIC BLOOD PRESSURE: 120 MMHG

## 2024-05-06 DIAGNOSIS — I71.21 ANEURYSM OF ASCENDING AORTA WITHOUT RUPTURE: ICD-10-CM

## 2024-05-06 PROCEDURE — 93356 MYOCRD STRAIN IMG SPCKL TRCK: CPT

## 2024-05-06 PROCEDURE — 93306 TTE W/DOPPLER COMPLETE: CPT

## 2024-05-07 ENCOUNTER — HOSPITAL ENCOUNTER (OUTPATIENT)
Dept: PULMONOLOGY | Facility: HOSPITAL | Age: 64
Discharge: HOME | End: 2024-05-07
Payer: COMMERCIAL

## 2024-05-07 DIAGNOSIS — I71.21 ANEURYSM OF ASCENDING AORTA WITHOUT RUPTURE: ICD-10-CM

## 2024-05-07 LAB
ARTERIAL PATENCY WRIST A: ABNORMAL
ATMOSPHERIC PRESS: 746 MMHG
BASE EXCESS BLDA CALC-SCNC: 3.5 MMOL/L (ref 0–2)
BDY SITE: ABNORMAL
BODY TEMPERATURE: 37
HCO3 BLDA-SCNC: 28.8 MMOL/L (ref 20–26)
Lab: ABNORMAL
MODALITY: ABNORMAL
PCO2 BLDA: 45.5 MM HG (ref 35–45)
PCO2 TEMP ADJ BLD: 45.5 MM HG (ref 35–45)
PH BLDA: 7.41 PH UNITS (ref 7.35–7.45)
PH, TEMP CORRECTED: 7.41 PH UNITS (ref 7.35–7.45)
PO2 BLDA: 72.4 MM HG (ref 83–108)
PO2 TEMP ADJ BLD: 72.4 MM HG (ref 83–108)
SAO2 % BLDCOA: 95.7 % (ref 94–99)
VENTILATOR MODE: ABNORMAL

## 2024-05-07 PROCEDURE — 94729 DIFFUSING CAPACITY: CPT | Performed by: INTERNAL MEDICINE

## 2024-05-07 PROCEDURE — 82803 BLOOD GASES ANY COMBINATION: CPT

## 2024-05-07 PROCEDURE — 36600 WITHDRAWAL OF ARTERIAL BLOOD: CPT

## 2024-05-07 PROCEDURE — 94060 EVALUATION OF WHEEZING: CPT

## 2024-05-07 PROCEDURE — 94726 PLETHYSMOGRAPHY LUNG VOLUMES: CPT | Performed by: INTERNAL MEDICINE

## 2024-05-07 PROCEDURE — 94726 PLETHYSMOGRAPHY LUNG VOLUMES: CPT

## 2024-05-07 PROCEDURE — 94060 EVALUATION OF WHEEZING: CPT | Performed by: INTERNAL MEDICINE

## 2024-05-07 PROCEDURE — 94729 DIFFUSING CAPACITY: CPT

## 2024-05-07 RX ORDER — ALBUTEROL SULFATE 2.5 MG/3ML
2.5 SOLUTION RESPIRATORY (INHALATION) ONCE
Status: COMPLETED | OUTPATIENT
Start: 2024-05-07 | End: 2024-05-07

## 2024-05-07 RX ADMIN — ALBUTEROL SULFATE 2.5 MG: 2.5 SOLUTION RESPIRATORY (INHALATION) at 07:30

## 2024-05-09 ENCOUNTER — HOSPITAL ENCOUNTER (OUTPATIENT)
Dept: ULTRASOUND IMAGING | Facility: HOSPITAL | Age: 64
Discharge: HOME | End: 2024-05-09
Payer: COMMERCIAL

## 2024-05-09 DIAGNOSIS — I71.21 ANEURYSM OF ASCENDING AORTA WITHOUT RUPTURE: ICD-10-CM

## 2024-05-09 PROCEDURE — 93880 EXTRACRANIAL BILAT STUDY: CPT

## 2024-05-10 DIAGNOSIS — N40.1 BENIGN PROSTATIC HYPERPLASIA WITH URINARY RETENTION: ICD-10-CM

## 2024-05-10 DIAGNOSIS — R33.8 BENIGN PROSTATIC HYPERPLASIA WITH URINARY RETENTION: ICD-10-CM

## 2024-05-10 LAB
ASCENDING AORTA: 4.8 CM
BH CV ECHO LEFT VENTRICLE GLOBAL LONGITUDINAL STRAIN: -17.1 %
BH CV ECHO MEAS - AO MAX PG: 9.9 MMHG
BH CV ECHO MEAS - AO MEAN PG: 6 MMHG
BH CV ECHO MEAS - AO V2 MAX: 157 CM/SEC
BH CV ECHO MEAS - AO V2 VTI: 33.2 CM
BH CV ECHO MEAS - AVA(I,D): 3.7 CM2
BH CV ECHO MEAS - EDV(CUBED): 125 ML
BH CV ECHO MEAS - EDV(MOD-SP2): 74 ML
BH CV ECHO MEAS - EDV(MOD-SP4): 73.2 ML
BH CV ECHO MEAS - EF(MOD-BP): 56.6 %
BH CV ECHO MEAS - EF(MOD-SP2): 58.8 %
BH CV ECHO MEAS - EF(MOD-SP4): 55.9 %
BH CV ECHO MEAS - ESV(CUBED): 42.9 ML
BH CV ECHO MEAS - ESV(MOD-SP2): 30.5 ML
BH CV ECHO MEAS - ESV(MOD-SP4): 32.3 ML
BH CV ECHO MEAS - FS: 30 %
BH CV ECHO MEAS - IVS/LVPW: 0.69 CM
BH CV ECHO MEAS - IVSD: 0.9 CM
BH CV ECHO MEAS - LA DIMENSION: 4.3 CM
BH CV ECHO MEAS - LAT PEAK E' VEL: 9.9 CM/SEC
BH CV ECHO MEAS - LV DIASTOLIC VOL/BSA (35-75): 32.6 CM2
BH CV ECHO MEAS - LV MASS(C)D: 207.1 GRAMS
BH CV ECHO MEAS - LV MAX PG: 6.2 MMHG
BH CV ECHO MEAS - LV MEAN PG: 3 MMHG
BH CV ECHO MEAS - LV SYSTOLIC VOL/BSA (12-30): 14.4 CM2
BH CV ECHO MEAS - LV V1 MAX: 124 CM/SEC
BH CV ECHO MEAS - LV V1 VTI: 27.2 CM
BH CV ECHO MEAS - LVIDD: 5 CM
BH CV ECHO MEAS - LVIDS: 3.5 CM
BH CV ECHO MEAS - LVOT AREA: 4.5 CM2
BH CV ECHO MEAS - LVOT DIAM: 2.4 CM
BH CV ECHO MEAS - LVPWD: 1.3 CM
BH CV ECHO MEAS - MED PEAK E' VEL: 7.8 CM/SEC
BH CV ECHO MEAS - MR MAX PG: 48.9 MMHG
BH CV ECHO MEAS - MR MAX VEL: 347.3 CM/SEC
BH CV ECHO MEAS - MV A MAX VEL: 81.6 CM/SEC
BH CV ECHO MEAS - MV DEC SLOPE: 188 CM/SEC2
BH CV ECHO MEAS - MV E MAX VEL: 59.6 CM/SEC
BH CV ECHO MEAS - MV E/A: 0.73
BH CV ECHO MEAS - MV P1/2T: 99.9 MSEC
BH CV ECHO MEAS - MVA(P1/2T): 2.2 CM2
BH CV ECHO MEAS - PA V2 MAX: 90.2 CM/SEC
BH CV ECHO MEAS - RAP SYSTOLE: 5 MMHG
BH CV ECHO MEAS - RV MAX PG: 2.2 MMHG
BH CV ECHO MEAS - RV V1 MAX: 74.1 CM/SEC
BH CV ECHO MEAS - RVDD: 4 CM
BH CV ECHO MEAS - RVSP: 39.6 MMHG
BH CV ECHO MEAS - SV(LVOT): 123 ML
BH CV ECHO MEAS - SV(MOD-SP2): 43.5 ML
BH CV ECHO MEAS - SV(MOD-SP4): 40.9 ML
BH CV ECHO MEAS - SVI(LVOT): 54.7 ML/M2
BH CV ECHO MEAS - SVI(MOD-SP2): 19.3 ML/M2
BH CV ECHO MEAS - SVI(MOD-SP4): 18.2 ML/M2
BH CV ECHO MEAS - TAPSE (>1.6): 2.49 CM
BH CV ECHO MEAS - TR MAX PG: 34.6 MMHG
BH CV ECHO MEAS - TR MAX VEL: 294 CM/SEC
BH CV ECHO MEASUREMENTS AVERAGE E/E' RATIO: 6.73
BH CV XLRA - RV BASE: 2.9 CM
LEFT ATRIUM VOLUME INDEX: 21.9 ML/M2
LEFT ATRIUM VOLUME: 49.3 ML

## 2024-05-10 RX ORDER — TAMSULOSIN HYDROCHLORIDE 0.4 MG/1
1 CAPSULE ORAL DAILY
Qty: 90 CAPSULE | Refills: 0 | Status: SHIPPED | OUTPATIENT
Start: 2024-05-10

## 2024-05-14 ENCOUNTER — HOSPITAL ENCOUNTER (OUTPATIENT)
Facility: HOSPITAL | Age: 64
Setting detail: HOSPITAL OUTPATIENT SURGERY
Discharge: HOME | End: 2024-05-14
Attending: INTERNAL MEDICINE | Admitting: INTERNAL MEDICINE
Payer: COMMERCIAL

## 2024-05-14 VITALS
HEART RATE: 58 BPM | SYSTOLIC BLOOD PRESSURE: 130 MMHG | WEIGHT: 223.4 LBS | HEIGHT: 72 IN | RESPIRATION RATE: 15 BRPM | DIASTOLIC BLOOD PRESSURE: 89 MMHG | BODY MASS INDEX: 30.26 KG/M2 | OXYGEN SATURATION: 95 %

## 2024-05-14 DIAGNOSIS — I71.21 ANEURYSM OF ASCENDING AORTA WITHOUT RUPTURE: ICD-10-CM

## 2024-05-14 LAB
ALBUMIN SERPL-MCNC: 4 G/DL (ref 3.5–5.2)
ALBUMIN/GLOB SERPL: 1.2 G/DL
ALP SERPL-CCNC: 52 U/L (ref 39–117)
ALT SERPL W P-5'-P-CCNC: 34 U/L (ref 1–41)
ANION GAP SERPL CALCULATED.3IONS-SCNC: 6 MMOL/L (ref 5–15)
AST SERPL-CCNC: 29 U/L (ref 1–40)
BASOPHILS # BLD AUTO: 0.05 10*3/MM3 (ref 0–0.2)
BASOPHILS NFR BLD AUTO: 0.8 % (ref 0–1.5)
BILIRUB SERPL-MCNC: 0.8 MG/DL (ref 0–1.2)
BUN SERPL-MCNC: 14 MG/DL (ref 8–23)
BUN/CREAT SERPL: 16.5 (ref 7–25)
CALCIUM SPEC-SCNC: 8.8 MG/DL (ref 8.6–10.5)
CHLORIDE SERPL-SCNC: 101 MMOL/L (ref 98–107)
CO2 SERPL-SCNC: 29 MMOL/L (ref 22–29)
CREAT SERPL-MCNC: 0.85 MG/DL (ref 0.76–1.27)
DEPRECATED RDW RBC AUTO: 41.5 FL (ref 37–54)
EGFRCR SERPLBLD CKD-EPI 2021: 98.2 ML/MIN/1.73
EOSINOPHIL # BLD AUTO: 0.16 10*3/MM3 (ref 0–0.4)
EOSINOPHIL NFR BLD AUTO: 2.6 % (ref 0.3–6.2)
ERYTHROCYTE [DISTWIDTH] IN BLOOD BY AUTOMATED COUNT: 12.6 % (ref 12.3–15.4)
GLOBULIN UR ELPH-MCNC: 3.4 GM/DL
GLUCOSE SERPL-MCNC: 97 MG/DL (ref 65–99)
HCT VFR BLD AUTO: 41.5 % (ref 37.5–51)
HGB BLD-MCNC: 14 G/DL (ref 13–17.7)
IMM GRANULOCYTES # BLD AUTO: 0.01 10*3/MM3 (ref 0–0.05)
IMM GRANULOCYTES NFR BLD AUTO: 0.2 % (ref 0–0.5)
INR PPP: 0.96 (ref 0.91–1.09)
LYMPHOCYTES # BLD AUTO: 1.75 10*3/MM3 (ref 0.7–3.1)
LYMPHOCYTES NFR BLD AUTO: 28.2 % (ref 19.6–45.3)
MCH RBC QN AUTO: 30.3 PG (ref 26.6–33)
MCHC RBC AUTO-ENTMCNC: 33.7 G/DL (ref 31.5–35.7)
MCV RBC AUTO: 89.8 FL (ref 79–97)
MONOCYTES # BLD AUTO: 0.57 10*3/MM3 (ref 0.1–0.9)
MONOCYTES NFR BLD AUTO: 9.2 % (ref 5–12)
NEUTROPHILS NFR BLD AUTO: 3.66 10*3/MM3 (ref 1.7–7)
NEUTROPHILS NFR BLD AUTO: 59 % (ref 42.7–76)
NRBC BLD AUTO-RTO: 0 /100 WBC (ref 0–0.2)
PLATELET # BLD AUTO: 218 10*3/MM3 (ref 140–450)
PMV BLD AUTO: 10.5 FL (ref 6–12)
POTASSIUM SERPL-SCNC: 4 MMOL/L (ref 3.5–5.2)
PROT SERPL-MCNC: 7.4 G/DL (ref 6–8.5)
PROTHROMBIN TIME: 13.2 SECONDS (ref 11.8–14.8)
RBC # BLD AUTO: 4.62 10*6/MM3 (ref 4.14–5.8)
SODIUM SERPL-SCNC: 136 MMOL/L (ref 136–145)
WBC NRBC COR # BLD AUTO: 6.2 10*3/MM3 (ref 3.4–10.8)

## 2024-05-14 PROCEDURE — 25010000002 DIPHENHYDRAMINE PER 50 MG: Performed by: INTERNAL MEDICINE

## 2024-05-14 PROCEDURE — 25010000002 HEPARIN (PORCINE) 1000-0.9 UT/500ML-% SOLUTION: Performed by: INTERNAL MEDICINE

## 2024-05-14 PROCEDURE — 93454 CORONARY ARTERY ANGIO S&I: CPT | Performed by: INTERNAL MEDICINE

## 2024-05-14 PROCEDURE — 25010000002 HEPARIN (PORCINE) 2000-0.9 UNIT/L-% SOLUTION: Performed by: INTERNAL MEDICINE

## 2024-05-14 PROCEDURE — 85610 PROTHROMBIN TIME: CPT | Performed by: INTERNAL MEDICINE

## 2024-05-14 PROCEDURE — 85025 COMPLETE CBC W/AUTO DIFF WBC: CPT | Performed by: INTERNAL MEDICINE

## 2024-05-14 PROCEDURE — C1769 GUIDE WIRE: HCPCS | Performed by: INTERNAL MEDICINE

## 2024-05-14 PROCEDURE — C1894 INTRO/SHEATH, NON-LASER: HCPCS | Performed by: INTERNAL MEDICINE

## 2024-05-14 PROCEDURE — 25010000002 MIDAZOLAM PER 1 MG: Performed by: INTERNAL MEDICINE

## 2024-05-14 PROCEDURE — 25510000001 IOPAMIDOL PER 1 ML: Performed by: INTERNAL MEDICINE

## 2024-05-14 PROCEDURE — 99152 MOD SED SAME PHYS/QHP 5/>YRS: CPT | Performed by: INTERNAL MEDICINE

## 2024-05-14 PROCEDURE — 80053 COMPREHEN METABOLIC PANEL: CPT | Performed by: INTERNAL MEDICINE

## 2024-05-14 PROCEDURE — 25010000002 FENTANYL CITRATE (PF) 50 MCG/ML SOLUTION: Performed by: INTERNAL MEDICINE

## 2024-05-14 RX ORDER — SODIUM CHLORIDE 0.9 % (FLUSH) 0.9 %
10 SYRINGE (ML) INJECTION EVERY 12 HOURS SCHEDULED
Status: CANCELLED | OUTPATIENT
Start: 2024-05-14

## 2024-05-14 RX ORDER — LIDOCAINE HYDROCHLORIDE 20 MG/ML
INJECTION, SOLUTION INFILTRATION; PERINEURAL
Status: DISCONTINUED | OUTPATIENT
Start: 2024-05-14 | End: 2024-05-14 | Stop reason: HOSPADM

## 2024-05-14 RX ORDER — SODIUM CHLORIDE 0.9 % (FLUSH) 0.9 %
10 SYRINGE (ML) INJECTION AS NEEDED
Status: DISCONTINUED | OUTPATIENT
Start: 2024-05-14 | End: 2024-05-14 | Stop reason: HOSPADM

## 2024-05-14 RX ORDER — SODIUM CHLORIDE 0.9 % (FLUSH) 0.9 %
3 SYRINGE (ML) INJECTION EVERY 12 HOURS SCHEDULED
Status: DISCONTINUED | OUTPATIENT
Start: 2024-05-14 | End: 2024-05-14 | Stop reason: HOSPADM

## 2024-05-14 RX ORDER — VERAPAMIL HYDROCHLORIDE 2.5 MG/ML
INJECTION, SOLUTION INTRAVENOUS
Status: DISCONTINUED | OUTPATIENT
Start: 2024-05-14 | End: 2024-05-14 | Stop reason: HOSPADM

## 2024-05-14 RX ORDER — FENTANYL CITRATE 50 UG/ML
INJECTION, SOLUTION INTRAMUSCULAR; INTRAVENOUS
Status: DISCONTINUED | OUTPATIENT
Start: 2024-05-14 | End: 2024-05-14 | Stop reason: HOSPADM

## 2024-05-14 RX ORDER — MIDAZOLAM HYDROCHLORIDE 1 MG/ML
INJECTION INTRAMUSCULAR; INTRAVENOUS
Status: DISCONTINUED | OUTPATIENT
Start: 2024-05-14 | End: 2024-05-14 | Stop reason: HOSPADM

## 2024-05-14 RX ORDER — OXYCODONE AND ACETAMINOPHEN 7.5; 325 MG/1; MG/1
1 TABLET ORAL EVERY 6 HOURS PRN
COMMUNITY

## 2024-05-14 RX ORDER — ASPIRIN 81 MG/1
81 TABLET, CHEWABLE ORAL DAILY
COMMUNITY

## 2024-05-14 RX ORDER — SODIUM CHLORIDE 9 MG/ML
40 INJECTION, SOLUTION INTRAVENOUS AS NEEDED
Status: CANCELLED | OUTPATIENT
Start: 2024-05-14

## 2024-05-14 RX ORDER — ACETAMINOPHEN 325 MG/1
650 TABLET ORAL EVERY 4 HOURS PRN
Status: CANCELLED | OUTPATIENT
Start: 2024-05-14

## 2024-05-14 RX ORDER — SODIUM CHLORIDE 9 MG/ML
100 INJECTION, SOLUTION INTRAVENOUS CONTINUOUS
Status: CANCELLED | OUTPATIENT
Start: 2024-05-14

## 2024-05-14 RX ORDER — HEPARIN SODIUM 200 [USP'U]/100ML
INJECTION, SOLUTION INTRAVENOUS
Status: DISCONTINUED | OUTPATIENT
Start: 2024-05-14 | End: 2024-05-14 | Stop reason: HOSPADM

## 2024-05-14 RX ORDER — DIPHENHYDRAMINE HYDROCHLORIDE 50 MG/ML
INJECTION INTRAMUSCULAR; INTRAVENOUS
Status: DISCONTINUED | OUTPATIENT
Start: 2024-05-14 | End: 2024-05-14 | Stop reason: HOSPADM

## 2024-05-14 RX ORDER — SODIUM CHLORIDE 9 MG/ML
40 INJECTION, SOLUTION INTRAVENOUS AS NEEDED
Status: DISCONTINUED | OUTPATIENT
Start: 2024-05-14 | End: 2024-05-14 | Stop reason: HOSPADM

## 2024-05-14 RX ORDER — SODIUM CHLORIDE 0.9 % (FLUSH) 0.9 %
10 SYRINGE (ML) INJECTION AS NEEDED
Status: CANCELLED | OUTPATIENT
Start: 2024-05-14

## 2024-05-14 RX ORDER — MAGNESIUM HYDROXIDE 1200 MG/15ML
LIQUID ORAL
Status: DISCONTINUED | OUTPATIENT
Start: 2024-05-14 | End: 2024-05-14 | Stop reason: HOSPADM

## 2024-05-14 NOTE — Clinical Note
Hemostasis started on the right radial artery. Radial compression device applied to vessel. Hemostasis achieved successfully. Closure device additional comment: 14 of air

## 2024-05-15 ENCOUNTER — OFFICE VISIT (OUTPATIENT)
Dept: CARDIAC SURGERY | Facility: CLINIC | Age: 64
End: 2024-05-15
Payer: COMMERCIAL

## 2024-05-15 VITALS
HEIGHT: 72 IN | DIASTOLIC BLOOD PRESSURE: 78 MMHG | OXYGEN SATURATION: 99 % | WEIGHT: 222.8 LBS | SYSTOLIC BLOOD PRESSURE: 121 MMHG | BODY MASS INDEX: 30.18 KG/M2 | HEART RATE: 83 BPM

## 2024-05-15 DIAGNOSIS — Z87.891 FORMER SMOKER: ICD-10-CM

## 2024-05-15 DIAGNOSIS — I71.20 THORACIC AORTIC ANEURYSM WITHOUT RUPTURE, UNSPECIFIED PART: Primary | ICD-10-CM

## 2024-05-15 PROCEDURE — 99214 OFFICE O/P EST MOD 30 MIN: CPT | Performed by: THORACIC SURGERY (CARDIOTHORACIC VASCULAR SURGERY)

## 2024-05-15 RX ORDER — IBUPROFEN 800 MG/1
TABLET ORAL
COMMUNITY
Start: 2024-05-13

## 2024-05-15 RX ORDER — OXYCODONE HYDROCHLORIDE AND ACETAMINOPHEN 5; 325 MG/1; MG/1
TABLET ORAL
COMMUNITY
Start: 2024-05-13

## 2024-05-24 ENCOUNTER — TELEPHONE (OUTPATIENT)
Dept: CARDIAC SURGERY | Facility: CLINIC | Age: 64
End: 2024-05-24
Payer: COMMERCIAL

## 2024-05-24 NOTE — TELEPHONE ENCOUNTER
Attempted to reach patient to discuss surgery date of 5/31.  No answer.  Left message.  We also need to find out if he has had dental completed or not

## 2024-05-28 NOTE — TELEPHONE ENCOUNTER
Pt called back to report he had some teeth pulled with [facility] and they were supposed to send us documentation re: this.  Pt states he will contact them to request this to be sent.  Pt states he is still having some swelling from tooth extractions and thinks this Friday may be too soon and he would like to wait until next week.  Informed pt I would send a message back with this info/tucker

## 2024-05-29 ENCOUNTER — PREP FOR SURGERY (OUTPATIENT)
Dept: OTHER | Facility: HOSPITAL | Age: 64
End: 2024-05-29
Payer: COMMERCIAL

## 2024-05-29 DIAGNOSIS — R33.8 BENIGN PROSTATIC HYPERPLASIA WITH URINARY RETENTION: ICD-10-CM

## 2024-05-29 DIAGNOSIS — I71.21 ANEURYSM OF ASCENDING AORTA WITHOUT RUPTURE: Primary | ICD-10-CM

## 2024-05-29 DIAGNOSIS — N40.1 BENIGN PROSTATIC HYPERPLASIA WITH URINARY RETENTION: ICD-10-CM

## 2024-05-29 RX ORDER — SODIUM CHLORIDE 9 MG/ML
30 INJECTION, SOLUTION INTRAVENOUS CONTINUOUS PRN
OUTPATIENT
Start: 2024-05-29

## 2024-05-29 RX ORDER — CHLORHEXIDINE GLUCONATE 500 MG/1
CLOTH TOPICAL EVERY 12 HOURS PRN
OUTPATIENT
Start: 2024-05-29

## 2024-05-29 RX ORDER — SODIUM CHLORIDE 0.9 % (FLUSH) 0.9 %
3 SYRINGE (ML) INJECTION EVERY 12 HOURS SCHEDULED
OUTPATIENT
Start: 2024-05-29

## 2024-05-29 RX ORDER — NICOTINE POLACRILEX 4 MG
15 LOZENGE BUCCAL
OUTPATIENT
Start: 2024-05-29

## 2024-05-29 RX ORDER — PREGABALIN 25 MG/1
25 CAPSULE ORAL ONCE
OUTPATIENT
Start: 2024-06-06

## 2024-05-29 RX ORDER — DEXTROSE MONOHYDRATE 25 G/50ML
10-50 INJECTION, SOLUTION INTRAVENOUS
OUTPATIENT
Start: 2024-05-29

## 2024-05-29 RX ORDER — ACETAMINOPHEN 500 MG
1000 TABLET ORAL ONCE
OUTPATIENT
Start: 2024-06-06

## 2024-05-29 RX ORDER — IBUPROFEN 600 MG/1
1 TABLET ORAL
OUTPATIENT
Start: 2024-05-29

## 2024-05-29 RX ORDER — SODIUM CHLORIDE 0.9 % (FLUSH) 0.9 %
30 SYRINGE (ML) INJECTION ONCE AS NEEDED
OUTPATIENT
Start: 2024-05-29

## 2024-05-29 RX ORDER — SODIUM CHLORIDE 0.9 % (FLUSH) 0.9 %
3-10 SYRINGE (ML) INJECTION AS NEEDED
OUTPATIENT
Start: 2024-05-29

## 2024-05-29 NOTE — TELEPHONE ENCOUNTER
Pt called back and states he has called [facility] again to request this to be sent and they report they have sent this now.  I will watch for this to come through and call them if we do not get it.  Pt uses the [facility] on [address]  Pt states 6-6-24 should be OK for him for surgery/tucker

## 2024-05-29 NOTE — TELEPHONE ENCOUNTER
Surgery orders have been entered, please schedule and notify patient.  Bactroban has been sent to his pharmacy.  Still awaiting review of dental letter

## 2024-05-29 NOTE — TELEPHONE ENCOUNTER
Patient aware of prework date/time and OR date/arrival time 0500/npo/no meds. Aware to  Bactroban for use on 6/5 @ 1700 - instructions given.

## 2024-05-29 NOTE — TELEPHONE ENCOUNTER
Still have not rec'd dental clearance letter.  Attempted to call pt re: this and new surgery date.  No answer.  VM message left for pt to call back/tucker

## 2024-05-30 RX ORDER — TAMSULOSIN HYDROCHLORIDE 0.4 MG/1
1 CAPSULE ORAL DAILY
Qty: 90 CAPSULE | Refills: 0 | OUTPATIENT
Start: 2024-05-30

## 2024-05-30 NOTE — TELEPHONE ENCOUNTER
Reviewed dental letter and reviewed with Dr. Gtz.  He has been recommended to undergo additional treatment of stage IV periodontal disease.  I called and spoke with Chanelle, staff at [facility].  She relays that patient does have periodontal disease which is an active infection of the mouth.  He does not need to be on systemic antibiotics but does need to undergo treatment in which they will descale underneath gums and place a powdered topical antibiotic.  He has been recommended to undergo this prior to cardiac surgery.  He will then need to have follow-up every 3 to 4 months as part of routine maintenance because this is not curable.  Explained that he has scheduled heart surgery next week and they are willing to work with our schedule to get him in for this treatment.  I am told his insurance will cover a big portion of this cost.  Attempted to call him and explained the above but unable to reach him.  I left a detailed message.  If he does not call back, can someone please call him before the end of the day.  Chanelle can work him into her schedule this afternoon but again, we have to be able to reach him and I did inform her that sometimes it is difficult to reach him as he is at work.

## 2024-05-31 NOTE — TELEPHONE ENCOUNTER
Was able to reach pt this morning and he has not contacted dental office yet but he states he will do this.  Informed pt that the dental office said they would try to work with him to be able to keep the current surgery schedule but advised pt to let us know if this would no longer be possible so we could adjust our scheduling if needed.  Pt voiced understanding to all/tucker

## 2024-06-02 PROBLEM — I71.20 THORACIC AORTIC ANEURYSM WITHOUT RUPTURE: Status: ACTIVE | Noted: 2024-06-02

## 2024-06-02 NOTE — PROGRESS NOTES
Subjective   Patient ID: Michael Issa is a 64 y.o. male who is here for follow-up of a known aneurysm.   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  He had been previously seen with recommendation to consider aneurysmectomy.  His workup has been completed.  He returns today for final discussion.  No new medical events.  No chest pain.  No dyspnea.    Past Medical History:   Diagnosis Date    Aneurysm 2023    Arthritis     Disease of thyroid gland     NODULE PRESENT     Hx of chest pain     Hypertension        Past Surgical History:   Procedure Laterality Date    CARDIAC CATHETERIZATION Left 2024    Procedure: Cardiac Catheterization/Vascular Study;  Surgeon: Nima Nickerson MD;  Location: [facility] CATH INVASIVE LOCATION;  Service: Cardiology;  Laterality: Left;    COLONOSCOPY N/A 2021    Procedure: COLONOSCOPY;  Surgeon: Eduardo Neil MD;  Location: [facility] ENDOSCOPY;  Service: Gastroenterology;  Laterality: N/A;  pre  post diverticulosis, hemorrhoids  Dr. cavazos    KNEE SURGERY      rt knee MENISCUS    PREPERITONEAL HERNIA REPAIR Bilateral 2017    Procedure: BILATERAL PREPERITONEAL INGUINAL HERNIA REPAIR LAPAROSCOPIC WITH MESH;  Surgeon: Rambo Church MD;  Location: [facility] OR;  Service:        Social History     Socioeconomic History    Marital status: Single   Tobacco Use    Smoking status: Former     Current packs/day: 0.00     Average packs/day: 0.5 packs/day for 30.0 years (15.0 ttl pk-yrs)     Types: Cigarettes     Start date: 1997     Quit date: 2017     Years since quittin.1     Passive exposure: Past    Smokeless tobacco: Never    Tobacco comments:     Ex smoker    Vaping Use    Vaping status: Never Used   Substance and Sexual Activity    Alcohol use: Not Currently    Drug use: Not Currently    Sexual activity: Yes     Partners: Female     Birth control/protection: Birth control pill       Family History   Problem Relation Age of Onset    Alzheimer's disease Mother     Diabetes Father     Heart disease Father     Heart attack Father     Heart failure Father     Hypertension Father     No Known Problems Sister     No Known Problems Brother     No Known Problems Daughter     No Known Problems Maternal Grandmother     No Known Problems Maternal Grandfather     No Known Problems Paternal Grandmother     No Known Problems Paternal Grandfather     Hypertension Brother     No Known Problems Brother        The following portions of the patient's history were reviewed and updated as appropriate: allergies, current medications, past family history, past medical history, past social history, past surgical history and problem list.       Objective   Physical Exam  General: No acute distress, appropriate, alert.   Heart: Regular rate and rhythm without murmurs, rubs, or gallops.   Pulmonary: Clear to auscultation bilateral without wheezing, rubs or rales.   Abdomen: Soft, nontender, tenderness.   Extremities: No clubbing, cyanosis, or edema.    CT Angiogram Chest    Result Date: 4/24/2024  Narrative: EXAMINATION:  CT ANGIOGRAM CHEST-  4/24/2024 2:02 PM  HISTORY: I71.9-Aortic aneurysm of unspecified site, without rupture.  COMPARISON : 12/20/2023 and 9/25/2017.  DLP: 479.58 mGy.cm Automated dosage reduction technique was utilized to decrease patient dosage.  TECHNIQUE: CT angio was performed of the chest with IV contrast. Coronal, sagittal and 3-D reconstruction were performed.  INDEPENDENT 3-D WORKSTATION UTILIZED FOR RECONSTRUCTION: Yes. A radiologist was present in the department.  MEDIASTINUM, HEART AND VASCULAR STRUCTURES: There is a 1.2 cm left thyroid nodule, stable. There is  atheromatous calcification of the thoracic aorta. The ascending thoracic aorta is aneurysmal measuring 4.7 cm, stable. The aortic root measures 4.5 cm. The mid aortic arch measures 3.1 cm. The descending thoracic aorta measures 3.3 cm. There is mild coronary artery calcification. The main pulmonary artery segment is dilated measuring 4.1 cm. There is cardiomegaly. There are no enlarged hilar or mediastinal lymph nodes.  LUNGS: There are calcified lower lobe nodules bilaterally. There is a stable 2 mm subpleural left upper lobe nodule image 53 series 6 compared back to 2017.  UPPER ABDOMEN: Multiple liver cysts. No acute findings in the upper abdomen.  BONES: There are degenerative changes of the visualized spine.       Impression: 1. Stable aneurysmal dilatation of the ascending thoracic aorta measuring 4.7 cm. Aortic root is stable at 4.5 cm. The remainder of the thoracic aorta is normal caliber. Main pulmonary artery segment is dilated measuring 4.1 cm. There is atheromatous calcification of the thoracic aorta. Mild coronary artery calcification is noted. Cardiomegaly. 2. No dense consolidation or pleural effusion. Mild dependent atelectasis. Mild linear atelectasis left lung base. 3. Old granulomatous disease. Stable 2 mm left upper lobe nodule compared back to 2017. 4. Multiple liver cysts.   This report was signed and finalized on 4/24/2024 4:37 PM by Dr. Genaro Burgess MD.       Conclusion    Cardiac Catheterization Operative Report        Patient was referred for cardiac catheterization: High suspicion for coronary artery disease     Procedure performed     Access of right radial artery using ultrasonic guidance with confirmation of placement of catheter in the right radial artery CPT code 45946  Selective coronary angiography using TIG 5 Iraqi diagnostic catheter  Moderate sedation administered under supervision of  with monitoring CPT code 20129     Procedure Details  The risks, benefits,  complications, treatment options, and expected outcomes were discussed with the patient. Plan is for moderate sedation, and the patient agrees to proceed with the procedure.  An immediate assessment was done prior to the administration of moderate sedation.     The patient and/or family concurred with the proposed plan, giving informed consent. Patient was brought to the cath lab after IV hydration was begun and oral premedication was given.      The skin overlying the patient's right radial artery was prepped and draped in the usual sterile fashion.  Timeout was taken to confirm the correct patient and procedure.  Lidocaine was administered for local anesthesia.  IV Versed and fentanyl were used to achieve conscious sedation.  Modified Seldinger technique was then used to place a 6 German sheath in the right radial artery     Diagnostic coronary angiography was performed with Tig coronary catheter.     Coronary angiogram were performed in MARYSOL and HUTCHINSON projection to evaluate the coronary arterial systems.       A TR band device was used to achieve hemostasis at the end of the procedure.  The patient tolerated the procedure well, and there were no immediate complications.     Procedural Details: After written and informed consent was obtained, the patient was brought to the cath lab in a fasting state.        Selective coronary angiography:     Left main coronary artery:  The left main coronary artery arises from the left coronary cusp and bifurcates into the  left anterior descending coronary artery  and left circumflex arteries.    Left anterior descending artery:  The  left anterior descending coronary artery   arises normally from the left main coronary artery and courses in the anterior interventricular groove and terminates at the apex. No stenosis noted  Left circumflex:  The left circumflex arises form the left man artery and supplies obtuse marginal branches.  .  Right coronary artery:  The right coronary  artery  arises normally from the right coronary cusp and is dominant for the posterior circulation.       Coronary angiography: As below     Left heart cath: Not performed     LV Gram: Not performed      Interventions: None     Estimated Blood Loss:  Minimal     Specimens: None         Complications:  None; patient tolerated the procedure well.           Disposition: Cardiovascular observation unit           Condition: stable            I supervised the administration of conscious sedation by nursing staff throughout the case.  First dose was given at 1512   and the end of my face-to-face encounter was at  1526  Hours.     During the case, continuous pulse oximetry, heart rate, blood pressure, and patient status were monitored.         Risk, Benefits, and Alternatives discussed with the patient and/or family.  Plan is for moderate sedation, and the patient agrees to proceed with the procedure.  An immediate assessment was done prior to the administration of moderate sedation        Conclusion 5/14/2024     Slightly angulated Superior takeoff of left main coronary artery  There is no dampening  Good reflux of contrast  This appears to be a normal anatomical variant  No obstructive disease of left main coronary artery  Mid left anterior descending coronary artery has mild atherosclerotic changes with 20 to 30% nonobstructive stenosis  Normal diagonal branches  Normal left circumflex coronary artery and obtuse marginal branches  Normal dominant right coronary artery     Summary     No evidence of any significant epicardial coronary artery stenosis with superior takeoff of left main coronary artery at an angulation without any obstructive disease        Plan     Follow-up with Dr. Gtz for thoracic aortic  Hydration   Observation  Risk factor modifications    Clinical Indication    Valvular Function - Ascending aortic aneurysm, preop planning   Dx: Aneurysm of ascending aorta without rupture [I71.21 (ICD-10-CM)]      Interpretation Summary         Left ventricular systolic function is normal. Calculated left ventricular EF = 56.6% Left ventricular ejection fraction appears to be 56 - 60%.    Left ventricular diastolic function was normal.    Mild pulmonary hypertension is present.    Known ascending aortic aneurysm that measures 4.8 cm in diameter.    Normal global longitudinal LV strain (GLS) = -17.1%.     Cardiac History    Diagnosis Date Comment Source   Hypertension        Social History    Tobacco Use    Former; Cigarettes: 4/18/1997 - 4/18/2017; Smoked an average of 0.5 packs/day for 30.0 years   Passive Exposure: Past   Smokeless Tobacco: Never used smokeless tobacco.   Comments: Ex smoker     Vaping Use    Never used     Family Cardiac History as of 5/10/2024  Pedigree Problem Relation Age of Onset   Diabetes Father    Heart attack Father    Heart disease Father    Heart failure Father    Hypertension Brother    Hypertension Father      Additional Study Details    Study Quality The study is technically good for diagnosis.     Echocardiogram Findings    Left Ventricle Left ventricular systolic function is normal. Calculated left ventricular EF = 56.6% Left ventricular ejection fraction appears to be 56 - 60%.   Normal global longitudinal LV strain (GLS) = -17.1%. Left ventricle strain data was reviewed by the physician and found to be accurate. Normal left ventricular cavity size and wall thickness noted. All left ventricular wall segments contract normally. Left ventricular diastolic function was normal. Normal left atrial pressure.   Right Ventricle Normal right ventricular cavity size, wall thickness, systolic function and septal motion noted.   Left Atrium Normal left atrial size and volume noted.   Right Atrium Normal right atrial cavity size noted.   Aortic Valve The aortic valve is structurally normal with no regurgitation or stenosis present.   Mitral Valve The mitral valve is structurally normal with no  regurgitation or significant stenosis present.   Tricuspid Valve The tricuspid valve is grossly normal in structure. Trace tricuspid valve regurgitation is present. Estimated right ventricular systolic pressure from tricuspid regurgitation is mildly elevated (35-45 mmHg). Mild pulmonary hypertension is present. No evidence of significant tricuspid valve stenosis is present.   Pulmonic Valve The pulmonic valve is structurally normal with no regurgitation or significant stenosis present.   Greater Vessels Moderate dilation of the ascending aorta is present. Ascending aorta = 4.8 cm   Pericardium The pericardium is normal. There is no evidence of pericardial effusion. .            LV Measurements    Dimensions   LVIDd 5 cm         IVSd 0.9 cm         FS 30 %         ESV(cubed) 42.9 ml         EDV(cubed) 125 ml         LVOT area 4.5 cm2         LVOT diam 2.4 cm         EDV(MOD-sp2) 74 ml         ESV(MOD-sp2) 30.5 ml         Diastolic Filling   MV E max tono 59.6 cm/sec         MV A max tono 81.6 cm/sec         MV E/A 0.73         LA ESV Index (BP) 21.9 ml/m2         Med Peak E' Tono 7.8 cm/sec         Lat Peak E' Tono 9.9 cm/sec         Avg E/e' ratio 6.73         Shunt Ratio   SV(LVOT) 123 ml          Dimensions   LVIDs 3.5 cm         LVPWd 1.3 cm         IVS/LVPW 0.69 cm         LV Sys Vol (BSA corrected) 14.4 cm2         LV Pop Vol (BSA corrected) 32.6 cm2         LV mass(C)d 207.1 grams         EDV(MOD-sp4) 73.2 ml         ESV(MOD-sp4) 32.3 ml         Systolic Function   SV(MOD-sp2) 43.5 ml         SV(MOD-sp4) 40.9 ml         SVi(MOD-SP2) 19.3 ml/m2         SVi(MOD-SP4) 18.2 ml/m2         SVi (LVOT) 54.7 ml/m2         EF(MOD-sp2) 58.8 %         EF(MOD-sp4) 55.9 %         EF(MOD-bp) 56.6 %                Right Ventricle Measurements    RVIDd 4 cm         RV Base 2.9 cm          TAPSE (>1.6) 2.49 cm                LA Measurements    LA Dimensions   LA dimension (2D) 4.3 cm         LA ESV (BP) 49.3 ml         LA ESV Index  (BP) 21.9 ml/m2                 Aortic Valve Measurements    Stenosis   LVOT diam 2.4 cm         LV V1 max 124 cm/sec         LV V1 max PG 6.2 mmHg         LV V1 mean PG 3 mmHg         LV V1 VTI 27.2 cm         Ao pk arian 157 cm/sec          Stenosis   Ao max PG 9.9 mmHg         Ao mean PG 6 mmHg         Ao V2 VTI 33.2 cm         ROMERO(I,D) 3.7 cm2                Mitral Valve Measurements    Stenosis   MV P1/2t 99.9 msec         MVA(P1/2t) 2.2 cm2         MV dec slope 188 cm/sec2          Regurgitation   MR max arian 347.3 cm/sec         MR max PG 48.9 mmHg                Tricuspid Valve Measurements    Regurgitation   TR max arian 294 cm/sec         TR max PG 34.6 mmHg         RVSP(TR) 39.6 mmHg         RAP systole 5 mmHg         PISA   TR max arian 294 cm/sec         TR max PG 34.6 mmHg                 Pulmonic Valve Measurements    Stenosis   RV V1 max PG 2.2 mmHg         RV V1 max 74.1 cm/sec         PA V2 max 90.2 cm/sec                 Greater Vessels Measurements    Ascending aorta 4.8 cm               Diagnoses and all orders for this visit:    1. Thoracic aortic aneurysm without rupture, unspecified part (Primary)    2. Former smoker      [facility] - Pulmonary Function Test     [address]  [id number]  [phone]     Patient : Michael Issa   MRN : [id number]  CSN : [id number]  Pulmonologist : Julito Samayoa MD  Date : 5/7/2024     ______________________________________________________________________     Interpretation :  1.  Spirometry reveals a mild decrease in peak expiratory flow, and otherwise is within normal limits.  2.  There is improvement in peak expiratory flow postbronchodilator which is now normal.  Otherwise there is no significant change in spirometry postbronchodilator.  3.  Lung volumes reveal mild hyperinflation.  4.  Diffusion capacity is within normal limits.  5.  Arterial blood gases on room air reveal mild hypoxemia and a mild metabolic alkalosis with some  degree of respiratory compensation.        Julito Samayoa MD       Imaging  CT Angiogram Chest (04/24/2024 15:14)   CT angiogram of the chest was obtained on 04/24/2024. My independent interpretation reveals a distal ascending aorta measuring 3.9 cm in size. Ascending aorta measures 49.5 x 49.6. Another slice measures 49.8. Root measures 45 cm in size previously. His root measured 4.4 cm in size and his ascending aorta now measures 5 cm in size.    Assessment & Plan     Impression:  Ascending aortic aneurysm.  Aortic root aneurysm.     Medical decision making/Recommendations/Plan:     As stated previously his ascending aorta now measures 5 cm in size with his aortic root also showing a millimeter growth from last assessment.  We did make the recommendation for he to proceed forward with aneurysmectomy.  I have recommended he proceed forward with modified Bentall procedure, biologic valve conduit utilization, ascending aortic replacement, and resection of hemiarch with deep hypothermic circulatory arrest.  I discussed the operative conduct and expected postoperative course.  Discussed that risks to include but not limited to bleeding, infection, stroke, heart attack, need for additional procedures, anesthesia risk, organ dysfunction and/or failure, prolonged mechanical ventilation, prolonged ICU stay, sternal nonunion, and/or death.  STS risk calculator does not apply in this case.  I discussed his valvular prosthetic options available.  Mechanical versus biologic.  He favors a biologic tissue valve and is agreeable to have a bovine pericardial tissue valve.  The pros and cons of each option were discussed in detail.  All questions been answered to the best my ability and he is agreeable to proceed forward.  We will identify date for surgery.    He is a non-smoker and congratulated this    Many thanks for the opportunity to care for your patient.    I will continue to keep you apprised of provided care as it  ensues.

## 2024-06-03 NOTE — TELEPHONE ENCOUNTER
Called patient re: additional dental work. He states he has appt at 1400 today for this. I provided our fax # for dental clearance.

## 2024-06-04 ENCOUNTER — ANESTHESIA EVENT (OUTPATIENT)
Dept: PERIOP | Facility: HOSPITAL | Age: 64
End: 2024-06-04
Payer: COMMERCIAL

## 2024-06-04 ENCOUNTER — HOSPITAL ENCOUNTER (OUTPATIENT)
Dept: GENERAL RADIOLOGY | Facility: HOSPITAL | Age: 64
Discharge: HOME | End: 2024-06-04
Payer: COMMERCIAL

## 2024-06-04 ENCOUNTER — PRE-ADMISSION TESTING (OUTPATIENT)
Dept: PREADMISSION TESTING | Facility: HOSPITAL | Age: 64
End: 2024-06-04
Payer: COMMERCIAL

## 2024-06-04 VITALS
HEART RATE: 73 BPM | BODY MASS INDEX: 31.5 KG/M2 | HEIGHT: 72 IN | SYSTOLIC BLOOD PRESSURE: 137 MMHG | DIASTOLIC BLOOD PRESSURE: 88 MMHG | OXYGEN SATURATION: 96 % | RESPIRATION RATE: 18 BRPM | WEIGHT: 232.59 LBS

## 2024-06-04 DIAGNOSIS — I71.21 ANEURYSM OF ASCENDING AORTA WITHOUT RUPTURE: ICD-10-CM

## 2024-06-04 DIAGNOSIS — I71.20 THORACIC AORTIC ANEURYSM WITHOUT RUPTURE, UNSPECIFIED PART: Primary | ICD-10-CM

## 2024-06-04 DIAGNOSIS — I71.20 THORACIC AORTIC ANEURYSM WITHOUT RUPTURE, UNSPECIFIED PART: ICD-10-CM

## 2024-06-04 LAB
ABO GROUP BLD: NORMAL
ALBUMIN SERPL-MCNC: 4 G/DL (ref 3.5–5.2)
ALBUMIN/GLOB SERPL: 1.3 G/DL
ALP SERPL-CCNC: 49 U/L (ref 39–117)
ALT SERPL W P-5'-P-CCNC: 30 U/L (ref 1–41)
ANION GAP SERPL CALCULATED.3IONS-SCNC: 7 MMOL/L (ref 5–15)
APTT PPP: 34.6 SECONDS (ref 24.5–36)
AST SERPL-CCNC: 25 U/L (ref 1–40)
BASOPHILS # BLD AUTO: 0.06 10*3/MM3 (ref 0–0.2)
BASOPHILS NFR BLD AUTO: 1.1 % (ref 0–1.5)
BILIRUB SERPL-MCNC: 0.3 MG/DL (ref 0–1.2)
BILIRUB UR QL STRIP: NEGATIVE
BLD GP AB SCN SERPL QL: NEGATIVE
BUN SERPL-MCNC: 12 MG/DL (ref 8–23)
BUN/CREAT SERPL: 15.2 (ref 7–25)
CALCIUM SPEC-SCNC: 9.2 MG/DL (ref 8.6–10.5)
CHLORIDE SERPL-SCNC: 101 MMOL/L (ref 98–107)
CLARITY UR: CLEAR
CO2 SERPL-SCNC: 33 MMOL/L (ref 22–29)
COLOR UR: YELLOW
CREAT SERPL-MCNC: 0.79 MG/DL (ref 0.76–1.27)
DEPRECATED RDW RBC AUTO: 42.1 FL (ref 37–54)
EGFRCR SERPLBLD CKD-EPI 2021: 100.4 ML/MIN/1.73
EOSINOPHIL # BLD AUTO: 0.17 10*3/MM3 (ref 0–0.4)
EOSINOPHIL NFR BLD AUTO: 3.1 % (ref 0.3–6.2)
ERYTHROCYTE [DISTWIDTH] IN BLOOD BY AUTOMATED COUNT: 12.7 % (ref 12.3–15.4)
GLOBULIN UR ELPH-MCNC: 3.2 GM/DL
GLUCOSE SERPL-MCNC: 100 MG/DL (ref 65–99)
GLUCOSE UR STRIP-MCNC: NEGATIVE MG/DL
HBA1C MFR BLD: 5.6 % (ref 4.8–5.6)
HCT VFR BLD AUTO: 40.4 % (ref 37.5–51)
HGB BLD-MCNC: 13.4 G/DL (ref 13–17.7)
HGB UR QL STRIP.AUTO: NEGATIVE
IMM GRANULOCYTES # BLD AUTO: 0.01 10*3/MM3 (ref 0–0.05)
IMM GRANULOCYTES NFR BLD AUTO: 0.2 % (ref 0–0.5)
INR PPP: 0.91 (ref 0.91–1.09)
KETONES UR QL STRIP: NEGATIVE
LEUKOCYTE ESTERASE UR QL STRIP.AUTO: NEGATIVE
LYMPHOCYTES # BLD AUTO: 1.7 10*3/MM3 (ref 0.7–3.1)
LYMPHOCYTES NFR BLD AUTO: 30.9 % (ref 19.6–45.3)
MCH RBC QN AUTO: 29.9 PG (ref 26.6–33)
MCHC RBC AUTO-ENTMCNC: 33.2 G/DL (ref 31.5–35.7)
MCV RBC AUTO: 90.2 FL (ref 79–97)
MONOCYTES # BLD AUTO: 0.53 10*3/MM3 (ref 0.1–0.9)
MONOCYTES NFR BLD AUTO: 9.6 % (ref 5–12)
NEUTROPHILS NFR BLD AUTO: 3.04 10*3/MM3 (ref 1.7–7)
NEUTROPHILS NFR BLD AUTO: 55.1 % (ref 42.7–76)
NITRITE UR QL STRIP: NEGATIVE
NRBC BLD AUTO-RTO: 0 /100 WBC (ref 0–0.2)
PH UR STRIP.AUTO: 6.5 [PH] (ref 5–8)
PLATELET # BLD AUTO: 244 10*3/MM3 (ref 140–450)
PMV BLD AUTO: 10.2 FL (ref 6–12)
POTASSIUM SERPL-SCNC: 4 MMOL/L (ref 3.5–5.2)
PROT SERPL-MCNC: 7.2 G/DL (ref 6–8.5)
PROT UR QL STRIP: NEGATIVE
PROTHROMBIN TIME: 12.6 SECONDS (ref 11.8–14.8)
RBC # BLD AUTO: 4.48 10*6/MM3 (ref 4.14–5.8)
RH BLD: POSITIVE
SODIUM SERPL-SCNC: 141 MMOL/L (ref 136–145)
SP GR UR STRIP: 1.01 (ref 1–1.03)
T&S EXPIRATION DATE: NORMAL
UROBILINOGEN UR QL STRIP: NORMAL
WBC NRBC COR # BLD AUTO: 5.51 10*3/MM3 (ref 3.4–10.8)

## 2024-06-04 PROCEDURE — 85730 THROMBOPLASTIN TIME PARTIAL: CPT

## 2024-06-04 PROCEDURE — 36415 COLL VENOUS BLD VENIPUNCTURE: CPT

## 2024-06-04 PROCEDURE — 81003 URINALYSIS AUTO W/O SCOPE: CPT

## 2024-06-04 PROCEDURE — 85610 PROTHROMBIN TIME: CPT

## 2024-06-04 PROCEDURE — 86850 RBC ANTIBODY SCREEN: CPT

## 2024-06-04 PROCEDURE — 83036 HEMOGLOBIN GLYCOSYLATED A1C: CPT

## 2024-06-04 PROCEDURE — 80053 COMPREHEN METABOLIC PANEL: CPT

## 2024-06-04 PROCEDURE — 86901 BLOOD TYPING SEROLOGIC RH(D): CPT

## 2024-06-04 PROCEDURE — 93005 ELECTROCARDIOGRAM TRACING: CPT

## 2024-06-04 PROCEDURE — 71046 X-RAY EXAM CHEST 2 VIEWS: CPT

## 2024-06-04 PROCEDURE — 86900 BLOOD TYPING SEROLOGIC ABO: CPT

## 2024-06-04 PROCEDURE — 85025 COMPLETE CBC W/AUTO DIFF WBC: CPT

## 2024-06-04 NOTE — ANESTHESIA PREPROCEDURE EVALUATION
Anesthesia Evaluation     Patient summary reviewed   no history of anesthetic complications:                Airway   Mallampati: II  Dental    (+) upper dentures    Pulmonary    (+) a smoker Former,  (-) COPD, asthma, sleep apnea  Cardiovascular   Exercise tolerance: good (4-7 METS)    (+) hypertension, hyperlipidemia  (-) pacemaker, past MI, CAD, angina, CHF, cardiac stents, CABG, carotid artery disease    ROS comment: TAAA      Neuro/Psych  (-) seizures, TIA, CVA  GI/Hepatic/Renal/Endo    (+) obesity, GERD  (-) liver disease, no renal disease, diabetes    Musculoskeletal     Abdominal    Substance History      OB/GYN          Other                      Anesthesia Plan    ASA 4     general, [place], CVL and PAC     (No CI to [place]  )  intravenous induction     Anesthetic plan, risks, benefits, and alternatives have been provided, discussed and informed consent has been obtained with: patient.    Use of blood products discussed with patient  Consented to blood products.      CODE STATUS:

## 2024-06-05 NOTE — TELEPHONE ENCOUNTER
Pt returned call and was informed of the above. Pt voiced understanding and will await our call back to him with new surgery date.

## 2024-06-05 NOTE — TELEPHONE ENCOUNTER
Instructed by Dr. Gtz to cancel patient's surgery tomorrow.  Please place case request in [facility].  Attempted to reach patient to discuss but no answer.  I left a message.  Unfortunately, there is an inpatient that needs to be done and we will have to move his case to a new date.  If he calls back find out if there is another timeframe that is good for him and we will try to work with that

## 2024-06-06 ENCOUNTER — ANESTHESIA (OUTPATIENT)
Dept: PERIOP | Facility: HOSPITAL | Age: 64
End: 2024-06-06
Payer: COMMERCIAL

## 2024-06-06 LAB
QT INTERVAL: 430 MS
QTC INTERVAL: 436 MS

## 2024-06-07 ENCOUNTER — TELEPHONE (OUTPATIENT)
Dept: CARDIAC SURGERY | Facility: CLINIC | Age: 64
End: 2024-06-07

## 2024-06-07 NOTE — TELEPHONE ENCOUNTER
Caller: Michael Issa    Relationship: Self    Best call back number: [phone] (home)       What form or medical record are you requesting: SHORT TERM DISABILITY     Who is requesting this form or medical record from you: PT EMPLOYER / [place]     How would you like to receive the form or medical records (pick-up, mail, fax):     PT IS WILLING TO  PAPERWORK OR WE CAN MAIL IT TO THE ADDRESS LISTED ON THE FORM PT DROPPED OFF.     If mail, what is the address: LISTED ON FORM PT DROPPED OFF 6/7/24    If pick-up, provide patient with address and location details    Timeframe paperwork needed: ASAP     Additional notes: PT HAS BEEN OFF WORK FOR THE PAST 5 WEEKS. PT DROPPED OFF STD PAPERWORK ON 6/7/24 TO BE FILLED OUT. PT WAS TOLD WE WOULD NOT FILL OUT PAPERWORK UNTIL AFTER THE SURGERY ON 6/20/24.     PT SPOKE WITH EMPLOYER AND THEY LET HIM KNOW THAT THEY NEED THE STD PAPERWORK ASAP PRIOR TO THE SURGERY OR ELSE PT WILL HAVE TO SELF PAY THE PREMIUMS ASSOCIATED WITH HIS HEALTHCARE INSURANCE AND THIS WILL BE APPROX $2,000-3,000.   PLEASE CALL PT WITH OPTIONS/ADDITIONAL INFO.

## 2024-06-07 NOTE — TELEPHONE ENCOUNTER
Dr. Gtz would like to proceed with patient surgery on Thursday, June 20.  Please post and notify patient.

## 2024-06-07 NOTE — TELEPHONE ENCOUNTER
Patient aware of new OR date of 6/20 - arrival time 0500/npo/no meds. Aware to use Bactroban on 6/19 @ 1700 - instructions given.

## 2024-06-10 NOTE — TELEPHONE ENCOUNTER
I have paperwork, but patient is not scheduled for OR until 6/20. Pt states his employer needs it prior to this and he has been off work for 5 weeks already. Please advise.

## 2024-06-18 NOTE — TELEPHONE ENCOUNTER
Form was completed and signed. I called pt to inform and he requests this be mailed in the envelope provided.

## 2024-06-19 DIAGNOSIS — I71.20 THORACIC AORTIC ANEURYSM WITHOUT RUPTURE, UNSPECIFIED PART: Primary | ICD-10-CM

## 2024-06-20 ENCOUNTER — HOSPITAL ENCOUNTER (INPATIENT)
Facility: HOSPITAL | Age: 64
LOS: 4 days | Discharge: HOME | DRG: 220 | End: 2024-06-24
Attending: THORACIC SURGERY (CARDIOTHORACIC VASCULAR SURGERY) | Admitting: THORACIC SURGERY (CARDIOTHORACIC VASCULAR SURGERY)
Payer: COMMERCIAL

## 2024-06-20 ENCOUNTER — APPOINTMENT (OUTPATIENT)
Dept: CARDIOLOGY | Facility: HOSPITAL | Age: 64
DRG: 220 | End: 2024-06-20
Payer: COMMERCIAL

## 2024-06-20 ENCOUNTER — APPOINTMENT (OUTPATIENT)
Dept: GENERAL RADIOLOGY | Facility: HOSPITAL | Age: 64
DRG: 220 | End: 2024-06-20
Payer: COMMERCIAL

## 2024-06-20 DIAGNOSIS — I71.21 ANEURYSM OF ASCENDING AORTA WITHOUT RUPTURE: ICD-10-CM

## 2024-06-20 DIAGNOSIS — Z74.09 IMPAIRED MOBILITY: Primary | ICD-10-CM

## 2024-06-20 PROBLEM — Q25.43 AORTIC ROOT ANEURYSM: Status: ACTIVE | Noted: 2024-06-20

## 2024-06-20 PROBLEM — E66.811 CLASS 1 OBESITY DUE TO EXCESS CALORIES WITH BODY MASS INDEX (BMI) OF 30.0 TO 30.9 IN ADULT: Status: ACTIVE | Noted: 2024-06-20

## 2024-06-20 PROBLEM — E66.09 CLASS 1 OBESITY DUE TO EXCESS CALORIES WITH BODY MASS INDEX (BMI) OF 30.0 TO 30.9 IN ADULT: Status: ACTIVE | Noted: 2024-06-20

## 2024-06-20 LAB
ABO GROUP BLD: NORMAL
ALBUMIN SERPL-MCNC: 3 G/DL (ref 3.5–5.2)
ALBUMIN SERPL-MCNC: 3.3 G/DL (ref 3.5–5.2)
ANION GAP SERPL CALCULATED.3IONS-SCNC: 7 MMOL/L (ref 5–15)
ANION GAP SERPL CALCULATED.3IONS-SCNC: 7 MMOL/L (ref 5–15)
APTT PPP: 40.6 SECONDS (ref 24.5–36)
APTT PPP: >200 SECONDS (ref 24.5–36)
ARTERIAL PATENCY WRIST A: ABNORMAL
ATMOSPHERIC PRESS: 757 MMHG
ATMOSPHERIC PRESS: 758 MMHG
ATMOSPHERIC PRESS: 759 MMHG
ATMOSPHERIC PRESS: 760 MMHG
BASE EXCESS BLDA CALC-SCNC: 0.4 MMOL/L (ref 0–2)
BASE EXCESS BLDA CALC-SCNC: 0.5 MMOL/L (ref 0–2)
BASE EXCESS BLDA CALC-SCNC: 1 MMOL/L (ref 0–2)
BASE EXCESS BLDA CALC-SCNC: 1.6 MMOL/L (ref 0–2)
BASE EXCESS BLDA CALC-SCNC: 1.8 MMOL/L (ref 0–2)
BASE EXCESS BLDA CALC-SCNC: 2.2 MMOL/L (ref 0–2)
BASE EXCESS BLDA CALC-SCNC: 2.3 MMOL/L (ref 0–2)
BASE EXCESS BLDA CALC-SCNC: 2.9 MMOL/L (ref 0–2)
BASE EXCESS BLDA CALC-SCNC: 4.1 MMOL/L (ref 0–2)
BASOPHILS # BLD AUTO: 0.03 10*3/MM3 (ref 0–0.2)
BASOPHILS NFR BLD AUTO: 0.4 % (ref 0–1.5)
BDY SITE: ABNORMAL
BLD GP AB SCN SERPL QL: NEGATIVE
BODY TEMPERATURE: 37
BUN SERPL-MCNC: 15 MG/DL (ref 8–23)
BUN SERPL-MCNC: 16 MG/DL (ref 8–23)
BUN/CREAT SERPL: 18.8 (ref 7–25)
BUN/CREAT SERPL: 19.8 (ref 7–25)
CA-I BLD-MCNC: 3.96 MG/DL (ref 4.6–5.4)
CA-I BLD-MCNC: 4.25 MG/DL (ref 4.6–5.4)
CA-I BLD-MCNC: 4.49 MG/DL (ref 4.6–5.4)
CA-I BLD-MCNC: 4.5 MG/DL (ref 4.6–5.4)
CA-I BLD-MCNC: 4.62 MG/DL (ref 4.6–5.4)
CA-I BLD-MCNC: 4.69 MG/DL (ref 4.6–5.4)
CA-I BLD-MCNC: ABNORMAL MG/DL
CALCIUM SPEC-SCNC: 7.7 MG/DL (ref 8.6–10.5)
CALCIUM SPEC-SCNC: 7.8 MG/DL (ref 8.6–10.5)
CHLORIDE SERPL-SCNC: 107 MMOL/L (ref 98–107)
CHLORIDE SERPL-SCNC: 108 MMOL/L (ref 98–107)
CO2 SERPL-SCNC: 26 MMOL/L (ref 22–29)
CO2 SERPL-SCNC: 28 MMOL/L (ref 22–29)
COHGB MFR BLD: 0.1 % (ref 0–5)
COHGB MFR BLD: 0.3 % (ref 0–5)
COHGB MFR BLD: 0.4 % (ref 0–5)
COHGB MFR BLD: 0.4 % (ref 0–5)
COHGB MFR BLD: 0.5 % (ref 0–5)
COHGB MFR BLD: 0.8 % (ref 0–5)
CREAT SERPL-MCNC: 0.8 MG/DL (ref 0.76–1.27)
CREAT SERPL-MCNC: 0.81 MG/DL (ref 0.76–1.27)
DEPRECATED RDW RBC AUTO: 40 FL (ref 37–54)
DEPRECATED RDW RBC AUTO: 41 FL (ref 37–54)
DEPRECATED RDW RBC AUTO: 41.1 FL (ref 37–54)
EGFRCR SERPLBLD CKD-EPI 2021: 100.1 ML/MIN/1.73
EGFRCR SERPLBLD CKD-EPI 2021: 99.7 ML/MIN/1.73
EOSINOPHIL # BLD AUTO: 0.09 10*3/MM3 (ref 0–0.4)
EOSINOPHIL NFR BLD AUTO: 1.1 % (ref 0.3–6.2)
ERYTHROCYTE [DISTWIDTH] IN BLOOD BY AUTOMATED COUNT: 12.7 % (ref 12.3–15.4)
ERYTHROCYTE [DISTWIDTH] IN BLOOD BY AUTOMATED COUNT: 12.7 % (ref 12.3–15.4)
ERYTHROCYTE [DISTWIDTH] IN BLOOD BY AUTOMATED COUNT: 12.8 % (ref 12.3–15.4)
FIBRINOGEN PPP-MCNC: 161 MG/DL (ref 240–460)
FIBRINOGEN PPP-MCNC: 195 MG/DL (ref 240–460)
GLUCOSE BLDC GLUCOMTR-MCNC: 124 MG/DL (ref 70–130)
GLUCOSE BLDC GLUCOMTR-MCNC: 135 MG/DL (ref 70–130)
GLUCOSE BLDC GLUCOMTR-MCNC: 137 MG/DL (ref 70–130)
GLUCOSE BLDC GLUCOMTR-MCNC: 138 MG/DL (ref 70–130)
GLUCOSE BLDC GLUCOMTR-MCNC: 146 MG/DL (ref 70–130)
GLUCOSE BLDC GLUCOMTR-MCNC: 152 MG/DL (ref 70–130)
GLUCOSE BLDC GLUCOMTR-MCNC: 67 MG/DL (ref 70–130)
GLUCOSE SERPL-MCNC: 124 MG/DL (ref 65–99)
GLUCOSE SERPL-MCNC: 143 MG/DL (ref 65–99)
HCO3 BLDA-SCNC: 25.9 MMOL/L (ref 20–26)
HCO3 BLDA-SCNC: 26 MMOL/L (ref 20–26)
HCO3 BLDA-SCNC: 26.4 MMOL/L (ref 20–26)
HCO3 BLDA-SCNC: 26.5 MMOL/L (ref 20–26)
HCO3 BLDA-SCNC: 28 MMOL/L (ref 20–26)
HCO3 BLDA-SCNC: 28.2 MMOL/L (ref 20–26)
HCO3 BLDA-SCNC: 28.5 MMOL/L (ref 20–26)
HCO3 BLDA-SCNC: 29.9 MMOL/L (ref 20–26)
HCO3 BLDA-SCNC: 30.4 MMOL/L (ref 20–26)
HCT VFR BLD AUTO: 26 % (ref 37.5–51)
HCT VFR BLD AUTO: 32.4 % (ref 37.5–51)
HCT VFR BLD AUTO: 34.2 % (ref 37.5–51)
HCT VFR BLD CALC: 28.3 % (ref 38–51)
HCT VFR BLD CALC: 28.8 % (ref 38–51)
HCT VFR BLD CALC: 30 % (ref 38–51)
HCT VFR BLD CALC: 33.4 % (ref 38–51)
HCT VFR BLD CALC: 39.3 % (ref 38–51)
HCT VFR BLD CALC: 39.7 % (ref 38–51)
HGB BLD-MCNC: 11.2 G/DL (ref 13–17.7)
HGB BLD-MCNC: 12 G/DL (ref 13–17.7)
HGB BLD-MCNC: 8.8 G/DL (ref 13–17.7)
HGB BLDA-MCNC: 10.9 G/DL (ref 14–18)
HGB BLDA-MCNC: 12.8 G/DL (ref 14–18)
HGB BLDA-MCNC: 12.9 G/DL (ref 14–18)
HGB BLDA-MCNC: 9.2 G/DL (ref 14–18)
HGB BLDA-MCNC: 9.4 G/DL (ref 14–18)
HGB BLDA-MCNC: 9.8 G/DL (ref 14–18)
IMM GRANULOCYTES # BLD AUTO: 0.03 10*3/MM3 (ref 0–0.05)
IMM GRANULOCYTES NFR BLD AUTO: 0.4 % (ref 0–0.5)
INHALED O2 CONCENTRATION: 100 %
INHALED O2 CONCENTRATION: 30 %
INHALED O2 CONCENTRATION: 40 %
INHALED O2 CONCENTRATION: 60 %
INR PPP: 1.25 (ref 0.91–1.09)
INR PPP: 1.72 (ref 0.91–1.09)
LYMPHOCYTES # BLD AUTO: 1.58 10*3/MM3 (ref 0.7–3.1)
LYMPHOCYTES NFR BLD AUTO: 19.3 % (ref 19.6–45.3)
Lab: ABNORMAL
MCH RBC QN AUTO: 29.8 PG (ref 26.6–33)
MCH RBC QN AUTO: 30.2 PG (ref 26.6–33)
MCH RBC QN AUTO: 30.3 PG (ref 26.6–33)
MCHC RBC AUTO-ENTMCNC: 33.8 G/DL (ref 31.5–35.7)
MCHC RBC AUTO-ENTMCNC: 34.6 G/DL (ref 31.5–35.7)
MCHC RBC AUTO-ENTMCNC: 35.1 G/DL (ref 31.5–35.7)
MCV RBC AUTO: 86.1 FL (ref 79–97)
MCV RBC AUTO: 87.6 FL (ref 79–97)
MCV RBC AUTO: 88.1 FL (ref 79–97)
METHGB BLD QL: 0.6 % (ref 0–3)
METHGB BLD QL: 0.7 % (ref 0–3)
METHGB BLD QL: 0.8 % (ref 0–3)
MODALITY: ABNORMAL
MONOCYTES # BLD AUTO: 0.1 10*3/MM3 (ref 0.1–0.9)
MONOCYTES NFR BLD AUTO: 1.2 % (ref 5–12)
NEUTROPHILS NFR BLD AUTO: 6.37 10*3/MM3 (ref 1.7–7)
NEUTROPHILS NFR BLD AUTO: 77.6 % (ref 42.7–76)
NOTIFIED BY: ABNORMAL
NOTIFIED WHO: ABNORMAL
NRBC BLD AUTO-RTO: 0 /100 WBC (ref 0–0.2)
OXYHGB MFR BLDV: 98.7 % (ref 94–99)
OXYHGB MFR BLDV: 99.1 % (ref 94–99)
OXYHGB MFR BLDV: 99.1 % (ref 94–99)
OXYHGB MFR BLDV: 99.2 % (ref 94–99)
OXYHGB MFR BLDV: 99.2 % (ref 94–99)
OXYHGB MFR BLDV: 99.3 % (ref 94–99)
PCO2 BLDA: 38.4 MM HG (ref 35–45)
PCO2 BLDA: 38.9 MM HG (ref 35–45)
PCO2 BLDA: 44.3 MM HG (ref 35–45)
PCO2 BLDA: 47.5 MM HG (ref 35–45)
PCO2 BLDA: 47.8 MM HG (ref 35–45)
PCO2 BLDA: 49 MM HG (ref 35–45)
PCO2 BLDA: 49.7 MM HG (ref 35–45)
PCO2 BLDA: 51.9 MM HG (ref 35–45)
PCO2 BLDA: 72.3 MM HG (ref 35–45)
PCO2 TEMP ADJ BLD: 38.4 MM HG (ref 35–45)
PCO2 TEMP ADJ BLD: 38.9 MM HG (ref 35–45)
PCO2 TEMP ADJ BLD: 44.3 MM HG (ref 35–45)
PCO2 TEMP ADJ BLD: 47.5 MM HG (ref 35–45)
PCO2 TEMP ADJ BLD: 47.8 MM HG (ref 35–45)
PCO2 TEMP ADJ BLD: 49 MM HG (ref 35–45)
PCO2 TEMP ADJ BLD: 49.7 MM HG (ref 35–45)
PCO2 TEMP ADJ BLD: 51.9 MM HG (ref 35–45)
PCO2 TEMP ADJ BLD: 72.3 MM HG (ref 35–45)
PEEP RESPIRATORY: 5 CM[H2O]
PEEP RESPIRATORY: 8 CM[H2O]
PEEP RESPIRATORY: 8 CM[H2O]
PH BLDA: 7.23 PH UNITS (ref 7.35–7.45)
PH BLDA: 7.35 PH UNITS (ref 7.35–7.45)
PH BLDA: 7.35 PH UNITS (ref 7.35–7.45)
PH BLDA: 7.36 PH UNITS (ref 7.35–7.45)
PH BLDA: 7.37 PH UNITS (ref 7.35–7.45)
PH BLDA: 7.39 PH UNITS (ref 7.35–7.45)
PH BLDA: 7.41 PH UNITS (ref 7.35–7.45)
PH BLDA: 7.43 PH UNITS (ref 7.35–7.45)
PH BLDA: 7.44 PH UNITS (ref 7.35–7.45)
PH, TEMP CORRECTED: 7.23 PH UNITS (ref 7.35–7.45)
PH, TEMP CORRECTED: 7.35 PH UNITS (ref 7.35–7.45)
PH, TEMP CORRECTED: 7.35 PH UNITS (ref 7.35–7.45)
PH, TEMP CORRECTED: 7.36 PH UNITS (ref 7.35–7.45)
PH, TEMP CORRECTED: 7.37 PH UNITS (ref 7.35–7.45)
PH, TEMP CORRECTED: 7.39 PH UNITS (ref 7.35–7.45)
PH, TEMP CORRECTED: 7.41 PH UNITS (ref 7.35–7.45)
PH, TEMP CORRECTED: 7.43 PH UNITS (ref 7.35–7.45)
PH, TEMP CORRECTED: 7.44 PH UNITS (ref 7.35–7.45)
PHOSPHATE SERPL-MCNC: 2 MG/DL (ref 2.5–4.5)
PHOSPHATE SERPL-MCNC: 2.6 MG/DL (ref 2.5–4.5)
PLATELET # BLD AUTO: 144 10*3/MM3 (ref 140–450)
PLATELET # BLD AUTO: 149 10*3/MM3 (ref 140–450)
PLATELET # BLD AUTO: 167 10*3/MM3 (ref 140–450)
PMV BLD AUTO: 10.2 FL (ref 6–12)
PMV BLD AUTO: 10.2 FL (ref 6–12)
PMV BLD AUTO: 10.7 FL (ref 6–12)
PO2 BLD: 115 MM[HG] (ref 0–500)
PO2 BLD: 254 MM[HG] (ref 0–500)
PO2 BLD: 385 MM[HG] (ref 0–500)
PO2 BLDA: 154 MM HG (ref 83–108)
PO2 BLDA: 450 MM HG (ref 83–108)
PO2 BLDA: 482 MM HG (ref 83–108)
PO2 BLDA: 532 MM HG (ref 83–108)
PO2 BLDA: 69 MM HG (ref 83–108)
PO2 BLDA: 76.3 MM HG (ref 83–108)
PO2 BLDA: >547 MM HG (ref 83–108)
PO2 TEMP ADJ BLD: 154 MM HG (ref 83–108)
PO2 TEMP ADJ BLD: 450 MM HG (ref 83–108)
PO2 TEMP ADJ BLD: 482 MM HG (ref 83–108)
PO2 TEMP ADJ BLD: 532 MM HG (ref 83–108)
PO2 TEMP ADJ BLD: 69 MM HG (ref 83–108)
PO2 TEMP ADJ BLD: 76.3 MM HG (ref 83–108)
PO2 TEMP ADJ BLD: >547 MM HG (ref 83–108)
POTASSIUM BLDA-SCNC: 3 MMOL/L (ref 3.5–5.2)
POTASSIUM BLDA-SCNC: 3.3 MMOL/L (ref 3.5–5.2)
POTASSIUM BLDA-SCNC: 3.4 MMOL/L (ref 3.5–5.2)
POTASSIUM BLDA-SCNC: 3.5 MMOL/L (ref 3.5–5.2)
POTASSIUM BLDA-SCNC: 3.5 MMOL/L (ref 3.5–5.2)
POTASSIUM BLDA-SCNC: 3.7 MMOL/L (ref 3.5–5.2)
POTASSIUM SERPL-SCNC: 3 MMOL/L (ref 3.5–5.2)
POTASSIUM SERPL-SCNC: 4.8 MMOL/L (ref 3.5–5.2)
PROTHROMBIN TIME: 16.2 SECONDS (ref 11.8–14.8)
PROTHROMBIN TIME: 20.9 SECONDS (ref 11.8–14.8)
PSV: 10 CMH2O
RBC # BLD AUTO: 2.95 10*6/MM3 (ref 4.14–5.8)
RBC # BLD AUTO: 3.7 10*6/MM3 (ref 4.14–5.8)
RBC # BLD AUTO: 3.97 10*6/MM3 (ref 4.14–5.8)
RH BLD: POSITIVE
SAO2 % BLDCOA: 100 % (ref 94–99)
SAO2 % BLDCOA: 100 % (ref 94–99)
SAO2 % BLDCOA: 94.2 % (ref 94–99)
SAO2 % BLDCOA: 95.6 % (ref 94–99)
SAO2 % BLDCOA: 99.8 % (ref 94–99)
SAO2 % BLDCOA: >100.1 % (ref 94–99)
SET MECH RESP RATE: 20
SET MECH RESP RATE: 22
SODIUM BLDA-SCNC: 138 MMOL/L (ref 136–145)
SODIUM BLDA-SCNC: 140 MMOL/L (ref 136–145)
SODIUM BLDA-SCNC: 141 MMOL/L (ref 136–145)
SODIUM BLDA-SCNC: 141 MMOL/L (ref 136–145)
SODIUM SERPL-SCNC: 141 MMOL/L (ref 136–145)
SODIUM SERPL-SCNC: 142 MMOL/L (ref 136–145)
T&S EXPIRATION DATE: NORMAL
VENTILATOR MODE: ABNORMAL
VT ON VENT VENT: 600 ML
VT ON VENT VENT: 600 ML
WBC NRBC COR # BLD AUTO: 6.23 10*3/MM3 (ref 3.4–10.8)
WBC NRBC COR # BLD AUTO: 7.43 10*3/MM3 (ref 3.4–10.8)
WBC NRBC COR # BLD AUTO: 8.2 10*3/MM3 (ref 3.4–10.8)

## 2024-06-20 PROCEDURE — 33268 EXCL LAA OPN OTH PX ANY METH: CPT | Performed by: THORACIC SURGERY (CARDIOTHORACIC VASCULAR SURGERY)

## 2024-06-20 PROCEDURE — 85384 FIBRINOGEN ACTIVITY: CPT | Performed by: THORACIC SURGERY (CARDIOTHORACIC VASCULAR SURGERY)

## 2024-06-20 PROCEDURE — 83050 HGB METHEMOGLOBIN QUAN: CPT

## 2024-06-20 PROCEDURE — 82375 ASSAY CARBOXYHB QUANT: CPT

## 2024-06-20 PROCEDURE — 25810000003 SODIUM CHLORIDE 0.9 % SOLUTION 250 ML FLEX CONT

## 2024-06-20 PROCEDURE — 25010000002 CEFAZOLIN PER 500 MG: Performed by: NURSE PRACTITIONER

## 2024-06-20 PROCEDURE — 86900 BLOOD TYPING SEROLOGIC ABO: CPT | Performed by: NURSE PRACTITIONER

## 2024-06-20 PROCEDURE — 25010000002 NICARDIPINE 2.5 MG/ML SOLUTION: Performed by: THORACIC SURGERY (CARDIOTHORACIC VASCULAR SURGERY)

## 2024-06-20 PROCEDURE — 94799 UNLISTED PULMONARY SVC/PX: CPT

## 2024-06-20 PROCEDURE — 5A1221Z PERFORMANCE OF CARDIAC OUTPUT, CONTINUOUS: ICD-10-PCS | Performed by: THORACIC SURGERY (CARDIOTHORACIC VASCULAR SURGERY)

## 2024-06-20 PROCEDURE — C1760 CLOSURE DEV, VASC: HCPCS | Performed by: THORACIC SURGERY (CARDIOTHORACIC VASCULAR SURGERY)

## 2024-06-20 PROCEDURE — 71045 X-RAY EXAM CHEST 1 VIEW: CPT

## 2024-06-20 PROCEDURE — 85730 THROMBOPLASTIN TIME PARTIAL: CPT | Performed by: THORACIC SURGERY (CARDIOTHORACIC VASCULAR SURGERY)

## 2024-06-20 PROCEDURE — 25010000002 HEPARIN (PORCINE) PER 1000 UNITS

## 2024-06-20 PROCEDURE — 25010000002 MIDAZOLAM PER 1 MG: Performed by: ANESTHESIOLOGY

## 2024-06-20 PROCEDURE — 25010000002 VANCOMYCIN 1 G RECONSTITUTED SOLUTION 1 EACH VIAL: Performed by: THORACIC SURGERY (CARDIOTHORACIC VASCULAR SURGERY)

## 2024-06-20 PROCEDURE — 94002 VENT MGMT INPAT INIT DAY: CPT

## 2024-06-20 PROCEDURE — C1768 GRAFT, VASCULAR: HCPCS | Performed by: THORACIC SURGERY (CARDIOTHORACIC VASCULAR SURGERY)

## 2024-06-20 PROCEDURE — 86901 BLOOD TYPING SEROLOGIC RH(D): CPT | Performed by: NURSE PRACTITIONER

## 2024-06-20 PROCEDURE — 85610 PROTHROMBIN TIME: CPT | Performed by: THORACIC SURGERY (CARDIOTHORACIC VASCULAR SURGERY)

## 2024-06-20 PROCEDURE — C1889 IMPLANT/INSERT DEVICE, NOC: HCPCS | Performed by: THORACIC SURGERY (CARDIOTHORACIC VASCULAR SURGERY)

## 2024-06-20 PROCEDURE — 63710000001 INSULIN REGULAR HUMAN PER 5 UNITS

## 2024-06-20 PROCEDURE — 33863 ASCENDING AORTIC GRAFT: CPT | Performed by: THORACIC SURGERY (CARDIOTHORACIC VASCULAR SURGERY)

## 2024-06-20 PROCEDURE — 25810000003 LACTATED RINGERS PER 1000 ML: Performed by: THORACIC SURGERY (CARDIOTHORACIC VASCULAR SURGERY)

## 2024-06-20 PROCEDURE — 25010000002 VANCOMYCIN 10 G RECONSTITUTED SOLUTION: Performed by: THORACIC SURGERY (CARDIOTHORACIC VASCULAR SURGERY)

## 2024-06-20 PROCEDURE — 94761 N-INVAS EAR/PLS OXIMETRY MLT: CPT

## 2024-06-20 PROCEDURE — 93312 ECHO TRANSESOPHAGEAL: CPT | Performed by: INTERNAL MEDICINE

## 2024-06-20 PROCEDURE — C1713 ANCHOR/SCREW BN/BN,TIS/BN: HCPCS | Performed by: THORACIC SURGERY (CARDIOTHORACIC VASCULAR SURGERY)

## 2024-06-20 PROCEDURE — 25010000002 SUGAMMADEX 200 MG/2ML SOLUTION

## 2024-06-20 PROCEDURE — 02RF0KZ REPLACEMENT OF AORTIC VALVE WITH NONAUTOLOGOUS TISSUE SUBSTITUTE, OPEN APPROACH: ICD-10-PCS | Performed by: THORACIC SURGERY (CARDIOTHORACIC VASCULAR SURGERY)

## 2024-06-20 PROCEDURE — 25010000002 POTASSIUM CHLORIDE PER 2 MEQ: Performed by: THORACIC SURGERY (CARDIOTHORACIC VASCULAR SURGERY)

## 2024-06-20 PROCEDURE — C1751 CATH, INF, PER/CENT/MIDLINE: HCPCS

## 2024-06-20 PROCEDURE — 02L70CK OCCLUSION OF LEFT ATRIAL APPENDAGE WITH EXTRALUMINAL DEVICE, OPEN APPROACH: ICD-10-PCS | Performed by: THORACIC SURGERY (CARDIOTHORACIC VASCULAR SURGERY)

## 2024-06-20 PROCEDURE — 25010000002 FENTANYL CITRATE (PF) 50 MCG/ML SOLUTION

## 2024-06-20 PROCEDURE — S0260 H&P FOR SURGERY: HCPCS | Performed by: THORACIC SURGERY (CARDIOTHORACIC VASCULAR SURGERY)

## 2024-06-20 PROCEDURE — 93010 ELECTROCARDIOGRAM REPORT: CPT | Performed by: INTERNAL MEDICINE

## 2024-06-20 PROCEDURE — 25010000002 PROPOFOL 10 MG/ML EMULSION

## 2024-06-20 PROCEDURE — 25010000002 AMIODARONE IN DEXTROSE 5% 360-4.14 MG/200ML-% SOLUTION: Performed by: THORACIC SURGERY (CARDIOTHORACIC VASCULAR SURGERY)

## 2024-06-20 PROCEDURE — 85025 COMPLETE CBC W/AUTO DIFF WBC: CPT | Performed by: THORACIC SURGERY (CARDIOTHORACIC VASCULAR SURGERY)

## 2024-06-20 PROCEDURE — 82948 REAGENT STRIP/BLOOD GLUCOSE: CPT

## 2024-06-20 PROCEDURE — 82803 BLOOD GASES ANY COMBINATION: CPT

## 2024-06-20 PROCEDURE — 02RX0JZ REPLACEMENT OF THORACIC AORTA, ASCENDING/ARCH WITH SYNTHETIC SUBSTITUTE, OPEN APPROACH: ICD-10-PCS | Performed by: THORACIC SURGERY (CARDIOTHORACIC VASCULAR SURGERY)

## 2024-06-20 PROCEDURE — 93325 DOPPLER ECHO COLOR FLOW MAPG: CPT | Performed by: INTERNAL MEDICINE

## 2024-06-20 PROCEDURE — 25010000002 CEFAZOLIN PER 500 MG: Performed by: THORACIC SURGERY (CARDIOTHORACIC VASCULAR SURGERY)

## 2024-06-20 PROCEDURE — 25010000002 HEPARIN (PORCINE) PER 1000 UNITS: Performed by: THORACIC SURGERY (CARDIOTHORACIC VASCULAR SURGERY)

## 2024-06-20 PROCEDURE — 86850 RBC ANTIBODY SCREEN: CPT | Performed by: NURSE PRACTITIONER

## 2024-06-20 PROCEDURE — 82805 BLOOD GASES W/O2 SATURATION: CPT

## 2024-06-20 PROCEDURE — 3E080GC INTRODUCTION OF OTHER THERAPEUTIC SUBSTANCE INTO HEART, OPEN APPROACH: ICD-10-PCS | Performed by: THORACIC SURGERY (CARDIOTHORACIC VASCULAR SURGERY)

## 2024-06-20 PROCEDURE — 25810000003 SODIUM CHLORIDE 0.9 % SOLUTION: Performed by: THORACIC SURGERY (CARDIOTHORACIC VASCULAR SURGERY)

## 2024-06-20 PROCEDURE — 25810000003 DEXTROSE 5 % WITH KCL 20 MEQ 20 MEQ/L SOLUTION: Performed by: THORACIC SURGERY (CARDIOTHORACIC VASCULAR SURGERY)

## 2024-06-20 PROCEDURE — 25010000002 MIDAZOLAM PER 1 MG

## 2024-06-20 PROCEDURE — 85027 COMPLETE CBC AUTOMATED: CPT | Performed by: THORACIC SURGERY (CARDIOTHORACIC VASCULAR SURGERY)

## 2024-06-20 PROCEDURE — 33866 AORTIC HEMIARCH GRAFT: CPT | Performed by: THORACIC SURGERY (CARDIOTHORACIC VASCULAR SURGERY)

## 2024-06-20 PROCEDURE — 93005 ELECTROCARDIOGRAM TRACING: CPT | Performed by: THORACIC SURGERY (CARDIOTHORACIC VASCULAR SURGERY)

## 2024-06-20 PROCEDURE — 82330 ASSAY OF CALCIUM: CPT

## 2024-06-20 PROCEDURE — 25010000002 METHYLPREDNISOLONE PER 125 MG

## 2024-06-20 PROCEDURE — 88305 TISSUE EXAM BY PATHOLOGIST: CPT | Performed by: THORACIC SURGERY (CARDIOTHORACIC VASCULAR SURGERY)

## 2024-06-20 PROCEDURE — 80069 RENAL FUNCTION PANEL: CPT | Performed by: THORACIC SURGERY (CARDIOTHORACIC VASCULAR SURGERY)

## 2024-06-20 PROCEDURE — 25010000002 PROTAMINE SULFATE PER 10 MG

## 2024-06-20 PROCEDURE — 25010000002 VASOPRESSIN 20 UNIT/ML SOLUTION

## 2024-06-20 PROCEDURE — 25010000002 ACETAMINOPHEN 10 MG/ML SOLUTION: Performed by: THORACIC SURGERY (CARDIOTHORACIC VASCULAR SURGERY)

## 2024-06-20 PROCEDURE — 86923 COMPATIBILITY TEST ELECTRIC: CPT

## 2024-06-20 RX ORDER — SODIUM CHLORIDE 0.9 % (FLUSH) 0.9 %
3-10 SYRINGE (ML) INJECTION AS NEEDED
Status: DISCONTINUED | OUTPATIENT
Start: 2024-06-20 | End: 2024-06-20 | Stop reason: HOSPADM

## 2024-06-20 RX ORDER — PROPOFOL 10 MG/ML
VIAL (ML) INTRAVENOUS AS NEEDED
Status: DISCONTINUED | OUTPATIENT
Start: 2024-06-20 | End: 2024-06-20 | Stop reason: SURG

## 2024-06-20 RX ORDER — SODIUM CHLORIDE 0.9 % (FLUSH) 0.9 %
3 SYRINGE (ML) INJECTION AS NEEDED
Status: DISCONTINUED | OUTPATIENT
Start: 2024-06-20 | End: 2024-06-20 | Stop reason: HOSPADM

## 2024-06-20 RX ORDER — VANCOMYCIN/0.9 % SOD CHLORIDE 1.5G/250ML
PLASTIC BAG, INJECTION (ML) INTRAVENOUS AS NEEDED
Status: DISCONTINUED | OUTPATIENT
Start: 2024-06-20 | End: 2024-06-20 | Stop reason: SURG

## 2024-06-20 RX ORDER — POTASSIUM CHLORIDE, DEXTROSE MONOHYDRATE 150; 5 MG/100ML; G/100ML
30 INJECTION, SOLUTION INTRAVENOUS CONTINUOUS
Status: DISCONTINUED | OUTPATIENT
Start: 2024-06-20 | End: 2024-06-21

## 2024-06-20 RX ORDER — SODIUM CHLORIDE 0.9 % (FLUSH) 0.9 %
3 SYRINGE (ML) INJECTION EVERY 12 HOURS SCHEDULED
Status: DISCONTINUED | OUTPATIENT
Start: 2024-06-20 | End: 2024-06-20 | Stop reason: HOSPADM

## 2024-06-20 RX ORDER — LIDOCAINE HYDROCHLORIDE 10 MG/ML
0.5 INJECTION, SOLUTION EPIDURAL; INFILTRATION; INTRACAUDAL; PERINEURAL ONCE AS NEEDED
Status: DISCONTINUED | OUTPATIENT
Start: 2024-06-20 | End: 2024-06-20 | Stop reason: HOSPADM

## 2024-06-20 RX ORDER — DEXMEDETOMIDINE HYDROCHLORIDE 4 UG/ML
.2-1.5 INJECTION, SOLUTION INTRAVENOUS
Status: DISCONTINUED | OUTPATIENT
Start: 2024-06-20 | End: 2024-06-21

## 2024-06-20 RX ORDER — METHYLPREDNISOLONE SODIUM SUCCINATE 125 MG/2ML
INJECTION, POWDER, LYOPHILIZED, FOR SOLUTION INTRAMUSCULAR; INTRAVENOUS AS NEEDED
Status: DISCONTINUED | OUTPATIENT
Start: 2024-06-20 | End: 2024-06-20 | Stop reason: SURG

## 2024-06-20 RX ORDER — LIDOCAINE HYDROCHLORIDE 20 MG/ML
INJECTION, SOLUTION EPIDURAL; INFILTRATION; INTRACAUDAL; PERINEURAL AS NEEDED
Status: DISCONTINUED | OUTPATIENT
Start: 2024-06-20 | End: 2024-06-20 | Stop reason: SURG

## 2024-06-20 RX ORDER — CHLORHEXIDINE GLUCONATE 500 MG/1
1 CLOTH TOPICAL EVERY 24 HOURS
Status: DISCONTINUED | OUTPATIENT
Start: 2024-06-21 | End: 2024-06-23

## 2024-06-20 RX ORDER — PREGABALIN 25 MG/1
25 CAPSULE ORAL ONCE
Status: COMPLETED | OUTPATIENT
Start: 2024-06-20 | End: 2024-06-20

## 2024-06-20 RX ORDER — VANCOMYCIN/0.9 % SOD CHLORIDE 1.5G/250ML
1500 PLASTIC BAG, INJECTION (ML) INTRAVENOUS EVERY 12 HOURS
Status: COMPLETED | OUTPATIENT
Start: 2024-06-20 | End: 2024-06-21

## 2024-06-20 RX ORDER — DEXTROSE MONOHYDRATE 25 G/50ML
10-50 INJECTION, SOLUTION INTRAVENOUS
Status: DISCONTINUED | OUTPATIENT
Start: 2024-06-20 | End: 2024-06-20 | Stop reason: HOSPADM

## 2024-06-20 RX ORDER — SODIUM CHLORIDE, SODIUM LACTATE, POTASSIUM CHLORIDE, CALCIUM CHLORIDE 600; 310; 30; 20 MG/100ML; MG/100ML; MG/100ML; MG/100ML
100 INJECTION, SOLUTION INTRAVENOUS CONTINUOUS
Status: DISCONTINUED | OUTPATIENT
Start: 2024-06-20 | End: 2024-06-20

## 2024-06-20 RX ORDER — ACETAMINOPHEN 10 MG/ML
1000 INJECTION, SOLUTION INTRAVENOUS EVERY 8 HOURS
Qty: 300 ML | Refills: 0 | Status: COMPLETED | OUTPATIENT
Start: 2024-06-20 | End: 2024-06-21

## 2024-06-20 RX ORDER — PREGABALIN 25 MG/1
25 CAPSULE ORAL EVERY 12 HOURS SCHEDULED
Status: DISCONTINUED | OUTPATIENT
Start: 2024-06-21 | End: 2024-06-24

## 2024-06-20 RX ORDER — NICOTINE POLACRILEX 4 MG
15 LOZENGE BUCCAL
Status: DISCONTINUED | OUTPATIENT
Start: 2024-06-20 | End: 2024-06-24

## 2024-06-20 RX ORDER — CHLORHEXIDINE GLUCONATE ORAL RINSE 1.2 MG/ML
15 SOLUTION DENTAL EVERY 12 HOURS
Status: DISCONTINUED | OUTPATIENT
Start: 2024-06-20 | End: 2024-06-23

## 2024-06-20 RX ORDER — CLOPIDOGREL BISULFATE 75 MG/1
75 TABLET ORAL DAILY
Status: DISCONTINUED | OUTPATIENT
Start: 2024-06-21 | End: 2024-06-24 | Stop reason: HOSPADM

## 2024-06-20 RX ORDER — SODIUM CHLORIDE 9 MG/ML
30 INJECTION, SOLUTION INTRAVENOUS CONTINUOUS PRN
Status: DISCONTINUED | OUTPATIENT
Start: 2024-06-20 | End: 2024-06-20 | Stop reason: HOSPADM

## 2024-06-20 RX ORDER — SODIUM CHLORIDE 9 MG/ML
40 INJECTION, SOLUTION INTRAVENOUS AS NEEDED
Status: DISCONTINUED | OUTPATIENT
Start: 2024-06-20 | End: 2024-06-20 | Stop reason: HOSPADM

## 2024-06-20 RX ORDER — DEXMEDETOMIDINE HYDROCHLORIDE 4 UG/ML
INJECTION, SOLUTION INTRAVENOUS CONTINUOUS PRN
Status: DISCONTINUED | OUTPATIENT
Start: 2024-06-20 | End: 2024-06-20 | Stop reason: SURG

## 2024-06-20 RX ORDER — ASPIRIN 81 MG/1
162 TABLET, CHEWABLE ORAL ONCE
Status: COMPLETED | OUTPATIENT
Start: 2024-06-21 | End: 2024-06-21

## 2024-06-20 RX ORDER — POTASSIUM CHLORIDE 29.8 MG/ML
20 INJECTION INTRAVENOUS
Status: COMPLETED | OUTPATIENT
Start: 2024-06-20 | End: 2024-06-20

## 2024-06-20 RX ORDER — POLYETHYLENE GLYCOL 3350 17 G/17G
17 POWDER, FOR SOLUTION ORAL DAILY
Status: DISCONTINUED | OUTPATIENT
Start: 2024-06-21 | End: 2024-06-24

## 2024-06-20 RX ORDER — ROCURONIUM BROMIDE 10 MG/ML
INJECTION, SOLUTION INTRAVENOUS AS NEEDED
Status: DISCONTINUED | OUTPATIENT
Start: 2024-06-20 | End: 2024-06-20 | Stop reason: SURG

## 2024-06-20 RX ORDER — FENTANYL CITRATE 50 UG/ML
INJECTION, SOLUTION INTRAMUSCULAR; INTRAVENOUS AS NEEDED
Status: DISCONTINUED | OUTPATIENT
Start: 2024-06-20 | End: 2024-06-20 | Stop reason: SURG

## 2024-06-20 RX ORDER — MIDAZOLAM HYDROCHLORIDE 1 MG/ML
1 INJECTION INTRAMUSCULAR; INTRAVENOUS
Status: DISCONTINUED | OUTPATIENT
Start: 2024-06-20 | End: 2024-06-20 | Stop reason: HOSPADM

## 2024-06-20 RX ORDER — OXYCODONE HYDROCHLORIDE 5 MG/1
5 TABLET ORAL
Status: DISCONTINUED | OUTPATIENT
Start: 2024-06-20 | End: 2024-06-23

## 2024-06-20 RX ORDER — IPRATROPIUM BROMIDE AND ALBUTEROL SULFATE 2.5; .5 MG/3ML; MG/3ML
1.5 SOLUTION RESPIRATORY (INHALATION)
Status: DISCONTINUED | OUTPATIENT
Start: 2024-06-20 | End: 2024-06-21

## 2024-06-20 RX ORDER — DEXTROSE MONOHYDRATE 25 G/50ML
10-50 INJECTION, SOLUTION INTRAVENOUS
Status: DISCONTINUED | OUTPATIENT
Start: 2024-06-20 | End: 2024-06-24

## 2024-06-20 RX ORDER — NICARDIPINE HYDROCHLORIDE 2.5 MG/ML
INJECTION INTRAVENOUS AS NEEDED
Status: DISCONTINUED | OUTPATIENT
Start: 2024-06-20 | End: 2024-06-20 | Stop reason: SURG

## 2024-06-20 RX ORDER — PHENYLEPHRINE HCL IN 0.9% NACL 50MG/250ML
.5-3 PLASTIC BAG, INJECTION (ML) INTRAVENOUS CONTINUOUS PRN
Status: DISCONTINUED | OUTPATIENT
Start: 2024-06-20 | End: 2024-06-21

## 2024-06-20 RX ORDER — NICOTINE POLACRILEX 4 MG
15 LOZENGE BUCCAL
Status: DISCONTINUED | OUTPATIENT
Start: 2024-06-20 | End: 2024-06-20 | Stop reason: HOSPADM

## 2024-06-20 RX ORDER — IBUPROFEN 600 MG/1
1 TABLET ORAL
Status: DISCONTINUED | OUTPATIENT
Start: 2024-06-20 | End: 2024-06-20 | Stop reason: HOSPADM

## 2024-06-20 RX ORDER — ACETAMINOPHEN 160 MG/5ML
1000 SOLUTION ORAL EVERY 6 HOURS
Status: DISCONTINUED | OUTPATIENT
Start: 2024-06-21 | End: 2024-06-23

## 2024-06-20 RX ORDER — SODIUM CHLORIDE 0.9 % (FLUSH) 0.9 %
30 SYRINGE (ML) INJECTION ONCE AS NEEDED
Status: DISCONTINUED | OUTPATIENT
Start: 2024-06-20 | End: 2024-06-20 | Stop reason: HOSPADM

## 2024-06-20 RX ORDER — MIDAZOLAM HYDROCHLORIDE 1 MG/ML
4 INJECTION INTRAMUSCULAR; INTRAVENOUS ONCE
Status: COMPLETED | OUTPATIENT
Start: 2024-06-20 | End: 2024-06-20

## 2024-06-20 RX ORDER — METOPROLOL TARTRATE 1 MG/ML
INJECTION, SOLUTION INTRAVENOUS AS NEEDED
Status: DISCONTINUED | OUTPATIENT
Start: 2024-06-20 | End: 2024-06-20 | Stop reason: SURG

## 2024-06-20 RX ORDER — MAGNESIUM HYDROXIDE 1200 MG/15ML
LIQUID ORAL AS NEEDED
Status: DISCONTINUED | OUTPATIENT
Start: 2024-06-20 | End: 2024-06-20 | Stop reason: HOSPADM

## 2024-06-20 RX ORDER — VASOPRESSIN 20 U/ML
INJECTION PARENTERAL CONTINUOUS PRN
Status: DISCONTINUED | OUTPATIENT
Start: 2024-06-20 | End: 2024-06-20 | Stop reason: SURG

## 2024-06-20 RX ORDER — OXYCODONE HYDROCHLORIDE 10 MG/1
10 TABLET ORAL
Status: DISCONTINUED | OUTPATIENT
Start: 2024-06-20 | End: 2024-06-23

## 2024-06-20 RX ORDER — CHLORHEXIDINE GLUCONATE 500 MG/1
CLOTH TOPICAL EVERY 12 HOURS PRN
Status: DISCONTINUED | OUTPATIENT
Start: 2024-06-20 | End: 2024-06-20 | Stop reason: HOSPADM

## 2024-06-20 RX ORDER — VECURONIUM BROMIDE 1 MG/ML
INJECTION, POWDER, LYOPHILIZED, FOR SOLUTION INTRAVENOUS AS NEEDED
Status: DISCONTINUED | OUTPATIENT
Start: 2024-06-20 | End: 2024-06-20 | Stop reason: SURG

## 2024-06-20 RX ORDER — BUPIVACAINE HCL/0.9 % NACL/PF 0.125 %
PLASTIC BAG, INJECTION (ML) EPIDURAL AS NEEDED
Status: DISCONTINUED | OUTPATIENT
Start: 2024-06-20 | End: 2024-06-20 | Stop reason: SURG

## 2024-06-20 RX ORDER — ACETAMINOPHEN 650 MG/1
650 SUPPOSITORY RECTAL EVERY 6 HOURS
Status: DISCONTINUED | OUTPATIENT
Start: 2024-06-21 | End: 2024-06-23

## 2024-06-20 RX ORDER — ATORVASTATIN CALCIUM 10 MG/1
20 TABLET, FILM COATED ORAL NIGHTLY
Status: DISCONTINUED | OUTPATIENT
Start: 2024-06-21 | End: 2024-06-24 | Stop reason: HOSPADM

## 2024-06-20 RX ORDER — AMIODARONE HYDROCHLORIDE 200 MG/1
400 TABLET ORAL 2 TIMES DAILY WITH MEALS
Status: DISCONTINUED | OUTPATIENT
Start: 2024-06-21 | End: 2024-06-23

## 2024-06-20 RX ORDER — CHLORHEXIDINE GLUCONATE ORAL RINSE 1.2 MG/ML
15 SOLUTION DENTAL EVERY 12 HOURS SCHEDULED
Status: DISCONTINUED | OUTPATIENT
Start: 2024-06-20 | End: 2024-06-20 | Stop reason: SDUPTHER

## 2024-06-20 RX ORDER — ACETAMINOPHEN 500 MG
1000 TABLET ORAL ONCE
Status: COMPLETED | OUTPATIENT
Start: 2024-06-20 | End: 2024-06-20

## 2024-06-20 RX ORDER — MEPERIDINE HYDROCHLORIDE 50 MG/ML
25 INJECTION INTRAMUSCULAR; INTRAVENOUS; SUBCUTANEOUS
Status: DISCONTINUED | OUTPATIENT
Start: 2024-06-20 | End: 2024-06-21

## 2024-06-20 RX ORDER — FENTANYL CITRATE 50 UG/ML
25 INJECTION, SOLUTION INTRAMUSCULAR; INTRAVENOUS
Status: DISCONTINUED | OUTPATIENT
Start: 2024-06-20 | End: 2024-06-21

## 2024-06-20 RX ORDER — LIDOCAINE 4 G/G
2 PATCH TOPICAL
Status: DISCONTINUED | OUTPATIENT
Start: 2024-06-20 | End: 2024-06-24 | Stop reason: HOSPADM

## 2024-06-20 RX ORDER — SODIUM CHLORIDE, SODIUM LACTATE, POTASSIUM CHLORIDE, CALCIUM CHLORIDE 600; 310; 30; 20 MG/100ML; MG/100ML; MG/100ML; MG/100ML
1000 INJECTION, SOLUTION INTRAVENOUS CONTINUOUS
Status: DISCONTINUED | OUTPATIENT
Start: 2024-06-20 | End: 2024-06-20

## 2024-06-20 RX ORDER — SODIUM CHLORIDE, SODIUM LACTATE, POTASSIUM CHLORIDE, CALCIUM CHLORIDE 600; 310; 30; 20 MG/100ML; MG/100ML; MG/100ML; MG/100ML
30 INJECTION, SOLUTION INTRAVENOUS CONTINUOUS
Status: DISCONTINUED | OUTPATIENT
Start: 2024-06-20 | End: 2024-06-20

## 2024-06-20 RX ORDER — MIDAZOLAM HYDROCHLORIDE 1 MG/ML
INJECTION INTRAMUSCULAR; INTRAVENOUS AS NEEDED
Status: DISCONTINUED | OUTPATIENT
Start: 2024-06-20 | End: 2024-06-20 | Stop reason: SURG

## 2024-06-20 RX ORDER — PANTOPRAZOLE SODIUM 40 MG/1
40 TABLET, DELAYED RELEASE ORAL EVERY MORNING
Status: COMPLETED | OUTPATIENT
Start: 2024-06-21 | End: 2024-06-23

## 2024-06-20 RX ORDER — ENOXAPARIN SODIUM 100 MG/ML
40 INJECTION SUBCUTANEOUS DAILY
Status: DISCONTINUED | OUTPATIENT
Start: 2024-06-21 | End: 2024-06-24 | Stop reason: HOSPADM

## 2024-06-20 RX ORDER — PANTOPRAZOLE SODIUM 40 MG/10ML
40 INJECTION, POWDER, LYOPHILIZED, FOR SOLUTION INTRAVENOUS ONCE
Status: COMPLETED | OUTPATIENT
Start: 2024-06-20 | End: 2024-06-20

## 2024-06-20 RX ORDER — ASPIRIN 81 MG/1
81 TABLET ORAL DAILY
Status: DISCONTINUED | OUTPATIENT
Start: 2024-06-22 | End: 2024-06-24 | Stop reason: HOSPADM

## 2024-06-20 RX ORDER — NALOXONE HCL 0.4 MG/ML
0.4 VIAL (ML) INJECTION
Status: DISCONTINUED | OUTPATIENT
Start: 2024-06-20 | End: 2024-06-21

## 2024-06-20 RX ORDER — FENTANYL CITRATE 50 UG/ML
50 INJECTION, SOLUTION INTRAMUSCULAR; INTRAVENOUS
Status: DISCONTINUED | OUTPATIENT
Start: 2024-06-20 | End: 2024-06-21

## 2024-06-20 RX ORDER — ONDANSETRON 2 MG/ML
4 INJECTION INTRAMUSCULAR; INTRAVENOUS EVERY 6 HOURS PRN
Status: DISCONTINUED | OUTPATIENT
Start: 2024-06-20 | End: 2024-06-24 | Stop reason: HOSPADM

## 2024-06-20 RX ORDER — NITROGLYCERIN 20 MG/100ML
5 INJECTION INTRAVENOUS CONTINUOUS
Status: DISCONTINUED | OUTPATIENT
Start: 2024-06-20 | End: 2024-06-21

## 2024-06-20 RX ORDER — ACETAMINOPHEN 500 MG
1000 TABLET ORAL EVERY 6 HOURS
Status: DISCONTINUED | OUTPATIENT
Start: 2024-06-21 | End: 2024-06-24

## 2024-06-20 RX ORDER — BISACODYL 5 MG/1
10 TABLET, DELAYED RELEASE ORAL 2 TIMES DAILY
Status: DISCONTINUED | OUTPATIENT
Start: 2024-06-21 | End: 2024-06-24

## 2024-06-20 RX ORDER — HEPARIN SODIUM 1000 [USP'U]/ML
INJECTION, SOLUTION INTRAVENOUS; SUBCUTANEOUS AS NEEDED
Status: DISCONTINUED | OUTPATIENT
Start: 2024-06-20 | End: 2024-06-20 | Stop reason: SURG

## 2024-06-20 RX ORDER — PROTAMINE SULFATE 10 MG/ML
INJECTION, SOLUTION INTRAVENOUS AS NEEDED
Status: DISCONTINUED | OUTPATIENT
Start: 2024-06-20 | End: 2024-06-20 | Stop reason: SURG

## 2024-06-20 RX ORDER — ALBUTEROL SULFATE 2.5 MG/3ML
2.5 SOLUTION RESPIRATORY (INHALATION) EVERY 4 HOURS PRN
Status: ACTIVE | OUTPATIENT
Start: 2024-06-20 | End: 2024-06-21

## 2024-06-20 RX ADMIN — POTASSIUM CHLORIDE 20 MEQ: 400 INJECTION, SOLUTION INTRAVENOUS at 17:28

## 2024-06-20 RX ADMIN — NICARDIPINE HYDROCHLORIDE 5 MG/HR: 25 INJECTION, SOLUTION INTRAVENOUS at 16:47

## 2024-06-20 RX ADMIN — FENTANYL CITRATE 100 MCG: 50 INJECTION, SOLUTION INTRAMUSCULAR; INTRAVENOUS at 08:51

## 2024-06-20 RX ADMIN — IPRATROPIUM BROMIDE AND ALBUTEROL SULFATE 1.5 ML: .5; 3 SOLUTION RESPIRATORY (INHALATION) at 18:46

## 2024-06-20 RX ADMIN — VECURONIUM BROMIDE 5 MG: 10 INJECTION, POWDER, LYOPHILIZED, FOR SOLUTION INTRAVENOUS at 09:58

## 2024-06-20 RX ADMIN — FENTANYL CITRATE 750 MCG: 50 INJECTION, SOLUTION INTRAMUSCULAR; INTRAVENOUS at 07:29

## 2024-06-20 RX ADMIN — HEPARIN SODIUM 40000 UNITS: 1000 INJECTION, SOLUTION INTRAVENOUS; SUBCUTANEOUS at 09:12

## 2024-06-20 RX ADMIN — SODIUM CHLORIDE, POTASSIUM CHLORIDE, SODIUM LACTATE AND CALCIUM CHLORIDE 1000 ML: 600; 310; 30; 20 INJECTION, SOLUTION INTRAVENOUS at 06:05

## 2024-06-20 RX ADMIN — AMINOCAPROIC ACID 5 G: 250 INJECTION, SOLUTION INTRAVENOUS at 08:11

## 2024-06-20 RX ADMIN — FENTANYL CITRATE 50 MCG: 50 INJECTION, SOLUTION INTRAMUSCULAR; INTRAVENOUS at 10:24

## 2024-06-20 RX ADMIN — AMINOCAPROIC ACID 1 G/HR: 250 INJECTION, SOLUTION INTRAVENOUS at 08:10

## 2024-06-20 RX ADMIN — Medication 100 MCG: at 07:57

## 2024-06-20 RX ADMIN — DEXMEDETOMIDINE HYDROCHLORIDE 1 MCG/KG/HR: 4 INJECTION, SOLUTION INTRAVENOUS at 16:19

## 2024-06-20 RX ADMIN — LIDOCAINE HYDROCHLORIDE 200 MG: 20 INJECTION, SOLUTION EPIDURAL; INFILTRATION; INTRACAUDAL; PERINEURAL at 07:29

## 2024-06-20 RX ADMIN — POTASSIUM PHOSPHATE, MONOBASIC AND POTASSIUM PHOSPHATE, DIBASIC 15 MMOL: 224; 236 INJECTION, SOLUTION, CONCENTRATE INTRAVENOUS at 18:18

## 2024-06-20 RX ADMIN — METHYLPREDNISOLONE SODIUM SUCCINATE 1000 MG: 125 INJECTION, POWDER, FOR SOLUTION INTRAMUSCULAR; INTRAVENOUS at 09:00

## 2024-06-20 RX ADMIN — POTASSIUM CHLORIDE AND DEXTROSE MONOHYDRATE 30 ML/HR: 150; 5 INJECTION, SOLUTION INTRAVENOUS at 15:48

## 2024-06-20 RX ADMIN — Medication 100 MCG: at 07:42

## 2024-06-20 RX ADMIN — DEXMEDETOMIDINE HYDROCHLORIDE 0.8 MCG/KG/HR: 4 INJECTION, SOLUTION INTRAVENOUS at 12:15

## 2024-06-20 RX ADMIN — MIDAZOLAM HYDROCHLORIDE 3 MG: 1 INJECTION, SOLUTION INTRAMUSCULAR; INTRAVENOUS at 12:40

## 2024-06-20 RX ADMIN — ROCURONIUM BROMIDE 100 MG: 10 INJECTION, SOLUTION INTRAVENOUS at 07:29

## 2024-06-20 RX ADMIN — VASOPRESSIN 0.02 UNITS/MIN: 20 INJECTION INTRAVENOUS at 09:39

## 2024-06-20 RX ADMIN — MIDAZOLAM HYDROCHLORIDE 2 MG: 1 INJECTION, SOLUTION INTRAMUSCULAR; INTRAVENOUS at 10:24

## 2024-06-20 RX ADMIN — VECURONIUM BROMIDE 5 MG: 1 INJECTION, POWDER, LYOPHILIZED, FOR SOLUTION INTRAVENOUS at 12:30

## 2024-06-20 RX ADMIN — Medication 1 APPLICATION: at 17:06

## 2024-06-20 RX ADMIN — ACETAMINOPHEN 1000 MG: 10 INJECTION, SOLUTION INTRAVENOUS at 15:47

## 2024-06-20 RX ADMIN — OXYCODONE HYDROCHLORIDE 10 MG: 10 TABLET ORAL at 21:35

## 2024-06-20 RX ADMIN — ACETAMINOPHEN 1000 MG: 500 TABLET, FILM COATED ORAL at 05:56

## 2024-06-20 RX ADMIN — CEFAZOLIN 2 G: 2 INJECTION, POWDER, FOR SOLUTION INTRAMUSCULAR; INTRAVENOUS at 20:27

## 2024-06-20 RX ADMIN — LIDOCAINE 2 PATCH: 4 PATCH TOPICAL at 15:47

## 2024-06-20 RX ADMIN — PROPOFOL 35 MG: 10 INJECTION, EMULSION INTRAVENOUS at 11:14

## 2024-06-20 RX ADMIN — Medication 1500 MG: at 08:03

## 2024-06-20 RX ADMIN — SUGAMMADEX 200 MG: 100 INJECTION, SOLUTION INTRAVENOUS at 15:06

## 2024-06-20 RX ADMIN — PROPOFOL 100 MG: 10 INJECTION, EMULSION INTRAVENOUS at 07:29

## 2024-06-20 RX ADMIN — AMIODARONE HYDROCHLORIDE 1 MG/MIN: 1.8 INJECTION, SOLUTION INTRAVENOUS at 20:25

## 2024-06-20 RX ADMIN — PREGABALIN 25 MG: 25 CAPSULE ORAL at 05:56

## 2024-06-20 RX ADMIN — POTASSIUM CHLORIDE 20 MEQ: 400 INJECTION, SOLUTION INTRAVENOUS at 18:46

## 2024-06-20 RX ADMIN — METOPROLOL TARTRATE 1 MG: 5 INJECTION INTRAVENOUS at 07:49

## 2024-06-20 RX ADMIN — CHLORHEXIDINE GLUCONATE: 500 CLOTH TOPICAL at 05:56

## 2024-06-20 RX ADMIN — SODIUM CHLORIDE, POTASSIUM CHLORIDE, SODIUM LACTATE AND CALCIUM CHLORIDE 30 ML/HR: 600; 310; 30; 20 INJECTION, SOLUTION INTRAVENOUS at 06:05

## 2024-06-20 RX ADMIN — SODIUM CHLORIDE 1500 MG: 9 INJECTION, SOLUTION INTRAVENOUS at 20:14

## 2024-06-20 RX ADMIN — MIDAZOLAM HYDROCHLORIDE 5 MG: 1 INJECTION, SOLUTION INTRAMUSCULAR; INTRAVENOUS at 07:29

## 2024-06-20 RX ADMIN — VECURONIUM BROMIDE 5 MG: 1 INJECTION, POWDER, LYOPHILIZED, FOR SOLUTION INTRAVENOUS at 11:47

## 2024-06-20 RX ADMIN — VECURONIUM BROMIDE 5 MG: 10 INJECTION, POWDER, LYOPHILIZED, FOR SOLUTION INTRAVENOUS at 08:56

## 2024-06-20 RX ADMIN — SODIUM CHLORIDE 1 UNITS/HR: 9 INJECTION, SOLUTION INTRAVENOUS at 12:25

## 2024-06-20 RX ADMIN — PANTOPRAZOLE SODIUM 40 MG: 40 INJECTION, POWDER, FOR SOLUTION INTRAVENOUS at 16:19

## 2024-06-20 RX ADMIN — CEFAZOLIN 2000 MG: 2 INJECTION, POWDER, FOR SOLUTION INTRAMUSCULAR; INTRAVENOUS at 12:00

## 2024-06-20 RX ADMIN — CHLORHEXIDINE GLUCONATE 0.12% ORAL RINSE 15 ML: 1.2 LIQUID ORAL at 17:06

## 2024-06-20 RX ADMIN — Medication 100 MCG: at 08:13

## 2024-06-20 RX ADMIN — PROPOFOL 50 MG: 10 INJECTION, EMULSION INTRAVENOUS at 10:24

## 2024-06-20 RX ADMIN — SODIUM CHLORIDE 5 UNITS: 9 INJECTION, SOLUTION INTRAVENOUS at 12:35

## 2024-06-20 RX ADMIN — CEFAZOLIN 2000 MG: 2 INJECTION, POWDER, FOR SOLUTION INTRAMUSCULAR; INTRAVENOUS at 08:00

## 2024-06-20 RX ADMIN — NICARDIPINE HYDROCHLORIDE 250 MCG: 2.5 INJECTION INTRAVENOUS at 09:27

## 2024-06-20 RX ADMIN — NICARDIPINE HYDROCHLORIDE 250 MCG: 2.5 INJECTION INTRAVENOUS at 09:25

## 2024-06-20 RX ADMIN — PROTAMINE SULFATE 250 MG: 10 INJECTION, SOLUTION INTRAVENOUS at 13:29

## 2024-06-20 RX ADMIN — FENTANYL CITRATE 100 MCG: 50 INJECTION, SOLUTION INTRAMUSCULAR; INTRAVENOUS at 12:40

## 2024-06-20 RX ADMIN — MIDAZOLAM HYDROCHLORIDE 4 MG: 1 INJECTION, SOLUTION INTRAMUSCULAR; INTRAVENOUS at 06:31

## 2024-06-20 RX ADMIN — Medication 1 APPLICATION: at 05:56

## 2024-06-20 NOTE — SIGNIFICANT NOTE
06/20/24 1521   Readings   PEEP Intrinsic (cm H2O) 7.8 cm H2O   Plateau Pressure (cm H2O) 20 cm H2O   Driving Pressure (cm H2O) 12 cm H2O   Static Compliance (L/cm H2O) 52

## 2024-06-20 NOTE — H&P
Patient ID: Michael Issa is a 64 y.o. male who is here for follow-up of a known aneurysm.        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  He had been previously seen with recommendation to consider aneurysmectomy.  His workup has been completed.  He returns today for final discussion.  No new medical events.  No chest pain.  No dyspnea.  No changes since last interval       Medical History        Past Medical History:   Diagnosis Date    Aneurysm October 4th 2023    Arthritis      Disease of thyroid gland       NODULE PRESENT     Hx of chest pain      Hypertension              Surgical History         Past Surgical History:   Procedure Laterality Date    CARDIAC CATHETERIZATION Left 5/14/2024     Procedure: Cardiac Catheterization/Vascular Study;  Surgeon: Nima Nickerson MD;  Location: [facility] CATH INVASIVE LOCATION;  Service: Cardiology;  Laterality: Left;    COLONOSCOPY N/A 09/23/2021     Procedure: COLONOSCOPY;  Surgeon: Eduardo Neil MD;  Location: [facility] ENDOSCOPY;  Service: Gastroenterology;  Laterality: N/A;  pre  post diverticulosis, hemorrhoids  Dr. cavazos    KNEE SURGERY   2014     rt knee MENISCUS    PREPERITONEAL HERNIA REPAIR Bilateral 09/20/2017     Procedure: BILATERAL PREPERITONEAL INGUINAL HERNIA REPAIR LAPAROSCOPIC WITH MESH;  Surgeon: Rambo Church MD;  Location: [facility] OR;  Service:             Social History   Social History            Socioeconomic History    Marital status: Single   Tobacco Use    Smoking status: Former       Current packs/day: 0.00       Average packs/day: 0.5 packs/day for 30.0 years (15.0 ttl pk-yrs)       Types: Cigarettes       Start date: 4/18/1997       Quit date:  "2017       Years since quittin.1       Passive exposure: Past    Smokeless tobacco: Never    Tobacco comments:       Ex smoker   Vaping Use    Vaping status: Never Used   Substance and Sexual Activity    Alcohol use: Not Currently    Drug use: Not Currently    Sexual activity: Yes       Partners: Female       Birth control/protection: Birth control pill                  Family History   Problem Relation Age of Onset    Alzheimer's disease Mother      Diabetes Father      Heart disease Father      Heart attack Father      Heart failure Father      Hypertension Father      No Known Problems Sister      No Known Problems Brother      No Known Problems Daughter      No Known Problems Maternal Grandmother      No Known Problems Maternal Grandfather      No Known Problems Paternal Grandmother      No Known Problems Paternal Grandfather      Hypertension Brother      No Known Problems Brother           The following portions of the patient's history were reviewed and updated as appropriate: allergies, current medications, past family history, past medical history, past social history, past surgical history and problem list.              Objective    Visit Vitals  /85 (BP Location: Left arm, Patient Position: Sitting)   Pulse 63   Temp 97.1 °F (36.2 °C) (Temporal)   Resp 16   Ht 184 cm (72.44\")   Wt 102 kg (225 lb 8.5 oz)   SpO2 98%   BMI 30.22 kg/m²       Physical Exam  General: No acute distress, appropriate, alert.   Heart: Regular rate and rhythm without murmurs, rubs, or gallops.   Pulmonary: Clear to auscultation bilateral without wheezing, rubs or rales.   Abdomen: Soft, nontender, tenderness.   Extremities: No clubbing, cyanosis, or edema.     CT Angiogram Chest     Result Date: 2024  Narrative: EXAMINATION:  CT ANGIOGRAM CHEST-  2024 2:02 PM  HISTORY: I71.9-Aortic aneurysm of unspecified site, without rupture.  COMPARISON : 2023 and 2017.  DLP: 479.58 mGy.cm Automated dosage " reduction technique was utilized to decrease patient dosage.  TECHNIQUE: CT angio was performed of the chest with IV contrast. Coronal, sagittal and 3-D reconstruction were performed.  INDEPENDENT 3-D WORKSTATION UTILIZED FOR RECONSTRUCTION: Yes. A radiologist was present in the department.  MEDIASTINUM, HEART AND VASCULAR STRUCTURES: There is a 1.2 cm left thyroid nodule, stable. There is atheromatous calcification of the thoracic aorta. The ascending thoracic aorta is aneurysmal measuring 4.7 cm, stable. The aortic root measures 4.5 cm. The mid aortic arch measures 3.1 cm. The descending thoracic aorta measures 3.3 cm. There is mild coronary artery calcification. The main pulmonary artery segment is dilated measuring 4.1 cm. There is cardiomegaly. There are no enlarged hilar or mediastinal lymph nodes.  LUNGS: There are calcified lower lobe nodules bilaterally. There is a stable 2 mm subpleural left upper lobe nodule image 53 series 6 compared back to 2017.  UPPER ABDOMEN: Multiple liver cysts. No acute findings in the upper abdomen.  BONES: There are degenerative changes of the visualized spine.        Impression: 1. Stable aneurysmal dilatation of the ascending thoracic aorta measuring 4.7 cm. Aortic root is stable at 4.5 cm. The remainder of the thoracic aorta is normal caliber. Main pulmonary artery segment is dilated measuring 4.1 cm. There is atheromatous calcification of the thoracic aorta. Mild coronary artery calcification is noted. Cardiomegaly. 2. No dense consolidation or pleural effusion. Mild dependent atelectasis. Mild linear atelectasis left lung base. 3. Old granulomatous disease. Stable 2 mm left upper lobe nodule compared back to 2017. 4. Multiple liver cysts.   This report was signed and finalized on 4/24/2024 4:37 PM by Dr. Genaro Burgess MD.        Conclusion     Cardiac Catheterization Operative Report        Patient was referred for cardiac catheterization: High suspicion for coronary  artery disease     Procedure performed     Access of right radial artery using ultrasonic guidance with confirmation of placement of catheter in the right radial artery CPT code 73746  Selective coronary angiography using TIG 5 Cam[place]onian diagnostic catheter  Moderate sedation administered under supervision of  with monitoring CPT code 33844     Procedure Details  The risks, benefits, complications, treatment options, and expected outcomes were discussed with the patient. Plan is for moderate sedation, and the patient agrees to proceed with the procedure.  An immediate assessment was done prior to the administration of moderate sedation.     The patient and/or family concurred with the proposed plan, giving informed consent. Patient was brought to the cath lab after IV hydration was begun and oral premedication was given.      The skin overlying the patient's right radial artery was prepped and draped in the usual sterile fashion.  Timeout was taken to confirm the correct patient and procedure.  Lidocaine was administered for local anesthesia.  IV Versed and fentanyl were used to achieve conscious sedation.  Modified Seldinger technique was then used to place a 6 Cam[place]onian sheath in the right radial artery     Diagnostic coronary angiography was performed with Tig coronary catheter.     Coronary angiogram were performed in Montenegrin and HUTCHINSON projection to evaluate the coronary arterial systems.       A TR band device was used to achieve hemostasis at the end of the procedure.  The patient tolerated the procedure well, and there were no immediate complications.     Procedural Details: After written and informed consent was obtained, the patient was brought to the cath lab in a fasting state.        Selective coronary angiography:     Left main coronary artery:  The left main coronary artery arises from the left coronary cusp and bifurcates into the  left anterior descending coronary artery  and left circumflex arteries.     Left anterior descending artery:  The  left anterior descending coronary artery   arises normally from the left main coronary artery and courses in the anterior interventricular groove and terminates at the apex. No stenosis noted  Left circumflex:  The left circumflex arises form the left man artery and supplies obtuse marginal branches.  .  Right coronary artery:  The right coronary artery  arises normally from the right coronary cusp and is dominant for the posterior circulation.       Coronary angiography: As below     Left heart cath: Not performed     LV Gram: Not performed      Interventions: None     Estimated Blood Loss:  Minimal     Specimens: None         Complications:  None; patient tolerated the procedure well.           Disposition: Cardiovascular observation unit           Condition: stable            I supervised the administration of conscious sedation by nursing staff throughout the case.  First dose was given at 1512   and the end of my face-to-face encounter was at  1526  Hours.     During the case, continuous pulse oximetry, heart rate, blood pressure, and patient status were monitored.         Risk, Benefits, and Alternatives discussed with the patient and/or family.  Plan is for moderate sedation, and the patient agrees to proceed with the procedure.  An immediate assessment was done prior to the administration of moderate sedation        Conclusion 5/14/2024     Slightly angulated Superior takeoff of left main coronary artery  There is no dampening  Good reflux of contrast  This appears to be a normal anatomical variant  No obstructive disease of left main coronary artery  Mid left anterior descending coronary artery has mild atherosclerotic changes with 20 to 30% nonobstructive stenosis  Normal diagonal branches  Normal left circumflex coronary artery and obtuse marginal branches  Normal dominant right coronary artery     Summary     No evidence of any significant epicardial coronary  artery stenosis with superior takeoff of left main coronary artery at an angulation without any obstructive disease        Plan     Follow-up with Dr. Gtz for thoracic aortic  Hydration   Observation  Risk factor modifications     Clinical Indication     Valvular Function - Ascending aortic aneurysm, preop planning   Dx: Aneurysm of ascending aorta without rupture [I71.21 (ICD-10-CM)]      Interpretation Summary          Left ventricular systolic function is normal. Calculated left ventricular EF = 56.6% Left ventricular ejection fraction appears to be 56 - 60%.    Left ventricular diastolic function was normal.    Mild pulmonary hypertension is present.    Known ascending aortic aneurysm that measures 4.8 cm in diameter.    Normal global longitudinal LV strain (GLS) = -17.1%.     Cardiac History     Diagnosis Date Comment Source   Hypertension            Social History     Tobacco Use     Former; Cigarettes: 4/18/1997 - 4/18/2017; Smoked an average of 0.5 packs/day for 30.0 years   Passive Exposure: Past   Smokeless Tobacco: Never used smokeless tobacco.   Comments: Ex smoker      Vaping Use     Never used      Family Cardiac History as of 5/10/2024  Pedigree Problem Relation Age of Onset   Diabetes Father     Heart attack Father     Heart disease Father     Heart failure Father     Hypertension Brother     Hypertension Father        Additional Study Details     Study Quality The study is technically good for diagnosis.      Echocardiogram Findings     Left Ventricle Left ventricular systolic function is normal. Calculated left ventricular EF = 56.6% Left ventricular ejection fraction appears to be 56 - 60%.   Normal global longitudinal LV strain (GLS) = -17.1%. Left ventricle strain data was reviewed by the physician and found to be accurate. Normal left ventricular cavity size and wall thickness noted. All left ventricular wall segments contract normally. Left ventricular diastolic function was normal. Normal  left atrial pressure.   Right Ventricle Normal right ventricular cavity size, wall thickness, systolic function and septal motion noted.   Left Atrium Normal left atrial size and volume noted.   Right Atrium Normal right atrial cavity size noted.   Aortic Valve The aortic valve is structurally normal with no regurgitation or stenosis present.   Mitral Valve The mitral valve is structurally normal with no regurgitation or significant stenosis present.   Tricuspid Valve The tricuspid valve is grossly normal in structure. Trace tricuspid valve regurgitation is present. Estimated right ventricular systolic pressure from tricuspid regurgitation is mildly elevated (35-45 mmHg). Mild pulmonary hypertension is present. No evidence of significant tricuspid valve stenosis is present.   Pulmonic Valve The pulmonic valve is structurally normal with no regurgitation or significant stenosis present.   Greater Vessels Moderate dilation of the ascending aorta is present. Ascending aorta = 4.8 cm   Pericardium The pericardium is normal. There is no evidence of pericardial effusion. .                LV Measurements          Dimensions   LVIDd 5 cm           IVSd 0.9 cm           FS 30 %           ESV(cubed) 42.9 ml           EDV(cubed) 125 ml           LVOT area 4.5 cm2           LVOT diam 2.4 cm           EDV(MOD-sp2) 74 ml           ESV(MOD-sp2) 30.5 ml           Diastolic Filling   MV E max tono 59.6 cm/sec           MV A max tono 81.6 cm/sec           MV E/A 0.73           LA ESV Index (BP) 21.9 ml/m2           Med Peak E' Tono 7.8 cm/sec           Lat Peak E' Tono 9.9 cm/sec           Avg E/e' ratio 6.73           Shunt Ratio   SV(LVOT) 123 ml                 Dimensions   LVIDs 3.5 cm           LVPWd 1.3 cm           IVS/LVPW 0.69 cm           LV Sys Vol (BSA corrected) 14.4 cm2           LV Pop Vol (BSA corrected) 32.6 cm2           LV mass(C)d 207.1 grams           EDV(MOD-sp4) 73.2 ml           ESV(MOD-sp4) 32.3 ml            Systolic Function   SV(MOD-sp2) 43.5 ml           SV(MOD-sp4) 40.9 ml           SVi(MOD-SP2) 19.3 ml/m2           SVi(MOD-SP4) 18.2 ml/m2           SVi (LVOT) 54.7 ml/m2           EF(MOD-sp2) 58.8 %           EF(MOD-sp4) 55.9 %           EF(MOD-bp) 56.6 %                    Right Ventricle Measurements     RVIDd 4 cm          RV Base 2.9 cm           TAPSE (>1.6) 2.49 cm                   LA Measurements          LA Dimensions   LA dimension (2D) 4.3 cm           LA ESV (BP) 49.3 ml           LA ESV Index (BP) 21.9 ml/m2                      Aortic Valve Measurements          Stenosis   LVOT diam 2.4 cm           LV V1 max 124 cm/sec           LV V1 max PG 6.2 mmHg           LV V1 mean PG 3 mmHg           LV V1 VTI 27.2 cm           Ao pk arian 157 cm/sec                 Stenosis   Ao max PG 9.9 mmHg           Ao mean PG 6 mmHg           Ao V2 VTI 33.2 cm           ROMERO(I,D) 3.7 cm2                    Mitral Valve Measurements          Stenosis   MV P1/2t 99.9 msec           MVA(P1/2t) 2.2 cm2           MV dec slope 188 cm/sec2                 Regurgitation   MR max arian 347.3 cm/sec           MR max PG 48.9 mmHg                    Tricuspid Valve Measurements          Regurgitation   TR max arian 294 cm/sec           TR max PG 34.6 mmHg           RVSP(TR) 39.6 mmHg           RAP systole 5 mmHg           PISA   TR max arian 294 cm/sec           TR max PG 34.6 mmHg                      Pulmonic Valve Measurements          Stenosis   RV V1 max PG 2.2 mmHg           RV V1 max 74.1 cm/sec           PA V2 max 90.2 cm/sec                      Greater Vessels Measurements     Ascending aorta 4.8 cm                  Diagnoses and all orders for this visit:     1. Thoracic aortic aneurysm without rupture, unspecified part (Primary)     2. Former smoker        [facility] - Pulmonary Function Test     [address] [place] [address]  [id number]  [phone]     Patient : Michael Issa   MRN : [id number]  CSN :  [id number]  Pulmonologist : Julito Samayoa MD  Date : 5/7/2024     ______________________________________________________________________     Interpretation :  1.  Spirometry reveals a mild decrease in peak expiratory flow, and otherwise is within normal limits.  2.  There is improvement in peak expiratory flow postbronchodilator which is now normal.  Otherwise there is no significant change in spirometry postbronchodilator.  3.  Lung volumes reveal mild hyperinflation.  4.  Diffusion capacity is within normal limits.  5.  Arterial blood gases on room air reveal mild hypoxemia and a mild metabolic alkalosis with some degree of respiratory compensation.        Julito Samayoa MD         Imaging  CT Angiogram Chest (04/24/2024 15:14)   CT angiogram of the chest was obtained on 04/24/2024. My independent interpretation reveals a distal ascending aorta measuring 3.9 cm in size. Ascending aorta measures 49.5 x 49.6. Another slice measures 49.8. Root measures 45 cm in size previously. His root measured 4.4 cm in size and his ascending aorta now measures 5 cm in size.           Assessment & Plan  Impression:  Ascending aortic aneurysm.  Aortic root aneurysm.      Medical decision making/Recommendations/Plan:      As stated previously his ascending aorta now measures 5 cm in size with his aortic root also showing a millimeter growth from last assessment.  We did make the recommendation for he to proceed forward with aneurysmectomy.  I have recommended he proceed forward with modified Bentall procedure, biologic valve conduit utilization, ascending aortic replacement, and resection of hemiarch with deep hypothermic circulatory arrest.  I discussed the operative conduct and expected postoperative course.  Discussed that risks to include but not limited to bleeding, infection, stroke, heart attack, need for additional procedures, anesthesia risk, organ dysfunction and/or failure, prolonged mechanical ventilation,  prolonged ICU stay, sternal nonunion, and/or death.  STS risk calculator does not apply in this case.  I discussed his valvular prosthetic options available.  Mechanical versus biologic.  He favors a biologic tissue valve and is agreeable to have a bovine pericardial tissue valve.  The pros and cons of each option were discussed in detail.  All questions been answered to the best my ability and he is agreeable to proceed forward.  We will identify date for surgery.     He is a non-smoker and congratulated this     Many thanks for the opportunity to care for your patient.     I will continue to keep you apprised of provided care as it ensues.      All questions answered and he verbalizes understanding and has provided consent

## 2024-06-20 NOTE — ANESTHESIA PROCEDURE NOTES
Intra-Op Anesthesia CHEYENNE    Procedure Performed: Intra-Op Anesthesia [facility]NE       Start Time:  6/20/2024 7:43 AM       End Time:   6/20/2024 7:52 AM    Preanesthesia Checklist:  Patient identified, IV assessed, risks and benefits discussed, monitors and equipment assessed, procedure being performed at surgeon's request and anesthesia consent obtained.    General Procedure Information  CHEYENNE Placed for monitoring purposes only -- This is not a diagnostic CHEYENNE

## 2024-06-20 NOTE — BRIEF OP NOTE
Michael Issa  6/20/2024    Pre-op Diagnosis:   Aneurysm of ascending aorta without rupture [I71.21]  Aortic root aneurysm  Hypertension  Obesity, class I         Post-Op Diagnosis Codes:  Same       Procedure(s):  MODIFIED BENTALL PROCEDURE  CREATION OF BIOLOGIC VALVE CONDUIT with 25mm INSPIRIS AORTIC VALVE AND 30 MM HEMASHIELD PLATINUM GRAFT  RESECTION OF ASCENDING AORTA AND PROXIMAL HEMIARCH AORTA USING DEEP HYPOTHERMIC CIRCULATORY ARREST WITH 28 MM HEMASHIELD PLATINUM GRAFT  ULTRASOUND GUIDED FEMORAL ARTERIAL LINE PLACEMENT  LEFT ATRIAL APPENDAGE EXCLUSION with 35mm ATRICLIP  STERNAL RIGID FIXATION USING XP STERNAL PLATING SYSTEM      Surgeon(s):  Filiberto Gtz MD    Anesthesia: General    Staff:   Circulator: Quique Summers RN; Kala King RN  Perfusionist: Thalia Heath CCP  Scrub Person: Justine Betancourt; Hussein Moncada; Momo Alvarez         Estimated Blood Loss: CELL SAVER    Urine Voided: 1200 mL    Specimens:                Specimens       ID Source Type Tests Collected By Collected At Frozen?    A Aortic Leaflet Tissue TISSUE PATHOLOGY EXAM   Filiberto Gtz MD 6/20/24 0900 No    B Aortic Aneurysm Tissue TISSUE PATHOLOGY EXAM   Filiberto Gtz MD 6/20/24 0900     Description: SENT FOR PERMANENT TO RULE OUT CYSTIC MEDIAL DEGENERATION                  Drains:   Chest Tube 1 Right Mediastinal (Active)   Dressing Type Gauze;Other (Comment) 06/20/24 1438   Dressing Status Clean;Dry 06/20/24 1438   Dressing Intervention New dressing 06/20/24 1438   Securement tubing anchored to body distal to insertion site with tape 06/20/24 1438       Urethral Catheter Temperature probe;Silicone 16 Fr. (Active)       Y Chest Tube 1 and 2 1 Right Mediastinal 24 Fr. 2 Left Mediastinal 24 Fr. (Active)   Dressing Type 1 Gauze 06/20/24 1438   Dressing Status 1 Dry;Clean 06/20/24 1438   Dressing Intervention 1 New dressing 06/20/24 1438   Securement 1 tubing anchored to body distal to insertion site with  tape 06/20/24 1438   Dressing Type 2 Gauze;Other (Comment) 06/20/24 1438   Dressing Status 2 Clean;Dry 06/20/24 1438   Dressing Intervention 2 New dressing 06/20/24 1438   Securement 2 tubing anchored to body distal to insertion site with tape 06/20/24 1438       Findings: SEE OP NOTE.  Mean gradient of 7 mmHg.  Left atrial appendage is excluded successfully.        Complications: None          Filiberto Gtz MD     Date: 6/20/2024  Time: 15:22 CDT

## 2024-06-20 NOTE — ANESTHESIA PROCEDURE NOTES
Airway  Urgency: elective    Date/Time: 6/20/2024 7:30 AM  Airway not difficult    General Information and Staff    Patient location during procedure: OR    Indications and Patient Condition  Indications for airway management: airway protection    Preoxygenated: yes  Mask difficulty assessment: 1 - vent by mask    Final Airway Details  Final airway type: endotracheal airway      Successful airway: ETT  Cuffed: yes   Successful intubation technique: direct laryngoscopy  Endotracheal tube insertion site: oral  Blade: Nails  Blade size: 4  ETT size (mm): 7.5  Cormack-Lehane Classification: grade I - full view of glottis  Placement verified by: chest auscultation and capnometry   Measured from: teeth  ETT/EBT  to teeth (cm): 23  Number of attempts at approach: 1  Assessment: lips, teeth, and gum same as pre-op and atraumatic intubation    Additional Comments  Placed by ICU intensivist

## 2024-06-20 NOTE — ANESTHESIA POSTPROCEDURE EVALUATION
"Patient: Michael Issa    Procedure Summary       Date: 06/20/24 Room / Location:  [facility] OR  /  [facility]    Anesthesia Start: 0719 Anesthesia Stop: 1512    Procedures:       MODIFIED BENTALL PROCEDURE, CREATION OF BIOLOGIC VALVE CONDUITwith 25mm INSPIRIS AORTIC VALVE, RESECTION OF ASCENDING AORTA AND PROXIMAL HEMIARCH AORTA USING DEEP HYPOTHERMIC CIRCULATORY ARREST, ULTRASOUND GUIDED FEMORAL ARTERIAL LINE PLACEMENT, ATRIAL APPENDAGE EXCLUSION LEFT with 35mm ATRICLIP, TRANSESOPHAGEAL ECHOCARDIOGRAM, XP STERNAL PLATING (Chest)      ASCENDING AORTIC ARCH/HEMIARCH REPLACEMENT WITH CIRCULATORY ARREST (Chest)      ATRIAL APPENDAGE EXCLUSION LEFT WITH TRANSESOPHAGEAL ECHOCARDIOGRAM (Chest) Diagnosis:       Aneurysm of ascending aorta without rupture      (Aneurysm of ascending aorta without rupture [I71.21])    Surgeons: Filiberto Gtz MD Provider: Slim Duong CRNA    Anesthesia Type: general, Regina, CVL, PAC ASA Status: 4            Anesthesia Type: general, Regina, CVL, PAC    Vitals  Vitals Value Taken Time   /70 06/20/24 1531   Temp     Pulse 51 06/20/24 1543   Resp     SpO2 94 % 06/20/24 1543   Vitals shown include unfiled device data.        Post Anesthesia Care and Evaluation    Patient location during evaluation: ICU  Patient participation: complete - patient participated  Pain management: adequate    Airway patency: patent  Anesthetic complications: No anesthetic complications    Cardiovascular status: acceptable  Respiratory status: acceptable  Hydration status: acceptable    Comments: Blood pressure 132/85, pulse 63, temperature 97.1 °F (36.2 °C), temperature source Temporal, resp. rate 16, height 184 cm (72.44\"), weight 102 kg (225 lb 8.5 oz), SpO2 98%.    Pt discharged from PACU based on carmen score >8    "

## 2024-06-20 NOTE — ANESTHESIA PROCEDURE NOTES
Arterial Line    Pre-sedation assessment completed: 6/20/2024 6:51 AM    Patient reassessed immediately prior to procedure    Patient location during procedure: pre-op  Start time: 6/20/2024 6:51 AM  Stop Time:6/20/2024 6:51 AM       Line placed for hemodynamic monitoring.  Performed By   Anesthesiologist: Sunny Francisco MD   Preanesthetic Checklist  Completed: patient identified, IV checked, site marked, risks and benefits discussed, surgical consent, monitors and equipment checked, pre-op evaluation and timeout performed  Arterial Line Prep    Sterile Tech: cap, gloves and mask  Prep: ChloraPrep  Patient monitoring: blood pressure monitoring, continuous pulse oximetry and EKG  Arterial Line Procedure   Laterality:left  Location:  radial artery  Catheter size: 20 G   Guidance: ultrasound guided  PROCEDURE NOTE/ULTRASOUND INTERPRETATION.  Using ultrasound guidance the potential vascular sites for insertion of the catheter were visualized to determine the patency of the vessel to be used for vascular access.  After selecting the appropriate site for insertion, the needle was visualized under ultrasound being inserted into the radial artery, followed by ultrasound confirmation of wire and catheter placement. There were no abnormalities seen on ultrasound; an image was taken; and the patient tolerated the procedure with no complications.   Number of attempts: 1  Successful placement: yes Images: still images not obtained  Post Assessment   Dressing Type: occlusive dressing applied, secured with tape and wrist guard applied.   Complications no  Circ/Move/Sens Assessment: normal.   Patient Tolerance: patient tolerated the procedure well with no apparent complications  Additional Notes  Wire intact. Calibrated/Connections checked, line flushed.  Appropriate waveform. Clear occlusive opsite applied over catheter insertion site. Pt remained hemodynamically stable throughout procedure.

## 2024-06-20 NOTE — ANESTHESIA PROCEDURE NOTES
Central Line    Pre-sedation assessment completed: 6/20/2024 7:32 AM    Patient reassessed immediately prior to procedure    Patient location during procedure: OR  Start time: 6/20/2024 7:32 AM  Stop Time:6/20/2024 7:41 AM  Indications: vascular access  Staff  Anesthesiologist: Sunny Francisco MD  Preanesthetic Checklist  Completed: patient identified, IV checked, site marked, risks and benefits discussed, surgical consent, monitors and equipment checked, pre-op evaluation and timeout performed  Central Line Prep  Sterile Tech:cap, gloves, mask, sterile barriers and gown  Prep: chloraprep  no medical exclusion documented for following all elements of maximal sterile barrier technique  Patient monitoring: blood pressure monitoring, continuous pulse oximetry and EKG  Central Line Procedure  Laterality:right  Location:internal jugular  Catheter Type:MAC and [place]-Srinivas  Catheter Size:9 Fr  Guidance:ultrasound guided  PROCEDURE NOTE/ULTRASOUND INTERPRETATION.  Using ultrasound guidance the potential vascular sites for insertion of the catheter were visualized to determine the patency of the vessel to be used for vascular access.  After selecting the appropriate site for insertion, the needle was visualized under ultrasound being inserted into the internal jugular vein, followed by ultrasound confirmation of wire and catheter placement. There were no abnormalities seen on ultrasound; an image was taken; and the patient tolerated the procedure with no complications. Images: still images not obtained  Assessment  Post procedure:occlusive dressing applied, line sutured and biopatch applied  Assessement:blood return through all ports, free fluid flow, chest x-ray ordered and Hakeem Test  Complications:no  Patient Tolerance:patient tolerated the procedure well with no apparent complications  Additional Notes  PCW 57

## 2024-06-21 ENCOUNTER — APPOINTMENT (OUTPATIENT)
Dept: GENERAL RADIOLOGY | Facility: HOSPITAL | Age: 64
DRG: 220 | End: 2024-06-21
Payer: COMMERCIAL

## 2024-06-21 PROBLEM — I87.2 VENOUS INSUFFICIENCY (CHRONIC) (PERIPHERAL): Status: ACTIVE | Noted: 2024-06-21

## 2024-06-21 PROBLEM — L97.919 VENOUS ULCER OF RIGHT LEG: Status: ACTIVE | Noted: 2024-06-21

## 2024-06-21 PROBLEM — I83.019 VENOUS ULCER OF RIGHT LEG: Status: ACTIVE | Noted: 2024-06-21

## 2024-06-21 LAB
ALBUMIN SERPL-MCNC: 3.3 G/DL (ref 3.5–5.2)
ANION GAP SERPL CALCULATED.3IONS-SCNC: 6 MMOL/L (ref 5–15)
BASOPHILS # BLD AUTO: 0.02 10*3/MM3 (ref 0–0.2)
BASOPHILS NFR BLD AUTO: 0.2 % (ref 0–1.5)
BUN SERPL-MCNC: 19 MG/DL (ref 8–23)
BUN/CREAT SERPL: 22.4 (ref 7–25)
CALCIUM SPEC-SCNC: 7.7 MG/DL (ref 8.6–10.5)
CHLORIDE SERPL-SCNC: 106 MMOL/L (ref 98–107)
CO2 SERPL-SCNC: 26 MMOL/L (ref 22–29)
CREAT SERPL-MCNC: 0.85 MG/DL (ref 0.76–1.27)
DEPRECATED RDW RBC AUTO: 41.5 FL (ref 37–54)
EGFRCR SERPLBLD CKD-EPI 2021: 98.2 ML/MIN/1.73
EOSINOPHIL # BLD AUTO: 0 10*3/MM3 (ref 0–0.4)
EOSINOPHIL NFR BLD AUTO: 0 % (ref 0.3–6.2)
ERYTHROCYTE [DISTWIDTH] IN BLOOD BY AUTOMATED COUNT: 12.9 % (ref 12.3–15.4)
GLUCOSE BLDC GLUCOMTR-MCNC: 156 MG/DL (ref 70–130)
GLUCOSE BLDC GLUCOMTR-MCNC: 160 MG/DL (ref 70–130)
GLUCOSE BLDC GLUCOMTR-MCNC: 168 MG/DL (ref 70–130)
GLUCOSE BLDC GLUCOMTR-MCNC: 171 MG/DL (ref 70–130)
GLUCOSE SERPL-MCNC: 158 MG/DL (ref 65–99)
HCT VFR BLD AUTO: 34.7 % (ref 37.5–51)
HGB BLD-MCNC: 11.8 G/DL (ref 13–17.7)
IMM GRANULOCYTES # BLD AUTO: 0.02 10*3/MM3 (ref 0–0.05)
IMM GRANULOCYTES NFR BLD AUTO: 0.2 % (ref 0–0.5)
INR PPP: 1.1 (ref 0.91–1.09)
LYMPHOCYTES # BLD AUTO: 0.51 10*3/MM3 (ref 0.7–3.1)
LYMPHOCYTES NFR BLD AUTO: 4.5 % (ref 19.6–45.3)
MAGNESIUM SERPL-MCNC: 1.8 MG/DL (ref 1.6–2.4)
MCH RBC QN AUTO: 29.9 PG (ref 26.6–33)
MCHC RBC AUTO-ENTMCNC: 34 G/DL (ref 31.5–35.7)
MCV RBC AUTO: 88.1 FL (ref 79–97)
MONOCYTES # BLD AUTO: 0.28 10*3/MM3 (ref 0.1–0.9)
MONOCYTES NFR BLD AUTO: 2.5 % (ref 5–12)
NEUTROPHILS NFR BLD AUTO: 10.41 10*3/MM3 (ref 1.7–7)
NEUTROPHILS NFR BLD AUTO: 92.6 % (ref 42.7–76)
NRBC BLD AUTO-RTO: 0 /100 WBC (ref 0–0.2)
PHOSPHATE SERPL-MCNC: 3.5 MG/DL (ref 2.5–4.5)
PLATELET # BLD AUTO: 177 10*3/MM3 (ref 140–450)
PMV BLD AUTO: 10.5 FL (ref 6–12)
POTASSIUM SERPL-SCNC: 4.6 MMOL/L (ref 3.5–5.2)
PROTHROMBIN TIME: 14.6 SECONDS (ref 11.8–14.8)
QT INTERVAL: 466 MS
QT INTERVAL: 558 MS
QTC INTERVAL: 472 MS
QTC INTERVAL: 504 MS
RBC # BLD AUTO: 3.94 10*6/MM3 (ref 4.14–5.8)
SODIUM SERPL-SCNC: 138 MMOL/L (ref 136–145)
WBC NRBC COR # BLD AUTO: 11.24 10*3/MM3 (ref 3.4–10.8)

## 2024-06-21 PROCEDURE — 94664 DEMO&/EVAL PT USE INHALER: CPT

## 2024-06-21 PROCEDURE — 25010000002 CEFAZOLIN PER 500 MG: Performed by: THORACIC SURGERY (CARDIOTHORACIC VASCULAR SURGERY)

## 2024-06-21 PROCEDURE — 25810000003 SODIUM CHLORIDE 0.9 % SOLUTION: Performed by: THORACIC SURGERY (CARDIOTHORACIC VASCULAR SURGERY)

## 2024-06-21 PROCEDURE — 93005 ELECTROCARDIOGRAM TRACING: CPT | Performed by: THORACIC SURGERY (CARDIOTHORACIC VASCULAR SURGERY)

## 2024-06-21 PROCEDURE — 71045 X-RAY EXAM CHEST 1 VIEW: CPT

## 2024-06-21 PROCEDURE — 80069 RENAL FUNCTION PANEL: CPT | Performed by: THORACIC SURGERY (CARDIOTHORACIC VASCULAR SURGERY)

## 2024-06-21 PROCEDURE — 82948 REAGENT STRIP/BLOOD GLUCOSE: CPT

## 2024-06-21 PROCEDURE — 85610 PROTHROMBIN TIME: CPT | Performed by: THORACIC SURGERY (CARDIOTHORACIC VASCULAR SURGERY)

## 2024-06-21 PROCEDURE — 25010000002 AMIODARONE IN DEXTROSE 5% 360-4.14 MG/200ML-% SOLUTION: Performed by: THORACIC SURGERY (CARDIOTHORACIC VASCULAR SURGERY)

## 2024-06-21 PROCEDURE — 85025 COMPLETE CBC W/AUTO DIFF WBC: CPT | Performed by: THORACIC SURGERY (CARDIOTHORACIC VASCULAR SURGERY)

## 2024-06-21 PROCEDURE — 94799 UNLISTED PULMONARY SVC/PX: CPT

## 2024-06-21 PROCEDURE — 25010000002 ACETAMINOPHEN 10 MG/ML SOLUTION: Performed by: THORACIC SURGERY (CARDIOTHORACIC VASCULAR SURGERY)

## 2024-06-21 PROCEDURE — 99024 POSTOP FOLLOW-UP VISIT: CPT | Performed by: THORACIC SURGERY (CARDIOTHORACIC VASCULAR SURGERY)

## 2024-06-21 PROCEDURE — 83735 ASSAY OF MAGNESIUM: CPT | Performed by: THORACIC SURGERY (CARDIOTHORACIC VASCULAR SURGERY)

## 2024-06-21 PROCEDURE — 25010000002 VANCOMYCIN 10 G RECONSTITUTED SOLUTION: Performed by: THORACIC SURGERY (CARDIOTHORACIC VASCULAR SURGERY)

## 2024-06-21 PROCEDURE — 97162 PT EVAL MOD COMPLEX 30 MIN: CPT

## 2024-06-21 PROCEDURE — 97116 GAIT TRAINING THERAPY: CPT

## 2024-06-21 PROCEDURE — 25010000002 ENOXAPARIN PER 10 MG: Performed by: THORACIC SURGERY (CARDIOTHORACIC VASCULAR SURGERY)

## 2024-06-21 PROCEDURE — 93010 ELECTROCARDIOGRAM REPORT: CPT | Performed by: INTERNAL MEDICINE

## 2024-06-21 PROCEDURE — 94761 N-INVAS EAR/PLS OXIMETRY MLT: CPT

## 2024-06-21 PROCEDURE — 25010000002 MAGNESIUM SULFATE 4 GM/100ML SOLUTION: Performed by: THORACIC SURGERY (CARDIOTHORACIC VASCULAR SURGERY)

## 2024-06-21 RX ORDER — LISINOPRIL AND HYDROCHLOROTHIAZIDE 20; 12.5 MG/1; MG/1
1 TABLET ORAL DAILY
COMMUNITY
End: 2024-06-24 | Stop reason: HOSPADM

## 2024-06-21 RX ORDER — TAMSULOSIN HYDROCHLORIDE 0.4 MG/1
1 CAPSULE ORAL DAILY
COMMUNITY

## 2024-06-21 RX ORDER — MAGNESIUM SULFATE HEPTAHYDRATE 40 MG/ML
4 INJECTION, SOLUTION INTRAVENOUS ONCE
Status: COMPLETED | OUTPATIENT
Start: 2024-06-21 | End: 2024-06-21

## 2024-06-21 RX ORDER — IPRATROPIUM BROMIDE AND ALBUTEROL SULFATE 2.5; .5 MG/3ML; MG/3ML
1.5 SOLUTION RESPIRATORY (INHALATION)
Status: DISCONTINUED | OUTPATIENT
Start: 2024-06-22 | End: 2024-06-23

## 2024-06-21 RX ORDER — TAMSULOSIN HYDROCHLORIDE 0.4 MG/1
0.4 CAPSULE ORAL DAILY
Status: DISCONTINUED | OUTPATIENT
Start: 2024-06-21 | End: 2024-06-24

## 2024-06-21 RX ADMIN — CHLORHEXIDINE GLUCONATE 0.12% ORAL RINSE 15 ML: 1.2 LIQUID ORAL at 17:02

## 2024-06-21 RX ADMIN — ACETAMINOPHEN 1000 MG: 10 INJECTION, SOLUTION INTRAVENOUS at 08:00

## 2024-06-21 RX ADMIN — AMIODARONE HYDROCHLORIDE 400 MG: 200 TABLET ORAL at 17:01

## 2024-06-21 RX ADMIN — Medication 1 APPLICATION: at 17:01

## 2024-06-21 RX ADMIN — PANTOPRAZOLE SODIUM 40 MG: 40 TABLET, DELAYED RELEASE ORAL at 06:34

## 2024-06-21 RX ADMIN — Medication 1 APPLICATION: at 06:34

## 2024-06-21 RX ADMIN — CHLORHEXIDINE GLUCONATE 0.12% ORAL RINSE 15 ML: 1.2 LIQUID ORAL at 06:32

## 2024-06-21 RX ADMIN — SODIUM CHLORIDE 1500 MG: 9 INJECTION, SOLUTION INTRAVENOUS at 19:54

## 2024-06-21 RX ADMIN — TAMSULOSIN HYDROCHLORIDE 0.4 MG: 0.4 CAPSULE ORAL at 10:14

## 2024-06-21 RX ADMIN — PREGABALIN 25 MG: 25 CAPSULE ORAL at 08:00

## 2024-06-21 RX ADMIN — CHLORHEXIDINE GLUCONATE 1 APPLICATION: 500 CLOTH TOPICAL at 04:41

## 2024-06-21 RX ADMIN — OXYCODONE HYDROCHLORIDE 5 MG: 5 TABLET ORAL at 06:31

## 2024-06-21 RX ADMIN — IPRATROPIUM BROMIDE AND ALBUTEROL SULFATE 1.5 ML: .5; 3 SOLUTION RESPIRATORY (INHALATION) at 18:22

## 2024-06-21 RX ADMIN — LIDOCAINE 2 PATCH: 4 PATCH TOPICAL at 08:00

## 2024-06-21 RX ADMIN — IPRATROPIUM BROMIDE AND ALBUTEROL SULFATE 1.5 ML: .5; 3 SOLUTION RESPIRATORY (INHALATION) at 06:45

## 2024-06-21 RX ADMIN — PREGABALIN 25 MG: 25 CAPSULE ORAL at 20:40

## 2024-06-21 RX ADMIN — CEFAZOLIN 2 G: 2 INJECTION, POWDER, FOR SOLUTION INTRAMUSCULAR; INTRAVENOUS at 11:00

## 2024-06-21 RX ADMIN — CEFAZOLIN 2 G: 2 INJECTION, POWDER, FOR SOLUTION INTRAMUSCULAR; INTRAVENOUS at 19:54

## 2024-06-21 RX ADMIN — ASPIRIN 162 MG: 81 TABLET, CHEWABLE ORAL at 08:00

## 2024-06-21 RX ADMIN — CEFAZOLIN 2 G: 2 INJECTION, POWDER, FOR SOLUTION INTRAMUSCULAR; INTRAVENOUS at 04:41

## 2024-06-21 RX ADMIN — IPRATROPIUM BROMIDE AND ALBUTEROL SULFATE 1.5 ML: .5; 3 SOLUTION RESPIRATORY (INHALATION) at 14:01

## 2024-06-21 RX ADMIN — IPRATROPIUM BROMIDE AND ALBUTEROL SULFATE 1.5 ML: .5; 3 SOLUTION RESPIRATORY (INHALATION) at 10:00

## 2024-06-21 RX ADMIN — OXYCODONE HYDROCHLORIDE 10 MG: 10 TABLET ORAL at 15:58

## 2024-06-21 RX ADMIN — METOPROLOL TARTRATE 12.5 MG: 25 TABLET, FILM COATED ORAL at 20:40

## 2024-06-21 RX ADMIN — ACETAMINOPHEN 1000 MG: 500 TABLET, FILM COATED ORAL at 22:17

## 2024-06-21 RX ADMIN — MAGNESIUM SULFATE HEPTAHYDRATE 4 G: 40 INJECTION, SOLUTION INTRAVENOUS at 03:37

## 2024-06-21 RX ADMIN — BISACODYL 10 MG: 5 TABLET, COATED ORAL at 20:40

## 2024-06-21 RX ADMIN — ACETAMINOPHEN 1000 MG: 10 INJECTION, SOLUTION INTRAVENOUS at 00:11

## 2024-06-21 RX ADMIN — BISACODYL 10 MG: 5 TABLET, COATED ORAL at 08:00

## 2024-06-21 RX ADMIN — ENOXAPARIN SODIUM 40 MG: 100 INJECTION SUBCUTANEOUS at 08:00

## 2024-06-21 RX ADMIN — ACETAMINOPHEN 1000 MG: 500 TABLET, FILM COATED ORAL at 15:58

## 2024-06-21 RX ADMIN — OXYCODONE HYDROCHLORIDE 10 MG: 10 TABLET ORAL at 00:35

## 2024-06-21 RX ADMIN — ATORVASTATIN CALCIUM 20 MG: 10 TABLET, FILM COATED ORAL at 20:40

## 2024-06-21 RX ADMIN — AMIODARONE HYDROCHLORIDE 400 MG: 200 TABLET ORAL at 08:00

## 2024-06-21 RX ADMIN — AMIODARONE HYDROCHLORIDE 0.5 MG/MIN: 1.8 INJECTION, SOLUTION INTRAVENOUS at 02:07

## 2024-06-21 RX ADMIN — POLYETHYLENE GLYCOL 3350 17 G: 17 POWDER, FOR SOLUTION ORAL at 08:00

## 2024-06-21 RX ADMIN — SODIUM CHLORIDE 1500 MG: 9 INJECTION, SOLUTION INTRAVENOUS at 08:01

## 2024-06-21 RX ADMIN — METOPROLOL TARTRATE 12.5 MG: 25 TABLET, FILM COATED ORAL at 08:00

## 2024-06-21 RX ADMIN — CLOPIDOGREL BISULFATE 75 MG: 75 TABLET, FILM COATED ORAL at 17:02

## 2024-06-21 NOTE — PROGRESS NOTES
"Modified Bentall procedure, creation of biologic valve conduit with 25 mm Inspiris aortic valve and 30 mm Hemashield platinum graft, resection of ascending aorta and proximal hemiarch aorta with 28 mm Hemashield platinum graft using deep hypothermic circulatory arrest, ultrasound-guided femoral arterial line placement, left atrial appendage exclusion with 35 mm AtriCure AtriClip, sternal rigid fixation using XP sternal plating system    POD 1    Extubated overnight.  Up in the chair.  On 2 L/min nasal cannula.  Reaching approximately 2000 mL on incentive spirometer.  Rates pain 4-5 out of 10.  Due to walk with PT.  No family present.    IV drips: Amiodarone, IV fluids  Telemetry: Sinus 60s  Hemodynamics reviewed, stable    Visit Vitals  BP 97/69   Pulse 63   Temp 98.8 °F (37.1 °C) (Oral)   Resp 20   Ht 184 cm (72.44\")   Wt 109 kg (239 lb 12.8 oz)   SpO2 96%   BMI 32.13 kg/m²   Preoperative weight: 225 pounds    Intake/Output Summary (Last 24 hours) at 6/21/2024 0817  Last data filed at 6/21/2024 0744  Gross per 24 hour   Intake 3950.2 ml   Output 3777 ml   Net 173.2 ml     Mediastinal tube output: 735 mL / 24 hours, serosanguineous  Root drain: 132 mL / 24 hours, serosanguineous    Labs:        Chest x-ray: Extubated, appropriate support lines in place, no pneumothorax, mild bibasilar atelectasis    Physical Exam:  General: No apparent distress. In good spirits.  Up in the chair.  Cardiovascular: Regular rate and rhythm without murmur, rubs, or gallops.    Pulmonary: Clear to auscultation bilaterally without wheezing, rubs, or rales.  Chest: Sternotomy incision clean, dry, and intact. Sternum stable. No clicks. Mediastinal tubes x 2 and root drain with dressing clean, dry, and intact.    Abdomen: Soft, nondistended and nontender.  Extremities: Warm, moves all extremities.  Right lower leg edema with open wound, venous ulcer.  Neurologic: Grossly intact with no focal deficits.       Impression:  Ascending aortic " aneurysm without rupture  Aortic root aneurysm  Hypertension  Class I obesity  Former smoker  Chronic venous insufficiency, venous ulcer right leg present on admission      Plan:   Discontinue amiodarone infusion once bag infused  Multimodality pain control strategies  Routine postcardiac surgery regimen  Encourage pulmonary toilet and ambulation  Keep in unit for now, will assess for transfer to  later today  Discussed with patient and nursing  Wash right leg venous ulcer with antibacterial soap and water daily, open to air

## 2024-06-21 NOTE — PLAN OF CARE
Goal Outcome Evaluation:  Plan of Care Reviewed With: patient, caregiver      Progress: improving  Outcome Evaluation: RDN assessment completed. RDN consult for obesity education. Will instruct as appropriate. Cardiac diet. Pt reports he is a little hungry. Boost Original BID. Con to follow for plan of care.

## 2024-06-21 NOTE — CONSULTS
[facility]  INPATIENT WOUND & OSTOMY CONSULTATION    Today's Date: 06/21/24    Patient Name: Michael Issa  MRN: [id number]  CSN: [id number]  PCP: Mansi Zapata DNP, APRN  Referring Provider:   Consulting Provider (From admission, onward)      Start Ordered     Status Ordering Provider    06/21/24 0740 06/21/24 0740  Inpatient Wound Care MD Consult  Once        Specialty:  Wound Care  Provider:  Erma Andre APRN    Acknowledged FILIBERTO GTZ           Attending Provider: Filiberto Gtz MD  Length of Stay: 1    SUBJECTIVE   Chief Complaint: ***    HPI: Michael Issa, a 64 y.o.male, presents with a past medical history of ***.  A full past medical history as listed below.  Inpatient wound care consulted due to ***      Visit Dx:    ICD-10-CM ICD-9-CM   1. Aneurysm of ascending aorta without rupture  I71.21 441.2       Hospital Problem List:     Thoracic aortic aneurysm without rupture    Hypertension    Former smoker    Class 1 obesity due to excess calories with body mass index (BMI) of 30.0 to 30.9 in adult    Aortic root aneurysm    Venous insufficiency (chronic) (peripheral)    Venous ulcer of right leg      History:   Past Medical History:   Diagnosis Date    Aneurysm October 4th 2023    Arthritis     Disease of thyroid gland     NODULE PRESENT     ED (erectile dysfunction)     Elevated cholesterol     Hx of chest pain     Hypertension     Urgency of urination      Past Surgical History:   Procedure Laterality Date    CARDIAC CATHETERIZATION Left 5/14/2024    Procedure: Cardiac Catheterization/Vascular Study;  Surgeon: Nima Nickerson MD;  Location: [facility] CATH INVASIVE LOCATION;  Service: Cardiology;  Laterality: Left;    COLONOSCOPY N/A 09/23/2021    Procedure: COLONOSCOPY;  Surgeon: Eduardo Neil MD;  Location: [facility] ENDOSCOPY;  Service: Gastroenterology;  Laterality: N/A;  pre  post diverticulosis, hemorrhoids  Dr. zapata    KNEE SURGERY  2014    rt knee MENISCUS     PREPERITONEAL HERNIA REPAIR Bilateral 2017    Procedure: BILATERAL PREPERITONEAL INGUINAL HERNIA REPAIR LAPAROSCOPIC WITH MESH;  Surgeon: Rambo Church MD;  Location: [facility];  Service:      Social History     Socioeconomic History    Marital status: Single   Tobacco Use    Smoking status: Former     Average packs/day: 0.5 packs/day for 30.0 years (15.0 ttl pk-yrs)     Types: Cigarettes     Start date: 1997     Quit date: 2017     Years since quittin.1     Passive exposure: Past    Smokeless tobacco: Never    Tobacco comments:     Ex smoker   Vaping Use    Vaping status: Never Used   Substance and Sexual Activity    Alcohol use: Not Currently     Comment: very rarely    Drug use: Not Currently     Comment: recreational drug use no use in last 2-3 years    Sexual activity: Yes     Partners: Female     Birth control/protection: Birth control pill     Family History   Problem Relation Age of Onset    Alzheimer's disease Mother     Diabetes Father     Heart disease Father     Heart attack Father     Heart failure Father     Hypertension Father     No Known Problems Sister     No Known Problems Brother     No Known Problems Daughter     No Known Problems Maternal Grandmother     No Known Problems Maternal Grandfather     No Known Problems Paternal Grandmother     No Known Problems Paternal Grandfather     Hypertension Brother     No Known Problems Brother        Allergies:  Allergies   Allergen Reactions    Penicillins Rash     Childhood allergy       Medications:    Current Facility-Administered Medications:     acetaminophen (TYLENOL) tablet 1,000 mg, 1,000 mg, Oral, Q6H **OR** acetaminophen (TYLENOL) 160 MG/5ML oral solution 1,000 mg, 1,000 mg, Oral, Q6H **OR** acetaminophen (TYLENOL) suppository 650 mg, 650 mg, Rectal, Q6H, Filiberto Gtz MD    albuterol (PROVENTIL) nebulizer solution 0.083% 2.5 mg/3mL, 2.5 mg, Nebulization, Q4H PRN, Filiberto Gtz MD    amiodarone (PACERONE)  tablet 400 mg, 400 mg, Oral, BID With Meals, Filiberto Gtz MD, 400 mg at 24 0800    [] amiodarone 360 mg in 200 mL D5W infusion, 1 mg/min, Intravenous, Continuous, Last Rate: 33.3 mL/hr at 24, 1 mg/min at 24 **FOLLOWED BY** amiodarone 360 mg in 200 mL D5W infusion, 0.5 mg/min, Intravenous, Continuous, Filiberto Gtz MD, Last Rate: 16.67 mL/hr at 24 0207, 0.5 mg/min at 24 0207    [START ON 2024] aspirin EC tablet 81 mg, 81 mg, Oral, Daily, Filiberto Gtz MD    atorvastatin (LIPITOR) tablet 20 mg, 20 mg, Oral, Nightly, Filiberto Gtz MD    bisacodyl (DULCOLAX) EC tablet 10 mg, 10 mg, Oral, BID, Filiberto Gtz MD, 10 mg at 24 0800    Calcium Replacement - Follow Nurse / BPA Driven Protocol, , Does not apply, PRN, Filiberto Gtz MD    ceFAZolin 2000 mg IVPB in 100 mL NS (MBP), 2 g, Intravenous, Q8H, Filiberto Gtz MD, 2 g at 24 0441    chlorhexidine (PERIDEX) 0.12 % solution 15 mL, 15 mL, Mouth/Throat, Q12H, Filiberto Gtz MD, 15 mL at 24 0632    Chlorhexidine Gluconate Cloth 2 % pads 1 Application, 1 Application, Topical, Q24H, Filiberto Gtz MD, 1 Application at 24 0441    clevidipine (CLEVIPREX) infusion 0.5 mg/mL, 2-32 mg/hr, Intravenous, Continuous PRN, Filiberto Gtz MD    clopidogrel (PLAVIX) tablet 75 mg, 75 mg, Oral, Daily, Filiberto Gtz MD    dextrose (D50W) (25 g/50 mL) IV injection 10-50 mL, 10-50 mL, Intravenous, Q15 Min PRN, Filiberto Gtz MD    dextrose (GLUTOSE) oral gel 15 g, 15 g, Oral, Q15 Min PRN, Filiberto Gtz MD    dextrose 5 % with KCl 20 mEq/L infusion, 30 mL/hr, Intravenous, Continuous, Last Rate: 30 mL/hr at 24 1548, 30 mL/hr at 24 1548 **AND** dextrose 5 % with KCl 20 mEq/L infusion, 30 mL/hr, Intravenous, Continuous, Filiberto Gtz MD, Last Rate: 30 mL/hr at 24 1548, 30 mL/hr at 24 1548    Enoxaparin Sodium (LOVENOX) syringe 40 mg, 40  mg, Subcutaneous, Daily, Filiberto Gtz MD, 40 mg at 06/21/24 0800    glucagon (GLUCAGEN) injection 1 mg, 1 mg, Intramuscular, Q15 Min PRN, Filiberto Gtz MD    Hold All immunizations, , Does not apply, Continuous PRN, Filiberto Gtz MD    ipratropium-albuterol (DUO-NEB) nebulizer solution 1.5 mL, 1.5 mL, Nebulization, 4x Daily - RT, Filiberto Gtz MD, 1.5 mL at 06/21/24 1000    Lidocaine 4 % 2 patch, 2 patch, Transdermal, Q24H, Filiberto Gtz MD, 2 patch at 06/21/24 0800    Magnesium Cardiology Dose Replacement - Follow Nurse / BPA Driven Protocol, , Does not apply, PRN, Filiberto Gtz MD    metoprolol tartrate (LOPRESSOR) tablet 12.5 mg, 12.5 mg, Oral, Q12H, Filiberto Gtz MD, 12.5 mg at 06/21/24 0800    mupirocin (BACTROBAN) 2 % nasal ointment, , Each Nare, Q12H, Filiberto Gtz MD, 1 Application at 06/21/24 0634    niCARdipine (CARDENE) 25 mg in 250 mL NS infusion kit, 5-15 mg/hr, Intravenous, Continuous PRN, Filiberto Gtz MD, Stopped at 06/20/24 1726    nitroglycerin (TRIDIL) 200 mcg/ml infusion, 5 mcg/min, Intravenous, Continuous, Filiberto Gtz MD    ondansetron (ZOFRAN) injection 4 mg, 4 mg, Intravenous, Q6H PRN, Filiberto Gtz MD    oxyCODONE (ROXICODONE) immediate release tablet 10 mg, 10 mg, Oral, Q3H PRN, Filiberto Gtz MD, 10 mg at 06/21/24 0035    oxyCODONE (ROXICODONE) immediate release tablet 5 mg, 5 mg, Oral, Q3H PRN, Filiberto Gtz MD, 5 mg at 06/21/24 0631    [COMPLETED] pantoprazole (PROTONIX) injection 40 mg, 40 mg, Intravenous, Once, 40 mg at 06/20/24 1619 **FOLLOWED BY** pantoprazole (PROTONIX) EC tablet 40 mg, 40 mg, Oral, QAMUlisses Nicholas M, MD, 40 mg at 06/21/24 0634    phenylephrine (PANCHO-SYNEPHRINE) 50 mg in 250 mL NS infusion, 0.5-3 mcg/kg/min, Intravenous, Continuous PRN, Filiberto Gtz MD    Phosphorus Replacement - Follow Nurse / BPA Driven Protocol, , Does not apply, Ulisses LIVE Nicholas M, MD    polyethylene glycol (MIRALAX)  packet 17 g, 17 g, Oral, Daily, Filiberto Gtz MD, 17 g at 06/21/24 0800    Potassium Replacement - Follow Nurse / BPA Driven Protocol, , Does not apply, PRN, Filiberto Gtz MD    pregabalin (LYRICA) capsule 25 mg, 25 mg, Oral, Q12H, Filiberto Gtz MD, 25 mg at 06/21/24 0800    tamsulosin (FLOMAX) 24 hr capsule 0.4 mg, 0.4 mg, Oral, Daily, Zhanna Isaacs APRN, 0.4 mg at 06/21/24 1014    vancomycin IVPB 1500 mg in 0.9% NaCl (Premix) 500 mL, 1,500 mg, Intravenous, Q12H, Filiberto Gtz MD, Last Rate: 333.3 mL/hr at 06/21/24 0801, 1,500 mg at 06/21/24 0801    OBJECTIVE     Vitals:    06/21/24 1009   BP:    Pulse: 64   Resp:    Temp:    SpO2:        PHYSICAL EXAM: ***  Physical Exam       Results Review:  Lab Results (last 48 hours)       Procedure Component Value Units Date/Time    Tissue Pathology Exam [[id number]] Collected: 06/20/24 0900    Specimen: Tissue from Aortic Leaflet, Tissue from Aortic Aneurysm Updated: 06/21/24 0828    POC Glucose Once [[id number]]  (Abnormal) Collected: 06/21/24 0704    Specimen: Blood Updated: 06/21/24 0724     Glucose 171 mg/dL      Comment: : [id number] Luis F KristiMeter ID: [id number]       POC Glucose Once [[id number]]  (Abnormal) Collected: 06/21/24 0454    Specimen: Blood Updated: 06/21/24 0505     Glucose 168 mg/dL      Comment: : [id number] Luis F KristiMeter ID: [id number]       POC Glucose Once [[id number]]  (Abnormal) Collected: 06/21/24 0236    Specimen: Blood Updated: 06/21/24 0247     Glucose 160 mg/dL      Comment: : [id number] Luis F KristiMeter ID: [id number]       Renal Function Panel [[id number]]  (Abnormal) Collected: 06/21/24 0213    Specimen: Blood from Arm, Left Updated: 06/21/24 0238     Glucose 158 mg/dL      BUN 19 mg/dL      Creatinine 0.85 mg/dL      Sodium 138 mmol/L      Potassium 4.6 mmol/L      Chloride 106 mmol/L      CO2 26.0 mmol/L      Calcium 7.7 mg/dL      Albumin 3.3 g/dL      Phosphorus 3.5 mg/dL      Anion Gap  6.0 mmol/L      BUN/Creatinine Ratio 22.4     eGFR 98.2 mL/min/1.73     Narrative:      GFR Normal >60  Chronic Kidney Disease <60  Kidney Failure <15      Magnesium [[id number]]  (Normal) Collected: 06/21/24 0213    Specimen: Blood from Arm, Left Updated: 06/21/24 0232     Magnesium 1.8 mg/dL     Protime-INR [[id number]]  (Abnormal) Collected: 06/21/24 0213    Specimen: Blood from Arm, Left Updated: 06/21/24 0228     Protime 14.6 Seconds      INR 1.10    CBC & Differential [[id number]]  (Abnormal) Collected: 06/21/24 0213    Specimen: Blood from Arm, Left Updated: 06/21/24 0223    Narrative:      The following orders were created for panel order CBC & Differential.  Procedure                               Abnormality         Status                     ---------                               -----------         ------                     CBC Auto Differential[[id number]]        Abnormal            Final result                 Please view results for these tests on the individual orders.    CBC Auto Differential [[id number]]  (Abnormal) Collected: 06/21/24 0213    Specimen: Blood from Arm, Left Updated: 06/21/24 0223     WBC 11.24 10*3/mm3      RBC 3.94 10*6/mm3      Hemoglobin 11.8 g/dL      Hematocrit 34.7 %      MCV 88.1 fL      MCH 29.9 pg      MCHC 34.0 g/dL      RDW 12.9 %      RDW-SD 41.5 fl      MPV 10.5 fL      Platelets 177 10*3/mm3      Neutrophil % 92.6 %      Lymphocyte % 4.5 %      Monocyte % 2.5 %      Eosinophil % 0.0 %      Basophil % 0.2 %      Immature Grans % 0.2 %      Neutrophils, Absolute 10.41 10*3/mm3      Lymphocytes, Absolute 0.51 10*3/mm3      Monocytes, Absolute 0.28 10*3/mm3      Eosinophils, Absolute 0.00 10*3/mm3      Basophils, Absolute 0.02 10*3/mm3      Immature Grans, Absolute 0.02 10*3/mm3      nRBC 0.0 /100 WBC     POC Glucose Once [[id number]]  (Abnormal) Collected: 06/21/24 0038    Specimen: Blood Updated: 06/21/24 0049     Glucose 156 mg/dL      Comment: : [id number] Luis F  KristiMeter ID: [id number]       POC Glucose Once [[id number]]  (Abnormal) Collected: 06/20/24 2301    Specimen: Blood Updated: 06/20/24 2313     Glucose 67 mg/dL      Comment: : [id number] Luis F KristiMeter ID: [id number]       POC Glucose Once [[id number]]  (Abnormal) Collected: 06/20/24 2159    Specimen: Blood Updated: 06/20/24 2210     Glucose 152 mg/dL      Comment: : [id number] Luis F KristiMeter ID: [id number]       Blood Gas, Arterial - [[id number]]  (Abnormal) Collected: 06/20/24 2110    Specimen: Arterial Blood Updated: 06/20/24 2112     Site Arterial Line     Gilbert's Test N/A     pH, Arterial 7.355 pH units      pCO2, Arterial 47.5 mm Hg      Comment: 83 Value above reference range        pO2, Arterial 76.3 mm Hg      Comment: 84 Value below reference range        HCO3, Arterial 26.5 mmol/L      Comment: 83 Value above reference range        Base Excess, Arterial 0.4 mmol/L      O2 Saturation, Arterial 95.6 %      Temperature 37.0     Barometric Pressure for Blood Gas 758 mmHg      Modality Ventilator     FIO2 30 %      Ventilator Mode SPONTANEOUS     PEEP 5.0     PSV 10.0 cmH2O      Collected by LUTHER     Comment: Meter: [id number]     :  LUTHER        pCO2, Temperature Corrected 47.5 mm Hg      pH, Temp Corrected 7.355 pH Units      pO2, Temperature Corrected 76.3 mm Hg      PO2/FIO2 254    POC Glucose Once [[id number]]  (Abnormal) Collected: 06/20/24 2059    Specimen: Blood Updated: 06/20/24 2111     Glucose 137 mg/dL      Comment: : [id number] Luis F KristiMeter ID: [id number]       POC Glucose Once [[id number]]  (Abnormal) Collected: 06/20/24 1952    Specimen: Blood Updated: 06/20/24 2003     Glucose 135 mg/dL      Comment: : [id number] Luis F KristiMeter ID: [id number]       Renal Function Panel [[id number]]  (Abnormal) Collected: 06/20/24 1846    Specimen: Blood Updated: 06/20/24 1916     Glucose 124 mg/dL      BUN 15 mg/dL      Creatinine 0.80 mg/dL       Sodium 141 mmol/L      Potassium 4.8 mmol/L      Comment: Slight hemolysis detected by analyzer. Result may be falsely elevated.        Chloride 108 mmol/L      CO2 26.0 mmol/L      Calcium 7.7 mg/dL      Albumin 3.3 g/dL      Phosphorus 2.6 mg/dL      Anion Gap 7.0 mmol/L      BUN/Creatinine Ratio 18.8     eGFR 100.1 mL/min/1.73     Narrative:      GFR Normal >60  Chronic Kidney Disease <60  Kidney Failure <15      Blood Gas, Arterial - [[id number]]  (Abnormal) Collected: 06/20/24 1904    Specimen: Arterial Blood Updated: 06/20/24 1907     Site Arterial Line     Gilbert's Test N/A     pH, Arterial 7.439 pH units      pCO2, Arterial 38.4 mm Hg      pO2, Arterial 154.0 mm Hg      Comment: 83 Value above reference range        HCO3, Arterial 26.0 mmol/L      Base Excess, Arterial 1.8 mmol/L      O2 Saturation, Arterial 99.8 %      Comment: 83 Value above reference range        Temperature 37.0     Barometric Pressure for Blood Gas 757 mmHg      Modality Ventilator     FIO2 40 %      Ventilator Mode SIMV     Set Tidal Volume 600     Set Mech Resp Rate 22.0     PEEP 8.0     PSV 10.0 cmH2O      Collected by LUTHER     Comment: Meter: [id number]     :  LUTHER        pCO2, Temperature Corrected 38.4 mm Hg      pH, Temp Corrected 7.439 pH Units      pO2, Temperature Corrected 154 mm Hg      PO2/FIO2 385    POC Glucose Once [[id number]]  (Normal) Collected: 06/20/24 1851    Specimen: Blood Updated: 06/20/24 1902     Glucose 124 mg/dL      Comment: : [id number] Uriel ChinoAlberta ID: [id number]       CBC (No Diff) [[id number]]  (Abnormal) Collected: 06/20/24 1846    Specimen: Blood Updated: 06/20/24 1858     WBC 6.23 10*3/mm3      RBC 3.97 10*6/mm3      Hemoglobin 12.0 g/dL      Hematocrit 34.2 %      MCV 86.1 fL      MCH 30.2 pg      MCHC 35.1 g/dL      RDW 12.7 %      RDW-SD 40.0 fl      MPV 10.2 fL      Platelets 167 10*3/mm3     POC Glucose Once [[id number]]  (Abnormal) Collected: 06/20/24 1800     Specimen: Blood Updated: 06/20/24 1811     Glucose 138 mg/dL      Comment: : [id number] Uriel WashburnonMeter ID: [id number]       POC Glucose Once [[id number]]  (Abnormal) Collected: 06/20/24 1614    Specimen: Blood Updated: 06/20/24 1624     Glucose 146 mg/dL      Comment: : [id number] Uriel WashburnonMeter ID: [id number]       Renal Function Panel [[id number]]  (Abnormal) Collected: 06/20/24 1524    Specimen: Blood Updated: 06/20/24 1555     Glucose 143 mg/dL      BUN 16 mg/dL      Creatinine 0.81 mg/dL      Sodium 142 mmol/L      Potassium 3.0 mmol/L      Comment: Slight hemolysis detected by analyzer. Result may be falsely elevated.        Chloride 107 mmol/L      CO2 28.0 mmol/L      Calcium 7.8 mg/dL      Albumin 3.0 g/dL      Phosphorus 2.0 mg/dL      Anion Gap 7.0 mmol/L      BUN/Creatinine Ratio 19.8     eGFR 99.7 mL/min/1.73     Narrative:      GFR Normal >60  Chronic Kidney Disease <60  Kidney Failure <15      CBC (No Diff) [[id number]]  (Abnormal) Collected: 06/20/24 1524    Specimen: Blood Updated: 06/20/24 1545     WBC 7.43 10*3/mm3      RBC 3.70 10*6/mm3      Hemoglobin 11.2 g/dL      Hematocrit 32.4 %      MCV 87.6 fL      MCH 30.3 pg      MCHC 34.6 g/dL      RDW 12.7 %      RDW-SD 41.0 fl      MPV 10.2 fL      Platelets 149 10*3/mm3     Protime-INR [[id number]]  (Abnormal) Collected: 06/20/24 1524    Specimen: Blood Updated: 06/20/24 1545     Protime 16.2 Seconds      INR 1.25    aPTT [[id number]]  (Abnormal) Collected: 06/20/24 1524    Specimen: Blood Updated: 06/20/24 1545     PTT 40.6 seconds     Fibrinogen [[id number]]  (Abnormal) Collected: 06/20/24 1524    Specimen: Blood Updated: 06/20/24 1545     Fibrinogen 195 mg/dL     Calcium, Ionized [[id number]]  (Abnormal) Collected: 06/20/24 1529    Specimen: Blood Updated: 06/20/24 1531     Ionized Calcium 4.50 mg/dL      Comment: 84 Value below reference range        Collected by [id number]     Comment: Meter: [id number]     :  Darrel Mohamud CRT        Blood Gas, Arterial - [[id number]]  (Abnormal) Collected: 06/20/24 1528    Specimen: Arterial Blood Updated: 06/20/24 1530     Site Arterial Line     Gilbert's Test N/A     pH, Arterial 7.369 pH units      pCO2, Arterial 49.0 mm Hg      Comment: 83 Value above reference range        pO2, Arterial 69.0 mm Hg      Comment: 84 Value below reference range        HCO3, Arterial 28.2 mmol/L      Comment: 83 Value above reference range        Base Excess, Arterial 2.2 mmol/L      Comment: 83 Value above reference range        O2 Saturation, Arterial 94.2 %      Temperature 37.0     Barometric Pressure for Blood Gas 759 mmHg      Modality Ventilator     FIO2 60 %      Ventilator Mode SIMV     Set Tidal Volume 600     Set Mech Resp Rate 20.0     PEEP 8.0     PSV 10.0 cmH2O      Collected by [id number]     Comment: Meter: [id number]     :  Darrel Mohamud, CRT        pCO2, Temperature Corrected 49.0 mm Hg      pH, Temp Corrected 7.369 pH Units      pO2, Temperature Corrected 69.0 mm Hg      PO2/FIO2 115    Fibrinogen [[id number]]  (Abnormal) Collected: 06/20/24 1321    Specimen: Blood, Arterial Line Updated: 06/20/24 1356     Fibrinogen 161 mg/dL     Protime-INR [[id number]]  (Abnormal) Collected: 06/20/24 1321    Specimen: Blood, Arterial Line Updated: 06/20/24 1356     Protime 20.9 Seconds      INR 1.72    aPTT [[id number]]  (Abnormal) Collected: 06/20/24 1321    Specimen: Blood, Arterial Line Updated: 06/20/24 1356     PTT >200.0 seconds     Blood Gas, Arterial With Co-Ox [[id number]]  (Abnormal) Collected: 06/20/24 1341    Specimen: Arterial Blood Updated: 06/20/24 1340     Site Arterial Line     Gilbert's Test N/A     pH, Arterial 7.351 pH units      pCO2, Arterial 47.8 mm Hg      Comment: 83 Value above reference range        pO2, Arterial 450.0 mm Hg      Comment: 83 Value above reference range        HCO3, Arterial 26.4 mmol/L      Comment: 83 Value above reference range        Base Excess, Arterial 0.5 mmol/L       O2 Saturation, Arterial >100.1 %      Comment: 93 Value above reportable range > 100.1        Hemoglobin, Blood Gas 9.4 g/dL      Comment: 84 Value below reference range        Hematocrit, Blood Gas 28.8 %      Comment: 84 Value below reference range        Oxyhemoglobin 98.7 %      Methemoglobin 0.80 %      Carboxyhemoglobin 0.8 %      Temperature 37.0     Sodium, Arterial 141 mmol/L      Potassium, Arterial 3.0 mmol/L      Comment: 84 Value below reference range        Ionized Calcium 4.49 mg/dL      Comment: 84 Value below reference range        Barometric Pressure for Blood Gas 760 mmHg      Modality Ventilator     FIO2 100 %      Ventilator Mode NA     Collected by [id number]     Comment: Meter: [id number]     :  [id number]        pH, Temp Corrected 7.351 pH Units      pCO2, Temperature Corrected 47.8 mm Hg      pO2, Temperature Corrected 450 mm Hg     CBC & Differential [[id number]]  (Abnormal) Collected: 06/20/24 1321    Specimen: Blood, Arterial Line Updated: 06/20/24 1331    Narrative:      The following orders were created for panel order CBC & Differential.  Procedure                               Abnormality         Status                     ---------                               -----------         ------                     CBC Auto Differential[[id number]]        Abnormal            Final result                 Please view results for these tests on the individual orders.    CBC Auto Differential [[id number]]  (Abnormal) Collected: 06/20/24 1321    Specimen: Blood, Arterial Line Updated: 06/20/24 1331     WBC 8.20 10*3/mm3      RBC 2.95 10*6/mm3      Hemoglobin 8.8 g/dL      Hematocrit 26.0 %      MCV 88.1 fL      MCH 29.8 pg      MCHC 33.8 g/dL      RDW 12.8 %      RDW-SD 41.1 fl      MPV 10.7 fL      Platelets 144 10*3/mm3      Neutrophil % 77.6 %      Lymphocyte % 19.3 %      Monocyte % 1.2 %      Eosinophil % 1.1 %      Basophil % 0.4 %      Immature Grans % 0.4 %      Neutrophils,  Absolute 6.37 10*3/mm3      Lymphocytes, Absolute 1.58 10*3/mm3      Monocytes, Absolute 0.10 10*3/mm3      Eosinophils, Absolute 0.09 10*3/mm3      Basophils, Absolute 0.03 10*3/mm3      Immature Grans, Absolute 0.03 10*3/mm3      nRBC 0.0 /100 WBC     Blood Gas, Arterial With Co-Ox [[id number]]  (Abnormal) Collected: 06/20/24 1235    Specimen: Arterial Blood Updated: 06/20/24 1233     Site Arterial Line     Gilbert's Test N/A     pH, Arterial 7.433 pH units      pCO2, Arterial 38.9 mm Hg      pO2, Arterial >547.0 mm Hg      Comment: 93 Value above reportable range > 547.0        HCO3, Arterial 25.9 mmol/L      Base Excess, Arterial 1.6 mmol/L      O2 Saturation, Arterial >100.1 %      Comment: 93 Value above reportable range > 100.1        Hemoglobin, Blood Gas 9.2 g/dL      Comment: 84 Value below reference range        Hematocrit, Blood Gas 28.3 %      Comment: 84 Value below reference range        Oxyhemoglobin 99.2 %      Comment: 83 Value above reference range        Methemoglobin 0.70 %      Carboxyhemoglobin 0.5 %      Temperature 37.0     Sodium, Arterial 138 mmol/L      Potassium, Arterial 3.3 mmol/L      Comment: 84 Value below reference range        Ionized Calcium 3.96 mg/dL      Comment: 84 Value below reference range        Barometric Pressure for Blood Gas 760 mmHg      Modality Ventilator     FIO2 100 %      Ventilator Mode NA     Collected by [id number]     Comment: Meter: [id number]     :  [id number]        pH, Temp Corrected 7.433 pH Units      pCO2, Temperature Corrected 38.9 mm Hg      pO2, Temperature Corrected >547 mm Hg     Blood Gas, Arterial With Co-Ox [[id number]]  (Abnormal) Collected: 06/20/24 1203    Specimen: Arterial Blood Updated: 06/20/24 1201     Site Arterial Line     Gilbert's Test N/A     pH, Arterial 7.349 pH units      Comment: 84 Value below reference range        pCO2, Arterial 51.9 mm Hg      Comment: 83 Value above reference range        pO2, Arterial >547.0 mm Hg       Comment: 93 Value above reportable range > 547.0        HCO3, Arterial 28.5 mmol/L      Comment: 83 Value above reference range        Base Excess, Arterial 2.3 mmol/L      Comment: 83 Value above reference range        O2 Saturation, Arterial >100.1 %      Comment: 93 Value above reportable range > 100.1        Hemoglobin, Blood Gas 9.8 g/dL      Comment: 84 Value below reference range        Hematocrit, Blood Gas 30.0 %      Comment: 84 Value below reference range        Oxyhemoglobin 99.1 %      Comment: 83 Value above reference range        Methemoglobin 0.70 %      Carboxyhemoglobin 0.4 %      Temperature 37.0     Sodium, Arterial 140 mmol/L      Potassium, Arterial 3.5 mmol/L      Comment: 84 Value below reference range        Ionized Calcium --     Comment:  [id number] Calibration error(s) [id number] Value below reference range        Barometric Pressure for Blood Gas 760 mmHg      Modality Ventilator     FIO2 100 %      Ventilator Mode NA     Collected by [id number]     Comment: Meter: [id number]     :  [id number]        pH, Temp Corrected 7.349 pH Units      pCO2, Temperature Corrected 51.9 mm Hg      pO2, Temperature Corrected >547 mm Hg     Blood Gas, Arterial With Co-Ox [[id number]]  (Abnormal) Collected: 06/20/24 0952    Specimen: Arterial Blood Updated: 06/20/24 0951     Site Arterial Line     Gilbert's Test N/A     pH, Arterial 7.388 pH units      pCO2, Arterial 49.7 mm Hg      Comment: 83 Value above reference range        pO2, Arterial >547.0 mm Hg      Comment: 93 Value above reportable range > 547.0        HCO3, Arterial 29.9 mmol/L      Comment: 83 Value above reference range        Base Excess, Arterial 4.1 mmol/L      Comment: 83 Value above reference range        O2 Saturation, Arterial >100.1 %      Comment: 93 Value above reportable range > 100.1        Hemoglobin, Blood Gas 10.9 g/dL      Comment: 84 Value below reference range        Hematocrit, Blood  Gas 33.4 %      Comment: 84 Value below reference range        Oxyhemoglobin 99.2 %      Comment: 83 Value above reference range        Methemoglobin 0.70 %      Carboxyhemoglobin 0.4 %      Temperature 37.0     Sodium, Arterial 140 mmol/L      Potassium, Arterial 3.4 mmol/L      Comment: 84 Value below reference range        Ionized Calcium 4.25 mg/dL      Comment: 84 Value below reference range        Barometric Pressure for Blood Gas 760 mmHg      Modality Ventilator     FIO2 100 %      Ventilator Mode NA     Collected by [id number]     Comment: Meter: [id number]     :  [id number]        pH, Temp Corrected 7.388 pH Units      pCO2, Temperature Corrected 49.7 mm Hg      pO2, Temperature Corrected >547 mm Hg     Blood Gas, Arterial With Co-Ox [[id number]]  (Abnormal) Collected: 06/20/24 0920    Specimen: Arterial Blood Updated: 06/20/24 0919     Site Arterial Line     Gilbert's Test N/A     pH, Arterial 7.233 pH units      Comment: 84 Value below reference range        pCO2, Arterial 72.3 mm Hg      Comment: 86 Value above critical limit        pO2, Arterial 482.0 mm Hg      Comment: 83 Value above reference range        HCO3, Arterial 30.4 mmol/L      Comment: 83 Value above reference range        Base Excess, Arterial 1.0 mmol/L      O2 Saturation, Arterial 100.0 %      Comment: 83 Value above reference range        Hemoglobin, Blood Gas 12.8 g/dL      Comment: 84 Value below reference range        Hematocrit, Blood Gas 39.3 %      Oxyhemoglobin 99.1 %      Comment: 83 Value above reference range        Methemoglobin 0.60 %      Carboxyhemoglobin 0.3 %      Temperature 37.0     Sodium, Arterial 141 mmol/L      Potassium, Arterial 3.7 mmol/L      Ionized Calcium 4.69 mg/dL      Barometric Pressure for Blood Gas 760 mmHg      Modality Ventilator     FIO2 100 %      Ventilator Mode NA     Notified Who SORAYA     Notified By [id number]     Notified Time 06/20/2024 09:20     Collected by [id number]     Comment: Meter:  [id number]     :  [id number]        pH, Temp Corrected 7.233 pH Units      pCO2, Temperature Corrected 72.3 mm Hg      pO2, Temperature Corrected 482 mm Hg     Blood Gas, Arterial With Co-Ox [[id number]]  (Abnormal) Collected: 06/20/24 0811    Specimen: Arterial Blood Updated: 06/20/24 0810     Site Arterial Line     Gilbert's Test N/A     pH, Arterial 7.410 pH units      pCO2, Arterial 44.3 mm Hg      pO2, Arterial 532.0 mm Hg      Comment: 83 Value above reference range        HCO3, Arterial 28.0 mmol/L      Comment: 83 Value above reference range        Base Excess, Arterial 2.9 mmol/L      Comment: 83 Value above reference range        O2 Saturation, Arterial 100.0 %      Comment: 83 Value above reference range        Hemoglobin, Blood Gas 12.9 g/dL      Comment: 84 Value below reference range        Hematocrit, Blood Gas 39.7 %      Oxyhemoglobin 99.3 %      Comment: 83 Value above reference range        Methemoglobin 0.70 %      Carboxyhemoglobin 0.1 %      Temperature 37.0     Sodium, Arterial 140 mmol/L      Potassium, Arterial 3.5 mmol/L      Comment: 84 Value below reference range        Ionized Calcium 4.62 mg/dL      Barometric Pressure for Blood Gas 760 mmHg      Modality Ventilator     FIO2 100 %      Ventilator Mode NA     Collected by [id number]     Comment: Meter: [id number]     :  [id number]        pH, Temp Corrected 7.410 pH Units      pCO2, Temperature Corrected 44.3 mm Hg      pO2, Temperature Corrected 532 mm Hg           Imaging Results (Last 72 Hours)       Procedure Component Value Units Date/Time    XR Chest 1 View [[id number]] Collected: 06/21/24 0703     Updated: 06/21/24 0706    Narrative:      EXAMINATION: XR CHEST 1 VW-  6/21/2024 7:03 AM     HISTORY: Postop heart surgery     FINDINGS: Today's exam is compared to previous study of 1 day earlier.  The Crowley-Srinivas catheter has been removed. The patient has been extubated.  There is bibasilar atelectasis. Lungs are otherwise  clear. There is no  effusion or free air present.       Impression:      1. Berclair-Srinivas catheter and endotracheal tube removed without  complication.  2. Bibasilar atelectasis.     This report was signed and finalized on 6/21/2024 7:03 AM by Dr. Kiet Bowen MD.       XR Chest 1 View [[id number]] Collected: 06/20/24 1603     Updated: 06/20/24 1608    Narrative:      XR CHEST 1 VW- 6/20/2024 2:53 PM     HISTORY: Post-Op Check Line & Tube Placement; I71.21-Aneurysm of the  ascending aorta, without rupture       COMPARISON: Chest x-ray dated 6/4/2024     FINDINGS:  Upright frontal radiograph of the chest was obtained     Status post aortic valve replacement and hemiarch repair. Endotracheal  tube is in good position. Lungs are well expanded. No pneumothorax.  Berclair-Srinivas catheter tip is in the main pulmonary outflow tract. No  consolidation or pleural effusion. Pulmonary vasculature are nondilated.       Impression:      1.  Postop aortic valve replacement and hemiarch repair. ET tube is in  good position. Lungs are well expanded. No pneumothorax.     This report was signed and finalized on 6/20/2024 4:05 PM by Dr Juan Jose Anders.                  ASSESSMENT/PLAN       Examination and evaluation of wound(s) was performed.    DIAGNOSIS:     Thoracic aortic aneurysm without rupture    Hypertension    Former smoker    Class 1 obesity due to excess calories with body mass index (BMI) of 30.0 to 30.9 in adult    Aortic root aneurysm    Venous insufficiency (chronic) (peripheral)    Venous ulcer of right leg      PLAN:   Orders placed for wound care and pressure/moisture management as listed below.       Start     Ordered    06/21/24 1002  Wound Care  Every 12 Hours        Question Answer Comment   Wound Locations Right lower leg wound and left lower leg wound    Wound Care Instructions Cleanse with NS. Apply therahoney to wound bed, cover with Vaseline gauze, cover with silicone foam border dressing.    Cleanse Normal Saline     Intervention Thera Honey Sheet    Intervention Vaseline Gauze    Intervention Silicone Border Dressing    Securement Silicone Border Dressing        06/21/24 1001    06/21/24 0719  Elevate Heels Off of Bed  Until Discontinued         06/21/24 0718    06/21/24 0719  Use Seat Cushion When Up In Chair  Continuous         06/21/24 0718    06/21/24 0719  Silicone Border Dressing to Bony Prominences  Per Order Details        Comments: Apply silicone foam border dressing per protocol to sacral spine/bilateral heels for protection.  Nursing to change dressing every 3 days and PRN if soiled. Nursing is to peel back dressing with every assessment to assess skin underneath dressing. No barrier cream under dressing.    06/21/24 0718    06/21/24 0719  Use Repositioning Wedge to Position Patient  Continuous        Comments: Use Comfort Glide repositioning sheet and wedges to position patient.    06/21/24 0718                               Discussed findings and treatment plan including risks, benefits, and treatment options with patient in detail. Patient agreed with treatment plan.      This document has been electronically signed by JUAN JOSE Luna on 6/21/2024 10:19 CDT

## 2024-06-21 NOTE — PLAN OF CARE
Goal Outcome Evaluation:  Plan of Care Reviewed With: patient, significant other        Progress: improving  Outcome Evaluation: Pt. up in chair at this time. Good pain control maintained. VSS. Safety maintained.

## 2024-06-21 NOTE — PLAN OF CARE
Goal Outcome Evaluation:   Patient stood and ambulated 200' with CGA. BP 95/74 - 110/75. HR 61 -72 - 64. RA SAT 95% - 96%. Reviewed sternal restrictions. Tolerated activity well.

## 2024-06-21 NOTE — PLAN OF CARE
Goal Outcome Evaluation:  Plan of Care Reviewed With: patient           Outcome Evaluation: PT eval complete. He is alert and oriented x 4. He was educated on sternal precautions. He lives with his s.o. where he was independent in his mobiilty and ADL's. Today needs CGA/min assist to stand and to ambulate. Was able to ambulate 200 ft around the unit. Demos small steps with generalized weakness and pain seemed controlled with activity performed. PT will cont with mobility and strengthening in preparation to d.c home with assist.      Anticipated Discharge Disposition (PT): home with assist

## 2024-06-21 NOTE — THERAPY EVALUATION
Patient Name: Michael Issa  : 1960    MRN: [id number]                              Today's Date: 2024       Admit Date: 2024    Visit Dx:     ICD-10-CM ICD-9-CM   1. Impaired mobility [Z74.09]  Z74.09 799.89   2. Aneurysm of ascending aorta without rupture  I71.21 441.2     Patient Active Problem List   Diagnosis    Hypertension    Thyroid nodule    Former smoker    Family history of colonic polyps    Erectile dysfunction    Calcified granuloma of lung    Aorta aneurysm    Chronic seasonal allergic rhinitis due to pollen    Chest pressure    History of colon polyps    HUMPHRIES (dyspnea on exertion)    Thoracic aortic aneurysm without rupture    Class 1 obesity due to excess calories with body mass index (BMI) of 30.0 to 30.9 in adult    Aortic root aneurysm    Venous insufficiency (chronic) (peripheral)    Venous ulcer of right leg     Past Medical History:   Diagnosis Date    Aneurysm 2023    Arthritis     Disease of thyroid gland     NODULE PRESENT     ED (erectile dysfunction)     Elevated cholesterol     Hx of chest pain     Hypertension     Urgency of urination      Past Surgical History:   Procedure Laterality Date    CARDIAC CATHETERIZATION Left 2024    Procedure: Cardiac Catheterization/Vascular Study;  Surgeon: Nima Nickerson MD;  Location: [facility] CATH INVASIVE LOCATION;  Service: Cardiology;  Laterality: Left;    COLONOSCOPY N/A 2021    Procedure: COLONOSCOPY;  Surgeon: Eduardo Neil MD;  Location: [facility] ENDOSCOPY;  Service: Gastroenterology;  Laterality: N/A;  pre  post diverticulosis, hemorrhoids  Dr. cavazos    KNEE SURGERY      rt knee MENISCUS    PREPERITONEAL HERNIA REPAIR Bilateral 2017    Procedure: BILATERAL PREPERITONEAL INGUINAL HERNIA REPAIR LAPAROSCOPIC WITH MESH;  Surgeon: Rambo Church MD;  Location: [facility] OR;  Service:       General Information       Row Name 24 0806          Physical Therapy Time and Intention    Document Type  evaluation  CC: s/p aortic valve replacement and hemiarch repair, sternal plating, femoral line, LAAE, modified Bentall procedure.  Dx: ascending aortic aeurysm and aortic root aneurysm. PMH: aneurysm, arthritis, HTN, chest pain  -SIMONE     Mode of Treatment physical therapy  -SIMONE (r) HT (t) SIMONE (c)       Row Name 06/21/24 0806          General Information    Patient Profile Reviewed yes  -SIMONE (r) HT (t) SIMONE (c)     Prior Level of Function independent:;all household mobility;ADL's  -SIMONE     Existing Precautions/Restrictions fall;sternal;oxygen therapy device and L/min  chest tube x 2, external pacemaker  -SIMONE     Barriers to Rehab medically complex  -SIMONE       Row Name 06/21/24 0806          Living Environment    People in Home significant other  -SIMONE       Row Name 06/21/24 08          Home Main Entrance    Number of Stairs, Main Entrance five;six;seven  Primary entrance has 5-7 steps but other entrances have no steps to enter  -SIMONE       Row Name 06/21/24 0806          Cognition    Orientation Status (Cognition) oriented x 4  -SIMONE       Row Name 06/21/24 0806          Safety Issues, Functional Mobility    Impairments Affecting Function (Mobility) balance;endurance/activity tolerance;strength;shortness of breath;pain  -SIMONE               User Key  (r) = Recorded By, (t) = Taken By, (c) = Cosigned By      Initials Name Provider Type    Jos Antonio, PT DPT Physical Therapist    HT Gena Orta, BALBIR Student PT Student                   Mobility       Row Name 06/21/24 0806          Bed Mobility    Comment, (Bed Mobility) in chair  -SIMONE       Row Name 06/21/24 0806          Sit-Stand Transfer    Sit-Stand [place] (Transfers) contact guard;minimum assist (75% patient effort)  -SIMONE       Row Name 06/21/24 0806          Gait/Stairs (Locomotion)    [place] Level (Gait) contact guard;minimum assist (75% patient effort)  -SIMONE     Distance in Feet (Gait) 200  -SIMONE     Deviations/Abnormal Patterns (Gait) deepali  decreased;gait speed decreased  -SIMONE               User Key  (r) = Recorded By, (t) = Taken By, (c) = Cosigned By      Initials Name Provider Type    Jos Antonio PT DPMARIO Physical Therapist                   Obj/Interventions       Row Name 06/21/24 0806          Range of Motion Comprehensive    General Range of Motion bilateral lower extremity ROM WFL  -SIMONE       Row Name 06/21/24 0806          Strength Comprehensive (MMT)    Comment, General Manual Muscle Testing (MMT) Assessment BLE 4/5  -SIMONE       Row Name 06/21/24 0806          Balance    Balance Assessment sitting dynamic balance;standing dynamic balance  -SIMONE     Dynamic Sitting Balance supervision  -SIMONE     Position, Sitting Balance unsupported;sitting in chair  -SIMONE     Dynamic Standing Balance contact guard;minimal assist  -SIMONE     Position/Device Used, Standing Balance supported  -SIMONE               User Key  (r) = Recorded By, (t) = Taken By, (c) = Cosigned By      Initials Name Provider Type    Jos Antonio PT DPT Physical Therapist                   Goals/Plan       Row Name 06/21/24 0806          Bed Mobility Goal 1 (PT)    Activity/Assistive Device (Bed Mobility Goal 1, PT) sit to supine/supine to sit  -SIMONE     [place] Level/Cues Needed (Bed Mobility Goal 1, PT) supervision required  -SIMONE     Time Frame (Bed Mobility Goal 1, PT) long term goal (LTG);10 days  -SIMONE     Progress/Outcomes (Bed Mobility Goal 1, PT) new goal  -SIMONE       Row Name 06/21/24 0806          Transfer Goal 1 (PT)    Activity/Assistive Device (Transfer Goal 1, PT) sit-to-stand/stand-to-sit;bed-to-chair/chair-to-bed  -SIMONE     [place] Level/Cues Needed (Transfer Goal 1, PT) supervision required  -SIMONE     Time Frame (Transfer Goal 1, PT) long term goal (LTG);10 days  -SIMONE     Progress/Outcome (Transfer Goal 1, PT) new goal  -SIMONE       Row Name 06/21/24 0806          Gait Training Goal 1 (PT)    Activity/Assistive Device (Gait Training Goal 1, PT) gait (walking  locomotion);decrease fall risk;improve balance and speed;increase endurance/gait distance  -SIMONE     [place] Level (Gait Training Goal 1, PT) supervision required  -SIMONE     Distance (Gait Training Goal 1, PT) 300 ft  -SIMONE     Time Frame (Gait Training Goal 1, PT) long term goal (LTG);10 days  -SIMONE     Progress/Outcome (Gait Training Goal 1, PT) new goal  -SIMONE       Row Name 06/21/24 0806          Stairs Goal 1 (PT)    Activity/Assistive Device (Stairs Goal 1, PT) ascending stairs;descending stairs;using handrail, left;using handrail, right  -SIMONE     [place] Level/Cues Needed (Stairs Goal 1, PT) supervision required  -SIMONE     Number of Stairs (Stairs Goal 1, PT) 2-7 steps  -SIMONE     Time Frame (Stairs Goal 1, PT) long term goal (LTG);10 days  -SIMONE     Progress/Outcome (Stairs Goal 1, PT) new goal  -SIMONE       Row Name 06/21/24 0806          Therapy Assessment/Plan (PT)    Planned Therapy Interventions (PT) bed mobility training;transfer training;gait training;balance training;home exercise program;patient/family education;postural re-education;stair training;strengthening  -SIMONE               User Key  (r) = Recorded By, (t) = Taken By, (c) = Cosigned By      Initials Name Provider Type    Jos Antonio, PT DPT Physical Therapist                   Clinical Impression       Row Name 06/21/24 0806          Pain    Pain Intervention(s) Repositioned;Ambulation/increased activity  -SIMONE     Additional Documentation Pain Scale: FACES Pre/Post-Treatment (Group)  -SIMONE       Row Name 06/21/24 0806          Pain Scale: FACES Pre/Post-Treatment    Pain: FACES Scale, Pretreatment 4-->hurts little more  -SIMONE     Pain Location incisional  -SIMONE     Pain Location - chest  -SIMONE       Row Name 06/21/24 0806          Plan of Care Review    Plan of Care Reviewed With patient  -SIMONE     Outcome Evaluation PT eval complete. He is alert and oriented x 4. He was educated on sternal precautions. He lives with his s.o. where he was independent in  his mobiilty and ADL's. Today needs CGA/min assist to stand and to ambulate. Was able to ambulate 200 ft around the unit. Demos small steps with generalized weakness and pain seemed controlled with activity performed. PT will cont with mobility and strengthening in preparation to d.c home with assist.  -SIMONE       Row Name 06/21/24 0806          Therapy Assessment/Plan (PT)    Patient/Family Therapy Goals Statement (PT) go home when ready  -SIMONE     Rehab Potential (PT) good, to achieve stated therapy goals  -SIMONE     Criteria for Skilled Interventions Met (PT) yes;meets criteria;skilled treatment is necessary  -SIMONE     Therapy Frequency (PT) 2 times/day  -SIMONE     Predicted Duration of Therapy Intervention (PT) until d/c  -SIMONE       Row Name 06/21/24 0806          Vital Signs    Pre Systolic BP Rehab 99  -SIMONE     Pre Treatment Diastolic BP 71  -SIMONE     Post Systolic BP Rehab 106  -SIMONE     Post Treatment Diastolic BP 75  -SIMONE     Pretreatment Heart Rate (beats/min) 63  -SIMONE     Intratreatment Heart Rate (beats/min) 74  -SIMONE     Posttreatment Heart Rate (beats/min) 63  -SIMONE     Pre SpO2 (%) 95  -SIMONE     O2 Delivery Pre Treatment nasal cannula  4L  -SIMONE     O2 Delivery Intra Treatment nasal cannula  4L  -SIMONE     Post SpO2 (%) 95  -SIMONE     O2 Delivery Post Treatment nasal cannula  4L  -SIMONE     Pre Patient Position Sitting  -SIMONE     Intra Patient Position Standing  -SIMONE     Post Patient Position Sitting  -SIMONE       Row Name 06/21/24 0806          Positioning and Restraints    Pre-Treatment Position sitting in chair/recliner  -SIMONE     Post Treatment Position chair  -SIMONE     In Chair notified nsg;reclined;call light within reach;encouraged to call for assist;patient within staff view  -SIMONE               User Key  (r) = Recorded By, (t) = Taken By, (c) = Cosigned By      Initials Name Provider Type    Jos Antonio, PT DPT Physical Therapist                   Outcome Measures       Row Name 06/21/24 0806 06/21/24 0745       How much help from  another person do you currently need...    Turning from your back to your side while in flat bed without using bedrails? 3  -SIMONE 3  -AB    Moving from lying on back to sitting on the side of a flat bed without bedrails? 3  -SIMONE 3  -AB    Moving to and from a bed to a chair (including a wheelchair)? 3  -SIMONE 3  -AB    Standing up from a chair using your arms (e.g., wheelchair, bedside chair)? 3  -SIMONE 3  -AB    Climbing 3-5 steps with a railing? 2  -SIMONE 2  -AB    To walk in hospital room? 3  -SIMONE 2  -AB    AM-PAC 6 Clicks Score (PT) 17  -SIMONE 16  -AB    Highest Level of Mobility Goal 5 --> Static standing  -SIMONE 5 --> Static standing  -AB      Row Name 06/21/24 0715          How much help from another person do you currently need...    Turning from your back to your side while in flat bed without using bedrails? 3  -SE     Moving from lying on back to sitting on the side of a flat bed without bedrails? 3  -SE     Moving to and from a bed to a chair (including a wheelchair)? 3  -SE     Standing up from a chair using your arms (e.g., wheelchair, bedside chair)? 3  -SE     Climbing 3-5 steps with a railing? 2  -SE     To walk in hospital room? 2  -SE     AM-PAC 6 Clicks Score (PT) 16  -SE     Highest Level of Mobility Goal 5 --> Static standing  -SE       Row Name 06/21/24 0806          Functional Assessment    Outcome Measure Options AM-PAC 6 Clicks Basic Mobility (PT)  -SIMONE               User Key  (r) = Recorded By, (t) = Taken By, (c) = Cosigned By      Initials Name Provider Type    Jos Antonio, PT DPT Physical Therapist    Wilner Mauricio RN Registered Nurse    Matti Vera, RN Registered Nurse                                 Physical Therapy Education       Title: PT OT SLP Therapies (In Progress)       Topic: Physical Therapy (In Progress)       Point: Mobility training (Done)       Learning Progress Summary             Patient Acceptance, E, VU,DU,NR by SIMONE at 6/21/2024 0806    Comment: benefits of activity,  progression of PT POC, sternal prec                         Point: Home exercise program (Not Started)       Learner Progress:  Not documented in this visit.              Point: Body mechanics (Not Started)       Learner Progress:  Not documented in this visit.              Point: Precautions (Done)       Learning Progress Summary             Patient Acceptance, E, CASSIE,DU,NR by SIMONE at 6/21/2024 0806    Comment: benefits of activity, progression of PT POC, sternal prec                                         User Key       Initials Effective Dates Name Provider Type Discipline    SIMONE 02/03/23 -  Jos Chapa PT DPT Physical Therapist PT                  PT Recommendation and Plan  Planned Therapy Interventions (PT): bed mobility training, transfer training, gait training, balance training, home exercise program, patient/family education, postural re-education, stair training, strengthening  Plan of Care Reviewed With: patient  Outcome Evaluation: PT eval complete. He is alert and oriented x 4. He was educated on sternal precautions. He lives with his s.o. where he was independent in his mobiilty and ADL's. Today needs CGA/min assist to stand and to ambulate. Was able to ambulate 200 ft around the unit. Demos small steps with generalized weakness and pain seemed controlled with activity performed. PT will cont with mobility and strengthening in preparation to d.c home with assist.     Time Calculation:         PT Charges       Row Name 06/21/24 1038             Time Calculation    Start Time 0806  -SIMONE      Stop Time 0900  imcludes chart review  -SIMONE      Time Calculation (min) 54 min  -SIMONE      PT Received On 06/21/24  -SIMONE      PT Goal Re-Cert Due Date 07/01/24  -SIMONE                User Key  (r) = Recorded By, (t) = Taken By, (c) = Cosigned By      Initials Name Provider Type    Jos Antonio PT DPT Physical Therapist                  Therapy Charges for Today       Code Description Service Date Service  Provider Modifiers Qty    [id number] HC PT EVAL MOD COMPLEXITY 4 6/21/2024 Jos Chapa, PT DPT GP 1            PT G-Codes  Outcome Measure Options: AM-PAC 6 Clicks Basic Mobility (PT)  AM-PAC 6 Clicks Score (PT): 17  PT Discharge Summary  Anticipated Discharge Disposition (PT): home with assist    Jos Chapa, PT DPT  6/21/2024

## 2024-06-21 NOTE — PAYOR COMM NOTE
"ADMIT INPT 6-20-24  [id number]    [phone]    Michael Issa (64 y.o. Male)       Date of Birth   1960    Social Security Number       Address   [address] [place] [id number]    Home Phone   [phone]    MRN   [id number]       [facility]    Marital Status   Single                            Admission Date   6/20/24    Admission Type   Elective    Admitting Provider   Filiberto Gtz MD    Attending Provider   Filiberto Gtz MD    Department, Room/Bed   [facility] INTENSIVE CARE, I006/1       Discharge Date       Discharge Disposition       Discharge Destination                                 Attending Provider: Filiberto Gtz MD    Allergies: Penicillins    Isolation: None   Infection: None   Code Status: CPR    Ht: 184 cm (72.44\")   Wt: 109 kg (239 lb 12.8 oz)    Admission Cmt: None   Principal Problem: Thoracic aortic aneurysm without rupture [I71.20]                   Active Insurance as of 6/20/2024       Primary Coverage       Payor Plan Insurance Group Employer/Plan Group    [facility] [id number]       Payor Plan Address Payor Plan Phone Number Payor Plan Fax Number Effective Dates    [address] [address]   3/1/2024 - None Entered    [facility] [id number]         Subscriber Name Subscriber Birth Date Member ID       MICHAEL ISSA 1960 [id number]                     Emergency Contacts        (Rel.) Home Phone Work Phone Mobile Phone    Romario Issa Jr (Brother) [phone] -- --    Elicia Ramos (Friend) [phone] -- [phone]    Melia Phipps (Sister) [phone] -- --                 History & Physical        Filiberto Gtz MD at 06/20/24 0712          Patient ID: Michael Issa is a 64 y.o. male who is here for follow-up of a known aneurysm.        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  He had been previously seen with recommendation to consider aneurysmectomy.  His workup has been completed.  He returns today for final discussion.  No new medical events.  No chest pain.  No dyspnea.  No changes since last interval       Medical History        Past Medical History:   Diagnosis Date    Aneurysm 2023    Arthritis      Disease of thyroid gland       NODULE PRESENT     Hx of chest pain      Hypertension              Surgical History         Past Surgical History:   Procedure Laterality Date    CARDIAC CATHETERIZATION Left 2024     Procedure: Cardiac Catheterization/Vascular Study;  Surgeon: Nima Nickerson MD;  Location: [facility] CATH INVASIVE LOCATION;  Service: Cardiology;  Laterality: Left;    COLONOSCOPY N/A 2021     Procedure: COLONOSCOPY;  Surgeon: Eduardo Neil MD;  Location: [facility] ENDOSCOPY;  Service: Gastroenterology;  Laterality: N/A;  pre  post diverticulosis, hemorrhoids  Dr. cavazos    KNEE SURGERY        rt knee MENISCUS    PREPERITONEAL HERNIA REPAIR Bilateral 2017     Procedure: BILATERAL PREPERITONEAL INGUINAL HERNIA REPAIR LAPAROSCOPIC WITH MESH;  Surgeon: Rambo Church MD;  Location: [facility] OR;  Service:             Social History   Social History            Socioeconomic History    Marital status: Single   Tobacco Use    Smoking status: Former       Current packs/day: 0.00       Average packs/day: 0.5 packs/day for 30.0 years (15.0 ttl pk-yrs)       Types: Cigarettes       Start date: 1997       Quit date: 2017       Years since quittin.1       Passive exposure: Past    Smokeless tobacco: Never    Tobacco comments:       Ex smoker   Vaping Use    Vaping status: Never Used   Substance and Sexual Activity    Alcohol use: Not  "Currently    Drug use: Not Currently    Sexual activity: Yes       Partners: Female       Birth control/protection: Birth control pill                  Family History   Problem Relation Age of Onset    Alzheimer's disease Mother      Diabetes Father      Heart disease Father      Heart attack Father      Heart failure Father      Hypertension Father      No Known Problems Sister      No Known Problems Brother      No Known Problems Daughter      No Known Problems Maternal Grandmother      No Known Problems Maternal Grandfather      No Known Problems Paternal Grandmother      No Known Problems Paternal Grandfather      Hypertension Brother      No Known Problems Brother           The following portions of the patient's history were reviewed and updated as appropriate: allergies, current medications, past family history, past medical history, past social history, past surgical history and problem list.              Objective    Visit Vitals  /85 (BP Location: Left arm, Patient Position: Sitting)   Pulse 63   Temp 97.1 °F (36.2 °C) (Temporal)   Resp 16   Ht 184 cm (72.44\")   Wt 102 kg (225 lb 8.5 oz)   SpO2 98%   BMI 30.22 kg/m²       Physical Exam  General: No acute distress, appropriate, alert.   Heart: Regular rate and rhythm without murmurs, rubs, or gallops.   Pulmonary: Clear to auscultation bilateral without wheezing, rubs or rales.   Abdomen: Soft, nontender, tenderness.   Extremities: No clubbing, cyanosis, or edema.     CT Angiogram Chest     Result Date: 4/24/2024  Narrative: EXAMINATION:  CT ANGIOGRAM CHEST-  4/24/2024 2:02 PM  HISTORY: I71.9-Aortic aneurysm of unspecified site, without rupture.  COMPARISON : 12/20/2023 and 9/25/2017.  DLP: 479.58 mGy.cm Automated dosage reduction technique was utilized to decrease patient dosage.  TECHNIQUE: CT angio was performed of the chest with IV contrast. Coronal, sagittal and 3-D reconstruction were performed.  INDEPENDENT 3-D WORKSTATION UTILIZED FOR " RECONSTRUCTION: Yes. A radiologist was present in the department.  MEDIASTINUM, HEART AND VASCULAR STRUCTURES: There is a 1.2 cm left thyroid nodule, stable. There is atheromatous calcification of the thoracic aorta. The ascending thoracic aorta is aneurysmal measuring 4.7 cm, stable. The aortic root measures 4.5 cm. The mid aortic arch measures 3.1 cm. The descending thoracic aorta measures 3.3 cm. There is mild coronary artery calcification. The main pulmonary artery segment is dilated measuring 4.1 cm. There is cardiomegaly. There are no enlarged hilar or mediastinal lymph nodes.  LUNGS: There are calcified lower lobe nodules bilaterally. There is a stable 2 mm subpleural left upper lobe nodule image 53 series 6 compared back to 2017.  UPPER ABDOMEN: Multiple liver cysts. No acute findings in the upper abdomen.  BONES: There are degenerative changes of the visualized spine.        Impression: 1. Stable aneurysmal dilatation of the ascending thoracic aorta measuring 4.7 cm. Aortic root is stable at 4.5 cm. The remainder of the thoracic aorta is normal caliber. Main pulmonary artery segment is dilated measuring 4.1 cm. There is atheromatous calcification of the thoracic aorta. Mild coronary artery calcification is noted. Cardiomegaly. 2. No dense consolidation or pleural effusion. Mild dependent atelectasis. Mild linear atelectasis left lung base. 3. Old granulomatous disease. Stable 2 mm left upper lobe nodule compared back to 2017. 4. Multiple liver cysts.   This report was signed and finalized on 4/24/2024 4:37 PM by Dr. Genaro Burgess MD.        Conclusion     Cardiac Catheterization Operative Report        Patient was referred for cardiac catheterization: High suspicion for coronary artery disease     Procedure performed     Access of right radial artery using ultrasonic guidance with confirmation of placement of catheter in the right radial artery CPT code 72695  Selective coronary angiography using TIG 5  Khmer diagnostic catheter  Moderate sedation administered under supervision of  with monitoring CPT code 58236     Procedure Details  The risks, benefits, complications, treatment options, and expected outcomes were discussed with the patient. Plan is for moderate sedation, and the patient agrees to proceed with the procedure.  An immediate assessment was done prior to the administration of moderate sedation.     The patient and/or family concurred with the proposed plan, giving informed consent. Patient was brought to the cath lab after IV hydration was begun and oral premedication was given.      The skin overlying the patient's right radial artery was prepped and draped in the usual sterile fashion.  Timeout was taken to confirm the correct patient and procedure.  Lidocaine was administered for local anesthesia.  IV Versed and fentanyl were used to achieve conscious sedation.  Modified Seldinger technique was then used to place a 6 Khmer sheath in the right radial artery     Diagnostic coronary angiography was performed with Tig coronary catheter.     Coronary angiogram were performed in Qatari and HUTCHINSON projection to evaluate the coronary arterial systems.       A TR band device was used to achieve hemostasis at the end of the procedure.  The patient tolerated the procedure well, and there were no immediate complications.     Procedural Details: After written and informed consent was obtained, the patient was brought to the cath lab in a fasting state.        Selective coronary angiography:     Left main coronary artery:  The left main coronary artery arises from the left coronary cusp and bifurcates into the  left anterior descending coronary artery  and left circumflex arteries.    Left anterior descending artery:  The  left anterior descending coronary artery   arises normally from the left main coronary artery and courses in the anterior interventricular groove and terminates at the apex. No stenosis  noted  Left circumflex:  The left circumflex arises form the left man artery and supplies obtuse marginal branches.  .  Right coronary artery:  The right coronary artery  arises normally from the right coronary cusp and is dominant for the posterior circulation.       Coronary angiography: As below     Left heart cath: Not performed     LV Gram: Not performed      Interventions: None     Estimated Blood Loss:  Minimal     Specimens: None         Complications:  None; patient tolerated the procedure well.           Disposition: Cardiovascular observation unit           Condition: stable            I supervised the administration of conscious sedation by nursing staff throughout the case.  First dose was given at 1512   and the end of my face-to-face encounter was at  1526  Hours.     During the case, continuous pulse oximetry, heart rate, blood pressure, and patient status were monitored.         Risk, Benefits, and Alternatives discussed with the patient and/or family.  Plan is for moderate sedation, and the patient agrees to proceed with the procedure.  An immediate assessment was done prior to the administration of moderate sedation        Conclusion 5/14/2024     Slightly angulated Superior takeoff of left main coronary artery  There is no dampening  Good reflux of contrast  This appears to be a normal anatomical variant  No obstructive disease of left main coronary artery  Mid left anterior descending coronary artery has mild atherosclerotic changes with 20 to 30% nonobstructive stenosis  Normal diagonal branches  Normal left circumflex coronary artery and obtuse marginal branches  Normal dominant right coronary artery     Summary     No evidence of any significant epicardial coronary artery stenosis with superior takeoff of left main coronary artery at an angulation without any obstructive disease        Plan     Follow-up with Dr. Gtz for thoracic aortic  Hydration   Observation  Risk factor modifications      Clinical Indication     Valvular Function - Ascending aortic aneurysm, preop planning   Dx: Aneurysm of ascending aorta without rupture [I71.21 (ICD-10-CM)]      Interpretation Summary          Left ventricular systolic function is normal. Calculated left ventricular EF = 56.6% Left ventricular ejection fraction appears to be 56 - 60%.    Left ventricular diastolic function was normal.    Mild pulmonary hypertension is present.    Known ascending aortic aneurysm that measures 4.8 cm in diameter.    Normal global longitudinal LV strain (GLS) = -17.1%.     Cardiac History     Diagnosis Date Comment Source   Hypertension            Social History     Tobacco Use     Former; Cigarettes: 4/18/1997 - 4/18/2017; Smoked an average of 0.5 packs/day for 30.0 years   Passive Exposure: Past   Smokeless Tobacco: Never used smokeless tobacco.   Comments: Ex smoker      Vaping Use     Never used      Family Cardiac History as of 5/10/2024  Pedigree Problem Relation Age of Onset   Diabetes Father     Heart attack Father     Heart disease Father     Heart failure Father     Hypertension Brother     Hypertension Father        Additional Study Details     Study Quality The study is technically good for diagnosis.      Echocardiogram Findings     Left Ventricle Left ventricular systolic function is normal. Calculated left ventricular EF = 56.6% Left ventricular ejection fraction appears to be 56 - 60%.   Normal global longitudinal LV strain (GLS) = -17.1%. Left ventricle strain data was reviewed by the physician and found to be accurate. Normal left ventricular cavity size and wall thickness noted. All left ventricular wall segments contract normally. Left ventricular diastolic function was normal. Normal left atrial pressure.   Right Ventricle Normal right ventricular cavity size, wall thickness, systolic function and septal motion noted.   Left Atrium Normal left atrial size and volume noted.   Right Atrium Normal right atrial  cavity size noted.   Aortic Valve The aortic valve is structurally normal with no regurgitation or stenosis present.   Mitral Valve The mitral valve is structurally normal with no regurgitation or significant stenosis present.   Tricuspid Valve The tricuspid valve is grossly normal in structure. Trace tricuspid valve regurgitation is present. Estimated right ventricular systolic pressure from tricuspid regurgitation is mildly elevated (35-45 mmHg). Mild pulmonary hypertension is present. No evidence of significant tricuspid valve stenosis is present.   Pulmonic Valve The pulmonic valve is structurally normal with no regurgitation or significant stenosis present.   Greater Vessels Moderate dilation of the ascending aorta is present. Ascending aorta = 4.8 cm   Pericardium The pericardium is normal. There is no evidence of pericardial effusion. .                LV Measurements          Dimensions   LVIDd 5 cm           IVSd 0.9 cm           FS 30 %           ESV(cubed) 42.9 ml           EDV(cubed) 125 ml           LVOT area 4.5 cm2           LVOT diam 2.4 cm           EDV(MOD-sp2) 74 ml           ESV(MOD-sp2) 30.5 ml           Diastolic Filling   MV E max tono 59.6 cm/sec           MV A max tono 81.6 cm/sec           MV E/A 0.73           LA ESV Index (BP) 21.9 ml/m2           Med Peak E' Tono 7.8 cm/sec           Lat Peak E' Tono 9.9 cm/sec           Avg E/e' ratio 6.73           Shunt Ratio   SV(LVOT) 123 ml                 Dimensions   LVIDs 3.5 cm           LVPWd 1.3 cm           IVS/LVPW 0.69 cm           LV Sys Vol (BSA corrected) 14.4 cm2           LV Pop Vol (BSA corrected) 32.6 cm2           LV mass(C)d 207.1 grams           EDV(MOD-sp4) 73.2 ml           ESV(MOD-sp4) 32.3 ml           Systolic Function   SV(MOD-sp2) 43.5 ml           SV(MOD-sp4) 40.9 ml           SVi(MOD-SP2) 19.3 ml/m2           SVi(MOD-SP4) 18.2 ml/m2           SVi (LVOT) 54.7 ml/m2           EF(MOD-sp2) 58.8 %           EF(MOD-sp4) 55.9 %            EF(MOD-bp) 56.6 %                    Right Ventricle Measurements     RVIDd 4 cm          RV Base 2.9 cm           TAPSE (>1.6) 2.49 cm                   LA Measurements          LA Dimensions   LA dimension (2D) 4.3 cm           LA ESV (BP) 49.3 ml           LA ESV Index (BP) 21.9 ml/m2                      Aortic Valve Measurements          Stenosis   LVOT diam 2.4 cm           LV V1 max 124 cm/sec           LV V1 max PG 6.2 mmHg           LV V1 mean PG 3 mmHg           LV V1 VTI 27.2 cm           Ao pk arian 157 cm/sec                 Stenosis   Ao max PG 9.9 mmHg           Ao mean PG 6 mmHg           Ao V2 VTI 33.2 cm           ROMERO(I,D) 3.7 cm2                    Mitral Valve Measurements          Stenosis   MV P1/2t 99.9 msec           MVA(P1/2t) 2.2 cm2           MV dec slope 188 cm/sec2                 Regurgitation   MR max arian 347.3 cm/sec           MR max PG 48.9 mmHg                    Tricuspid Valve Measurements          Regurgitation   TR max arian 294 cm/sec           TR max PG 34.6 mmHg           RVSP(TR) 39.6 mmHg           RAP systole 5 mmHg           PISA   TR max arian 294 cm/sec           TR max PG 34.6 mmHg                      Pulmonic Valve Measurements          Stenosis   RV V1 max PG 2.2 mmHg           RV V1 max 74.1 cm/sec           PA V2 max 90.2 cm/sec                      Greater Vessels Measurements     Ascending aorta 4.8 cm                  Diagnoses and all orders for this visit:     1. Thoracic aortic aneurysm without rupture, unspecified part (Primary)     2. Former smoker        [facility] - Pulmonary Function Test     [address] [id number]  [phone]     Patient : Michael Issa   MRN : [id number]  CSN : [id number]  Pulmonologist : Julito Samayoa MD  Date : 5/7/2024     ______________________________________________________________________     Interpretation :  1.  Spirometry reveals a mild decrease in peak expiratory flow, and otherwise is  within normal limits.  2.  There is improvement in peak expiratory flow postbronchodilator which is now normal.  Otherwise there is no significant change in spirometry postbronchodilator.  3.  Lung volumes reveal mild hyperinflation.  4.  Diffusion capacity is within normal limits.  5.  Arterial blood gases on room air reveal mild hypoxemia and a mild metabolic alkalosis with some degree of respiratory compensation.        Julito Samayoa MD         Imaging  CT Angiogram Chest (04/24/2024 15:14)   CT angiogram of the chest was obtained on 04/24/2024. My independent interpretation reveals a distal ascending aorta measuring 3.9 cm in size. Ascending aorta measures 49.5 x 49.6. Another slice measures 49.8. Root measures 45 cm in size previously. His root measured 4.4 cm in size and his ascending aorta now measures 5 cm in size.           Assessment & Plan  Impression:  Ascending aortic aneurysm.  Aortic root aneurysm.      Medical decision making/Recommendations/Plan:      As stated previously his ascending aorta now measures 5 cm in size with his aortic root also showing a millimeter growth from last assessment.  We did make the recommendation for he to proceed forward with aneurysmectomy.  I have recommended he proceed forward with modified Bentall procedure, biologic valve conduit utilization, ascending aortic replacement, and resection of hemiarch with deep hypothermic circulatory arrest.  I discussed the operative conduct and expected postoperative course.  Discussed that risks to include but not limited to bleeding, infection, stroke, heart attack, need for additional procedures, anesthesia risk, organ dysfunction and/or failure, prolonged mechanical ventilation, prolonged ICU stay, sternal nonunion, and/or death.  STS risk calculator does not apply in this case.  I discussed his valvular prosthetic options available.  Mechanical versus biologic.  He favors a biologic tissue valve and is agreeable to have a  bovine pericardial tissue valve.  The pros and cons of each option were discussed in detail.  All questions been answered to the best my ability and he is agreeable to proceed forward.  We will identify date for surgery.     He is a non-smoker and congratulated this     Many thanks for the opportunity to care for your patient.     I will continue to keep you apprised of provided care as it ensues.      All questions answered and he verbalizes understanding and has provided consent      Electronically signed by Filiberto Gtz MD at 24 0713       Emergency Department Notes    No notes of this type exist for this encounter.       Facility-Administered Medications as of 2024   Medication Dose Route Frequency Provider Last Rate Last Admin    [COMPLETED] acetaminophen (OFIRMEV) injection 1,000 mg  1,000 mg Intravenous Q8H Filiberto Gtz MD   1,000 mg at 24 0800    acetaminophen (TYLENOL) tablet 1,000 mg  1,000 mg Oral Q6H Filiberto Gtz MD        Or    acetaminophen (TYLENOL) 160 MG/5ML oral solution 1,000 mg  1,000 mg Oral Q6H Filiberto Gtz MD        Or    acetaminophen (TYLENOL) suppository 650 mg  650 mg Rectal Q6H Filiberto Gtz MD        [COMPLETED] acetaminophen (TYLENOL) tablet 1,000 mg  1,000 mg Oral Once Zhanna Isaacs APRN   1,000 mg at 24 0556    albuterol (PROVENTIL) nebulizer solution 0.083% 2.5 mg/3mL  2.5 mg Nebulization Q4H PRN Filiberto Gtz MD        [] aminocaproic acid (AMICAR) 15 g in sodium chloride 0.9 % 300 mL infusion  1 g/hr Intravenous Continuous Filiberto Gtz MD 20 mL/hr at 24 1547 1 g/hr at 24 1547    amiodarone (PACERONE) tablet 400 mg  400 mg Oral BID With Meals Filiberto Gtz MD   400 mg at 24 0800    [] amiodarone 360 mg in 200 mL D5W infusion  1 mg/min Intravenous Continuous Filiberto Gtz MD 33.3 mL/hr at 24 1 mg/min at 24    Followed by    amiodarone 360 mg in 200 mL  D5W infusion  0.5 mg/min Intravenous Continuous Filiberto Gtz MD 16.67 mL/hr at 06/21/24 0207 0.5 mg/min at 06/21/24 0207    [COMPLETED] aspirin chewable tablet 162 mg  162 mg Oral Once Filiberto Gtz MD   162 mg at 06/21/24 0800    [START ON 6/22/2024] aspirin EC tablet 81 mg  81 mg Oral Daily Filiberto Gtz MD        atorvastatin (LIPITOR) tablet 20 mg  20 mg Oral Nightly Filiberto Gtz MD        bisacodyl (DULCOLAX) EC tablet 10 mg  10 mg Oral BID Filiberto Gtz MD   10 mg at 06/21/24 0800    Calcium Replacement - Follow Nurse / BPA Driven Protocol   Does not apply PRN Filiberto Gtz MD        [COMPLETED] ceFAZolin 2000 mg IVPB in 100 mL NS (MBP)  2,000 mg Intravenous Once Zhanna Isaacs APRN   2,000 mg at 06/20/24 1200    ceFAZolin 2000 mg IVPB in 100 mL NS (MBP)  2 g Intravenous Q8H Filiberto Gtz MD   2 g at 06/21/24 0441    chlorhexidine (PERIDEX) 0.12 % solution 15 mL  15 mL Mouth/Throat Q12H Filiberto Gtz MD   15 mL at 06/21/24 0632    Chlorhexidine Gluconate Cloth 2 % pads 1 Application  1 Application Topical Q24H Filiberto Gtz MD   1 Application at 06/21/24 0441    clevidipine (CLEVIPREX) infusion 0.5 mg/mL  2-32 mg/hr Intravenous Continuous PRN Filiberto Gtz MD        clopidogrel (PLAVIX) tablet 75 mg  75 mg Oral Daily Filiberto Gtz MD        dextrose (D50W) (25 g/50 mL) IV injection 10-50 mL  10-50 mL Intravenous Q15 Min PRN Filiberto Gtz MD        dextrose (GLUTOSE) oral gel 15 g  15 g Oral Q15 Min PRN Filiberto Gtz MD        dextrose 5 % with KCl 20 mEq/L infusion  30 mL/hr Intravenous Continuous Filiberto Gtz MD 30 mL/hr at 06/20/24 1548 30 mL/hr at 06/20/24 1548    And    dextrose 5 % with KCl 20 mEq/L infusion  30 mL/hr Intravenous Continuous Filiberto Gtz MD 30 mL/hr at 06/20/24 1548 30 mL/hr at 06/20/24 1548    Enoxaparin Sodium (LOVENOX) syringe 40 mg  40 mg Subcutaneous Daily Filiberto Gtz MD   40 mg at 06/21/24 0800     glucagon (GLUCAGEN) injection 1 mg  1 mg Intramuscular Q15 Min PRN Filiberto Gtz MD        Hold All immunizations   Does not apply Continuous PRN Filiberto Gtz MD        ipratropium-albuterol (DUO-NEB) nebulizer solution 1.5 mL  1.5 mL Nebulization 4x Daily - RT Filiberto Gtz MD   1.5 mL at 06/21/24 0645    Lidocaine 4 % 2 patch  2 patch Transdermal Q24H Filiberto Gtz MD   2 patch at 06/21/24 0800    Magnesium Cardiology Dose Replacement - Follow Nurse / BPA Driven Protocol   Does not apply PRN Filiberto Gtz MD        [COMPLETED] magnesium sulfate 4g/100mL (PREMIX) infusion  4 g Intravenous Once Filiberto Gtz MD   4 g at 06/21/24 0337    metoprolol tartrate (LOPRESSOR) tablet 12.5 mg  12.5 mg Oral Q12H Filiberto Gtz MD   12.5 mg at 06/21/24 0800    [COMPLETED] midazolam (VERSED) injection 4 mg  4 mg Intravenous Once Sunny Francisco MD   4 mg at 06/20/24 0631    [COMPLETED] mupirocin (BACTROBAN) 2 % nasal ointment   Each Nare Once Zhanna Isaacs APRN   1 Application at 06/20/24 0556    mupirocin (BACTROBAN) 2 % nasal ointment   Each Nare Q12H Filiberto Gtz MD   1 Application at 06/21/24 0634    niCARdipine (CARDENE) 25 mg in 250 mL NS infusion kit  5-15 mg/hr Intravenous Continuous PRN Filiberto Gtz MD   Stopped at 06/20/24 1726    nitroglycerin (TRIDIL) 200 mcg/ml infusion  5 mcg/min Intravenous Continuous Filiberto Gtz MD        ondansetron (ZOFRAN) injection 4 mg  4 mg Intravenous Q6H PRN Filiberto Gtz MD        oxyCODONE (ROXICODONE) immediate release tablet 10 mg  10 mg Oral Q3H PRN Filiberto Gtz MD   10 mg at 06/21/24 0035    oxyCODONE (ROXICODONE) immediate release tablet 5 mg  5 mg Oral Q3H PRN Filiberto Gtz MD   5 mg at 06/21/24 0631    [COMPLETED] pantoprazole (PROTONIX) injection 40 mg  40 mg Intravenous Once Filiberto Gtz MD   40 mg at 06/20/24 1619    Followed by    pantoprazole (PROTONIX) EC tablet 40 mg  40 mg Oral JESUS ALBERTO Gtz  Filiberto FALCON MD   40 mg at 06/21/24 0634    phenylephrine (PANCHO-SYNEPHRINE) 50 mg in 250 mL NS infusion  0.5-3 mcg/kg/min Intravenous Continuous PRN Filiberto Gtz MD        Phosphorus Replacement - Follow Nurse / BPA Driven Protocol   Does not apply PRN Filiberto Gtz MD        polyethylene glycol (MIRALAX) packet 17 g  17 g Oral Daily Filiberto Gtz MD   17 g at 06/21/24 0800    [COMPLETED] potassium chloride 20 mEq in 50 mL IVPB  20 mEq Intravenous Q1H Filiberto Gtz MD 50 mL/hr at 06/20/24 1846 20 mEq at 06/20/24 1846    [COMPLETED] potassium phosphates 15 mmol in sodium chloride 0.9 % 100 mL infusion  15 mmol Intravenous Once Filiberto Gzt MD   15 mmol at 06/20/24 1818    Potassium Replacement - Follow Nurse / BPA Driven Protocol   Does not apply PRN Filiberto Gtz MD        [COMPLETED] pregabalin (LYRICA) capsule 25 mg  25 mg Oral Once Zhanna Isaacs APRN   25 mg at 06/20/24 0556    pregabalin (LYRICA) capsule 25 mg  25 mg Oral Q12H Filiberto Gtz MD   25 mg at 06/21/24 0800    tamsulosin (FLOMAX) 24 hr capsule 0.4 mg  0.4 mg Oral Daily Zhanna Isaacs APRN        vancomycin IVPB 1500 mg in 0.9% NaCl (Premix) 500 mL  1,500 mg Intravenous Q12H Filiberto Gtz .3 mL/hr at 06/21/24 0801 1,500 mg at 06/21/24 0801     Orders (all)        Start     Ordered    06/25/24 0600  XR Chest PA & Lateral  1 Time Imaging         06/20/24 1523    06/23/24 0600  XR Chest PA & Lateral  1 Time Imaging         06/20/24 1523    06/23/24 0300  Comprehensive Metabolic Panel  Once         06/20/24 1523    06/22/24 1200  Remove All Dressings 48 Hours Post-Op  Once         06/20/24 1523    06/22/24 1200  Remove Midsternal Dressing on POD 3. Patient May Shower With Dressing On. If Dressing Becomes Loose or Wet Remove on POD 2  Once         06/20/24 1523    06/22/24 1200  Leave Incisions Open to Air Unless Draining or Edges Are Not Approximated  Continuous         06/20/24 1523    06/22/24 0900  " aspirin EC tablet 81 mg  Daily         06/20/24 1523    06/22/24 0600  Wound Care  Daily       06/20/24 1523    06/22/24 0000  Magnesium  Morning Draw         06/21/24 0320    06/21/24 2100  atorvastatin (LIPITOR) tablet 20 mg  Nightly         06/20/24 1523    06/21/24 1800  clopidogrel (PLAVIX) tablet 75 mg  Daily         06/20/24 1523    06/21/24 1600  acetaminophen (TYLENOL) tablet 1,000 mg  Every 6 Hours        Placed in \"Or\" Linked Group    06/20/24 1523    06/21/24 1600  acetaminophen (TYLENOL) 160 MG/5ML oral solution 1,000 mg  Every 6 Hours        Placed in \"Or\" Linked Group    06/20/24 1523    06/21/24 1600  acetaminophen (TYLENOL) suppository 650 mg  Every 6 Hours        Placed in \"Or\" Linked Group    06/20/24 1523    06/21/24 1400  Intake & Output  Every Shift       06/20/24 1523    06/21/24 1200  Vital Signs  Every 4 Hours      Comments: Perform Vitals Less Frequently Only if Patient Stable      06/20/24 1523    06/21/24 1015  tamsulosin (FLOMAX) 24 hr capsule 0.4 mg  Daily         06/21/24 0926    06/21/24 0932  DC cano catheter at midnight  Nursing Communication  Once        Comments: DC cano catheter at midnight    06/21/24 0931    06/21/24 0927  Transfer Patient  Once         06/21/24 0927    06/21/24 0927  Transfer Patient  Once         06/21/24 0931    06/21/24 0900  Enoxaparin Sodium (LOVENOX) syringe 40 mg  Daily         06/20/24 1522    06/21/24 0900  aspirin chewable tablet 162 mg  Once         06/20/24 1523    06/21/24 0900  metoprolol tartrate (LOPRESSOR) tablet 12.5 mg  Every 12 Hours Scheduled         06/20/24 1523    06/21/24 0900  bisacodyl (DULCOLAX) EC tablet 10 mg  2 Times Daily         06/20/24 1523    06/21/24 0900  polyethylene glycol (MIRALAX) packet 17 g  Daily         06/20/24 1523    06/21/24 0900  pregabalin (LYRICA) capsule 25 mg  Every 12 Hours Scheduled         06/20/24 1523    06/21/24 0826  Right lower leg venous ulcer-wash with antibacterial soap and water daily, keep " LINDA  Nursing Communication  Once        Comments: Right lower leg venous ulcer-wash with antibacterial soap and water daily, keep LINDA    06/21/24 0826    06/21/24 0800  Wean & Extubate Per Rapid Wean and Extubation Protocol  Every Morning,   Status:  Canceled       06/20/24 1523    06/21/24 0800  Wean FiO2 per Respiratory Therapist for SpO2  for SpO2 >92%, until at 30 % FiO2  Every Morning,   Status:  Canceled      Comments: Wean FiO2 per Respiratory Therapist for SpO2  for SpO2 >92%, until at 30 % FiO2    06/20/24 1523    06/21/24 0800  Wean Respiratory Rate by a minimum of 2 every hour if indicated until the rate of 4 is achieved.  Every Morning,   Status:  Canceled      Comments: Wean Respiratory Rate by a minimum of 2 every hour if indicated until the rate of 4 is achieved.    06/20/24 1523    06/21/24 0800  amiodarone (PACERONE) tablet 400 mg  2 Times Daily With Meals         06/20/24 1523    06/21/24 0743  Diet: Cardiac; Healthy Heart (2-3 Na+); Texture: Regular (IDDSI 7); Fluid Consistency: Thin (IDDSI 0)  Diet Effective Now         06/21/24 0742    06/21/24 0741  Stage I Pressure Ulcer Care  Continuous        Comments: Position Patient Off Area With Stage I Pressure Ulcer    06/21/24 0740    06/21/24 0741  Follow Pressure Ulcer Prevention Measures Policy  Continuous        Comments: Implement Appropriate Pressure Ulcer Prevention Measures  - Open Order Report to View Full Instructions  Enter Wound LDA & Document Assessment  Add Wound Care Plan  Add Patient Education Per Policy    06/21/24 0740    06/21/24 0740  Inpatient Wound Care MD Consult  Once        Specialty:  Wound Care  Provider:  Erma Andre APRN    06/21/24 0740    06/21/24 0725  POC Glucose Once  PROCEDURE ONCE        Comments: Complete no more than 45 minutes prior to patient eating      06/21/24 0704    06/21/24 0719  Follow Pressure Ulcer Prevention Measures Policy  Continuous        Comments: Implement Appropriate Pressure Ulcer  "Prevention Measures  - Open Order Report to View Full Instructions  Enter Wound LDA & Document Assessment  Add Wound Care Plan  Add Patient Education Per Policy    06/21/24 0718    06/21/24 0719  Elevate Heels Off of Bed  Until Discontinued         06/21/24 0718    06/21/24 0719  Use Seat Cushion When Up In Chair  Continuous         06/21/24 0718    06/21/24 0719  Silicone Border Dressing to Bony Prominences  Per Order Details        Comments: Apply silicone foam border dressing per protocol to sacral spine/bilateral heels for protection.  Nursing to change dressing every 3 days and PRN if soiled. Nursing is to peel back dressing with every assessment to assess skin underneath dressing. No barrier cream under dressing.    06/21/24 0718    06/21/24 0719  Use Repositioning Wedge to Position Patient  Continuous        Comments: Use Comfort Glide repositioning sheet and wedges to position patient.    06/21/24 0718    06/21/24 0700  pantoprazole (PROTONIX) EC tablet 40 mg  Every Morning        Placed in \"Followed by\" Linked Group    06/20/24 1523    06/21/24 0600  CBC & Differential  Daily       06/20/24 1523    06/21/24 0600  Renal Function Panel  Daily       06/20/24 1523    06/21/24 0600  XR Chest 1 View  Daily       06/20/24 1523    06/21/24 0600  ECG 12 Lead Drug Monitoring  Daily       06/20/24 1523    06/21/24 0600  CBC Auto Differential  PROCEDURE ONCE         06/20/24 2201    06/21/24 0506  POC Glucose Once  PROCEDURE ONCE        Comments: Complete no more than 45 minutes prior to patient eating      06/21/24 0454    06/21/24 0415  magnesium sulfate 4g/100mL (PREMIX) infusion  Once         06/21/24 0320    06/21/24 0400  Chlorhexidine Gluconate Cloth 2 % pads 1 Application  Every 24 Hours         06/20/24 1532    06/21/24 0300  Magnesium  Once         06/20/24 1523    06/21/24 0300  Protime-INR  Once         06/20/24 1523    06/21/24 0248  POC Glucose Once  PROCEDURE ONCE        Comments: Complete no more than " "45 minutes prior to patient eating      06/21/24 0236    06/21/24 0225  amiodarone 360 mg in 200 mL D5W infusion  Continuous        Placed in \"Followed by\" Linked Group    06/20/24 1523    06/21/24 0148  Phosphorus  Timed,   Status:  Canceled         06/20/24 1747    06/21/24 0050  POC Glucose Once  PROCEDURE ONCE        Comments: Complete no more than 45 minutes prior to patient eating      06/21/24 0038    06/20/24 2314  POC Glucose Once  PROCEDURE ONCE        Comments: Complete no more than 45 minutes prior to patient eating      06/20/24 2301    06/20/24 2211  POC Glucose Once  PROCEDURE ONCE        Comments: Complete no more than 45 minutes prior to patient eating      06/20/24 2159 06/20/24 2113  Blood Gas, Arterial -  PROCEDURE ONCE         06/20/24 2110 06/20/24 2111  POC Glucose Once  PROCEDURE ONCE        Comments: Complete no more than 45 minutes prior to patient eating      06/20/24 2059 06/20/24 2100  chlorhexidine (PERIDEX) 0.12 % solution 15 mL  Every 12 Hours Scheduled,   Status:  Discontinued         06/20/24 1523    06/20/24 2004  POC Glucose Once  PROCEDURE ONCE        Comments: Complete no more than 45 minutes prior to patient eating      06/20/24 1952    06/20/24 2000  ceFAZolin 2000 mg IVPB in 100 mL NS (MBP)  Every 8 Hours         06/20/24 1523    06/20/24 2000  vancomycin IVPB 1500 mg in 0.9% NaCl (Premix) 500 mL  Every 12 Hours         06/20/24 1533    06/20/24 1914  Blood Gas, Arterial -  Once        Comments: if no ABG has been obtained in the previous 4 hours during Vent Liberation      06/20/24 1523    06/20/24 1908  Blood Gas, Arterial -  PROCEDURE ONCE         06/20/24 1904    06/20/24 1903  POC Glucose Once  PROCEDURE ONCE        Comments: Complete no more than 45 minutes prior to patient eating      06/20/24 1851    06/20/24 1900  ipratropium-albuterol (DUO-NEB) nebulizer solution 1.5 mL  4 Times Daily - RT         06/20/24 1523    06/20/24 1845  potassium phosphates 15 mmol " "in sodium chloride 0.9 % 100 mL infusion  Once         06/20/24 1747    06/20/24 1811  POC Glucose Once  PROCEDURE ONCE        Comments: Complete no more than 45 minutes prior to patient eating      06/20/24 1800    06/20/24 1800  Ambulate Patient  4 Times Daily       06/20/24 1523    06/20/24 1800  mupirocin (BACTROBAN) 2 % nasal ointment  Every 12 Hours         06/20/24 1523    06/20/24 1800  chlorhexidine (PERIDEX) 0.12 % solution 15 mL  Every 12 Hours         06/20/24 1523    06/20/24 1800  potassium chloride 20 mEq in 50 mL IVPB  Every 1 Hour         06/20/24 1706    06/20/24 1625  POC Glucose Once  PROCEDURE ONCE        Comments: Complete no more than 45 minutes prior to patient eating      06/20/24 1614    06/20/24 1615  nitroglycerin (TRIDIL) 200 mcg/ml infusion  Continuous         06/20/24 1523    06/20/24 1615  insulin regular (HumuLIN R,NovoLIN R) 100 Units in sodium chloride 0.9 % 100 mL (1 Units/mL) infusion  Titrated,   Status:  Discontinued        Note to Pharmacy: Upon initiation of Glucommander insulin drip, make sure all other insulin orders and anti-diabetic medications have been discontinued.    06/20/24 1523    06/20/24 1615  dexmedetomidine (PRECEDEX) 400 mcg in 100 mL NS infusion  Titrated,   Status:  Discontinued         06/20/24 1523    06/20/24 1615  aminocaproic acid (AMICAR) 15 g in sodium chloride 0.9 % 300 mL infusion  Continuous         06/20/24 1523    06/20/24 1615  amiodarone 360 mg in 200 mL D5W infusion  Continuous        Placed in \"Followed by\" Linked Group    06/20/24 1523    06/20/24 1615  Lidocaine 4 % 2 patch  Every 24 Hours Scheduled         06/20/24 1523    06/20/24 1600  Vital Signs Every Hour Until 24 Hours Post Op  Every Hour      Comments: Perform Vitals Less Frequently Only if Patient Stable      06/20/24 1523    06/20/24 1600  Check Peripheral Pulses Every 1 Hour x4  Every Hour       06/20/24 1523    06/20/24 1600  Check Peripheral Pulses Every 4 Hours  Every 4 Hours  "      06/20/24 1523    06/20/24 1600  Intake & Output Every 15 Minutes x2, Every 30 Minutes x4  Every Hour       06/20/24 1523    06/20/24 1600  Intake & Output Every Hour Until 24 Hours Post Op  Every Hour       06/20/24 1523    06/20/24 1600  Cardiac Output Parameters On Admission, Then Every 1 Hour x4  Every Hour       06/20/24 1523    06/20/24 1600  Cardiac Output Parameters Every 2 Hours Until 24 Hours Post Op  Every Hour       06/20/24 1523    06/20/24 1600  Incentive Spirometry  Every Hour While Awake       06/20/24 1523    06/20/24 1600  Nurse and RT to Assess Patient for Readiness to Wean Criteria as Follows:  Every Hour,   Status:  Canceled      Comments: Patient is awake and following commands, Patient is spontaneously breathing, respiratory rate <25/min, Patient's hemodynamics are stable, Patient has no evidence of clinically significant bleeding, SpO2 >92% on <30% FiO2, PEEP < or = to 8.    06/20/24 1523    06/20/24 1600  acetaminophen (OFIRMEV) injection 1,000 mg  Every 8 Hours         06/20/24 1523    06/20/24 1532  Calcium, Ionized  PROCEDURE ONCE         06/20/24 1529    06/20/24 1531  Blood Gas, Arterial -  PROCEDURE ONCE         06/20/24 1528    06/20/24 1524  Code Status and Medical Interventions:  Continuous         06/20/24 1523    06/20/24 1524  Vital Signs & Post-Op Checks Every 15 Minutes x2, Every 30 Minutes x4  Per Hospital Policy,   Status:  Canceled        Comments: Perform Vitals Less Frequently Only if Patient Stable    06/20/24 1523    06/20/24 1524  Daily Weights  Daily       06/20/24 1523    06/20/24 1524  Continuous Cardiac Monitoring  Continuous        Comments: Follow Standing Orders As Outlined in Process Instructions (Open Order Report to View Full Instructions)    06/20/24 1523    06/20/24 1524  Telemetry - Maintain IV Access  Continuous         06/20/24 1523    06/20/24 1524  Telemetry - Place Orders & Notify Provider of Results When Patient Experiences Acute Chest Pain,  "Dysrhythmia or Respiratory Distress  Until Discontinued         06/20/24 1523    06/20/24 1524  Continuous Pulse Oximetry  Continuous         06/20/24 1523    06/20/24 1524  Discontinue NG After Extubation  Once         06/20/24 1523    06/20/24 1524  Continue Indwelling Urinary Catheter  Once        Placed in \"And\" Linked Group    06/20/24 1523    06/20/24 1524  Assess Need for Indwelling Urinary Catheter - Follow Removal Protocol  Continuous        Comments: Indwelling Urinary Catheter Removal Criteria  Discontinue Indwelling Urinary Catheter Unless One of the Following is Present  Urinary Retention or Obstruction  Chronic Daniels Catheter Use  End of Life  Critical Illness with Strict I/O   Tract or Abdominal Surgery  Stage 3/4 Sacral / Perineal Wound  Required Activity Restriction: Trauma  Required Activity Restriction: Spine Surgery  If Patient is Being Followed by Urology Contact Them PRIOR to Removal  Do Not Remove Indwelling Urinary Catheter Order is Present with a CLINICAL REASON to Maintain the Catheter. Provider is Required to Include a Clinical Reason to Maintain a Urinary Catheter    Chronic Daniels Catheter Use (Present on Admission)  Assess for Continued Need & Document Medical Necessity  If Infection is Suspected, Contact the Provider       Placed in \"And\" Linked Group    06/20/24 1523    06/20/24 1524  Chest & Mediastinal Tubes to 20cm Continuous Suction  Once         06/20/24 1523    06/20/24 1524  Begin Rewarming with Thermal Unit As Needed For Temp Less Than 96F  Once         06/20/24 1523    06/20/24 1524  ACE Wraps to Vein [place] Donor Site for 24 Hours, Then Apply ARPIT Hose  Continuous         06/20/24 1523    06/20/24 1524  Monitor QTc  Every Shift       06/20/24 1523    06/20/24 1524  Notify Provider - QTc 500 milliseconds or Greater  Until Discontinued         06/20/24 1523    06/20/24 1524  Wound Dressing  Continuous         06/20/24 1523    06/20/24 1524  Wound Dressing  Continuous         " 06/20/24 1523    06/20/24 1524  Notify Surgeon if Drainage From Surgical Incision Site  Until Discontinued         06/20/24 1523    06/20/24 1524  ISOLATE PACER WIRES  Continuous         06/20/24 1523    06/20/24 1524  Turn Patient Every 2 Hours or Begin Bed Rotation Once Hemodynamically Stable  Now Then Every 2 Hours         06/20/24 1523    06/20/24 1524  Advance PROM to AROM  Now Then Every 4 Hours         06/20/24 1523    06/20/24 1524  Up in Chair for Meals  Until Discontinued         06/20/24 1523    06/20/24 1524  Notify Provider - Urine Output  Until Discontinued         06/20/24 1523    06/20/24 1524  Notify Provider - Chest Tube Output  Until Discontinued         06/20/24 1523    06/20/24 1524  Notify Provider (Specify)  Until Discontinued         06/20/24 1523    06/20/24 1524  Cardiac Sternal Precautions - Maintain at All Times & Teach Patient  Continuous        Comments: Maintain Sternal Precautions at All Times & Teach Patient    06/20/24 1523    06/20/24 1524  Fall Precautions  Continuous         06/20/24 1523    06/20/24 1524  Oxygen Therapy- Nasal Cannula; 2 LPM  Continuous         06/20/24 1523    06/20/24 1524  Ventilator - Vent Mode: Alternate (Custom) Mode: See Comments  Continuous,   Status:  Canceled        Comments: Initial Mechanical Ventilation Settings upon Arrival to the ICU: SIMV, Tidal Volume 5-7 mL/kg ideal body weight based on height formula, Respiratory Rate 14-16/min, PEEP 5 cmH2O if <90kg, PEEP 8 cmH2O if >90kg, FiO2 60%.    06/20/24 1523    06/20/24 1524  Ventilator - Vent Mode: Alternate (Custom) Mode: See Comments  Continuous,   Status:  Canceled        Comments: RT adjusts Mechanical Ventilation as needed to achieve: pH 7.35-7.45, pCO2 34-45 mmHg, PaO2 > 90 mmHg, SpO2 > 92%.    06/20/24 1523    06/20/24 1524  Extubate Patient If  All of the Following Criteria are Met:  Once,   Status:  Canceled        Comments: Patient tolerated Spontaneous Breathing Trial for 20 minutes,  respiratory rate < 25/min, SpO2 >94%, patient remains hemodynamically stable, patient is awake and following commands, RSBI f/vt < 90, Elevate HOB > 35 degrees, Vital Capacity: 10-15 mL/kg, NIF >-25 cm H2O, minute ventilation <14 l/min, Obtain ABG: pH of 7.33 to 7.55, confirm with nurse if ok to extubate. Prior to extubation, Propofol must be off, ok to proceed with Precedex with provider order. If criteria NOT met: Wait 1 hour, reassess. If still does not meet criteria: call Physician.    06/20/24 1523    06/20/24 1524  Notify Physician if Vapotherm Mask Needed to Keep SpO2 Greater Than 90% or Continuing Respiratory Distress  Until Discontinued        Comments: Notify Physician if Vapotherm mask needed to keep SpO2 >90% or continuing respiratory distress.r    06/20/24 1523    06/20/24 1524  Call Physician if Racemic needed.  Until Discontinued        Comments: Call Physician if Racemic needed.    06/20/24 1523    06/20/24 1524  RT To Evaluate & Demonstrate Use of IS  Once        Comments: RT To Evaluate & Demonstrate Use of IS    06/20/24 1523    06/20/24 1524  XR Chest 1 View  1 Time Imaging         06/20/24 1523    06/20/24 1524  ECG 12 Lead Drug Monitoring  STAT         06/20/24 1523    06/20/24 1524  Protime-INR  STAT         06/20/24 1523    06/20/24 1524  aPTT  STAT         06/20/24 1523    06/20/24 1524  Calcium, Ionized  STAT         06/20/24 1523    06/20/24 1524  Blood Gas, Arterial -  STAT         06/20/24 1523    06/20/24 1524  CBC (No Diff)  Now Then Every 4 Hours       06/20/24 1523    06/20/24 1524  Renal Function Panel  Now Then Every 4 Hours       06/20/24 1523    06/20/24 1524  Draw Labs and Notify Provider if Chest Tube Output Greater Than 100mL/hr  Until Discontinued         06/20/24 1523    06/20/24 1524  Diet Instructions to Nursing  Until Discontinued        Comments: Advance diet on POD 1 if off insulin drip    06/20/24 1523    06/20/24 1524  Advance Diet As Tolerated -  Until Discontinued          06/20/24 1523    06/20/24 1524  Cardiac Rehab Evaluation  Once        Provider:  (Not yet assigned)    06/20/24 1523    06/20/24 1524  Consult to Case Management /   Once        Provider:  (Not yet assigned)    06/20/24 1523    06/20/24 1524  PT Consult: Eval & Treat  Once         06/20/24 1523    06/20/24 1524  Consult to Diabetes Educator  Once        Provider:  (Not yet assigned)    06/20/24 1523    06/20/24 1524  Inpatient Nutrition Consult  Once        Provider:  (Not yet assigned)    06/20/24 1523    06/20/24 1524  RN to Release PRN POC Glucose Orders Per Glucommander  Continuous         06/20/24 1523    06/20/24 1524  RN to Order STAT Glucose for BG Less Than 10 mg/dl or Greater Than 600 mg/dl  Continuous        Comments: Do not delay treatment of unstable patient in order to obtain glucose sample from Lab.   Inform provider of results.    06/20/24 1523    06/20/24 1524  Prior to Initiating Glucommander™, Ensure All Prior Insulin Orders are Discontinued  Once         06/20/24 1523    06/20/24 1524  PRIOR to START of Intravenous Insulin Infusion, Notify Provider if K+ is Less Than 3.3, for Extra Potassium Orders, if Indicated. Do Not Start Insulin Drip if K+ Less Than 3.3.  Per Order Details         06/20/24 1523    06/20/24 1524  Use a Dedicated Line for Insulin Infusion (If Possible).  May Use a Carrier Fluid of NS at KVO Rate if Insulin Rate is Insufficient to Maintain IV Patency.  Prime IV Line With Insulin Infusion  Continuous         06/20/24 1523    06/20/24 1524  If Insulin Infusion is Discontinued, Glucommander Must Also be Discontinued. If Insulin Infusion is Re-Ordered Discontinue Previous Settings in Glucommander & Start New  Per Order Details,   Status:  Canceled         06/20/24 1523    06/20/24 1524  Patient is on Glucommander  Continuous,   Status:  Canceled         06/20/24 1523    06/20/24 1524  Notify Provider  Continuous        Comments: Open Order Report to View  "Parameters Requiring Provider Notification    06/20/24 1523    06/20/24 1524  If Patient Has Diet Order or Bolus Tube Feeds Utilize the Start Meal / Meal Bolus Feature in Glucommander  Continuous,   Status:  Canceled         06/20/24 1523    06/20/24 1524  NPO Diet NPO Type: Strict NPO  Diet Effective Now,   Status:  Canceled         06/20/24 1523    06/20/24 1524  Fibrinogen  STAT         06/20/24 1523    06/20/24 1523  Urinary Catheter Care  Every Shift      Placed in \"And\" Linked Group    06/20/24 1523    06/20/24 1523  dextrose 5 % with KCl 20 mEq/L infusion  Continuous        Placed in \"And\" Linked Group    06/20/24 1523    06/20/24 1523  dextrose 5 % with KCl 20 mEq/L infusion  Continuous        Placed in \"And\" Linked Group    06/20/24 1523    06/20/24 1523  propofol (DIPRIVAN) infusion 10 mg/mL 100 mL  Continuous PRN,   Status:  Discontinued         06/20/24 1523    06/20/24 1523  meperidine (DEMEROL) injection 25 mg  Every 1 Hour PRN,   Status:  Discontinued         06/20/24 1523    06/20/24 1523  Potassium Replacement - Follow Nurse / BPA Driven Protocol  As Needed         06/20/24 1523    06/20/24 1523  Magnesium Cardiology Dose Replacement - Follow Nurse / BPA Driven Protocol  As Needed         06/20/24 1523    06/20/24 1523  Phosphorus Replacement - Follow Nurse / BPA Driven Protocol  As Needed         06/20/24 1523    06/20/24 1523  Calcium Replacement - Follow Nurse / BPA Driven Protocol  As Needed         06/20/24 1523    06/20/24 1523  ondansetron (ZOFRAN) injection 4 mg  Every 6 Hours PRN         06/20/24 1523    06/20/24 1523  oxyCODONE (ROXICODONE) immediate release tablet 5 mg  Every 3 Hours PRN         06/20/24 1523    06/20/24 1523  fentaNYL citrate (PF) (SUBLIMAZE) injection 25 mcg  Every 30 Minutes PRN,   Status:  Discontinued        Placed in \"And\" Linked Group    06/20/24 1523    06/20/24 1523  naloxone (NARCAN) injection 0.4 mg  Every 5 Minutes PRN,   Status:  Discontinued        Placed in " "\"And\" Linked Group    06/20/24 1523    06/20/24 1523  oxyCODONE (ROXICODONE) immediate release tablet 10 mg  Every 3 Hours PRN         06/20/24 1523    06/20/24 1523  fentaNYL citrate (PF) (SUBLIMAZE) injection 50 mcg  Every 30 Minutes PRN,   Status:  Discontinued         06/20/24 1523    06/20/24 1523  pantoprazole (PROTONIX) injection 40 mg  Once        Placed in \"Followed by\" Linked Group    06/20/24 1523    06/20/24 1523  Hold All immunizations  Continuous PRN         06/20/24 1523    06/20/24 1523  phenylephrine (PANCHO-SYNEPHRINE) 50 mg in 250 mL NS infusion  Continuous PRN         06/20/24 1523    06/20/24 1523  niCARdipine (CARDENE) 25 mg in 250 mL NS infusion kit  Continuous PRN         06/20/24 1523    06/20/24 1523  clevidipine (CLEVIPREX) infusion 0.5 mg/mL  Continuous PRN         06/20/24 1523    06/20/24 1523  If Insulin Infusion is Paused - Follow Glucommander Instructions  As Needed,   Status:  Canceled       06/20/24 1523    06/20/24 1523  dextrose (GLUTOSE) oral gel 15 g  Every 15 Minutes PRN         06/20/24 1523    06/20/24 1523  dextrose (D50W) (25 g/50 mL) IV injection 10-50 mL  Every 15 Minutes PRN         06/20/24 1523    06/20/24 1523  glucagon (GLUCAGEN) injection 1 mg  Every 15 Minutes PRN         06/20/24 1523    06/20/24 1523  Begin Weaning When Criteria Met:  As Needed,   Status:  Canceled      Comments: If all of the weaning criteria are met, Respiratory Therapist is to initiate spontaneous breathing trial with PSV pressure support mode with PEEP 5 cmH2O if <90kg, PEEP 8 cmH2O if > or = 90kg. If criteria are not met, RT or nurse is to re-evaluate in 15-30 minutes.    06/20/24 1523    06/20/24 1523  albuterol (PROVENTIL) nebulizer solution 0.083% 2.5 mg/3mL  Every 4 Hours PRN         06/20/24 1523    06/20/24 1523  Target Arousal Level RASS -1 to -2  Continuous         06/20/24 1522    06/20/24 1514  Inpatient Admission  Once         06/20/24 1522    06/20/24 1341  Blood Gas, Arterial With " Co-Ox  PROCEDURE ONCE         06/20/24 1341    06/20/24 1330  sodium chloride 10 mL with thrombin 20,000 Units, vancomycin 1,000 mg mixture  As Needed,   Status:  Discontinued         06/20/24 1432    06/20/24 1322  CBC Auto Differential  PROCEDURE ONCE         06/20/24 1321    06/20/24 1321  Protime-INR  RELEASE UPON ORDERING         06/20/24 1321    06/20/24 1321  aPTT  RELEASE UPON ORDERING         06/20/24 1321    06/20/24 1321  CBC & Differential  RELEASE UPON ORDERING         06/20/24 1321    06/20/24 1321  Fibrinogen  RELEASE UPON ORDERING         06/20/24 1321    06/20/24 1243  Tissue Pathology Exam  RELEASE UPON ORDERING         06/20/24 1243    06/20/24 1234  Blood Gas, Arterial With Co-Ox  PROCEDURE ONCE         06/20/24 1235    06/20/24 1202  Blood Gas, Arterial With Co-Ox  PROCEDURE ONCE         06/20/24 1203    06/20/24 0952  Blood Gas, Arterial With Co-Ox  PROCEDURE ONCE         06/20/24 0952    06/20/24 0920  Blood Gas, Arterial With Co-Ox  PROCEDURE ONCE         06/20/24 0920    06/20/24 0901  OR Blood Pickup  Once,   Status:  Canceled         06/20/24 0901    06/20/24 0900  sodium chloride 0.9 % flush 3 mL  Every 12 Hours Scheduled,   Status:  Discontinued         06/20/24 0537    06/20/24 0900  sodium chloride 0.9 % flush 3 mL  Every 12 Hours Scheduled,   Status:  Discontinued         06/20/24 0626    06/20/24 0845  sodium chloride (NS) irrigation solution  As Needed,   Status:  Discontinued         06/20/24 0856    06/20/24 0845  sterile water irrigation solution  As Needed,   Status:  Discontinued         06/20/24 0856    06/20/24 0845  electrolyte-A 1,000 mL with heparin (porcine) 1,000 Units mixture  As Needed,   Status:  Discontinued         06/20/24 0856    06/20/24 0811  Blood Gas, Arterial With Co-Ox  PROCEDURE ONCE         06/20/24 0811    06/20/24 0704  Intra-Op TAVR Transesophageal Echo  Once         06/20/24 0704    06/20/24 0628  lactated ringers infusion  Continuous,   Status:   Discontinued         06/20/24 0626 06/20/24 0628  midazolam (VERSED) injection 4 mg  Once         06/20/24 0626 06/20/24 0627  Oxygen Therapy- Nasal Cannula; Titrate 1-6 LPM Per SpO2; 90 - 95%  Continuous,   Status:  Canceled         06/20/24 0626 06/20/24 0627  Continuous Pulse Oximetry  Continuous,   Status:  Canceled         06/20/24 0626 06/20/24 0627  Insert Peripheral IV  Once,   Status:  Canceled         06/20/24 0626 06/20/24 0627  Saline Lock & Maintain IV Access  Continuous,   Status:  Canceled         06/20/24 0626    06/20/24 0627  Oxygen Therapy- Nasal Cannula; Titrate 1-6 LPM Per SpO2; 90 - 95%  Continuous,   Status:  Canceled         06/20/24 0626    06/20/24 0626  Vital Signs - Per Anesthesia Protocol  As Needed,   Status:  Canceled       06/20/24 0626 06/20/24 0626  sodium chloride 0.9 % flush 3-10 mL  As Needed,   Status:  Discontinued         06/20/24 0626 06/20/24 0626  sodium chloride 0.9 % infusion 40 mL  As Needed,   Status:  Discontinued         06/20/24 0626 06/20/24 0626  midazolam (VERSED) injection 1 mg  Every 10 Minutes PRN,   Status:  Discontinued         06/20/24 0626 06/20/24 0600  Instruct Patient on Coughing, Deep Breathing and Incentive Spirometry  Every 4 Hours While Awake,   Status:  Canceled       06/20/24 0537 06/20/24 0539  insulin regular (HumuLIN R,NovoLIN R) 100 Units in sodium chloride 0.9 % 100 mL (1 Units/mL) infusion  Titrated,   Status:  Discontinued        Note to Pharmacy: Upon initiation of Glucommander insulin drip, make sure all other insulin orders and anti-diabetic medications have been discontinued.    06/20/24 0537 06/20/24 0539  ceFAZolin 2000 mg IVPB in 100 mL NS (MBP)  Once         06/20/24 0537 06/20/24 0539  acetaminophen (TYLENOL) tablet 1,000 mg  Once         06/20/24 0537    06/20/24 0539  mupirocin (BACTROBAN) 2 % nasal ointment  Once         06/20/24 0537    06/20/24 0539  pregabalin (LYRICA) capsule 25 mg  Once          06/20/24 0537    06/20/24 0539  lactated ringers infusion 1,000 mL  Continuous,   Status:  Discontinued         06/20/24 0537    06/20/24 0539  lactated ringers infusion  Continuous,   Status:  Discontinued         06/20/24 0537 06/20/24 0539  Type & Screen  STAT        Comments: Repeat type and screen morning of surgery if over 72 hours since last test.      06/20/24 0538    06/20/24 0538  Follow Anesthesia Guidelines / Protocol  Once,   Status:  Canceled         06/20/24 0537    06/20/24 0538  STS Risk Score has been calculated and discussed with the patient and family  Continuous,   Status:  Canceled         06/20/24 0537    06/20/24 0538  Obtain Informed Consent  Once,   Status:  Canceled         06/20/24 0537    06/20/24 0538  Verify NPO Status  Continuous,   Status:  Canceled         06/20/24 0537    06/20/24 0538  Place Sequential Compression Device  Once         06/20/24 0537    06/20/24 0538  Clip Hair Chin to Ankles  Once,   Status:  Canceled         06/20/24 0537    06/20/24 0538  Notify Physician - Standard  Until Discontinued,   Status:  Canceled         06/20/24 0537    06/20/24 0538  Prepare RBC, 2 Units  Blood - Once         06/20/24 0537    06/20/24 0538  Prepare Platelet Pheresis, 1 Units  Blood - Once,   Status:  Canceled         06/20/24 0537    06/20/24 0538  Prepare Fresh Frozen Plasma, 2 Units  Blood - Once,   Status:  Canceled         06/20/24 0537    06/20/24 0538  Insert Peripheral IV x2  Once,   Status:  Canceled         06/20/24 0537    06/20/24 0538  Saline Lock & Maintain IV Access  Continuous,   Status:  Canceled         06/20/24 0537    06/20/24 0538  Follow Anesthesia Guidelines / Protocol  Once,   Status:  Canceled         06/20/24 0537    06/20/24 0538  Insert Peripheral IV  Once,   Status:  Canceled         06/20/24 0537    06/20/24 0538  Maintain IV Access  Continuous,   Status:  Canceled         06/20/24 0537    06/20/24 0537  lidocaine PF 1% (XYLOCAINE) injection 0.5 mL   Once As Needed,   Status:  Discontinued         06/20/24 0537    06/20/24 0537  sodium chloride 0.9 % flush 3 mL  As Needed,   Status:  Discontinued         06/20/24 0537 06/20/24 0537  sodium chloride 0.9 % flush 3-10 mL  As Needed,   Status:  Discontinued         06/20/24 0537 06/20/24 0537  sodium chloride 0.9 % flush 30 mL  Once As Needed,   Status:  Discontinued         06/20/24 0537    06/20/24 0537  sodium chloride 0.9 % infusion  Continuous PRN,   Status:  Discontinued         06/20/24 0537 06/20/24 0537  dextrose (GLUTOSE) oral gel 15 g  Every 15 Minutes PRN,   Status:  Discontinued         06/20/24 0537 06/20/24 0537  dextrose (D50W) (25 g/50 mL) IV injection 10-50 mL  Every 15 Minutes PRN,   Status:  Discontinued         06/20/24 0537 06/20/24 0537  glucagon (GLUCAGEN) injection 1 mg  Every 15 Minutes PRN,   Status:  Discontinued         06/20/24 0537 06/20/24 0537  Chlorhexidine Gluconate Cloth 2 % pads  Every 12 Hours PRN,   Status:  Discontinued         06/20/24 0537    Unscheduled  Check Peripheral Pulses As Needed  As Needed       06/20/24 1523    Unscheduled  Cardiac Output Parameters PRN Until 24 Hours Post-Op  As Needed       06/20/24 1523    Unscheduled  Pacemaker Settings - Initiate for Heart Rate Less Than 60 And / Or Hemodynamically Unstable  As Needed       06/20/24 1523    Unscheduled  Insert Nasogastric Tube If Indicated & Not Already in Place  As Needed      Comments: Indications: Nausea, Vomiting, Prolonged Intubation or to Administer Medications  Attach to Low Wall Suction if Any Residual    06/20/24 1523    Unscheduled  Wound Care  As Needed       06/20/24 1523    Unscheduled  Dangle at Bedside After Extubation  As Needed       06/20/24 1523    Unscheduled  Up in Chair As Tolerated After Extubation  As Needed       06/20/24 1523    Unscheduled  Blood Gas, Arterial -  As Needed      Comments: Obtain ABG as needed for ventilator changes      06/20/24 1523    Unscheduled   Blood Gas, Arterial -  As Needed      Comments: Prior to Extubation      06/20/24 1523    Unscheduled  Oxygen Therapy- Nasal Cannula; Titrate 1-6 LPM Per SpO2; 90 - 95%  Continuous PRN       06/20/24 1523    Unscheduled  CBC & Differential  As Needed        Comments: Chest Tube Drainage Greater Than 100mL/hr      06/20/24 1523    Unscheduled  aPTT  As Needed        Comments: Chest Tube Drainage Greater Than 100mL/hr      06/20/24 1523    Unscheduled  Protime-INR  As Needed        Comments: Chest Tube Drainage Greater Than 100mL/hr      06/20/24 1523    Unscheduled  Fibrin Split Products  As Needed        Comments: Chest Tube Drainage Greater Than 100mL/hr      06/20/24 1523    Unscheduled  Fibrinogen  As Needed        Comments: Chest Tube Drainage Greater Than 100mL/hr      06/20/24 1523    Unscheduled  Treat Hypoglycemia As Recommended By Glucommander™ & Notify Provider of Treatment  As Needed      Comments: Follow Hypoglycemia Orders As Outlined in Process Instructions (Open Order Report to View Full Instructions)  Notify Provider Any Time Hypoglycemia Treatment is Administered    06/20/24 1523    Unscheduled  POC Glucose PRN  As Needed      Comments: Glucommander recommended POC testing will vary between 15 minutes and 2 hours.      06/20/24 1523    Unscheduled  Wound Care  As Needed       06/21/24 0718    Signed and Held  Insert Indwelling Urinary Catheter  Once        Comments: Follow Protocol As Outlined in Process Instructions (Open Order Report to View Full Instructions)    Signed and Held    Signed and Held  Assess Need for Indwelling Urinary Catheter - Follow Removal Protocol  Continuous        Comments: Follow Protocol As Outlined in Process Instructions (Open Order Report to View Full Instructions)    Signed and Held    Signed and Held  Urinary Catheter Care  Every Shift       Signed and Held    Signed and Held  ipratropium-albuterol (DUO-NEB) nebulizer solution 1.5 mL  3 Times Daily - RT         Signed  and Held                  Ventilator/Non-Invasive Ventilation Settings (From admission, onward)       Start     Ordered    06/20/24 1524  Ventilator - Vent Mode: Alternate (Custom) Mode: See Comments  (Initial Vent Settings upon arrival to ICU - PAD)  Continuous,   Status:  Canceled        Comments: Initial Mechanical Ventilation Settings upon Arrival to the ICU: SIMV, Tidal Volume 5-7 mL/kg ideal body weight based on height formula, Respiratory Rate 14-16/min, PEEP 5 cmH2O if <90kg, PEEP 8 cmH2O if >90kg, FiO2 60%.   Question:  Vent Mode  Answer:  Alternate (Custom) Mode: See Comments    06/20/24 1523    06/20/24 1524  Ventilator - Vent Mode: Alternate (Custom) Mode: See Comments  (Within 30 Minutes Arrival to ICU - PAD)  Continuous,   Status:  Canceled        Comments: RT adjusts Mechanical Ventilation as needed to achieve: pH 7.35-7.45, pCO2 34-45 mmHg, PaO2 > 90 mmHg, SpO2 > 92%.   Question:  Vent Mode  Answer:  Alternate (Custom) Mode: See Comments    06/20/24 1523                     Operative/Procedure Notes (all)        Filiberto Gtz MD at 06/20/24 0845  Version 1 of 1             Michael Issa  6/20/2024    Pre-op Diagnosis:   Aneurysm of ascending aorta without rupture [I71.21]  Aortic root aneurysm  Hypertension  Obesity, class I         Post-Op Diagnosis Codes:  Same       Procedure(s):  MODIFIED BENTALL PROCEDURE  CREATION OF BIOLOGIC VALVE CONDUIT with 25mm INSPIRIS AORTIC VALVE AND 30 MM HEMASHIELD PLATINUM GRAFT  RESECTION OF ASCENDING AORTA AND PROXIMAL HEMIARCH AORTA USING DEEP HYPOTHERMIC CIRCULATORY ARREST WITH 28 MM HEMASHIELD PLATINUM GRAFT  ULTRASOUND GUIDED FEMORAL ARTERIAL LINE PLACEMENT  LEFT ATRIAL APPENDAGE EXCLUSION with 35mm ATRICLIP  STERNAL RIGID FIXATION USING XP STERNAL PLATING SYSTEM      Surgeon(s):  Filiberto Gtz MD    Anesthesia: General    Staff:   Circulator: Quique Summers RN; Kala King RN  Perfusionist: Thalia Heath CCP  Scrub Person: Justine Betancourt;  Hussein Moncada; Momo Alvarez         Estimated Blood Loss: CELL SAVER    Urine Voided: 1200 mL    Specimens:                Specimens       ID Source Type Tests Collected By Collected At Frozen?    A Aortic Leaflet Tissue TISSUE PATHOLOGY EXAM   Filiberto Gtz MD 6/20/24 0900 No    B Aortic Aneurysm Tissue TISSUE PATHOLOGY EXAM   Filiberto Gtz MD 6/20/24 0900     Description: SENT FOR PERMANENT TO RULE OUT CYSTIC MEDIAL DEGENERATION                  Drains:   Chest Tube 1 Right Mediastinal (Active)   Dressing Type Gauze;Other (Comment) 06/20/24 1438   Dressing Status Clean;Dry 06/20/24 1438   Dressing Intervention New dressing 06/20/24 1438   Securement tubing anchored to body distal to insertion site with tape 06/20/24 1438       Urethral Catheter Temperature probe;Silicone 16 Fr. (Active)       Y Chest Tube 1 and 2 1 Right Mediastinal 24 Fr. 2 Left Mediastinal 24 Fr. (Active)   Dressing Type 1 Gauze 06/20/24 1438   Dressing Status 1 Dry;Clean 06/20/24 1438   Dressing Intervention 1 New dressing 06/20/24 1438   Securement 1 tubing anchored to body distal to insertion site with tape 06/20/24 1438   Dressing Type 2 Gauze;Other (Comment) 06/20/24 1438   Dressing Status 2 Clean;Dry 06/20/24 1438   Dressing Intervention 2 New dressing 06/20/24 1438   Securement 2 tubing anchored to body distal to insertion site with tape 06/20/24 1438       Findings: SEE OP NOTE.  Mean gradient of 7 mmHg.  Left atrial appendage is excluded successfully.        Complications: None          Filiberto Gtz MD     Date: 6/20/2024  Time: 15:22 CDT          Electronically signed by Filiberto Gtz MD at 06/20/24 1527          Physician Progress Notes (last 72 hours)        Zhanna Isaacs, APRN at 06/21/24 0817          Modified Bentall procedure, creation of biologic valve conduit with 25 mm Inspiris aortic valve and 30 mm Hemashield platinum graft, resection of ascending aorta and proximal hemiarch aorta with 28 mm  "Hemashield platinum graft using deep hypothermic circulatory arrest, ultrasound-guided femoral arterial line placement, left atrial appendage exclusion with 35 mm AtriCure AtriClip, sternal rigid fixation using XP sternal plating system    POD 1    Extubated overnight.  Up in the chair.  On 2 L/min nasal cannula.  Reaching approximately 2000 mL on incentive spirometer.  Rates pain 4-5 out of 10.  Due to walk with PT.  No family present.    IV drips: Amiodarone, IV fluids  Telemetry: Sinus 60s  Hemodynamics reviewed, stable    Visit Vitals  BP 97/69   Pulse 63   Temp 98.8 °F (37.1 °C) (Oral)   Resp 20   Ht 184 cm (72.44\")   Wt 109 kg (239 lb 12.8 oz)   SpO2 96%   BMI 32.13 kg/m²   Preoperative weight: 225 pounds    Intake/Output Summary (Last 24 hours) at 6/21/2024 0817  Last data filed at 6/21/2024 0744  Gross per 24 hour   Intake 3950.2 ml   Output 3777 ml   Net 173.2 ml     Mediastinal tube output: 735 mL / 24 hours, serosanguineous  Root drain: 132 mL / 24 hours, serosanguineous    Labs:        Chest x-ray: Extubated, appropriate support lines in place, no pneumothorax, mild bibasilar atelectasis    Physical Exam:  General: No apparent distress. In good spirits.  Up in the chair.  Cardiovascular: Regular rate and rhythm without murmur, rubs, or gallops.    Pulmonary: Clear to auscultation bilaterally without wheezing, rubs, or rales.  Chest: Sternotomy incision clean, dry, and intact. Sternum stable. No clicks. Mediastinal tubes x 2 and root drain with dressing clean, dry, and intact.    Abdomen: Soft, nondistended and nontender.  Extremities: Warm, moves all extremities.  Right lower leg edema with open wound, venous ulcer.  Neurologic: Grossly intact with no focal deficits.       Impression:  Ascending aortic aneurysm without rupture  Aortic root aneurysm  Hypertension  Class I obesity  Former smoker  Chronic venous insufficiency, venous ulcer right leg present on admission      Plan:   Discontinue amiodarone " infusion once bag infused  Multimodality pain control strategies  Routine postcardiac surgery regimen  Encourage pulmonary toilet and ambulation  Keep in unit for now, will assess for transfer to  later today  Discussed with patient and nursing  Wash right leg venous ulcer with antibacterial soap and water daily, open to air    Electronically signed by Zhanna Isaacs APRN at 06/21/24 0846       Consult Notes (last 72 hours)  Notes from 06/18/24 0944 through 06/21/24 0944   No notes of this type exist for this encounter.

## 2024-06-21 NOTE — CONSULTS
Nutrition Services    Patient Name:  Michael Issa  YOB: 1960  MRN: [id number]  Admit Date:  6/20/2024    RDN assessment completed. RDN consult for obesity education. Will instruct as appropriate. Pt extubated overnight. Pt has not had a meal yet. Cardiac diet ordered. Pt reports he is a little hungry. Boost Original BID. Con to follow for education needs. Cont to follow for plan of care.    Electronically signed by:  Marline Green RDN, MIRANDA  06/21/24 11:13 CDT

## 2024-06-21 NOTE — CASE MANAGEMENT/SOCIAL WORK
Discharge Planning Assessment  [facility]     Patient Name: Michael Issa  MRN: [id number]  Today's Date: 6/21/2024    Admit Date: 6/20/2024        Discharge Needs Assessment       Row Name 06/21/24 1000       Living Environment    People in Home significant other    Name(s) of People in Home Elicia    Current Living Arrangements home    Primary Care Provided by self;spouse/significant other    Provides Primary Care For no one    Family Caregiver if Needed significant other    Family Caregiver Names Elicia    Able to Return to Prior Arrangements yes       Resource/Environmental Concerns    Resource/Environmental Concerns none       Transition Planning    Patient/Family Anticipates Transition to home with family    Transportation Anticipated family or friend will provide       Discharge Needs Assessment    Readmission Within the Last 30 Days no previous admission in last 30 days    Equipment Currently Used at Home none    Concerns to be Addressed no discharge needs identified    Equipment Needed After Discharge none    Discharge Coordination/Progress spoke to patient who lives with significant other and works so is independent at home prior to surgery; has RX coverage and PCP; will follow for DC needs                   Discharge Plan    No documentation.                 Continued Care and Services - Admitted Since 6/20/2024    No active coordination exists for this encounter.          Demographic Summary    No documentation.                  Functional Status    No documentation.                  Psychosocial    No documentation.                  Abuse/Neglect    No documentation.                  Legal    No documentation.                  Substance Abuse    No documentation.                  Patient Forms    No documentation.                     Zhanna Rubio RN

## 2024-06-21 NOTE — NURSING NOTE
[facility]  INPATIENT WOUND & OSTOMY CARE    Today's Date: 06/21/24    Patient Name: Michael Issa  MRN: [id number]  CSN: [id number]  PCP: Mansi Zapata, DNP, APRN  Attending Provider: Filiberto Gtz MD  Length of Stay: 1    I placed pressure injury prevention measure orders per protocol due to patient being at risk for skin breakdown and being admitted to the unit.    Apply silicone foam border dressing per protocol to sacral spine/bilateral heels for protection.  Nursing to change dressing every 3 days and PRN if soiled. Nursing is to peel back dressing with every assessment to assess skin underneath dressing. No barrier cream under dressing. Patient's nurse Matti says patient has both sacral and heel dressings in place.     Patient is currently on a comfort glide with an absorbant pad. Wedges are at bedside for nursing to utilizing for turning. Patient is utilizing a waffle chair cushion in his recliner to help with offloading.     Please reach out to wound care nurse if any skin issues arise.     This document has been electronically signed by Rika Bain RN on 6/21/2024 07:18 CDT

## 2024-06-22 ENCOUNTER — APPOINTMENT (OUTPATIENT)
Dept: GENERAL RADIOLOGY | Facility: HOSPITAL | Age: 64
DRG: 220 | End: 2024-06-22
Payer: COMMERCIAL

## 2024-06-22 LAB
ALBUMIN SERPL-MCNC: 3.5 G/DL (ref 3.5–5.2)
ANION GAP SERPL CALCULATED.3IONS-SCNC: 6 MMOL/L (ref 5–15)
BASOPHILS # BLD AUTO: 0.03 10*3/MM3 (ref 0–0.2)
BASOPHILS NFR BLD AUTO: 0.1 % (ref 0–1.5)
BUN SERPL-MCNC: 19 MG/DL (ref 8–23)
BUN/CREAT SERPL: 26.4 (ref 7–25)
CALCIUM SPEC-SCNC: 8.4 MG/DL (ref 8.6–10.5)
CHLORIDE SERPL-SCNC: 102 MMOL/L (ref 98–107)
CO2 SERPL-SCNC: 28 MMOL/L (ref 22–29)
CREAT SERPL-MCNC: 0.72 MG/DL (ref 0.76–1.27)
DEPRECATED RDW RBC AUTO: 45.5 FL (ref 37–54)
EGFRCR SERPLBLD CKD-EPI 2021: 103.3 ML/MIN/1.73
EOSINOPHIL # BLD AUTO: 0 10*3/MM3 (ref 0–0.4)
EOSINOPHIL NFR BLD AUTO: 0 % (ref 0.3–6.2)
ERYTHROCYTE [DISTWIDTH] IN BLOOD BY AUTOMATED COUNT: 13.6 % (ref 12.3–15.4)
GLUCOSE BLDC GLUCOMTR-MCNC: 128 MG/DL (ref 70–130)
GLUCOSE BLDC GLUCOMTR-MCNC: 132 MG/DL (ref 70–130)
GLUCOSE BLDC GLUCOMTR-MCNC: 159 MG/DL (ref 70–130)
GLUCOSE SERPL-MCNC: 133 MG/DL (ref 65–99)
HCT VFR BLD AUTO: 36.7 % (ref 37.5–51)
HGB BLD-MCNC: 12 G/DL (ref 13–17.7)
IMM GRANULOCYTES # BLD AUTO: 0.19 10*3/MM3 (ref 0–0.05)
IMM GRANULOCYTES NFR BLD AUTO: 0.9 % (ref 0–0.5)
LYMPHOCYTES # BLD AUTO: 0.98 10*3/MM3 (ref 0.7–3.1)
LYMPHOCYTES NFR BLD AUTO: 4.8 % (ref 19.6–45.3)
MAGNESIUM SERPL-MCNC: 2.1 MG/DL (ref 1.6–2.4)
MCH RBC QN AUTO: 29.7 PG (ref 26.6–33)
MCHC RBC AUTO-ENTMCNC: 32.7 G/DL (ref 31.5–35.7)
MCV RBC AUTO: 90.8 FL (ref 79–97)
MONOCYTES # BLD AUTO: 1.33 10*3/MM3 (ref 0.1–0.9)
MONOCYTES NFR BLD AUTO: 6.5 % (ref 5–12)
NEUTROPHILS NFR BLD AUTO: 18.09 10*3/MM3 (ref 1.7–7)
NEUTROPHILS NFR BLD AUTO: 87.7 % (ref 42.7–76)
NRBC BLD AUTO-RTO: 0 /100 WBC (ref 0–0.2)
PHOSPHATE SERPL-MCNC: 2.9 MG/DL (ref 2.5–4.5)
PLATELET # BLD AUTO: 185 10*3/MM3 (ref 140–450)
PMV BLD AUTO: 11.2 FL (ref 6–12)
POTASSIUM SERPL-SCNC: 5 MMOL/L (ref 3.5–5.2)
RBC # BLD AUTO: 4.04 10*6/MM3 (ref 4.14–5.8)
SODIUM SERPL-SCNC: 136 MMOL/L (ref 136–145)
WBC NRBC COR # BLD AUTO: 20.62 10*3/MM3 (ref 3.4–10.8)

## 2024-06-22 PROCEDURE — 85025 COMPLETE CBC W/AUTO DIFF WBC: CPT | Performed by: NURSE PRACTITIONER

## 2024-06-22 PROCEDURE — 94799 UNLISTED PULMONARY SVC/PX: CPT

## 2024-06-22 PROCEDURE — 94664 DEMO&/EVAL PT USE INHALER: CPT

## 2024-06-22 PROCEDURE — 97116 GAIT TRAINING THERAPY: CPT

## 2024-06-22 PROCEDURE — 25010000002 ENOXAPARIN PER 10 MG: Performed by: NURSE PRACTITIONER

## 2024-06-22 PROCEDURE — 25010000002 FUROSEMIDE PER 20 MG: Performed by: NURSE PRACTITIONER

## 2024-06-22 PROCEDURE — 94761 N-INVAS EAR/PLS OXIMETRY MLT: CPT

## 2024-06-22 PROCEDURE — 25010000002 CEFAZOLIN PER 500 MG: Performed by: THORACIC SURGERY (CARDIOTHORACIC VASCULAR SURGERY)

## 2024-06-22 PROCEDURE — 80069 RENAL FUNCTION PANEL: CPT | Performed by: NURSE PRACTITIONER

## 2024-06-22 PROCEDURE — 99024 POSTOP FOLLOW-UP VISIT: CPT | Performed by: THORACIC SURGERY (CARDIOTHORACIC VASCULAR SURGERY)

## 2024-06-22 PROCEDURE — 82948 REAGENT STRIP/BLOOD GLUCOSE: CPT

## 2024-06-22 PROCEDURE — 83735 ASSAY OF MAGNESIUM: CPT | Performed by: NURSE PRACTITIONER

## 2024-06-22 PROCEDURE — 93010 ELECTROCARDIOGRAM REPORT: CPT | Performed by: INTERNAL MEDICINE

## 2024-06-22 PROCEDURE — 93005 ELECTROCARDIOGRAM TRACING: CPT | Performed by: THORACIC SURGERY (CARDIOTHORACIC VASCULAR SURGERY)

## 2024-06-22 PROCEDURE — 71045 X-RAY EXAM CHEST 1 VIEW: CPT

## 2024-06-22 RX ORDER — BISACODYL 10 MG
10 SUPPOSITORY, RECTAL RECTAL ONCE
Status: COMPLETED | OUTPATIENT
Start: 2024-06-22 | End: 2024-06-22

## 2024-06-22 RX ORDER — NAPROXEN 250 MG/1
250 TABLET ORAL 2 TIMES DAILY WITH MEALS
Status: DISCONTINUED | OUTPATIENT
Start: 2024-06-22 | End: 2024-06-24

## 2024-06-22 RX ORDER — POTASSIUM CHLORIDE 750 MG/1
20 CAPSULE, EXTENDED RELEASE ORAL 2 TIMES DAILY WITH MEALS
Status: DISCONTINUED | OUTPATIENT
Start: 2024-06-22 | End: 2024-06-23

## 2024-06-22 RX ORDER — GUAIFENESIN 600 MG/1
600 TABLET, EXTENDED RELEASE ORAL EVERY 12 HOURS SCHEDULED
Status: DISCONTINUED | OUTPATIENT
Start: 2024-06-22 | End: 2024-06-24 | Stop reason: HOSPADM

## 2024-06-22 RX ORDER — FUROSEMIDE 10 MG/ML
20 INJECTION INTRAMUSCULAR; INTRAVENOUS
Status: DISCONTINUED | OUTPATIENT
Start: 2024-06-22 | End: 2024-06-23

## 2024-06-22 RX ADMIN — IPRATROPIUM BROMIDE AND ALBUTEROL SULFATE 1.5 ML: .5; 3 SOLUTION RESPIRATORY (INHALATION) at 18:33

## 2024-06-22 RX ADMIN — TAMSULOSIN HYDROCHLORIDE 0.4 MG: 0.4 CAPSULE ORAL at 10:09

## 2024-06-22 RX ADMIN — POTASSIUM CHLORIDE 20 MEQ: 750 CAPSULE, EXTENDED RELEASE ORAL at 10:09

## 2024-06-22 RX ADMIN — ACETAMINOPHEN 1000 MG: 500 TABLET, FILM COATED ORAL at 10:09

## 2024-06-22 RX ADMIN — CHLORHEXIDINE GLUCONATE 0.12% ORAL RINSE 15 ML: 1.2 LIQUID ORAL at 05:58

## 2024-06-22 RX ADMIN — ACETAMINOPHEN 1000 MG: 500 TABLET, FILM COATED ORAL at 04:08

## 2024-06-22 RX ADMIN — NAPROXEN 250 MG: 250 TABLET ORAL at 17:50

## 2024-06-22 RX ADMIN — METOPROLOL TARTRATE 12.5 MG: 25 TABLET, FILM COATED ORAL at 10:09

## 2024-06-22 RX ADMIN — LIDOCAINE 2 PATCH: 4 PATCH TOPICAL at 10:10

## 2024-06-22 RX ADMIN — POLYETHYLENE GLYCOL 3350 17 G: 17 POWDER, FOR SOLUTION ORAL at 10:10

## 2024-06-22 RX ADMIN — ENOXAPARIN SODIUM 40 MG: 100 INJECTION SUBCUTANEOUS at 10:10

## 2024-06-22 RX ADMIN — OXYCODONE HYDROCHLORIDE 10 MG: 10 TABLET ORAL at 10:09

## 2024-06-22 RX ADMIN — PREGABALIN 25 MG: 25 CAPSULE ORAL at 20:52

## 2024-06-22 RX ADMIN — BISACODYL 10 MG: 5 TABLET, COATED ORAL at 20:53

## 2024-06-22 RX ADMIN — ASPIRIN 81 MG: 81 TABLET, COATED ORAL at 10:09

## 2024-06-22 RX ADMIN — CEFAZOLIN 2 G: 2 INJECTION, POWDER, FOR SOLUTION INTRAMUSCULAR; INTRAVENOUS at 04:08

## 2024-06-22 RX ADMIN — METOPROLOL TARTRATE 12.5 MG: 25 TABLET, FILM COATED ORAL at 20:54

## 2024-06-22 RX ADMIN — AMIODARONE HYDROCHLORIDE 400 MG: 200 TABLET ORAL at 10:08

## 2024-06-22 RX ADMIN — IPRATROPIUM BROMIDE AND ALBUTEROL SULFATE 1.5 ML: .5; 3 SOLUTION RESPIRATORY (INHALATION) at 14:02

## 2024-06-22 RX ADMIN — OXYCODONE HYDROCHLORIDE 5 MG: 5 TABLET ORAL at 17:51

## 2024-06-22 RX ADMIN — POTASSIUM CHLORIDE 20 MEQ: 750 CAPSULE, EXTENDED RELEASE ORAL at 17:51

## 2024-06-22 RX ADMIN — OXYCODONE HYDROCHLORIDE 10 MG: 10 TABLET ORAL at 03:10

## 2024-06-22 RX ADMIN — IPRATROPIUM BROMIDE AND ALBUTEROL SULFATE 1.5 ML: .5; 3 SOLUTION RESPIRATORY (INHALATION) at 06:38

## 2024-06-22 RX ADMIN — BISACODYL 10 MG: 5 TABLET, COATED ORAL at 10:08

## 2024-06-22 RX ADMIN — FUROSEMIDE 20 MG: 10 INJECTION, SOLUTION INTRAMUSCULAR; INTRAVENOUS at 10:08

## 2024-06-22 RX ADMIN — AMIODARONE HYDROCHLORIDE 400 MG: 200 TABLET ORAL at 17:51

## 2024-06-22 RX ADMIN — GUAIFENESIN 600 MG: 600 TABLET, EXTENDED RELEASE ORAL at 20:52

## 2024-06-22 RX ADMIN — ACETAMINOPHEN 1000 MG: 500 TABLET, FILM COATED ORAL at 17:50

## 2024-06-22 RX ADMIN — GUAIFENESIN 600 MG: 600 TABLET, EXTENDED RELEASE ORAL at 10:08

## 2024-06-22 RX ADMIN — FUROSEMIDE 20 MG: 10 INJECTION, SOLUTION INTRAMUSCULAR; INTRAVENOUS at 17:50

## 2024-06-22 RX ADMIN — ATORVASTATIN CALCIUM 20 MG: 10 TABLET, FILM COATED ORAL at 20:53

## 2024-06-22 RX ADMIN — ACETAMINOPHEN 1000 MG: 500 TABLET, FILM COATED ORAL at 20:52

## 2024-06-22 RX ADMIN — PREGABALIN 25 MG: 25 CAPSULE ORAL at 10:09

## 2024-06-22 RX ADMIN — CLOPIDOGREL BISULFATE 75 MG: 75 TABLET, FILM COATED ORAL at 17:50

## 2024-06-22 RX ADMIN — Medication 1 APPLICATION: at 05:57

## 2024-06-22 RX ADMIN — PANTOPRAZOLE SODIUM 40 MG: 40 TABLET, DELAYED RELEASE ORAL at 10:08

## 2024-06-22 RX ADMIN — BISACODYL 10 MG: 10 SUPPOSITORY RECTAL at 18:24

## 2024-06-22 NOTE — PROGRESS NOTES
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 Starting to have more productive cough. No BM.     Objective:      Intake/Output Summary (Last 24 hours) at 6/22/2024 0929  Last data filed at 6/22/2024 0600  Gross per 24 hour   Intake 1080 ml   Output 1785 ml   Net -705 ml       CT Output:   MCT to -20 cm suction with 260 ml serosanguinous drainage in the past 24 hours. No air leak.   Right pleural CT to -20 cm suction with 25 ml serosanguinous drainage in the past 24 hours. No air leak.     Gtt's:  None    Wt preop:225 lbs  Wt current:       06/21/24  2147   Weight: 109 kg (240 lb)         PE:  Vitals:    06/22/24 0700   BP: 108/61   Pulse: 69   Resp: 18   Temp: 98.1 °F (36.7 °C)   SpO2: 92%     GENERAL: NAD, resting comfortably, normal color  CARDIOVASCULAR: Normal HT with RRR. No murmurs, gallops or rubs.   PULMONARY: Clear bilateral breath sounds without wheezes, rhonchi, or rales. No labored breathing  ABDOMEN: Soft, non-distended/non-tender with active bowel sounds.   EXTREMITIES: No clubbing or cyanosis. Mild peripheral edema. Peripheral pulses palpated.         Lab Results (last 72 hours)       Procedure Component Value Units Date/Time    POC Glucose Once [[id number]]  (Normal) Collected: 06/22/24 0752    Specimen: Blood Updated: 06/22/24 0804     Glucose 128 mg/dL      Comment: : [id number] Hall AprilMeter ID: [id number]       Renal Function Panel [[id number]]   (Abnormal) Collected: 06/22/24 0353    Specimen: Blood Updated: 06/22/24 0432     Glucose 133 mg/dL      BUN 19 mg/dL      Creatinine 0.72 mg/dL      Sodium 136 mmol/L      Potassium 5.0 mmol/L      Chloride 102 mmol/L      CO2 28.0 mmol/L      Calcium 8.4 mg/dL      Albumin 3.5 g/dL      Phosphorus 2.9 mg/dL      Anion Gap 6.0 mmol/L      BUN/Creatinine Ratio 26.4     eGFR 103.3 mL/min/1.73     Narrative:      GFR Normal >60  Chronic Kidney Disease <60  Kidney Failure <15      CBC & Differential [[id number]]  (Abnormal) Collected: 06/22/24 0353    Specimen: Blood Updated: 06/22/24 0418    Narrative:      The following orders were created for panel order CBC & Differential.  Procedure                               Abnormality         Status                     ---------                               -----------         ------                     CBC Auto Differential[[id number]]        Abnormal            Final result                 Please view results for these tests on the individual orders.    CBC Auto Differential [[id number]]  (Abnormal) Collected: 06/22/24 0353    Specimen: Blood Updated: 06/22/24 0418     WBC 20.62 10*3/mm3      RBC 4.04 10*6/mm3      Hemoglobin 12.0 g/dL      Hematocrit 36.7 %      MCV 90.8 fL      MCH 29.7 pg      MCHC 32.7 g/dL      RDW 13.6 %      RDW-SD 45.5 fl      MPV 11.2 fL      Platelets 185 10*3/mm3      Neutrophil % 87.7 %      Lymphocyte % 4.8 %      Monocyte % 6.5 %      Eosinophil % 0.0 %      Basophil % 0.1 %      Immature Grans % 0.9 %      Neutrophils, Absolute 18.09 10*3/mm3      Lymphocytes, Absolute 0.98 10*3/mm3      Monocytes, Absolute 1.33 10*3/mm3      Eosinophils, Absolute 0.00 10*3/mm3      Basophils, Absolute 0.03 10*3/mm3      Immature Grans, Absolute 0.19 10*3/mm3      nRBC 0.0 /100 WBC     Magnesium [[id number]]  (Normal) Collected: 06/22/24 0009    Specimen: Blood Updated: 06/22/24 0024     Magnesium 2.1 mg/dL     Tissue Pathology Exam [[id number]] Collected:  06/20/24 0900    Specimen: Tissue from Aortic Leaflet, Tissue from Aortic Aneurysm Updated: 06/21/24 0828    POC Glucose Once [[id number]]  (Abnormal) Collected: 06/21/24 0704    Specimen: Blood Updated: 06/21/24 0724     Glucose 171 mg/dL      Comment: : [id number] Luis F KristiMeter ID: [id number]       POC Glucose Once [[id number]]  (Abnormal) Collected: 06/21/24 0454    Specimen: Blood Updated: 06/21/24 0505     Glucose 168 mg/dL      Comment: : [id number] Luis F KristiMeter ID: [id number]       POC Glucose Once [[id number]]  (Abnormal) Collected: 06/21/24 0236    Specimen: Blood Updated: 06/21/24 0247     Glucose 160 mg/dL      Comment: : [id number] Luis F KristiMeter ID: [id number]       Renal Function Panel [[id number]]  (Abnormal) Collected: 06/21/24 0213    Specimen: Blood from Arm, Left Updated: 06/21/24 0238     Glucose 158 mg/dL      BUN 19 mg/dL      Creatinine 0.85 mg/dL      Sodium 138 mmol/L      Potassium 4.6 mmol/L      Chloride 106 mmol/L      CO2 26.0 mmol/L      Calcium 7.7 mg/dL      Albumin 3.3 g/dL      Phosphorus 3.5 mg/dL      Anion Gap 6.0 mmol/L      BUN/Creatinine Ratio 22.4     eGFR 98.2 mL/min/1.73     Narrative:      GFR Normal >60  Chronic Kidney Disease <60  Kidney Failure <15      Magnesium [[id number]]  (Normal) Collected: 06/21/24 0213    Specimen: Blood from Arm, Left Updated: 06/21/24 0232     Magnesium 1.8 mg/dL     Protime-INR [[id number]]  (Abnormal) Collected: 06/21/24 0213    Specimen: Blood from Arm, Left Updated: 06/21/24 0228     Protime 14.6 Seconds      INR 1.10    CBC & Differential [[id number]]  (Abnormal) Collected: 06/21/24 0213    Specimen: Blood from Arm, Left Updated: 06/21/24 0223    Narrative:      The following orders were created for panel order CBC & Differential.  Procedure                               Abnormality         Status                     ---------                               -----------         ------                     CBC Auto  Differential[[id number]]        Abnormal            Final result                 Please view results for these tests on the individual orders.    CBC Auto Differential [[id number]]  (Abnormal) Collected: 06/21/24 0213    Specimen: Blood from Arm, Left Updated: 06/21/24 0223     WBC 11.24 10*3/mm3      RBC 3.94 10*6/mm3      Hemoglobin 11.8 g/dL      Hematocrit 34.7 %      MCV 88.1 fL      MCH 29.9 pg      MCHC 34.0 g/dL      RDW 12.9 %      RDW-SD 41.5 fl      MPV 10.5 fL      Platelets 177 10*3/mm3      Neutrophil % 92.6 %      Lymphocyte % 4.5 %      Monocyte % 2.5 %      Eosinophil % 0.0 %      Basophil % 0.2 %      Immature Grans % 0.2 %      Neutrophils, Absolute 10.41 10*3/mm3      Lymphocytes, Absolute 0.51 10*3/mm3      Monocytes, Absolute 0.28 10*3/mm3      Eosinophils, Absolute 0.00 10*3/mm3      Basophils, Absolute 0.02 10*3/mm3      Immature Grans, Absolute 0.02 10*3/mm3      nRBC 0.0 /100 WBC     POC Glucose Once [[id number]]  (Abnormal) Collected: 06/21/24 0038    Specimen: Blood Updated: 06/21/24 0049     Glucose 156 mg/dL      Comment: : [id number] Krishidhan SeedsistiMeter ID: [id number]       POC Glucose Once [[id number]]  (Abnormal) Collected: 06/20/24 2301    Specimen: Blood Updated: 06/20/24 2313     Glucose 67 mg/dL      Comment: : [id number] Luis F KristiMeter ID: [id number]       POC Glucose Once [[id number]]  (Abnormal) Collected: 06/20/24 2159    Specimen: Blood Updated: 06/20/24 2210     Glucose 152 mg/dL      Comment: : [id number] [place] KristiMeter ID: [id number]       Blood Gas, Arterial - [[id number]]  (Abnormal) Collected: 06/20/24 2110    Specimen: Arterial Blood Updated: 06/20/24 2112     Site Arterial Line     Gilbert's Test N/A     pH, Arterial 7.355 pH units      pCO2, Arterial 47.5 mm Hg      Comment: 83 Value above reference range        pO2, Arterial 76.3 mm Hg      Comment: 84 Value below reference range        HCO3, Arterial 26.5 mmol/L      Comment: 83 Value above  reference range        Base Excess, Arterial 0.4 mmol/L      O2 Saturation, Arterial 95.6 %      Temperature 37.0     Barometric Pressure for Blood Gas 758 mmHg      Modality Ventilator     FIO2 30 %      Ventilator Mode SPONTANEOUS     PEEP 5.0     PSV 10.0 cmH2O      Collected by LUTHER     Comment: Meter: [id number]     :  LUTHER        pCO2, Temperature Corrected 47.5 mm Hg      pH, Temp Corrected 7.355 pH Units      pO2, Temperature Corrected 76.3 mm Hg      PO2/FIO2 254    POC Glucose Once [[id number]]  (Abnormal) Collected: 06/20/24 2059    Specimen: Blood Updated: 06/20/24 2111     Glucose 137 mg/dL      Comment: : [id number] Luis F KristiMeter ID: [id number]       POC Glucose Once [[id number]]  (Abnormal) Collected: 06/20/24 1952    Specimen: Blood Updated: 06/20/24 2003     Glucose 135 mg/dL      Comment: : [id number] Luis F KristiMeter ID: [id number]       Renal Function Panel [[id number]]  (Abnormal) Collected: 06/20/24 1846    Specimen: Blood Updated: 06/20/24 1916     Glucose 124 mg/dL      BUN 15 mg/dL      Creatinine 0.80 mg/dL      Sodium 141 mmol/L      Potassium 4.8 mmol/L      Comment: Slight hemolysis detected by analyzer. Result may be falsely elevated.        Chloride 108 mmol/L      CO2 26.0 mmol/L      Calcium 7.7 mg/dL      Albumin 3.3 g/dL      Phosphorus 2.6 mg/dL      Anion Gap 7.0 mmol/L      BUN/Creatinine Ratio 18.8     eGFR 100.1 mL/min/1.73     Narrative:      GFR Normal >60  Chronic Kidney Disease <60  Kidney Failure <15      Blood Gas, Arterial - [[id number]]  (Abnormal) Collected: 06/20/24 1904    Specimen: Arterial Blood Updated: 06/20/24 1907     Site Arterial Line     Gilbert's Test N/A     pH, Arterial 7.439 pH units      pCO2, Arterial 38.4 mm Hg      pO2, Arterial 154.0 mm Hg      Comment: 83 Value above reference range        HCO3, Arterial 26.0 mmol/L      Base Excess, Arterial 1.8 mmol/L      O2 Saturation, Arterial 99.8 %      Comment: 83  Value above reference range        Temperature 37.0     Barometric Pressure for Blood Gas 757 mmHg      Modality Ventilator     FIO2 40 %      Ventilator Mode SIMV     Set Tidal Volume 600     Set Mech Resp Rate 22.0     PEEP 8.0     PSV 10.0 cmH2O      Collected by LUTHER     Comment: Meter: [id number]     :  LUTHER        pCO2, Temperature Corrected 38.4 mm Hg      pH, Temp Corrected 7.439 pH Units      pO2, Temperature Corrected 154 mm Hg      PO2/FIO2 385    POC Glucose Once [[id number]]  (Normal) Collected: 06/20/24 1851    Specimen: Blood Updated: 06/20/24 1902     Glucose 124 mg/dL      Comment: : [id number] Uriel AaronMeter ID: [id number]       CBC (No Diff) [[id number]]  (Abnormal) Collected: 06/20/24 1846    Specimen: Blood Updated: 06/20/24 1858     WBC 6.23 10*3/mm3      RBC 3.97 10*6/mm3      Hemoglobin 12.0 g/dL      Hematocrit 34.2 %      MCV 86.1 fL      MCH 30.2 pg      MCHC 35.1 g/dL      RDW 12.7 %      RDW-SD 40.0 fl      MPV 10.2 fL      Platelets 167 10*3/mm3     POC Glucose Once [[id number]]  (Abnormal) Collected: 06/20/24 1800    Specimen: Blood Updated: 06/20/24 1811     Glucose 138 mg/dL      Comment: : [id number] Uriel AaronMeter ID: [id number]       POC Glucose Once [[id number]]  (Abnormal) Collected: 06/20/24 1614    Specimen: Blood Updated: 06/20/24 1624     Glucose 146 mg/dL      Comment: : [id number] Uriel AaronMeter ID: [id number]       Renal Function Panel [[id number]]  (Abnormal) Collected: 06/20/24 1524    Specimen: Blood Updated: 06/20/24 1555     Glucose 143 mg/dL      BUN 16 mg/dL      Creatinine 0.81 mg/dL      Sodium 142 mmol/L      Potassium 3.0 mmol/L      Comment: Slight hemolysis detected by analyzer. Result may be falsely elevated.        Chloride 107 mmol/L      CO2 28.0 mmol/L      Calcium 7.8 mg/dL      Albumin 3.0 g/dL      Phosphorus 2.0 mg/dL      Anion Gap 7.0 mmol/L      BUN/Creatinine Ratio 19.8     eGFR 99.7 mL/min/1.73     Narrative:       GFR Normal >60  Chronic Kidney Disease <60  Kidney Failure <15      CBC (No Diff) [[id number]]  (Abnormal) Collected: 06/20/24 1524    Specimen: Blood Updated: 06/20/24 1545     WBC 7.43 10*3/mm3      RBC 3.70 10*6/mm3      Hemoglobin 11.2 g/dL      Hematocrit 32.4 %      MCV 87.6 fL      MCH 30.3 pg      MCHC 34.6 g/dL      RDW 12.7 %      RDW-SD 41.0 fl      MPV 10.2 fL      Platelets 149 10*3/mm3     Protime-INR [[id number]]  (Abnormal) Collected: 06/20/24 1524    Specimen: Blood Updated: 06/20/24 1545     Protime 16.2 Seconds      INR 1.25    aPTT [[id number]]  (Abnormal) Collected: 06/20/24 1524    Specimen: Blood Updated: 06/20/24 1545     PTT 40.6 seconds     Fibrinogen [[id number]]  (Abnormal) Collected: 06/20/24 1524    Specimen: Blood Updated: 06/20/24 1545     Fibrinogen 195 mg/dL     Calcium, Ionized [[id number]]  (Abnormal) Collected: 06/20/24 1529    Specimen: Blood Updated: 06/20/24 1531     Ionized Calcium 4.50 mg/dL      Comment: 84 Value below reference range        Collected by [id number]     Comment: Meter: [id number]     :  Darrel Mohamud CRT       Blood Gas, Arterial - [[id number]]  (Abnormal) Collected: 06/20/24 1528    Specimen: Arterial Blood Updated: 06/20/24 1530     Site Arterial Line     Gilbert's Test N/A     pH, Arterial 7.369 pH units      pCO2, Arterial 49.0 mm Hg      Comment: 83 Value above reference range        pO2, Arterial 69.0 mm Hg      Comment: 84 Value below reference range        HCO3, Arterial 28.2 mmol/L      Comment: 83 Value above reference range        Base Excess, Arterial 2.2 mmol/L      Comment: 83 Value above reference range        O2 Saturation, Arterial 94.2 %      Temperature 37.0     Barometric Pressure for Blood Gas 759 mmHg      Modality Ventilator     FIO2 60 %      Ventilator Mode SIMV     Set Tidal Volume 600     Set Mech Resp Rate 20.0     PEEP 8.0     PSV 10.0 cmH2O      Collected by [id number]     Comment: Meter: [id number]     :   Darrel Mohamud, CRT        pCO2, Temperature Corrected 49.0 mm Hg      pH, Temp Corrected 7.369 pH Units      pO2, Temperature Corrected 69.0 mm Hg      PO2/FIO2 115    Fibrinogen [[id number]]  (Abnormal) Collected: 06/20/24 1321    Specimen: Blood, Arterial Line Updated: 06/20/24 1356     Fibrinogen 161 mg/dL     Protime-INR [[id number]]  (Abnormal) Collected: 06/20/24 1321    Specimen: Blood, Arterial Line Updated: 06/20/24 1356     Protime 20.9 Seconds      INR 1.72    aPTT [[id number]]  (Abnormal) Collected: 06/20/24 1321    Specimen: Blood, Arterial Line Updated: 06/20/24 1356     PTT >200.0 seconds     Blood Gas, Arterial With Co-Ox [[id number]]  (Abnormal) Collected: 06/20/24 1341    Specimen: Arterial Blood Updated: 06/20/24 1340     Site Arterial Line     Gilbert's Test N/A     pH, Arterial 7.351 pH units      pCO2, Arterial 47.8 mm Hg      Comment: 83 Value above reference range        pO2, Arterial 450.0 mm Hg      Comment: 83 Value above reference range        HCO3, Arterial 26.4 mmol/L      Comment: 83 Value above reference range        Base Excess, Arterial 0.5 mmol/L      O2 Saturation, Arterial >100.1 %      Comment: 93 Value above reportable range > 100.1        Hemoglobin, Blood Gas 9.4 g/dL      Comment: 84 Value below reference range        Hematocrit, Blood Gas 28.8 %      Comment: 84 Value below reference range        Oxyhemoglobin 98.7 %      Methemoglobin 0.80 %      Carboxyhemoglobin 0.8 %      Temperature 37.0     Sodium, Arterial 141 mmol/L      Potassium, Arterial 3.0 mmol/L      Comment: 84 Value below reference range        Ionized Calcium 4.49 mg/dL      Comment: 84 Value below reference range        Barometric Pressure for Blood Gas 760 mmHg      Modality Ventilator     FIO2 100 %      Ventilator Mode NA     Collected by [id number]     Comment: Meter: [id number]     :  [id number]        pH, Temp Corrected 7.351 pH Units      pCO2, Temperature Corrected 47.8 mm Hg      pO2, Temperature  Corrected 450 mm Hg     CBC & Differential [[id number]]  (Abnormal) Collected: 06/20/24 1321    Specimen: Blood, Arterial Line Updated: 06/20/24 1331    Narrative:      The following orders were created for panel order CBC & Differential.  Procedure                               Abnormality         Status                     ---------                               -----------         ------                     CBC Auto Differential[[id number]]        Abnormal            Final result                 Please view results for these tests on the individual orders.    CBC Auto Differential [[id number]]  (Abnormal) Collected: 06/20/24 1321    Specimen: Blood, Arterial Line Updated: 06/20/24 1331     WBC 8.20 10*3/mm3      RBC 2.95 10*6/mm3      Hemoglobin 8.8 g/dL      Hematocrit 26.0 %      MCV 88.1 fL      MCH 29.8 pg      MCHC 33.8 g/dL      RDW 12.8 %      RDW-SD 41.1 fl      MPV 10.7 fL      Platelets 144 10*3/mm3      Neutrophil % 77.6 %      Lymphocyte % 19.3 %      Monocyte % 1.2 %      Eosinophil % 1.1 %      Basophil % 0.4 %      Immature Grans % 0.4 %      Neutrophils, Absolute 6.37 10*3/mm3      Lymphocytes, Absolute 1.58 10*3/mm3      Monocytes, Absolute 0.10 10*3/mm3      Eosinophils, Absolute 0.09 10*3/mm3      Basophils, Absolute 0.03 10*3/mm3      Immature Grans, Absolute 0.03 10*3/mm3      nRBC 0.0 /100 WBC     Blood Gas, Arterial With Co-Ox [[id number]]  (Abnormal) Collected: 06/20/24 1235    Specimen: Arterial Blood Updated: 06/20/24 1233     Site Arterial Line     Gilbert's Test N/A     pH, Arterial 7.433 pH units      pCO2, Arterial 38.9 mm Hg      pO2, Arterial >547.0 mm Hg      Comment: 93 Value above reportable range > 547.0        HCO3, Arterial 25.9 mmol/L      Base Excess, Arterial 1.6 mmol/L      O2 Saturation, Arterial >100.1 %      Comment: 93 Value above reportable range > 100.1        Hemoglobin, Blood Gas 9.2 g/dL      Comment: 84 Value below reference range        Hematocrit, Blood Gas 28.3 %       Comment: 84 Value below reference range        Oxyhemoglobin 99.2 %      Comment: 83 Value above reference range        Methemoglobin 0.70 %      Carboxyhemoglobin 0.5 %      Temperature 37.0     Sodium, Arterial 138 mmol/L      Potassium, Arterial 3.3 mmol/L      Comment: 84 Value below reference range        Ionized Calcium 3.96 mg/dL      Comment: 84 Value below reference range        Barometric Pressure for Blood Gas 760 mmHg      Modality Ventilator     FIO2 100 %      Ventilator Mode NA     Collected by [id number]     Comment: Meter: [id number]     :  [id number]        pH, Temp Corrected 7.433 pH Units      pCO2, Temperature Corrected 38.9 mm Hg      pO2, Temperature Corrected >547 mm Hg     Blood Gas, Arterial With Co-Ox [[id number]]  (Abnormal) Collected: 06/20/24 1203    Specimen: Arterial Blood Updated: 06/20/24 1201     Site Arterial Line     Gilbert's Test N/A     pH, Arterial 7.349 pH units      Comment: 84 Value below reference range        pCO2, Arterial 51.9 mm Hg      Comment: 83 Value above reference range        pO2, Arterial >547.0 mm Hg      Comment: 93 Value above reportable range > 547.0        HCO3, Arterial 28.5 mmol/L      Comment: 83 Value above reference range        Base Excess, Arterial 2.3 mmol/L      Comment: 83 Value above reference range        O2 Saturation, Arterial >100.1 %      Comment: 93 Value above reportable range > 100.1        Hemoglobin, Blood Gas 9.8 g/dL      Comment: 84 Value below reference range        Hematocrit, Blood Gas 30.0 %      Comment: 84 Value below reference range        Oxyhemoglobin 99.1 %      Comment: 83 Value above reference range        Methemoglobin 0.70 %      Carboxyhemoglobin 0.4 %      Temperature 37.0     Sodium, Arterial 140 mmol/L      Potassium, Arterial 3.5 mmol/L      Comment: 84 Value below reference range        Ionized Calcium --     Comment:  [id number] Calibration error(s) [id number] Value  below reference range        Barometric Pressure for Blood Gas 760 mmHg      Modality Ventilator     FIO2 100 %      Ventilator Mode NA     Collected by [id number]     Comment: Meter: [id number]     :  [id number]        pH, Temp Corrected 7.349 pH Units      pCO2, Temperature Corrected 51.9 mm Hg      pO2, Temperature Corrected >547 mm Hg     Blood Gas, Arterial With Co-Ox [[id number]]  (Abnormal) Collected: 06/20/24 0952    Specimen: Arterial Blood Updated: 06/20/24 0951     Site Arterial Line     Gilbert's Test N/A     pH, Arterial 7.388 pH units      pCO2, Arterial 49.7 mm Hg      Comment: 83 Value above reference range        pO2, Arterial >547.0 mm Hg      Comment: 93 Value above reportable range > 547.0        HCO3, Arterial 29.9 mmol/L      Comment: 83 Value above reference range        Base Excess, Arterial 4.1 mmol/L      Comment: 83 Value above reference range        O2 Saturation, Arterial >100.1 %      Comment: 93 Value above reportable range > 100.1        Hemoglobin, Blood Gas 10.9 g/dL      Comment: 84 Value below reference range        Hematocrit, Blood Gas 33.4 %      Comment: 84 Value below reference range        Oxyhemoglobin 99.2 %      Comment: 83 Value above reference range        Methemoglobin 0.70 %      Carboxyhemoglobin 0.4 %      Temperature 37.0     Sodium, Arterial 140 mmol/L      Potassium, Arterial 3.4 mmol/L      Comment: 84 Value below reference range        Ionized Calcium 4.25 mg/dL      Comment: 84 Value below reference range        Barometric Pressure for Blood Gas 760 mmHg      Modality Ventilator     FIO2 100 %      Ventilator Mode NA     Collected by [id number]     Comment: Meter: [id number]     :  [id number]        pH, Temp Corrected 7.388 pH Units      pCO2, Temperature Corrected 49.7 mm Hg      pO2, Temperature Corrected >547 mm Hg     Blood Gas, Arterial With Co-Ox [[id number]]  (Abnormal) Collected: 06/20/24 0920    Specimen: Arterial Blood Updated: 06/20/24  0919     Site Arterial Line     Gilbert's Test N/A     pH, Arterial 7.233 pH units      Comment: 84 Value below reference range        pCO2, Arterial 72.3 mm Hg      Comment: 86 Value above critical limit        pO2, Arterial 482.0 mm Hg      Comment: 83 Value above reference range        HCO3, Arterial 30.4 mmol/L      Comment: 83 Value above reference range        Base Excess, Arterial 1.0 mmol/L      O2 Saturation, Arterial 100.0 %      Comment: 83 Value above reference range        Hemoglobin, Blood Gas 12.8 g/dL      Comment: 84 Value below reference range        Hematocrit, Blood Gas 39.3 %      Oxyhemoglobin 99.1 %      Comment: 83 Value above reference range        Methemoglobin 0.60 %      Carboxyhemoglobin 0.3 %      Temperature 37.0     Sodium, Arterial 141 mmol/L      Potassium, Arterial 3.7 mmol/L      Ionized Calcium 4.69 mg/dL      Barometric Pressure for Blood Gas 760 mmHg      Modality Ventilator     FIO2 100 %      Ventilator Mode NA     Notified Who SORAYA     Notified By [id number]     Notified Time 06/20/2024 09:20     Collected by [id number]     Comment: Meter: [id number]     :  [id number]        pH, Temp Corrected 7.233 pH Units      pCO2, Temperature Corrected 72.3 mm Hg      pO2, Temperature Corrected 482 mm Hg     Blood Gas, Arterial With Co-Ox [[id number]]  (Abnormal) Collected: 06/20/24 0811    Specimen: Arterial Blood Updated: 06/20/24 0810     Site Arterial Line     Gilbert's Test N/A     pH, Arterial 7.410 pH units      pCO2, Arterial 44.3 mm Hg      pO2, Arterial 532.0 mm Hg      Comment: 83 Value above reference range        HCO3, Arterial 28.0 mmol/L      Comment: 83 Value above reference range        Base Excess, Arterial 2.9 mmol/L      Comment: 83 Value above reference range        O2 Saturation, Arterial 100.0 %      Comment: 83 Value above reference range        Hemoglobin, Blood Gas 12.9 g/dL      Comment: 84 Value below reference range        Hematocrit, Blood Gas 39.7 %       Oxyhemoglobin 99.3 %      Comment: 83 Value above reference range        Methemoglobin 0.70 %      Carboxyhemoglobin 0.1 %      Temperature 37.0     Sodium, Arterial 140 mmol/L      Potassium, Arterial 3.5 mmol/L      Comment: 84 Value below reference range        Ionized Calcium 4.62 mg/dL      Barometric Pressure for Blood Gas 760 mmHg      Modality Ventilator     FIO2 100 %      Ventilator Mode NA     Collected by [id number]     Comment: Meter: [id number]     :  [id number]        pH, Temp Corrected 7.410 pH Units      pCO2, Temperature Corrected 44.3 mm Hg      pO2, Temperature Corrected 532 mm Hg             Rhythm: Normal sinus rhythm    CXR: Normal postoperative changes with CT x 3 in good position.     Assessment & Plan   Thoracic aortic aneurysm - aortic root and ascending aorta  Essential HTN    Plan:    1. Ambulate in [place], possible removal of MCT later today.    2.  Lasix 20 mg IV BID with K replacement    3. Bowel regimen    4. Add Mucinex 600 mg BID    5. Add naproxen 250 mg BID    6. Continue with aggressive PT        Leia Perales, APRN

## 2024-06-22 NOTE — PLAN OF CARE
Goal Outcome Evaluation:           Problem: Adult Inpatient Plan of Care  Goal: Plan of Care Review  Outcome: Ongoing, Progressing  Goal: Patient-Specific Goal (Individualized)  Outcome: Ongoing, Progressing  Goal: Absence of Hospital-Acquired Illness or Injury  Outcome: Ongoing, Progressing  Intervention: Identify and Manage Fall Risk  Recent Flowsheet Documentation  Taken 6/22/2024 0200 by Blanca Odonnell RN  Safety Promotion/Fall Prevention: safety round/check completed  Taken 6/22/2024 0000 by Blanca Odonnell RN  Safety Promotion/Fall Prevention: safety round/check completed  Taken 6/21/2024 2207 by Blanca Odonnell RN  Safety Promotion/Fall Prevention: safety round/check completed  Intervention: Prevent and Manage VTE (Venous Thromboembolism) Risk  Recent Flowsheet Documentation  Taken 6/21/2024 2207 by Blanca Odonnell RN  Range of Motion: ROM (range of motion) performed  Goal: Optimal Comfort and Wellbeing  Outcome: Ongoing, Progressing  Goal: Readiness for Transition of Care  Outcome: Ongoing, Progressing     Problem: Skin Injury Risk Increased  Goal: Skin Health and Integrity  Outcome: Ongoing, Progressing     Problem: Fall Injury Risk  Goal: Absence of Fall and Fall-Related Injury  Outcome: Ongoing, Progressing  Intervention: Promote Injury-Free Environment  Recent Flowsheet Documentation  Taken 6/22/2024 0200 by Blanca Odonnell RN  Safety Promotion/Fall Prevention: safety round/check completed  Taken 6/22/2024 0000 by Blanca Odonnell RN  Safety Promotion/Fall Prevention: safety round/check completed  Taken 6/21/2024 2207 by Blanca Odonnell RN  Safety Promotion/Fall Prevention: safety round/check completed         S 64 -70 per tele. Transferred to 4B from the unit. Vss. C/o pain given prn pain meds. Will continue to monitor.

## 2024-06-22 NOTE — OP NOTE
Cardiothoracic Surgery   Op Note   Patient Name:  Michael Issa  YOB: 1960     Date of Surgery:  2/8/2024     Preoperative diagnosis:  Thoracic aortic aneurysm- aortic root and ascending aorta  Obesity, class I  Hypertension     Postoperative diagnosis:              Same      Procedure:    1.  Aortic hemiarch and ascending aortic replacement with deep hypothermic circulatory arrest using a 28 mm Hemashield platinum graft  2.  Aortic root replacement with Modified Bentall procedure with valve conduit creation (CE 25 mm Inspiris with hemashield platinum 30 mm graft)  3.  Left atrial appendage exclusion (Atricure Atriclip 35 mm device)   4.  Ultrasound guided insertion of right femoral arterial line  5.  Sternal Rigid fixation using XP sternal plating system     Surgeon: Filiberto Gtz MD     Anesthesia:  General     Estimated blood loss:  (Cell Saver)     Drains:  24 Turkish Gagan drains-anterior and posterior mediastinum and 19 Turkish gagan root drain.       Specimens: proximal arch, ascending aorta and root aorta.  Aortic valve leaflets         Operative findings: A trileaflet aortic valve with no evidence of endocarditis.  Post aortic reconstruction there is no notable aortic valve regurgitation and acceptable gradient of 7 mmHg.  Left atrial appendage is excluded successfully.  Ventricular function is preserved.               Operative description in detail:  The patient was taken to the operative suite where he was placed in a supine position.  Induction of general anesthesia and placement of a single-lumen endotracheal tube was performed without remark.  Appropriate arterial and venous access was established without remark.    Through the previously placed right internal jugular central venous line, a pulmonary artery catheter was floated into position.   He had received vancomycin, Ancef, Amicar, as well as induction and general anesthesia including narcotics without remark.  Oral  intubation was performed without remark.  The patient was then prepped and draped in the usual and sterile surgical fashion.  A timeout was performed.  A beta blocker was given.    Decision was made to place a right femoral arterial line.    US was used to guided access.  The right femoral artery was palpated and accessed with a large bore needle using modified Seldinger technique using US guidance.  Pulsatile and bright red blood was appreciated.  A wire was advanced without resistance.  With that, the large bore needle was removed and over a wire, a single lumen catheter was placed.  This was placed in continuity with a previously flushed and zero'ed pressure line.  An arterial waveform is noted and the catheter was secured to the skin with 3-0 silk suture.       Median sternotomy was performed.  Pericardial fat in midline from the level of the innominate vein to the level of the diaphragm was divided in midline.  A pericardial well was created. the patient was systemically anticoagulated with IV heparin. I elected to cannulate the heart centrally accessing the mid ascending aorta and the right atrial appendage. Each cannula was placed in continuity with the appropriate pump line. Retrograde autologous prime was completed as indicated.  A combined cardioplegia/aortic root vent set was secured with a horizontal mattress 4-0 Prolene suture.  With an appropriate ACT and all in readiness, cardiopulmonary bypass was commenced.        Cooling was initiated for planned circulatory arrest.  1 gram of solumedrol was administered. BIS monitoring was in place and used to guide burst suppression treatment with Propofol.    Aneurysmal burden was noted to include his aortic root but also to his ascending aorta.  The arch, ascending aorta and root were isolated as much possible.  With that the right superior pulmonary vein was dissected out and a pursestring suture was placed through which a LV vent was placed via a transverse  venotomy.  The SVC was circumferentially dissected out. Caval tape was placed.  A 4-0 prolene purse string was placed.  A transverse venotomy was performed and a 24 Urdu venous cannula was placed for placed retrograde cerebral perfusion during DHCA. With that I proceeded to apply the aortic cross-clamp and administered [place] cardioplegia antegrade.  He had suitable electrical-mechanical arrest.  Topical hypothermia was also implemented.  The left coronary and thereafter the right coronary artery ostia were selectively accessed with an additional dose of cardioplegia given thereafter.     With that I excised the ascending aorta leaving a residual portion of aorta adjacent to the aortic cross-clamp as well as to the level of the sinotubular junction.        I directed my attention to the root which was excised leaving a limited amount of aortic tissue as a cuff about both the left and right main coronary arteries.  The leaflets were excised sharply.  The annulus was measured and a 25 mm CE Inspiris valve with 30 mm hemashield were selected.  This was constructed for valve conduit.  With all vents discontinued, multiple aliquots of irrigant were used and cleared via separate wall suction to ensure no debris is present within the left ventricle cavity, left ventricular outflow tract, or aortic root. With that vents were restored. I placed about the aortic valve annulus horizontal mattress non-everting pledgeted 2-0 Ethibond sutures.  Each valve stitch was passed through the sewing ring of the aortic valve conduit.  The valve conduit was then lowered and seated, and each valve stitch secured with a CorKnot. Bioglue was applied to the aorto-ventricular anastomosis.      At this time we achieved goal temp of approaching 18 centigrade.  The patient was placed in steep trendelenburg, the head was packed in ice and propofol was administered until Bis monitoring was zero. With all in readiness and a 28 mm Hemashield  platinum graft with side arm prepared, pump support was discontinued.  To that end, the aortic clamp was released.  Retrograde cerebral perfusion was initiated.  The patient was not exsanguinated.  The proximal aortic arch was excised to complete a formal hemiarch with all arch vessels including the left subclavian artery fully visualized. The open distal anastomosis was performed with running 3-0 Prolene suture.  The side arm was cannulated with the existing arterial line and flow was initiated as we deaired the arch head vessels and graft. This was satisfactorily performed.  Retrograde perfusion was discontinued and full support was initiated.  The open distal anastomosis was hemostatic satisfactorily.  Bioglue was applied.  Rewarming was initiated.      The left main button was fashioned to size. A predetermined site on the graft was opened using opthalmic eye cautery.  A side graft end coronary artery anastomosis using running 5-0 prolene was constructed.  In a mirrored fashion, the right coronary artery was reimplanted.  Bioglue was used with each anastomosis.   With that the graft was fashioned to size and beveled appropriately and distally.  A graft to graft anastomosis using end-to-end orientation with running 3-0 prolene sure was constructed. With that a 4-0 prolene purse string suture was placed following graftotomy using eye cautery and then a combined antegrade cardioplegia/aortic root vent set was secured.  The left atrial appendage was excluded with an Atriclip device.       With that being accomplished, a terminal hotshot was administered. He was placed in trendelenburg position. Upon completion of terminal hotshot and placement of temporary epicardial ventricular pacing wires, with the aortic vent on high and pump flows diminished, the aortic cross-clamp was released. With that, full support was implemented. A nonworking beating phase was implemented. Surgical anastomoses were assessed.  The root was  hemostatic.  The graft/graft anastomosis was hemostatic following horizontal mattress 4-0 Prolene sutures were placed.  Ventilation was restored. I surveyed the chest and all sutured sites were hemostatic.  Time was spent re-warming.  With all in readiness, the heart was allowed to fill. De-aring maneuvers were performed as guided by transesophageal echocardiography. With that the heart was decompressed and we removed the LV vent first and then sequentially the aortic root vent/cardioplegia cannulas set. Its associated pursestring sutures were tied securely and this reinforced as per matter routine. During deairing we were able to separate.  He had excellent hemodynamics.  I did decannulate the venous line and snared down its associated pursestring suture. Systemic intravenous protamine was administered. Thrombin, surgicel and gelfoam were applied as indicated to surgical sites.  All associated blood volume was returned to the patient.   The SVC cannula was removed and its pursestring suture was removed. With continued good hemodynamics, I tied down the arterial side arm graft x 2 and divided it.    He was administered appropriate blood products to achieve a suitable hemostasis.  The mediastinum was drained with the described chest tubes placed.  I surveyed the chest and hemostasis was pristine. With that I impregnated the sternal edges with vancomycin, thrombin, and Gelfoam paste.  The previously snared atrial pursestring suture was tied down.  The sternum was reapproximated using XP rigid fixation system.  In layers anatomically the soft tissue planes were reapproximated.      Complications: None      Disposition: Transferred to ICU in stable and guarded condition.              Filiberto Gtz MD

## 2024-06-22 NOTE — PLAN OF CARE
Goal Outcome Evaluation:  Plan of Care Reviewed With: patient        Progress: improving  Outcome Evaluation: Reviewed sternal precautions.  CGA for sit to stand and amb 500' SBA with no rest break required. Educated on POC with bed mobility and stairs.

## 2024-06-22 NOTE — THERAPY TREATMENT NOTE
Acute Care - Physical Therapy Treatment Note  [facility]     Patient Name: Michael Issa  : 1960  MRN: [id number]  Today's Date: 2024      Visit Dx:     ICD-10-CM ICD-9-CM   1. Impaired mobility [Z74.09]  Z74.09 799.89   2. Aneurysm of ascending aorta without rupture  I71.21 441.2     Patient Active Problem List   Diagnosis    Hypertension    Thyroid nodule    Former smoker    Family history of colonic polyps    Erectile dysfunction    Calcified granuloma of lung    Aorta aneurysm    Chronic seasonal allergic rhinitis due to pollen    Chest pressure    History of colon polyps    HUMPHRIES (dyspnea on exertion)    Thoracic aortic aneurysm without rupture    Class 1 obesity due to excess calories with body mass index (BMI) of 30.0 to 30.9 in adult    Aortic root aneurysm    Venous insufficiency (chronic) (peripheral)    Venous ulcer of right leg     Past Medical History:   Diagnosis Date    Aneurysm 2023    Arthritis     Disease of thyroid gland     NODULE PRESENT     ED (erectile dysfunction)     Elevated cholesterol     Hx of chest pain     Hypertension     Urgency of urination      Past Surgical History:   Procedure Laterality Date    CARDIAC CATHETERIZATION Left 2024    Procedure: Cardiac Catheterization/Vascular Study;  Surgeon: Nima Nickerson MD;  Location: [facility] CATH INVASIVE LOCATION;  Service: Cardiology;  Laterality: Left;    COLONOSCOPY N/A 2021    Procedure: COLONOSCOPY;  Surgeon: Eduardo Neil MD;  Location: [facility] ENDOSCOPY;  Service: Gastroenterology;  Laterality: N/A;  pre  post diverticulosis, hemorrhoids  Dr. cavazos    KNEE SURGERY      rt knee MENISCUS    PREPERITONEAL HERNIA REPAIR Bilateral 2017    Procedure: BILATERAL PREPERITONEAL INGUINAL HERNIA REPAIR LAPAROSCOPIC WITH MESH;  Surgeon: Rambo Church MD;  Location: [facility] OR;  Service:      PT Assessment (Last 12 Hours)       PT Evaluation and Treatment       Row Name 24 0941           Physical Therapy Time and Intention    Subjective Information complains of;pain  -WK     Document Type therapy note (daily note)  -WK     Mode of Treatment physical therapy  -WK       Row Name 06/22/24 0941          Pain    Pretreatment Pain Rating 4/10  -WK     Posttreatment Pain Rating 6/10  -WK     Pain Location incisional  -WK     Pain Location - chest  -WK     Pain Intervention(s) Ambulation/increased activity  nsg stated she is bringing pain medicine to him  -WK       Row Name 06/22/24 0941          Bed Mobility    Comment, (Bed Mobility) in chair  -WK       Row Name 06/22/24 0941          Sit-Stand Transfer    Sit-Stand Simpson (Transfers) contact guard  -WK       Row Name 06/22/24 0941          Stand-Sit Transfer    Stand-Sit Simpson (Transfers) contact guard  -WK       Row Name 06/22/24 0941          Gait/Stairs (Locomotion)    Simpson Level (Gait) contact guard  -WK     Distance in Feet (Gait) 500  -WK     Deviations/Abnormal Patterns (Gait) gait speed decreased  -WK       Row Name 06/22/24 0941          Motor Skills    Comments, Therapeutic Exercise warm up 20 reps  -WK       Row Name             Wound 06/20/24 0845 midline sternal Incision    Wound - Properties Group Placement Date: 06/20/24  -SF Placement Time: 0845  -SF Present on Original Admission: N  -SF Orientation: midline  -SF Location: sternal  -SF Primary Wound Type: Incision  -SF    Retired Wound - Properties Group Placement Date: 06/20/24  -SF Placement Time: 0845  -SF Present on Original Admission: N  -SF Orientation: midline  -SF Location: sternal  -SF Primary Wound Type: Incision  -SF    Retired Wound - Properties Group Date first assessed: 06/20/24  -SF Time first assessed: 0845  -SF Present on Original Admission: N  -SF Location: sternal  -SF Primary Wound Type: Incision  -SF      Row Name             Wound 06/20/24 Right lower leg Venous Ulcer    Wound - Properties Group Placement Date: 06/20/24  -SF Present on Original  Admission: Y  -SF Side: Right  -SF Orientation: lower  -SF Location: leg  -SF Primary Wound Type: Venous ulcer  -SF Additional Comments: present upon arrival to [facility]    Retired Wound - Properties Group Placement Date: 06/20/24  -SF Present on Original Admission: Y  -SF Side: Right  -SF Orientation: lower  -SF Location: leg  -SF Primary Wound Type: Venous ulcer  -SF Additional Comments: present upon arrival to OR  [facility]    Retired Wound - Properties Group Date first assessed: 06/20/24  -SF Present on Original Admission: Y  -SF Side: Right  -SF Location: leg  -SF Primary Wound Type: Venous ulcer  -SF Additional Comments: present upon arrival to [facility]      Row Name             Wound 06/20/24 1643 Left lower leg Venous Ulcer    Wound - Properties Group Placement Date: 06/20/24  -AB Placement Time: 1643  -AB Present on Original Admission: Y  -AB Side: Left  -AB Orientation: lower  -AB Location: leg  -AB Primary Wound Type: Venous ulcer  -AB    Retired Wound - Properties Group Placement Date: 06/20/24  -AB Placement Time: 1643  -AB Present on Original Admission: Y  -AB Side: Left  -AB Orientation: lower  -AB Location: leg  -AB Primary Wound Type: Venous ulcer  -AB    Retired Wound - Properties Group Date first assessed: 06/20/24  -AB Time first assessed: 1643  -AB Present on Original Admission: Y  -AB Side: Left  -AB Location: leg  -AB Primary Wound Type: Venous ulcer  -AB      Row Name 06/22/24 0941          Plan of Care Review    Plan of Care Reviewed With patient  -WK     Progress improving  -WK     Outcome Evaluation Reviewed sternal precautions.  CGA for sit to stand and amb 500' SBA with no rest break required. Educated on POC with bed mobility and stairs.  -WK       Row Name 06/22/24 0941          Positioning and Restraints    Pre-Treatment Position sitting in chair/recliner  -WK     Post Treatment Position chair  -WK     In Chair reclined;call light within reach;encouraged to call for assist;with  family/caregiver  -WK               User Key  (r) = Recorded By, (t) = Taken By, (c) = Cosigned By      Initials Name Provider Type    WK Ana Chao, PTA Physical Therapist Assistant    Matti Vera, RN Registered Nurse    Quique Bryant, RN Registered Nurse                    Physical Therapy Education       Title: PT OT SLP Therapies (In Progress)       Topic: Physical Therapy (In Progress)       Point: Mobility training (Done)       Learning Progress Summary             Patient Acceptance, E, VU,DU,NR by SIMONE at 6/21/2024 0806    Comment: benefits of activity, progression of PT POC, sternal prec                         Point: Home exercise program (Not Started)       Learner Progress:  Not documented in this visit.              Point: Body mechanics (Not Started)       Learner Progress:  Not documented in this visit.              Point: Precautions (Done)       Learning Progress Summary             Patient Acceptance, E, VU,DU,NR by SIMONE at 6/21/2024 0806    Comment: benefits of activity, progression of PT POC, sternal prec                                         User Key       Initials Effective Dates Name Provider Type Discipline    SIMONE 02/03/23 -  Jos Chapa, PT DPT Physical Therapist PT                  PT Recommendation and Plan     Plan of Care Reviewed With: patient  Progress: improving  Outcome Evaluation: Reviewed sternal precautions.  CGA for sit to stand and amb 500' SBA with no rest break required. Educated on POC with bed mobility and stairs.   Outcome Measures       Row Name 06/22/24 0952             How much help from another person do you currently need...    Turning from your back to your side while in flat bed without using bedrails? 3  -WK      Moving from lying on back to sitting on the side of a flat bed without bedrails? 3  -WK      Moving to and from a bed to a chair (including a wheelchair)? 4  -WK      Standing up from a chair using your arms (e.g., wheelchair, bedside  chair)? 4  -WK      Climbing 3-5 steps with a railing? 3  -WK      To walk in hospital room? 3  -WK      AM-PAC 6 Clicks Score (PT) 20  -WK      Highest Level of Mobility Goal 6 --> Walk 10 steps or more  -WK         Functional Assessment    Outcome Measure Options AM-PAC 6 Clicks Basic Mobility (PT)  -WK                User Key  (r) = Recorded By, (t) = Taken By, (c) = Cosigned By      Initials Name Provider Type    WK Ana Chao PTA Physical Therapist Assistant                     Time Calculation:    PT Charges       Row Name 06/22/24 1005             Time Calculation    Start Time 0941  -WK      Stop Time 0951  -WK      Time Calculation (min) 10 min  -WK      PT Received On 06/22/24  -WK         Time Calculation- PT    Total Timed Code Minutes- PT 10 minute(s)  -WK         Timed Charges    93816 - Gait Training Minutes  10  -WK         Total Minutes    Timed Charges Total Minutes 10  -WK       Total Minutes 10  -WK                User Key  (r) = Recorded By, (t) = Taken By, (c) = Cosigned By      Initials Name Provider Type     Ana Chao PTA Physical Therapist Assistant                  Therapy Charges for Today       Code Description Service Date Service Provider Modifiers Qty    05257640748 HC GAIT TRAINING EA 15 MIN 6/22/2024 Ana Chao PTA GP 1            PT G-Codes  Outcome Measure Options: AM-PAC 6 Clicks Basic Mobility (PT)  AM-PAC 6 Clicks Score (PT): 20    Ana Chao PTA  6/22/2024

## 2024-06-22 NOTE — THERAPY TREATMENT NOTE
Acute Care - Physical Therapy Treatment Note  [facility]     Patient Name: Michael Isas  : 1960  MRN: [id number]  Today's Date: 2024      Visit Dx:     ICD-10-CM ICD-9-CM   1. Impaired mobility [Z74.09]  Z74.09 799.89   2. Aneurysm of ascending aorta without rupture  I71.21 441.2     Patient Active Problem List   Diagnosis    Hypertension    Thyroid nodule    Former smoker    Family history of colonic polyps    Erectile dysfunction    Calcified granuloma of lung    Aorta aneurysm    Chronic seasonal allergic rhinitis due to pollen    Chest pressure    History of colon polyps    HUMPHRIES (dyspnea on exertion)    Thoracic aortic aneurysm without rupture    Class 1 obesity due to excess calories with body mass index (BMI) of 30.0 to 30.9 in adult    Aortic root aneurysm    Venous insufficiency (chronic) (peripheral)    Venous ulcer of right leg     Past Medical History:   Diagnosis Date    Aneurysm 2023    Arthritis     Disease of thyroid gland     NODULE PRESENT     ED (erectile dysfunction)     Elevated cholesterol     Hx of chest pain     Hypertension     Urgency of urination      Past Surgical History:   Procedure Laterality Date    CARDIAC CATHETERIZATION Left 2024    Procedure: Cardiac Catheterization/Vascular Study;  Surgeon: Nima Nickerson MD;  Location: [facility] CATH INVASIVE LOCATION;  Service: Cardiology;  Laterality: Left;    COLONOSCOPY N/A 2021    Procedure: COLONOSCOPY;  Surgeon: Eduardo Neil MD;  Location: [facility] ENDOSCOPY;  Service: Gastroenterology;  Laterality: N/A;  pre  post diverticulosis, hemorrhoids  Dr. cavazos    KNEE SURGERY      rt knee MENISCUS    PREPERITONEAL HERNIA REPAIR Bilateral 2017    Procedure: BILATERAL PREPERITONEAL INGUINAL HERNIA REPAIR LAPAROSCOPIC WITH MESH;  Surgeon: Rambo Church MD;  Location: [facility] OR;  Service:      PT Assessment (Last 12 Hours)       PT Evaluation and Treatment       Row Name 24 1320 24 0941        Physical Therapy Time and Intention    Subjective Information complains of;pain  -WK complains of;pain  -WK    Document Type therapy note (daily note)  -WK therapy note (daily note)  -WK    Mode of Treatment physical therapy  -WK physical therapy  -WK      Row Name 06/22/24 1320          General Information    Existing Precautions/Restrictions fall;sternal  2 chest tubes  -WK       Row Name 06/22/24 1320 06/22/24 0941       Pain    Pretreatment Pain Rating 4/10  -WK 4/10  -WK    Posttreatment Pain Rating 4/10  -WK 6/10  -WK    Pain Location incisional  -WK incisional  -WK    Pain Location - chest  -WK chest  -WK    Pain Intervention(s) -- Ambulation/increased activity  nsg stated she is bringing pain medicine to him  -WK      Row Name 06/22/24 1320 06/22/24 0941       Bed Mobility    Comment, (Bed Mobility) in chair  -WK in chair  -WK      Row Name 06/22/24 1320 06/22/24 0941       Sit-Stand Transfer    Sit-Stand [place] (Transfers) standby assist  -WK contact guard  -WK      Row Name 06/22/24 1320 06/22/24 0941       Stand-Sit Transfer    Stand-Sit [place] (Transfers) standby assist  -WK contact guard  -WK      Row Name 06/22/24 1320 06/22/24 0941       Gait/Stairs (Locomotion)    [place] Level (Gait) standby assist  -WK contact guard  -WK    Distance in Feet (Gait) 500  -  -WK    Deviations/Abnormal Patterns (Gait) -- gait speed decreased  -WK    [place] Level (Stairs) stand by assist  -WK --    Number of Steps (Stairs) 18  -WK --    Ascending Technique (Stairs) step-over-step  -WK --    Descending Technique (Stairs) step-over-step  -WK --    Comment, (Gait/Stairs) educated on safety and POC.  -WK --      Row Name 06/22/24 1320 06/22/24 0941       Motor Skills    Comments, Therapeutic Exercise 20 reps warm up  -WK warm up 20 reps  -WK      Row Name             Wound 06/20/24 0845 midline sternal Incision    Wound - Properties Group Placement Date: 06/20/24  -SF Placement Time: 0845   -SF Present on Original Admission: N  -SF Orientation: midline  -SF Location: sternal  -SF Primary Wound Type: Incision  -SF    Retired Wound - Properties Group Placement Date: 06/20/24  -SF Placement Time: 0845  -SF Present on Original Admission: N  -SF Orientation: midline  -SF Location: sternal  -SF Primary Wound Type: Incision  -SF    Retired Wound - Properties Group Date first assessed: 06/20/24  -SF Time first assessed: 0845  -SF Present on Original Admission: N  -SF Location: sternal  -SF Primary Wound Type: Incision  -SF      Row Name             Wound 06/20/24 Right lower leg Venous Ulcer    Wound - Properties Group Placement Date: 06/20/24  -SF Present on Original Admission: Y  -SF Side: Right  -SF Orientation: lower  -SF Location: leg  -SF Primary Wound Type: Venous ulcer  -SF Additional Comments: present upon arrival to [facility]    Retired Wound - Properties Group Placement Date: 06/20/24  -SF Present on Original Admission: Y  -SF Side: Right  -SF Orientation: lower  -SF Location: leg  -SF Primary Wound Type: Venous ulcer  -SF Additional Comments: present upon arrival to [facility]    Retired Wound - Properties Group Date first assessed: 06/20/24  -SF Present on Original Admission: Y  -SF Side: Right  -SF Location: leg  -SF Primary Wound Type: Venous ulcer  -SF Additional Comments: present upon arrival to [facility]      Row Name             Wound 06/20/24 1643 Left lower leg Venous Ulcer    Wound - Properties Group Placement Date: 06/20/24  -AB Placement Time: 1643 -AB Present on Original Admission: Y  -AB Side: Left  -AB Orientation: lower  -AB Location: leg  -AB Primary Wound Type: Venous ulcer  -AB    Retired Wound - Properties Group Placement Date: 06/20/24  -AB Placement Time: 1643 -AB Present on Original Admission: Y  -AB Side: Left  -AB Orientation: lower  -AB Location: leg  -AB Primary Wound Type: Venous ulcer  -AB    Retired Wound - Properties Group Date first assessed: 06/20/24  -AB Time first  assessed: 1643  -AB Present on Original Admission: Y  -AB Side: Left  -AB Location: leg  -AB Primary Wound Type: Venous ulcer  -AB      Row Name 06/22/24 0941          Plan of Care Review    Plan of Care Reviewed With patient  -WK     Progress improving  -WK     Outcome Evaluation Reviewed sternal precautions.  CGA for sit to stand and amb 500' SBA with no rest break required. Educated on POC with bed mobility and stairs.  -WK       Row Name 06/22/24 1320          Vital Signs    Pretreatment Heart Rate (beats/min) 68  -WK     Posttreatment Heart Rate (beats/min) 66  -WK       Row Name 06/22/24 1320 06/22/24 0941       Positioning and Restraints    Pre-Treatment Position sitting in chair/recliner  -WK sitting in chair/recliner  -WK    Post Treatment Position chair  -WK chair  -WK    In Chair reclined;call light within reach;encouraged to call for assist  -WK reclined;call light within reach;encouraged to call for assist;with family/caregiver  -WK              User Key  (r) = Recorded By, (t) = Taken By, (c) = Cosigned By      Initials Name Provider Type    WK Ana Chao PTA Physical Therapist Assistant    AB Matti Trevino RN Registered Nurse    Quique Bryant RN Registered Nurse                    Physical Therapy Education       Title: PT OT SLP Therapies (In Progress)       Topic: Physical Therapy (In Progress)       Point: Mobility training (Done)       Learning Progress Summary             Patient Acceptance, E, CASSIE,MARIANO,NR by SIMONE at 6/21/2024 0806    Comment: benefits of activity, progression of PT POC, sternal prec                         Point: Home exercise program (Not Started)       Learner Progress:  Not documented in this visit.              Point: Body mechanics (Not Started)       Learner Progress:  Not documented in this visit.              Point: Precautions (Done)       Learning Progress Summary             Patient Acceptance, E, VU,DU,NR by SIMONE at 6/21/2024 0806    Comment: benefits of  activity, progression of PT POC, sternal prec                                         User Key       Initials Effective Dates Name Provider Type Discipline    SIMONE 02/03/23 -  Jos Chapa PT DPT Physical Therapist PT                  PT Recommendation and Plan     Plan of Care Reviewed With: patient  Progress: improving  Outcome Evaluation: Reviewed sternal precautions.  CGA for sit to stand and amb 500' SBA with no rest break required. Educated on POC with bed mobility and stairs.   Outcome Measures       Row Name 06/22/24 0952             How much help from another person do you currently need...    Turning from your back to your side while in flat bed without using bedrails? 3  -WK      Moving from lying on back to sitting on the side of a flat bed without bedrails? 3  -WK      Moving to and from a bed to a chair (including a wheelchair)? 4  -WK      Standing up from a chair using your arms (e.g., wheelchair, bedside chair)? 4  -WK      Climbing 3-5 steps with a railing? 3  -WK      To walk in hospital room? 3  -WK      AM-PAC 6 Clicks Score (PT) 20  -WK      Highest Level of Mobility Goal 6 --> Walk 10 steps or more  -WK         Functional Assessment    Outcome Measure Options AM-PAC 6 Clicks Basic Mobility (PT)  -WK                User Key  (r) = Recorded By, (t) = Taken By, (c) = Cosigned By      Initials Name Provider Type    WK Ana Chao, PTA Physical Therapist Assistant                     Time Calculation:    PT Charges       Row Name 06/22/24 1345 06/22/24 1005          Time Calculation    Start Time 1320  -WK 0941  -WK     Stop Time 1343  -WK 0951  -WK     Time Calculation (min) 23 min  -WK 10 min  -WK     PT Received On 06/22/24  -WK 06/22/24  -WK        Time Calculation- PT    Total Timed Code Minutes- PT 23 minute(s)  -WK 10 minute(s)  -WK        Timed Charges    11538 - Gait Training Minutes  23  -WK 10  -WK        Total Minutes    Timed Charges Total Minutes 23  -WK 10  -WK      Total  Minutes 23  -WK 10  -WK               User Key  (r) = Recorded By, (t) = Taken By, (c) = Cosigned By      Initials Name Provider Type    WK Ana Chao PTA Physical Therapist Assistant                  Therapy Charges for Today       Code Description Service Date Service Provider Modifiers Qty    25081548959 HC GAIT TRAINING EA 15 MIN 6/22/2024 Ana Chao PTA GP 1    91021053484 HC GAIT TRAINING EA 15 MIN 6/22/2024 Ana Chao PTA GP 2            PT G-Codes  Outcome Measure Options: AM-PAC 6 Clicks Basic Mobility (PT)  AM-PAC 6 Clicks Score (PT): 20    Ana Chao PTA  6/22/2024

## 2024-06-23 ENCOUNTER — APPOINTMENT (OUTPATIENT)
Dept: GENERAL RADIOLOGY | Facility: HOSPITAL | Age: 64
DRG: 220 | End: 2024-06-23
Payer: COMMERCIAL

## 2024-06-23 LAB
ALBUMIN SERPL-MCNC: 3.3 G/DL (ref 3.5–5.2)
ALBUMIN/GLOB SERPL: 1.2 G/DL
ALP SERPL-CCNC: 36 U/L (ref 39–117)
ALT SERPL W P-5'-P-CCNC: 14 U/L (ref 1–41)
ANION GAP SERPL CALCULATED.3IONS-SCNC: 9 MMOL/L (ref 5–15)
AST SERPL-CCNC: 17 U/L (ref 1–40)
BASOPHILS # BLD AUTO: 0.02 10*3/MM3 (ref 0–0.2)
BASOPHILS NFR BLD AUTO: 0.1 % (ref 0–1.5)
BILIRUB SERPL-MCNC: 0.3 MG/DL (ref 0–1.2)
BUN SERPL-MCNC: 23 MG/DL (ref 8–23)
BUN/CREAT SERPL: 29.9 (ref 7–25)
CALCIUM SPEC-SCNC: 8.4 MG/DL (ref 8.6–10.5)
CHLORIDE SERPL-SCNC: 100 MMOL/L (ref 98–107)
CO2 SERPL-SCNC: 28 MMOL/L (ref 22–29)
CREAT SERPL-MCNC: 0.77 MG/DL (ref 0.76–1.27)
DEPRECATED RDW RBC AUTO: 46.8 FL (ref 37–54)
EGFRCR SERPLBLD CKD-EPI 2021: 101.2 ML/MIN/1.73
EOSINOPHIL # BLD AUTO: 0 10*3/MM3 (ref 0–0.4)
EOSINOPHIL NFR BLD AUTO: 0 % (ref 0.3–6.2)
ERYTHROCYTE [DISTWIDTH] IN BLOOD BY AUTOMATED COUNT: 13.9 % (ref 12.3–15.4)
GLOBULIN UR ELPH-MCNC: 2.7 GM/DL
GLUCOSE BLDC GLUCOMTR-MCNC: 110 MG/DL (ref 70–130)
GLUCOSE BLDC GLUCOMTR-MCNC: 130 MG/DL (ref 70–130)
GLUCOSE BLDC GLUCOMTR-MCNC: 144 MG/DL (ref 70–130)
GLUCOSE SERPL-MCNC: 126 MG/DL (ref 65–99)
HCT VFR BLD AUTO: 35.8 % (ref 37.5–51)
HGB BLD-MCNC: 11.6 G/DL (ref 13–17.7)
IMM GRANULOCYTES # BLD AUTO: 0.15 10*3/MM3 (ref 0–0.05)
IMM GRANULOCYTES NFR BLD AUTO: 0.8 % (ref 0–0.5)
LYMPHOCYTES # BLD AUTO: 0.78 10*3/MM3 (ref 0.7–3.1)
LYMPHOCYTES NFR BLD AUTO: 4.3 % (ref 19.6–45.3)
MCH RBC QN AUTO: 29.7 PG (ref 26.6–33)
MCHC RBC AUTO-ENTMCNC: 32.4 G/DL (ref 31.5–35.7)
MCV RBC AUTO: 91.8 FL (ref 79–97)
MONOCYTES # BLD AUTO: 1.04 10*3/MM3 (ref 0.1–0.9)
MONOCYTES NFR BLD AUTO: 5.8 % (ref 5–12)
NEUTROPHILS NFR BLD AUTO: 16.02 10*3/MM3 (ref 1.7–7)
NEUTROPHILS NFR BLD AUTO: 89 % (ref 42.7–76)
NRBC BLD AUTO-RTO: 0 /100 WBC (ref 0–0.2)
PLATELET # BLD AUTO: 171 10*3/MM3 (ref 140–450)
PMV BLD AUTO: 11.6 FL (ref 6–12)
POTASSIUM SERPL-SCNC: 4.7 MMOL/L (ref 3.5–5.2)
PROT SERPL-MCNC: 6 G/DL (ref 6–8.5)
QT INTERVAL: 446 MS
QTC INTERVAL: 456 MS
RBC # BLD AUTO: 3.9 10*6/MM3 (ref 4.14–5.8)
SODIUM SERPL-SCNC: 137 MMOL/L (ref 136–145)
WBC NRBC COR # BLD AUTO: 18.01 10*3/MM3 (ref 3.4–10.8)

## 2024-06-23 PROCEDURE — 71046 X-RAY EXAM CHEST 2 VIEWS: CPT

## 2024-06-23 PROCEDURE — 99024 POSTOP FOLLOW-UP VISIT: CPT | Performed by: THORACIC SURGERY (CARDIOTHORACIC VASCULAR SURGERY)

## 2024-06-23 PROCEDURE — 94799 UNLISTED PULMONARY SVC/PX: CPT

## 2024-06-23 PROCEDURE — 97116 GAIT TRAINING THERAPY: CPT

## 2024-06-23 PROCEDURE — 82948 REAGENT STRIP/BLOOD GLUCOSE: CPT

## 2024-06-23 PROCEDURE — 80053 COMPREHEN METABOLIC PANEL: CPT | Performed by: NURSE PRACTITIONER

## 2024-06-23 PROCEDURE — 25010000002 FUROSEMIDE PER 20 MG: Performed by: NURSE PRACTITIONER

## 2024-06-23 PROCEDURE — 85025 COMPLETE CBC W/AUTO DIFF WBC: CPT | Performed by: NURSE PRACTITIONER

## 2024-06-23 PROCEDURE — 25010000002 ENOXAPARIN PER 10 MG: Performed by: NURSE PRACTITIONER

## 2024-06-23 RX ORDER — OXYCODONE HYDROCHLORIDE 10 MG/1
10 TABLET ORAL EVERY 6 HOURS PRN
Status: DISCONTINUED | OUTPATIENT
Start: 2024-06-23 | End: 2024-06-24 | Stop reason: HOSPADM

## 2024-06-23 RX ORDER — METOPROLOL SUCCINATE 25 MG/1
25 TABLET, EXTENDED RELEASE ORAL
Status: DISCONTINUED | OUTPATIENT
Start: 2024-06-24 | End: 2024-06-24 | Stop reason: HOSPADM

## 2024-06-23 RX ORDER — AMIODARONE HYDROCHLORIDE 200 MG/1
200 TABLET ORAL 3 TIMES DAILY
Status: DISCONTINUED | OUTPATIENT
Start: 2024-06-24 | End: 2024-06-24

## 2024-06-23 RX ORDER — OXYCODONE HYDROCHLORIDE 5 MG/1
5 TABLET ORAL EVERY 4 HOURS PRN
Status: DISCONTINUED | OUTPATIENT
Start: 2024-06-23 | End: 2024-06-24 | Stop reason: HOSPADM

## 2024-06-23 RX ORDER — FUROSEMIDE 10 MG/ML
40 INJECTION INTRAMUSCULAR; INTRAVENOUS
Status: DISCONTINUED | OUTPATIENT
Start: 2024-06-23 | End: 2024-06-24

## 2024-06-23 RX ORDER — BISACODYL 10 MG
10 SUPPOSITORY, RECTAL RECTAL DAILY PRN
Status: DISCONTINUED | OUTPATIENT
Start: 2024-06-23 | End: 2024-06-24 | Stop reason: HOSPADM

## 2024-06-23 RX ORDER — POTASSIUM CHLORIDE 750 MG/1
20 CAPSULE, EXTENDED RELEASE ORAL
Status: DISCONTINUED | OUTPATIENT
Start: 2024-06-23 | End: 2024-06-24

## 2024-06-23 RX ADMIN — POTASSIUM CHLORIDE 20 MEQ: 750 CAPSULE, EXTENDED RELEASE ORAL at 08:28

## 2024-06-23 RX ADMIN — CLOPIDOGREL BISULFATE 75 MG: 75 TABLET, FILM COATED ORAL at 17:46

## 2024-06-23 RX ADMIN — BISACODYL 10 MG: 5 TABLET, COATED ORAL at 08:27

## 2024-06-23 RX ADMIN — ASPIRIN 81 MG: 81 TABLET, COATED ORAL at 08:28

## 2024-06-23 RX ADMIN — PREGABALIN 25 MG: 25 CAPSULE ORAL at 20:14

## 2024-06-23 RX ADMIN — GUAIFENESIN 600 MG: 600 TABLET, EXTENDED RELEASE ORAL at 20:15

## 2024-06-23 RX ADMIN — POTASSIUM CHLORIDE 20 MEQ: 750 CAPSULE, EXTENDED RELEASE ORAL at 12:05

## 2024-06-23 RX ADMIN — FUROSEMIDE 40 MG: 10 INJECTION, SOLUTION INTRAMUSCULAR; INTRAVENOUS at 08:28

## 2024-06-23 RX ADMIN — ACETAMINOPHEN 1000 MG: 500 TABLET, FILM COATED ORAL at 20:14

## 2024-06-23 RX ADMIN — LIDOCAINE 2 PATCH: 4 PATCH TOPICAL at 08:31

## 2024-06-23 RX ADMIN — ENOXAPARIN SODIUM 40 MG: 100 INJECTION SUBCUTANEOUS at 08:29

## 2024-06-23 RX ADMIN — PANTOPRAZOLE SODIUM 40 MG: 40 TABLET, DELAYED RELEASE ORAL at 08:28

## 2024-06-23 RX ADMIN — Medication 10 MG: at 13:33

## 2024-06-23 RX ADMIN — POTASSIUM CHLORIDE 20 MEQ: 750 CAPSULE, EXTENDED RELEASE ORAL at 17:50

## 2024-06-23 RX ADMIN — NAPROXEN 250 MG: 250 TABLET ORAL at 08:33

## 2024-06-23 RX ADMIN — IPRATROPIUM BROMIDE AND ALBUTEROL SULFATE 1.5 ML: .5; 3 SOLUTION RESPIRATORY (INHALATION) at 07:00

## 2024-06-23 RX ADMIN — ACETAMINOPHEN 1000 MG: 500 TABLET, FILM COATED ORAL at 15:52

## 2024-06-23 RX ADMIN — FUROSEMIDE 40 MG: 10 INJECTION, SOLUTION INTRAMUSCULAR; INTRAVENOUS at 12:05

## 2024-06-23 RX ADMIN — GUAIFENESIN 600 MG: 600 TABLET, EXTENDED RELEASE ORAL at 08:27

## 2024-06-23 RX ADMIN — METOPROLOL TARTRATE 12.5 MG: 25 TABLET, FILM COATED ORAL at 08:29

## 2024-06-23 RX ADMIN — POLYETHYLENE GLYCOL 3350 17 G: 17 POWDER, FOR SOLUTION ORAL at 08:28

## 2024-06-23 RX ADMIN — TAMSULOSIN HYDROCHLORIDE 0.4 MG: 0.4 CAPSULE ORAL at 08:30

## 2024-06-23 RX ADMIN — FUROSEMIDE 40 MG: 10 INJECTION, SOLUTION INTRAMUSCULAR; INTRAVENOUS at 17:46

## 2024-06-23 RX ADMIN — CHLORHEXIDINE GLUCONATE 0.12% ORAL RINSE 15 ML: 1.2 LIQUID ORAL at 17:46

## 2024-06-23 RX ADMIN — PREGABALIN 25 MG: 25 CAPSULE ORAL at 08:28

## 2024-06-23 RX ADMIN — AMIODARONE HYDROCHLORIDE 400 MG: 200 TABLET ORAL at 08:28

## 2024-06-23 RX ADMIN — NAPROXEN 250 MG: 250 TABLET ORAL at 17:46

## 2024-06-23 RX ADMIN — AMIODARONE HYDROCHLORIDE 400 MG: 200 TABLET ORAL at 17:46

## 2024-06-23 RX ADMIN — ACETAMINOPHEN 1000 MG: 500 TABLET, FILM COATED ORAL at 09:59

## 2024-06-23 RX ADMIN — IPRATROPIUM BROMIDE AND ALBUTEROL SULFATE 1.5 ML: .5; 3 SOLUTION RESPIRATORY (INHALATION) at 14:38

## 2024-06-23 RX ADMIN — ACETAMINOPHEN 1000 MG: 500 TABLET, FILM COATED ORAL at 04:26

## 2024-06-23 RX ADMIN — BISACODYL 10 MG: 5 TABLET, COATED ORAL at 20:14

## 2024-06-23 RX ADMIN — ATORVASTATIN CALCIUM 20 MG: 10 TABLET, FILM COATED ORAL at 20:14

## 2024-06-23 NOTE — THERAPY TREATMENT NOTE
Acute Care - Physical Therapy Treatment Note  [facility]     Patient Name: Michael Issa  : 1960  MRN: [id number]  Today's Date: 2024      Visit Dx:     ICD-10-CM ICD-9-CM   1. Impaired mobility [Z74.09]  Z74.09 799.89   2. Aneurysm of ascending aorta without rupture  I71.21 441.2     Patient Active Problem List   Diagnosis    Hypertension    Thyroid nodule    Former smoker    Family history of colonic polyps    Erectile dysfunction    Calcified granuloma of lung    Aorta aneurysm    Chronic seasonal allergic rhinitis due to pollen    Chest pressure    History of colon polyps    HUMPHRIES (dyspnea on exertion)    Thoracic aortic aneurysm without rupture    Class 1 obesity due to excess calories with body mass index (BMI) of 30.0 to 30.9 in adult    Aortic root aneurysm    Venous insufficiency (chronic) (peripheral)    Venous ulcer of right leg     Past Medical History:   Diagnosis Date    Aneurysm 2023    Arthritis     Disease of thyroid gland     NODULE PRESENT     ED (erectile dysfunction)     Elevated cholesterol     Hx of chest pain     Hypertension     Urgency of urination      Past Surgical History:   Procedure Laterality Date    CARDIAC CATHETERIZATION Left 2024    Procedure: Cardiac Catheterization/Vascular Study;  Surgeon: Nima Nickerson MD;  Location: [facility] CATH INVASIVE LOCATION;  Service: Cardiology;  Laterality: Left;    COLONOSCOPY N/A 2021    Procedure: COLONOSCOPY;  Surgeon: Eduardo Neil MD;  Location: [facility] ENDOSCOPY;  Service: Gastroenterology;  Laterality: N/A;  pre  post diverticulosis, hemorrhoids  Dr. cavazos    KNEE SURGERY      rt knee MENISCUS    PREPERITONEAL HERNIA REPAIR Bilateral 2017    Procedure: BILATERAL PREPERITONEAL INGUINAL HERNIA REPAIR LAPAROSCOPIC WITH MESH;  Surgeon: Rambo Church MD;  Location: [facility] OR;  Service:      PT Assessment (Last 12 Hours)       PT Evaluation and Treatment       Row Name 24 1430 24 0805        Physical Therapy Time and Intention    Subjective Information complains of;pain  -WK complains of;pain  -WK    Document Type therapy note (daily note)  -WK therapy note (daily note)  -WK    Mode of Treatment physical therapy  -WK physical therapy  -WK      Row Name 06/23/24 1430 06/23/24 0805       General Information    Existing Precautions/Restrictions fall;sternal  2 chest tubes  -WK fall;sternal  2 chest tubes  -WK      Row Name 06/23/24 1430 06/23/24 0805       Pain    Pretreatment Pain Rating 2/10  -WK 4/10  -WK    Posttreatment Pain Rating 2/10  -WK 4/10  -WK    Pain Location incisional  -WK incisional  -WK    Pain Location - chest  -WK chest  -WK    Pain Intervention(s) Ambulation/increased activity  -WK --      Row Name 06/23/24 1430 06/23/24 0805       Bed Mobility    Rolling Right [place] (Bed Mobility) standby assist  -WK --    Sidelying-Sit [place] (Bed Mobility) standby assist  slow to perform but did not require assist  -WK --    Sit-Sidelying [place] (Bed Mobility) standby assist  -WK --    Comment, (Bed Mobility) able to maintain sternal precautions when performing  -WK in chair  -WK      Row Name 06/23/24 1430 06/23/24 0805       Sit-Stand Transfer    Sit-Stand [place] (Transfers) independent  -WK supervision  -WK      Row Name 06/23/24 1430 06/23/24 0805       Stand-Sit Transfer    Stand-Sit [place] (Transfers) independent  -WK supervision  -WK      Row Name 06/23/24 1430 06/23/24 0805       Gait/Stairs (Locomotion)    [place] Level (Gait) supervision  -WK supervision  -WK    Distance in Feet (Gait) 500  -  -WK    Deviations/Abnormal Patterns (Gait) -- gait speed decreased  -WK    Comment, (Gait/Stairs) -- pt wanted to wait to attempt bed mobility due to breakfast being delivered  -WK      Row Name             Wound 06/20/24 0845 midline sternal Incision    Wound - Properties Group Placement Date: 06/20/24  -SF Placement Time: 0845 -SF Present on Original  Admission: N  -SF Orientation: midline  -SF Location: sternal  -SF Primary Wound Type: Incision  -SF    Retired Wound - Properties Group Placement Date: 06/20/24  -SF Placement Time: 0845  -SF Present on Original Admission: N  -SF Orientation: midline  -SF Location: sternal  -SF Primary Wound Type: Incision  -SF    Retired Wound - Properties Group Date first assessed: 06/20/24  -SF Time first assessed: 0845  -SF Present on Original Admission: N  -SF Location: sternal  -SF Primary Wound Type: Incision  -SF      Row Name             Wound 06/20/24 Right lower leg Venous Ulcer    Wound - Properties Group Placement Date: 06/20/24  -SF Present on Original Admission: Y  -SF Side: Right  -SF Orientation: lower  -SF Location: leg  -SF Primary Wound Type: Venous ulcer  -SF Additional Comments: present upon arrival to [facility]    Retired Wound - Properties Group Placement Date: 06/20/24  -SF Present on Original Admission: Y  -SF Side: Right  -SF Orientation: lower  -SF Location: leg  -SF Primary Wound Type: Venous ulcer  -SF Additional Comments: present upon arrival to [facility]    Retired Wound - Properties Group Date first assessed: 06/20/24  -SF Present on Original Admission: Y  -SF Side: Right  -SF Location: leg  -SF Primary Wound Type: Venous ulcer  -SF Additional Comments: present upon arrival to [facility]      Row Name             Wound 06/20/24 1643 Left lower leg Venous Ulcer    Wound - Properties Group Placement Date: 06/20/24  -AB Placement Time: 1643  -AB Present on Original Admission: Y  -AB Side: Left  -AB Orientation: lower  -AB Location: leg  -AB Primary Wound Type: Venous ulcer  -AB    Retired Wound - Properties Group Placement Date: 06/20/24  -AB Placement Time: 1643  -AB Present on Original Admission: Y  -AB Side: Left  -AB Orientation: lower  -AB Location: leg  -AB Primary Wound Type: Venous ulcer  -AB    Retired Wound - Properties Group Date first assessed: 06/20/24  -AB Time first assessed: 1643  -AB Present  on Original Admission: Y  -AB Side: Left  -AB Location: leg  -AB Primary Wound Type: Venous ulcer  -AB      Row Name 06/23/24 0805          Plan of Care Review    Plan of Care Reviewed With patient  -WK     Outcome Evaluation Sit to stand SPV. Pt was a quick to stand and educated on safety with chest tubes. Amb 500' SPV. Pt wanted to wait to perform bed mobility due to breakfast being delivered.  -WK       Row Name 06/23/24 1430 06/23/24 0805       Positioning and Restraints    Pre-Treatment Position sitting in chair/recliner  -WK sitting in chair/recliner  -WK    Post Treatment Position chair  -WK chair  -WK    In Chair reclined;call light within reach;encouraged to call for assist;with family/caregiver;with other staff  -WK reclined;call light within reach;encouraged to call for assist;with other staff  -WK              User Key  (r) = Recorded By, (t) = Taken By, (c) = Cosigned By      Initials Name Provider Type    WK Ana Chao, [place] Physical Therapist Assistant    Matti Vera RN Registered Nurse    Quique Bryant RN Registered Nurse                    Physical Therapy Education       Title: PT OT SLP Therapies (In Progress)       Topic: Physical Therapy (In Progress)       Point: Mobility training (Done)       Learning Progress Summary             Patient Acceptance, E, VU,DU,NR by SIMONE at 6/21/2024 0806    Comment: benefits of activity, progression of PT POC, sternal prec                         Point: Home exercise program (Not Started)       Learner Progress:  Not documented in this visit.              Point: Body mechanics (Not Started)       Learner Progress:  Not documented in this visit.              Point: Precautions (Done)       Learning Progress Summary             Patient Acceptance, E, VU,DU,NR by SIMONE at 6/21/2024 0806    Comment: benefits of activity, progression of PT POC, sternal prec                                         User Key       Initials Effective Dates Name Provider  Type Discipline    SIMONE 02/03/23 -  Jos Chapa, PT DPT Physical Therapist PT                  PT Recommendation and Plan     Plan of Care Reviewed With: patient  Progress: improving  Outcome Evaluation: Sit to stand SPV. Pt was a quick to stand and educated on safety with chest tubes. Amb 500' SPV. Pt wanted to wait to perform bed mobility due to breakfast being delivered.   Outcome Measures       Row Name 06/23/24 0805 06/22/24 0952          How much help from another person do you currently need...    Turning from your back to your side while in flat bed without using bedrails? 3  -WK 3  -WK     Moving from lying on back to sitting on the side of a flat bed without bedrails? 3  -WK 3  -WK     Moving to and from a bed to a chair (including a wheelchair)? 4  -WK 4  -WK     Standing up from a chair using your arms (e.g., wheelchair, bedside chair)? 4  -WK 4  -WK     Climbing 3-5 steps with a railing? 4  -WK 3  -WK     To walk in hospital room? 4  -WK 3  -WK     AM-PAC 6 Clicks Score (PT) 22  -WK 20  -WK     Highest Level of Mobility Goal 7 --> Walk 25 feet or more  -WK 6 --> Walk 10 steps or more  -WK        Functional Assessment    Outcome Measure Options AM-PAC 6 Clicks Basic Mobility (PT)  -WK AM-PAC 6 Clicks Basic Mobility (PT)  -WK               User Key  (r) = Recorded By, (t) = Taken By, (c) = Cosigned By      Initials Name Provider Type    WK Ana Chao, PTA Physical Therapist Assistant                     Time Calculation:    PT Charges       Row Name 06/23/24 1443 06/23/24 0816          Time Calculation    Start Time 1430  -WK 0805  -WK     Stop Time 1443  -WK 0815  -WK     Time Calculation (min) 13 min  -WK 10 min  -WK     PT Received On 06/23/24  -WK 06/23/24  -WK        Time Calculation- PT    Total Timed Code Minutes- PT 13 minute(s)  -WK 10 minute(s)  -WK        Timed Charges    00585 - Gait Training Minutes  13  -WK 10  -WK        Total Minutes    Timed Charges Total Minutes 13  -WK 10  -WK       Total Minutes 13  -WK 10  -WK               User Key  (r) = Recorded By, (t) = Taken By, (c) = Cosigned By      Initials Name Provider Type    WK Ana Chao PTA Physical Therapist Assistant                  Therapy Charges for Today       Code Description Service Date Service Provider Modifiers Qty    26469513952 HC GAIT TRAINING EA 15 MIN 6/22/2024 Ana Chao, [place] GP 1    25059451091 HC GAIT TRAINING EA 15 MIN 6/22/2024 Ana Chao, [place] GP 2    53625976387 HC GAIT TRAINING EA 15 MIN 6/23/2024 Ana Chao, [place] GP 1    55757874299 HC GAIT TRAINING EA 15 MIN 6/23/2024 Ana Chao, [place] GP 1            PT G-Codes  Outcome Measure Options: AM-PAC 6 Clicks Basic Mobility (PT)  AM-PAC 6 Clicks Score (PT): 22    Ana Chao PTA  6/23/2024

## 2024-06-23 NOTE — THERAPY TREATMENT NOTE
Acute Care - Physical Therapy Treatment Note  [facility]     Patient Name: Michael Issa  : 1960  MRN: [id number]  Today's Date: 2024      Visit Dx:     ICD-10-CM ICD-9-CM   1. Impaired mobility [Z74.09]  Z74.09 799.89   2. Aneurysm of ascending aorta without rupture  I71.21 441.2     Patient Active Problem List   Diagnosis    Hypertension    Thyroid nodule    Former smoker    Family history of colonic polyps    Erectile dysfunction    Calcified granuloma of lung    Aorta aneurysm    Chronic seasonal allergic rhinitis due to pollen    Chest pressure    History of colon polyps    HUMPHRIES (dyspnea on exertion)    Thoracic aortic aneurysm without rupture    Class 1 obesity due to excess calories with body mass index (BMI) of 30.0 to 30.9 in adult    Aortic root aneurysm    Venous insufficiency (chronic) (peripheral)    Venous ulcer of right leg     Past Medical History:   Diagnosis Date    Aneurysm 2023    Arthritis     Disease of thyroid gland     NODULE PRESENT     ED (erectile dysfunction)     Elevated cholesterol     Hx of chest pain     Hypertension     Urgency of urination      Past Surgical History:   Procedure Laterality Date    CARDIAC CATHETERIZATION Left 2024    Procedure: Cardiac Catheterization/Vascular Study;  Surgeon: Nima Nickerson MD;  Location: [facility] CATH INVASIVE LOCATION;  Service: Cardiology;  Laterality: Left;    COLONOSCOPY N/A 2021    Procedure: COLONOSCOPY;  Surgeon: Eduardo Neil MD;  Location: [facility] ENDOSCOPY;  Service: Gastroenterology;  Laterality: N/A;  pre  post diverticulosis, hemorrhoids  Dr. cavazos    KNEE SURGERY      rt knee MENISCUS    PREPERITONEAL HERNIA REPAIR Bilateral 2017    Procedure: BILATERAL PREPERITONEAL INGUINAL HERNIA REPAIR LAPAROSCOPIC WITH MESH;  Surgeon: Rambo Church MD;  Location: [facility] OR;  Service:      PT Assessment (Last 12 Hours)       PT Evaluation and Treatment       Row Name 24 0805           Physical Therapy Time and Intention    Subjective Information complains of;pain  -WK     Document Type therapy note (daily note)  -WK     Mode of Treatment physical therapy  -WK       Row Name 06/23/24 0805          General Information    Existing Precautions/Restrictions fall;sternal  2 chest tubes  -WK       Row Name 06/23/24 0805          Pain    Pretreatment Pain Rating 4/10  -WK     Posttreatment Pain Rating 4/10  -WK     Pain Location incisional  -WK     Pain Location - chest  -WK       Row Name 06/23/24 0805          Bed Mobility    Comment, (Bed Mobility) in chair  -WK       Row Name 06/23/24 0805          Sit-Stand Transfer    Sit-Stand [place] (Transfers) supervision  -WK       Row Name 06/23/24 0805          Stand-Sit Transfer    Stand-Sit [place] (Transfers) supervision  -WK       Row Name 06/23/24 0805          Gait/Stairs (Locomotion)    [place] Level (Gait) supervision  -WK     Distance in Feet (Gait) 500  -WK     Deviations/Abnormal Patterns (Gait) gait speed decreased  -WK     Comment, (Gait/Stairs) pt wanted to wait to attempt bed mobility due to breakfast being delivered  -WK       Row Name             Wound 06/20/24 0845 midline sternal Incision    Wound - Properties Group Placement Date: 06/20/24  -SF Placement Time: 0845  -SF Present on Original Admission: N  -SF Orientation: midline  -SF Location: sternal  -SF Primary Wound Type: Incision  -SF    Retired Wound - Properties Group Placement Date: 06/20/24  -SF Placement Time: 0845  -SF Present on Original Admission: N  -SF Orientation: midline  -SF Location: sternal  -SF Primary Wound Type: Incision  -SF    Retired Wound - Properties Group Date first assessed: 06/20/24  -SF Time first assessed: 0845  -SF Present on Original Admission: N  -SF Location: sternal  -SF Primary Wound Type: Incision  -SF      Row Name             Wound 06/20/24 Right lower leg Venous Ulcer    Wound - Properties Group Placement Date: 06/20/24  -SF Present  on Original Admission: Y  -SF Side: Right  -SF Orientation: lower  -SF Location: leg  -SF Primary Wound Type: Venous ulcer  -SF Additional Comments: present upon arrival to OR  [facility]    Retired Wound - Properties Group Placement Date: 06/20/24  -SF Present on Original Admission: Y  -SF Side: Right  -SF Orientation: lower  -SF Location: leg  -SF Primary Wound Type: Venous ulcer  -SF Additional Comments: present upon arrival to OR  [facility]    Retired Wound - Properties Group Date first assessed: 06/20/24  -SF Present on Original Admission: Y  -SF Side: Right  -SF Location: leg  -SF Primary Wound Type: Venous ulcer  -SF Additional Comments: present upon arrival to [facility]      Row Name             Wound 06/20/24 1643 Left lower leg Venous Ulcer    Wound - Properties Group Placement Date: 06/20/24  -AB Placement Time: 1643  -AB Present on Original Admission: Y  -AB Side: Left  -AB Orientation: lower  -AB Location: leg  -AB Primary Wound Type: Venous ulcer  -AB    Retired Wound - Properties Group Placement Date: 06/20/24  -AB Placement Time: 1643  -AB Present on Original Admission: Y  -AB Side: Left  -AB Orientation: lower  -AB Location: leg  -AB Primary Wound Type: Venous ulcer  -AB    Retired Wound - Properties Group Date first assessed: 06/20/24  -AB Time first assessed: 1643  -AB Present on Original Admission: Y  -AB Side: Left  -AB Location: leg  -AB Primary Wound Type: Venous ulcer  -AB      Row Name 06/23/24 0805          Plan of Care Review    Plan of Care Reviewed With patient  -WK     Outcome Evaluation Sit to stand SPV. Pt was a quick to stand and educated on safety with chest tubes. Amb 500' SPV. Pt wanted to wait to perform bed mobility due to breakfast being delivered.  -WK       Row Name 06/23/24 0805          Positioning and Restraints    Pre-Treatment Position sitting in chair/recliner  -WK     Post Treatment Position chair  -WK     In Chair reclined;call light within reach;encouraged to call for assist;with  other staff  -WK               User Key  (r) = Recorded By, (t) = Taken By, (c) = Cosigned By      Initials Name Provider Type    WK Ana Chao, PTA Physical Therapist Assistant    Matti Vera, RN Registered Nurse    Quique Bryant, RN Registered Nurse                    Physical Therapy Education       Title: PT OT SLP Therapies (In Progress)       Topic: Physical Therapy (In Progress)       Point: Mobility training (Done)       Learning Progress Summary             Patient Acceptance, E, VU,DU,NR by SIMONE at 6/21/2024 0806    Comment: benefits of activity, progression of PT POC, sternal prec                         Point: Home exercise program (Not Started)       Learner Progress:  Not documented in this visit.              Point: Body mechanics (Not Started)       Learner Progress:  Not documented in this visit.              Point: Precautions (Done)       Learning Progress Summary             Patient Acceptance, E, VU,DU,NR by SIMONE at 6/21/2024 0806    Comment: benefits of activity, progression of PT POC, sternal prec                                         User Key       Initials Effective Dates Name Provider Type Discipline    SIMONE 02/03/23 -  Jos Chapa PT DPT Physical Therapist PT                  PT Recommendation and Plan     Plan of Care Reviewed With: patient  Progress: improving  Outcome Evaluation: Sit to stand SPV. Pt was a quick to stand and educated on safety with chest tubes. Amb 500' SPV. Pt wanted to wait to perform bed mobility due to breakfast being delivered.   Outcome Measures       Row Name 06/23/24 0805 06/22/24 0952          How much help from another person do you currently need...    Turning from your back to your side while in flat bed without using bedrails? 3  -WK 3  -WK     Moving from lying on back to sitting on the side of a flat bed without bedrails? 3  -WK 3  -WK     Moving to and from a bed to a chair (including a wheelchair)? 4  -WK 4  -WK     Standing up from  a chair using your arms (e.g., wheelchair, bedside chair)? 4  -WK 4  -WK     Climbing 3-5 steps with a railing? 4  -WK 3  -WK     To walk in hospital room? 4  -WK 3  -WK     AM-PAC 6 Clicks Score (PT) 22  -WK 20  -WK     Highest Level of Mobility Goal 7 --> Walk 25 feet or more  -WK 6 --> Walk 10 steps or more  -WK        Functional Assessment    Outcome Measure Options AM-PAC 6 Clicks Basic Mobility (PT)  -WK AM-PAC 6 Clicks Basic Mobility (PT)  -WK               User Key  (r) = Recorded By, (t) = Taken By, (c) = Cosigned By      Initials Name Provider Type    WK Ana Chao PTA Physical Therapist Assistant                     Time Calculation:    PT Charges       Row Name 06/23/24 0816             Time Calculation    Start Time 0805  -WK      Stop Time 0815  -WK      Time Calculation (min) 10 min  -WK      PT Received On 06/23/24  -WK         Time Calculation- PT    Total Timed Code Minutes- PT 10 minute(s)  -WK         Timed Charges    88874 - Gait Training Minutes  10  -WK         Total Minutes    Timed Charges Total Minutes 10  -WK       Total Minutes 10  -WK                User Key  (r) = Recorded By, (t) = Taken By, (c) = Cosigned By      Initials Name Provider Type    WK Ana Chao PTA Physical Therapist Assistant                  Therapy Charges for Today       Code Description Service Date Service Provider Modifiers Qty    79580844715 HC GAIT TRAINING EA 15 MIN 6/22/2024 Ana Chao PTA GP 1    07914924516 HC GAIT TRAINING EA 15 MIN 6/22/2024 Ana Chao PTA GP 2    16879975827 HC GAIT TRAINING EA 15 MIN 6/23/2024 Ana Chao PTA GP 1            PT G-Codes  Outcome Measure Options: AM-PAC 6 Clicks Basic Mobility (PT)  AM-PAC 6 Clicks Score (PT): 22    Ana Chao PTA  6/23/2024

## 2024-06-23 NOTE — PLAN OF CARE
Problem: Adult Inpatient Plan of Care  Goal: Plan of Care Review  Outcome: Ongoing, Progressing  Flowsheets (Taken 6/23/2024 8168)  Progress: improving  Plan of Care Reviewed With: patient  Outcome Evaluation: Patient c/o pain, scheduled tylenol given with relief. Up with stand by assist. NSR 65-79 on tele. VSS. Safety maintained. Will notify MD of any changes.

## 2024-06-23 NOTE — PROGRESS NOTES
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 Stated he had a small BM yesterday. Pain well controlled.     Objective:      Intake/Output Summary (Last 24 hours) at 6/23/2024 0852  Last data filed at 6/23/2024 0442  Gross per 24 hour   Intake --   Output 2658 ml   Net -2658 ml       CT Output:   MCT to -20 cm suction with 230 ml serosanguinous drainage in the past 24 hours. No air leak.   Right pleural CT to -20 cm suction with 62 ml serosanguinous drainage in the past 24 hours. No air leak.     Gtt's:  None    Wt preop:  225 lbs  Wt current:       06/23/24 0442   Weight: 108 kg (238 lb)         PE:  Vitals:    06/23/24 0706   BP: 115/64   Pulse: 67   Resp: 16   Temp: 97.7 °F (36.5 °C)   SpO2: 95%       GENERAL: NAD, resting comfortably, normal color  CARDIOVASCULAR: Normal HT with RRR. No murmurs, gallops or rubs.   PULMONARY: Clear bilateral breath sounds without wheezes, rhonchi, or rales. No labored breathing  ABDOMEN: Soft, non-distended/non-tender with active bowel sounds.   EXTREMITIES: No clubbing or cyanosis. Mild peripheral edema. Peripheral pulses palpated.   INCISIONS: Sternal incision open to air with steri-strips intact. Sternum appears stable.       Lab Results (last 72 hours)       Procedure Component Value Units Date/Time    POC Glucose Once [[id number]]  (Normal) Collected: 06/23/24 0734    Specimen: Blood Updated: 06/23/24 0746     Glucose 110 mg/dL      Comment: :  [id number] [facility] ID: [id number]       Comprehensive Metabolic Panel [[id number]]  (Abnormal) Collected: 06/23/24 0404    Specimen: Blood Updated: 06/23/24 0459     Glucose 126 mg/dL      BUN 23 mg/dL      Creatinine 0.77 mg/dL      Sodium 137 mmol/L      Potassium 4.7 mmol/L      Chloride 100 mmol/L      CO2 28.0 mmol/L      Calcium 8.4 mg/dL      Total Protein 6.0 g/dL      Albumin 3.3 g/dL      ALT (SGPT) 14 U/L      AST (SGOT) 17 U/L      Alkaline Phosphatase 36 U/L      Total Bilirubin 0.3 mg/dL      Globulin 2.7 gm/dL      A/G Ratio 1.2 g/dL      BUN/Creatinine Ratio 29.9     Anion Gap 9.0 mmol/L      eGFR 101.2 mL/min/1.73     Narrative:      GFR Normal >60  Chronic Kidney Disease <60  Kidney Failure <15      CBC & Differential [[id number]]  (Abnormal) Collected: 06/23/24 0404    Specimen: Blood Updated: 06/23/24 0437    Narrative:      The following orders were created for panel order CBC & Differential.  Procedure                               Abnormality         Status                     ---------                               -----------         ------                     CBC Auto Differential[[id number]]        Abnormal            Final result                 Please view results for these tests on the individual orders.    CBC Auto Differential [[id number]]  (Abnormal) Collected: 06/23/24 0404    Specimen: Blood Updated: 06/23/24 0437     WBC 18.01 10*3/mm3      RBC 3.90 10*6/mm3      Hemoglobin 11.6 g/dL      Hematocrit 35.8 %      MCV 91.8 fL      MCH 29.7 pg      MCHC 32.4 g/dL      RDW 13.9 %      RDW-SD 46.8 fl      MPV 11.6 fL      Platelets 171 10*3/mm3      Neutrophil % 89.0 %      Lymphocyte % 4.3 %      Monocyte % 5.8 %      Eosinophil % 0.0 %      Basophil % 0.1 %      Immature Grans % 0.8 %      Neutrophils, Absolute 16.02 10*3/mm3      Lymphocytes, Absolute 0.78 10*3/mm3      Monocytes, Absolute 1.04 10*3/mm3      Eosinophils, Absolute 0.00 10*3/mm3      Basophils, Absolute 0.02  10*3/mm3      Immature Grans, Absolute 0.15 10*3/mm3      nRBC 0.0 /100 WBC     POC Glucose Once [[id number]]  (Abnormal) Collected: 06/22/24 1627    Specimen: Blood Updated: 06/22/24 1641     Glucose 132 mg/dL      Comment[facility] [id number] [facility] [id number]       POC Glucose Once [[id number]]  (Abnormal) Collected: 06/22/24 1110    Specimen: Blood Updated: 06/22/24 1121     Glucose 159 mg/dL      Comment: : [id number] [facility] [id number]       POC Glucose Once [[id number]]  (Normal) Collected: 06/22/24 0752    Specimen: Blood Updated: 06/22/24 0804     Glucose 128 mg/dL      Comment: : [id number] [facility] ID: [id number]       Renal Function Panel [[id number]]  (Abnormal) Collected: 06/22/24 0353    Specimen: Blood Updated: 06/22/24 0432     Glucose 133 mg/dL      BUN 19 mg/dL      Creatinine 0.72 mg/dL      Sodium 136 mmol/L      Potassium 5.0 mmol/L      Chloride 102 mmol/L      CO2 28.0 mmol/L      Calcium 8.4 mg/dL      Albumin 3.5 g/dL      Phosphorus 2.9 mg/dL      Anion Gap 6.0 mmol/L      BUN/Creatinine Ratio 26.4     eGFR 103.3 mL/min/1.73     Narrative:      GFR Normal >60  Chronic Kidney Disease <60  Kidney Failure <15      CBC & Differential [[id number]]  (Abnormal) Collected: 06/22/24 0353    Specimen: Blood Updated: 06/22/24 0418    Narrative:      The following orders were created for panel order CBC & Differential.  Procedure                               Abnormality         Status                     ---------                               -----------         ------                     CBC Auto Differential[[id number]]        Abnormal            Final result                 Please view results for these tests on the individual orders.    CBC Auto Differential [[id number]]  (Abnormal) Collected: 06/22/24 0353    Specimen: Blood Updated: 06/22/24 0418     WBC 20.62 10*3/mm3      RBC 4.04 10*6/mm3      Hemoglobin 12.0 g/dL      Hematocrit 36.7 %      MCV 90.8  fL      MCH 29.7 pg      MCHC 32.7 g/dL      RDW 13.6 %      RDW-SD 45.5 fl      MPV 11.2 fL      Platelets 185 10*3/mm3      Neutrophil % 87.7 %      Lymphocyte % 4.8 %      Monocyte % 6.5 %      Eosinophil % 0.0 %      Basophil % 0.1 %      Immature Grans % 0.9 %      Neutrophils, Absolute 18.09 10*3/mm3      Lymphocytes, Absolute 0.98 10*3/mm3      Monocytes, Absolute 1.33 10*3/mm3      Eosinophils, Absolute 0.00 10*3/mm3      Basophils, Absolute 0.03 10*3/mm3      Immature Grans, Absolute 0.19 10*3/mm3      nRBC 0.0 /100 WBC     Magnesium [[id number]]  (Normal) Collected: 06/22/24 0009    Specimen: Blood Updated: 06/22/24 0024     Magnesium 2.1 mg/dL     Tissue Pathology Exam [[id number]] Collected: 06/20/24 0900    Specimen: Tissue from Aortic Leaflet, Tissue from Aortic Aneurysm Updated: 06/21/24 0828    POC Glucose Once [[id number]]  (Abnormal) Collected: 06/21/24 0704    Specimen: Blood Updated: 06/21/24 0724     Glucose 171 mg/dL      Comment: : [id number] Luis F [facility] ID: [id number]       POC Glucose Once [[id number]]  (Abnormal) Collected: 06/21/24 0454    Specimen: Blood Updated: 06/21/24 0505     Glucose 168 mg/dL      Comment: : [id number] [facility] F [facility] ID: [id number]       POC Glucose Once [[id number]]  (Abnormal) Collected: 06/21/24 0236    Specimen: Blood Updated: 06/21/24 0247     Glucose 160 mg/dL      Comment: : [id number] Luis F [facility] ID: [id number]       Renal Function Panel [[id number]]  (Abnormal) Collected: 06/21/24 0213    Specimen: Blood from Arm, Left Updated: 06/21/24 0238     Glucose 158 mg/dL      BUN 19 mg/dL      Creatinine 0.85 mg/dL      Sodium 138 mmol/L      Potassium 4.6 mmol/L      Chloride 106 mmol/L      CO2 26.0 mmol/L      Calcium 7.7 mg/dL      Albumin 3.3 g/dL      Phosphorus 3.5 mg/dL      Anion Gap 6.0 mmol/L      BUN/Creatinine Ratio 22.4     eGFR 98.2 mL/min/1.73     Narrative:      GFR Normal >60  Chronic Kidney Disease <60  Kidney  Failure <15      Magnesium [[id number]]  (Normal) Collected: 06/21/24 0213    Specimen: Blood from Arm, Left Updated: 06/21/24 0232     Magnesium 1.8 mg/dL     Protime-INR [[id number]]  (Abnormal) Collected: 06/21/24 0213    Specimen: Blood from Arm, Left Updated: 06/21/24 0228     Protime 14.6 Seconds      INR 1.10    CBC & Differential [[id number]]  (Abnormal) Collected: 06/21/24 0213    Specimen: Blood from Arm, Left Updated: 06/21/24 0223    Narrative:      The following orders were created for panel order CBC & Differential.  Procedure                               Abnormality         Status                     ---------                               -----------         ------                     CBC Auto Differential[[id number]]        Abnormal            Final result                 Please view results for these tests on the individual orders.    CBC Auto Differential [[id number]]  (Abnormal) Collected: 06/21/24 0213    Specimen: Blood from Arm, Left Updated: 06/21/24 0223     WBC 11.24 10*3/mm3      RBC 3.94 10*6/mm3      Hemoglobin 11.8 g/dL      Hematocrit 34.7 %      MCV 88.1 fL      MCH 29.9 pg      MCHC 34.0 g/dL      RDW 12.9 %      RDW-SD 41.5 fl      MPV 10.5 fL      Platelets 177 10*3/mm3      Neutrophil % 92.6 %      Lymphocyte % 4.5 %      Monocyte % 2.5 %      Eosinophil % 0.0 %      Basophil % 0.2 %      Immature Grans % 0.2 %      Neutrophils, Absolute 10.41 10*3/mm3      Lymphocytes, Absolute 0.51 10*3/mm3      Monocytes, Absolute 0.28 10*3/mm3      Eosinophils, Absolute 0.00 10*3/mm3      Basophils, Absolute 0.02 10*3/mm3      Immature Grans, Absolute 0.02 10*3/mm3      nRBC 0.0 /100 WBC     POC Glucose Once [[id number]]  (Abnormal) Collected: 06/21/24 0038    Specimen: Blood Updated: 06/21/24 0049     Glucose 156 mg/dL      Comment: : [id number] [facility] ID: [id number]       POC Glucose Once [[id number]]  (Abnormal) Collected: 06/20/24 2301    Specimen: Blood Updated: 06/20/24 2310      Glucose 67 mg/dL      Comment: : [id number] Luis F [facility] ID: [id number]       POC Glucose Once [[id number]]  (Abnormal) Collected: 06/20/24 2159    Specimen: Blood Updated: 06/20/24 2210     Glucose 152 mg/dL      Comment: : [id number] Luis F [facility] ID: [id number]       Blood Gas, Arterial - [[id number]]  (Abnormal) Collected: 06/20/24 2110    Specimen: Arterial Blood Updated: 06/20/24 2112     Site Arterial Line     Gilbert's Test N/A     pH, Arterial 7.355 pH units      pCO2, Arterial 47.5 mm Hg      Comment: 83 Value above reference range        pO2, Arterial 76.3 mm Hg      Comment: 84 Value below reference range        HCO3, Arterial 26.5 mmol/L      Comment: 83 Value above reference range        Base Excess, Arterial 0.4 mmol/L      O2 Saturation, Arterial 95.6 %      Temperature 37.0     Barometric Pressure for Blood Gas 758 mmHg      Modality Ventilator     FIO2 30 %      Ventilator Mode SPONTANEOUS     PEEP 5.0     PSV 10.0 cmH2O      Collected by LUTHER     Comment: Meter: [id number]     :  LUTHER        pCO2, Temperature Corrected 47.5 mm Hg      pH, Temp Corrected 7.355 pH Units      pO2, Temperature Corrected 76.3 mm Hg      PO2/FIO2 254    POC Glucose Once [[id number]]  (Abnormal) Collected: 06/20/24 2059    Specimen: Blood Updated: 06/20/24 2111     Glucose 137 mg/dL      Comment: : [id number] Luis F Kristi[facility] ID: [id number]       POC Glucose Once [[id number]]  (Abnormal) Collected: 06/20/24 1952    Specimen: Blood Updated: 06/20/24 2003     Glucose 135 mg/dL      Comment: : [id number] Luis F Kristi[facility] ID: [id number]       Renal Function Panel [[id number]]  (Abnormal) Collected: 06/20/24 1846    Specimen: Blood Updated: 06/20/24 1916     Glucose 124 mg/dL      BUN 15 mg/dL      Creatinine 0.80 mg/dL      Sodium 141 mmol/L      Potassium 4.8 mmol/L      Comment: Slight hemolysis detected by analyzer. Result may be falsely elevated.         Chloride 108 mmol/L      CO2 26.0 mmol/L      Calcium 7.7 mg/dL      Albumin 3.3 g/dL      Phosphorus 2.6 mg/dL      Anion Gap 7.0 mmol/L      BUN/Creatinine Ratio 18.8     eGFR 100.1 mL/min/1.73     Narrative:      GFR Normal >60  Chronic Kidney Disease <60  Kidney Failure <15      Blood Gas, Arterial - [[id number]]  (Abnormal) Collected: 06/20/24 1904    Specimen: Arterial Blood Updated: 06/20/24 1907     Site Arterial Line     Gilbert's Test N/A     pH, Arterial 7.439 pH units      pCO2, Arterial 38.4 mm Hg      pO2, Arterial 154.0 mm Hg      Comment: 83 Value above reference range        HCO3, Arterial 26.0 mmol/L      Base Excess, Arterial 1.8 mmol/L      O2 Saturation, Arterial 99.8 %      Comment: 83 Value above reference range        Temperature 37.0     Barometric Pressure for Blood Gas 757 mmHg      Modality Ventilator     FIO2 40 %      Ventilator Mode SIMV     Set Tidal Volume 600     Set Mech Resp Rate 22.0     PEEP 8.0     PSV 10.0 cmH2O      Collected by LUTHER     Comment: Meter: [id number]     :  LUTHER        pCO2, Temperature Corrected 38.4 mm Hg      pH, Temp Corrected 7.439 pH Units      pO2, Temperature Corrected 154 mm Hg      PO2/FIO2 385    POC Glucose Once [[id number]]  (Normal) Collected: 06/20/24 1851    Specimen: Blood Updated: 06/20/24 1902     Glucose 124 mg/dL      Comment: : [id number] Uriel [facility]ino[facility] ID: [id number]       CBC (No Diff) [[id number]]  (Abnormal) Collected: 06/20/24 1846    Specimen: Blood Updated: 06/20/24 1858     WBC 6.23 10*3/mm3      RBC 3.97 10*6/mm3      Hemoglobin 12.0 g/dL      Hematocrit 34.2 %      MCV 86.1 fL      MCH 30.2 pg      MCHC 35.1 g/dL      RDW 12.7 %      RDW-SD 40.0 fl      MPV 10.2 fL      Platelets 167 10*3/mm3     POC Glucose Once [[id number]]  (Abnormal) Collected: 06/20/24 1800    Specimen: Blood Updated: 06/20/24 1811     Glucose 138 mg/dL      Comment: : [id number] Chino[facility] ID: [id number]       POC Glucose  Once [[id number]]  (Abnormal) Collected: 06/20/24 1614    Specimen: Blood Updated: 06/20/24 1624     Glucose 146 mg/dL      Comment: : [id number] Uriel Castro ID: [id number]       Renal Function Panel [[id number]]  (Abnormal) Collected: 06/20/24 1524    Specimen: Blood Updated: 06/20/24 1555     Glucose 143 mg/dL      BUN 16 mg/dL      Creatinine 0.81 mg/dL      Sodium 142 mmol/L      Potassium 3.0 mmol/L      Comment: Slight hemolysis detected by analyzer. Result may be falsely elevated.        Chloride 107 mmol/L      CO2 28.0 mmol/L      Calcium 7.8 mg/dL      Albumin 3.0 g/dL      Phosphorus 2.0 mg/dL      Anion Gap 7.0 mmol/L      BUN/Creatinine Ratio 19.8     eGFR 99.7 mL/min/1.73     Narrative:      GFR Normal >60  Chronic Kidney Disease <60  Kidney Failure <15      CBC (No Diff) [[id number]]  (Abnormal) Collected: 06/20/24 1524    Specimen: Blood Updated: 06/20/24 1545     WBC 7.43 10*3/mm3      RBC 3.70 10*6/mm3      Hemoglobin 11.2 g/dL      Hematocrit 32.4 %      MCV 87.6 fL      MCH 30.3 pg      MCHC 34.6 g/dL      RDW 12.7 %      RDW-SD 41.0 fl      MPV 10.2 fL      Platelets 149 10*3/mm3     Protime-INR [[id number]]  (Abnormal) Collected: 06/20/24 1524    Specimen: Blood Updated: 06/20/24 1545     Protime 16.2 Seconds      INR 1.25    aPTT [[id number]]  (Abnormal) Collected: 06/20/24 1524    Specimen: Blood Updated: 06/20/24 1545     PTT 40.6 seconds     Fibrinogen [[id number]]  (Abnormal) Collected: 06/20/24 1524    Specimen: Blood Updated: 06/20/24 1545     Fibrinogen 195 mg/dL     Calcium, Ionized [[id number]]  (Abnormal) Collected: 06/20/24 1529    Specimen: Blood Updated: 06/20/24 1531     Ionized Calcium 4.50 mg/dL      Comment: 84 Value below reference range        Collected by [id number]     Comment: Meter: [id number]     :  Darrel Mohamud CRT       Blood Gas, Arterial - [[id number]]  (Abnormal) Collected: 06/20/24 1528    Specimen: Arterial Blood Updated: 06/20/24 1530     Site  Arterial Line     Gilbert's Test N/A     pH, Arterial 7.369 pH units      pCO2, Arterial 49.0 mm Hg      Comment: 83 Value above reference range        pO2, Arterial 69.0 mm Hg      Comment: 84 Value below reference range        HCO3, Arterial 28.2 mmol/L      Comment: 83 Value above reference range        Base Excess, Arterial 2.2 mmol/L      Comment: 83 Value above reference range        O2 Saturation, Arterial 94.2 %      Temperature 37.0     Barometric Pressure for Blood Gas 759 mmHg      Modality Ventilator     FIO2 60 %      Ventilator Mode SIMV     Set Tidal Volume 600     Set Mech Resp Rate 20.0     PEEP 8.0     PSV 10.0 cmH2O      Collected by [id number]     Comment: Meter: [id number]     :  Darrel Mohamud, MARGE        pCO2, Temperature Corrected 49.0 mm Hg      pH, Temp Corrected 7.369 pH Units      pO2, Temperature Corrected 69.0 mm Hg      PO2/FIO2 115    Fibrinogen [[id number]]  (Abnormal) Collected: 06/20/24 1321    Specimen: Blood, Arterial Line Updated: 06/20/24 1356     Fibrinogen 161 mg/dL     Protime-INR [[id number]]  (Abnormal) Collected: 06/20/24 1321    Specimen: Blood, Arterial Line Updated: 06/20/24 1356     Protime 20.9 Seconds      INR 1.72    aPTT [[id number]]  (Abnormal) Collected: 06/20/24 1321    Specimen: Blood, Arterial Line Updated: 06/20/24 1356     PTT >200.0 seconds     Blood Gas, Arterial With Co-Ox [[id number]]  (Abnormal) Collected: 06/20/24 1341    Specimen: Arterial Blood Updated: 06/20/24 1340     Site Arterial Line     Gilbert's Test N/A     pH, Arterial 7.351 pH units      pCO2, Arterial 47.8 mm Hg      Comment: 83 Value above reference range        pO2, Arterial 450.0 mm Hg      Comment: 83 Value above reference range        HCO3, Arterial 26.4 mmol/L      Comment: 83 Value above reference range        Base Excess, Arterial 0.5 mmol/L      O2 Saturation, Arterial >100.1 %      Comment: 93 Value above reportable range > 100.1        Hemoglobin, Blood Gas 9.4 g/dL       Comment: 84 Value below reference range        Hematocrit, Blood Gas 28.8 %      Comment: 84 Value below reference range        Oxyhemoglobin 98.7 %      Methemoglobin 0.80 %      Carboxyhemoglobin 0.8 %      Temperature 37.0     Sodium, Arterial 141 mmol/L      Potassium, Arterial 3.0 mmol/L      Comment: 84 Value below reference range        Ionized Calcium 4.49 mg/dL      Comment: 84 Value below reference range        Barometric Pressure for Blood Gas 760 mmHg      Modality Ventilator     FIO2 100 %      Ventilator Mode NA     Collected by [id number]     Comment: Meter: [id number]     :  [id number]        pH, Temp Corrected 7.351 pH Units      pCO2, Temperature Corrected 47.8 mm Hg      pO2, Temperature Corrected 450 mm Hg     CBC & Differential [[id number]]  (Abnormal) Collected: 06/20/24 1321    Specimen: Blood, Arterial Line Updated: 06/20/24 1331    Narrative:      The following orders were created for panel order CBC & Differential.  Procedure                               Abnormality         Status                     ---------                               -----------         ------                     CBC Auto Differential[[id number]]        Abnormal            Final result                 Please view results for these tests on the individual orders.    CBC Auto Differential [[id number]]  (Abnormal) Collected: 06/20/24 1321    Specimen: Blood, Arterial Line Updated: 06/20/24 1331     WBC 8.20 10*3/mm3      RBC 2.95 10*6/mm3      Hemoglobin 8.8 g/dL      Hematocrit 26.0 %      MCV 88.1 fL      MCH 29.8 pg      MCHC 33.8 g/dL      RDW 12.8 %      RDW-SD 41.1 fl      MPV 10.7 fL      Platelets 144 10*3/mm3      Neutrophil % 77.6 %      Lymphocyte % 19.3 %      Monocyte % 1.2 %      Eosinophil % 1.1 %      Basophil % 0.4 %      Immature Grans % 0.4 %      Neutrophils, Absolute 6.37 10*3/mm3      Lymphocytes, Absolute 1.58 10*3/mm3      Monocytes, Absolute 0.10 10*3/mm3      Eosinophils, Absolute 0.09  10*3/mm3      Basophils, Absolute 0.03 10*3/mm3      Immature Grans, Absolute 0.03 10*3/mm3      nRBC 0.0 /100 WBC     Blood Gas, Arterial With Co-Ox [[id number]]  (Abnormal) Collected: 06/20/24 1235    Specimen: Arterial Blood Updated: 06/20/24 1233     Site Arterial Line     Gilbert's Test N/A     pH, Arterial 7.433 pH units      pCO2, Arterial 38.9 mm Hg      pO2, Arterial >547.0 mm Hg      Comment: 93 Value above reportable range > 547.0        HCO3, Arterial 25.9 mmol/L      Base Excess, Arterial 1.6 mmol/L      O2 Saturation, Arterial >100.1 %      Comment: 93 Value above reportable range > 100.1        Hemoglobin, Blood Gas 9.2 g/dL      Comment: 84 Value below reference range        Hematocrit, Blood Gas 28.3 %      Comment: 84 Value below reference range        Oxyhemoglobin 99.2 %      Comment: 83 Value above reference range        Methemoglobin 0.70 %      Carboxyhemoglobin 0.5 %      Temperature 37.0     Sodium, Arterial 138 mmol/L      Potassium, Arterial 3.3 mmol/L      Comment: 84 Value below reference range        Ionized Calcium 3.96 mg/dL      Comment: 84 Value below reference range        Barometric Pressure for Blood Gas 760 mmHg      Modality Ventilator     FIO2 100 %      Ventilator Mode NA     Collected by [id number]     Comment: Meter: [id number]     :  [id number]        pH, Temp Corrected 7.433 pH Units      pCO2, Temperature Corrected 38.9 mm Hg      pO2, Temperature Corrected >547 mm Hg     Blood Gas, Arterial With Co-Ox [[id number]]  (Abnormal) Collected: 06/20/24 1203    Specimen: Arterial Blood Updated: 06/20/24 1201     Site Arterial Line     Gilbert's Test N/A     pH, Arterial 7.349 pH units      Comment: 84 Value below reference range        pCO2, Arterial 51.9 mm Hg      Comment: 83 Value above reference range        pO2, Arterial >547.0 mm Hg      Comment: 93 Value above reportable range > 547.0        HCO3, Arterial 28.5 mmol/L      Comment: 83 Value above reference range         Base Excess, Arterial 2.3 mmol/L      Comment: 83 Value above reference range        O2 Saturation, Arterial >100.1 %      Comment: 93 Value above reportable range > 100.1        Hemoglobin, Blood Gas 9.8 g/dL      Comment: 84 Value below reference range        Hematocrit, Blood Gas 30.0 %      Comment: 84 Value below reference range        Oxyhemoglobin 99.1 %      Comment: 83 Value above reference range        Methemoglobin 0.70 %      Carboxyhemoglobin 0.4 %      Temperature 37.0     Sodium, Arterial 140 mmol/L      Potassium, Arterial 3.5 mmol/L      Comment: 84 Value below reference range        Ionized Calcium --     Comment:  [id number] Calibration error(s) [id number] Value below reference range        Barometric Pressure for Blood Gas 760 mmHg      Modality Ventilator     FIO2 100 %      Ventilator Mode NA     Collected by [id number]     Comment: Meter: [id number]     :  [id number]        pH, Temp Corrected 7.349 pH Units      pCO2, Temperature Corrected 51.9 mm Hg      pO2, Temperature Corrected >547 mm Hg     Blood Gas, Arterial With Co-Ox [[id number]]  (Abnormal) Collected: 06/20/24 0952    Specimen: Arterial Blood Updated: 06/20/24 0951     Site Arterial Line     Gilbert's Test N/A     pH, Arterial 7.388 pH units      pCO2, Arterial 49.7 mm Hg      Comment: 83 Value above reference range        pO2, Arterial >547.0 mm Hg      Comment: 93 Value above reportable range > 547.0        HCO3, Arterial 29.9 mmol/L      Comment: 83 Value above reference range        Base Excess, Arterial 4.1 mmol/L      Comment: 83 Value above reference range        O2 Saturation, Arterial >100.1 %      Comment: 93 Value above reportable range > 100.1        Hemoglobin, Blood Gas 10.9 g/dL      Comment: 84 Value below reference range        Hematocrit, Blood Gas 33.4 %      Comment: 84 Value below reference range        Oxyhemoglobin 99.2 %      Comment: 83 Value above reference range         Methemoglobin 0.70 %      Carboxyhemoglobin 0.4 %      Temperature 37.0     Sodium, Arterial 140 mmol/L      Potassium, Arterial 3.4 mmol/L      Comment: 84 Value below reference range        Ionized Calcium 4.25 mg/dL      Comment: 84 Value below reference range        Barometric Pressure for Blood Gas 760 mmHg      Modality Ventilator     FIO2 100 %      Ventilator Mode NA     Collected by [id number]     Comment: Meter: [id number]     :  [id number]        pH, Temp Corrected 7.388 pH Units      pCO2, Temperature Corrected 49.7 mm Hg      pO2, Temperature Corrected >547 mm Hg     Blood Gas, Arterial With Co-Ox [[id number]]  (Abnormal) Collected: 06/20/24 0920    Specimen: Arterial Blood Updated: 06/20/24 0919     Site Arterial Line     Gilbert's Test N/A     pH, Arterial 7.233 pH units      Comment: 84 Value below reference range        pCO2, Arterial 72.3 mm Hg      Comment: 86 Value above critical limit        pO2, Arterial 482.0 mm Hg      Comment: 83 Value above reference range        HCO3, Arterial 30.4 mmol/L      Comment: 83 Value above reference range        Base Excess, Arterial 1.0 mmol/L      O2 Saturation, Arterial 100.0 %      Comment: 83 Value above reference range        Hemoglobin, Blood Gas 12.8 g/dL      Comment: 84 Value below reference range        Hematocrit, Blood Gas 39.3 %      Oxyhemoglobin 99.1 %      Comment: 83 Value above reference range        Methemoglobin 0.60 %      Carboxyhemoglobin 0.3 %      Temperature 37.0     Sodium, Arterial 141 mmol/L      Potassium, Arterial 3.7 mmol/L      Ionized Calcium 4.69 mg/dL      Barometric Pressure for Blood Gas 760 mmHg      Modality Ventilator     FIO2 100 %      Ventilator Mode NA     Notified Who SORAYA     Notified By [id number]     Notified Time 06/20/2024 09:20     Collected by [id number]     Comment: Meter: [id number]     :  [id number]        pH, Temp Corrected 7.233 pH Units      pCO2, Temperature Corrected 72.3 mm Hg      pO2,  Temperature Corrected 482 mm Hg             Rhythm: Normal sinus rhythm    CXR: Normal postoperative changes with CT x 3 in good position.     Assessment & Plan   Thoracic aortic aneurysm - aortic root and ascending aorta  Essential HTN    Plan:    1. Increase Lasix to 20 mg IV TID, continue K replacement    2. Continue with aggressive PT      Leia Perales, APRN

## 2024-06-23 NOTE — PLAN OF CARE
Goal Outcome Evaluation:  Plan of Care Reviewed With: patient        Progress: improving  Outcome Evaluation: Sit to stand SPV. Pt was a quick to stand and educated on safety with chest tubes. Amb 500' SPV. Pt wanted to wait to perform bed mobility due to breakfast being delivered.

## 2024-06-23 NOTE — PLAN OF CARE
Goal Outcome Evaluation:           Progress: improving  Outcome Evaluation: VSS.  S 65-76 on tele.  No complaints of pain.  Pt has had small BMs yesterday and this AM.  Pt felt constipated despite bowel meds and PRN suppository given.  MD was contacted and milk of molasses enema was ordered and given. Minimal stool output after enema, but pt states he felt some relief.

## 2024-06-24 ENCOUNTER — APPOINTMENT (OUTPATIENT)
Dept: CARDIOLOGY | Facility: HOSPITAL | Age: 64
DRG: 220 | End: 2024-06-24
Payer: COMMERCIAL

## 2024-06-24 ENCOUNTER — APPOINTMENT (OUTPATIENT)
Dept: GENERAL RADIOLOGY | Facility: HOSPITAL | Age: 64
DRG: 220 | End: 2024-06-24
Payer: COMMERCIAL

## 2024-06-24 ENCOUNTER — READMISSION MANAGEMENT (OUTPATIENT)
Dept: CALL CENTER | Facility: HOSPITAL | Age: 64
End: 2024-06-24
Payer: COMMERCIAL

## 2024-06-24 VITALS
HEIGHT: 73 IN | DIASTOLIC BLOOD PRESSURE: 54 MMHG | SYSTOLIC BLOOD PRESSURE: 83 MMHG | HEART RATE: 54 BPM | RESPIRATION RATE: 18 BRPM | OXYGEN SATURATION: 99 % | TEMPERATURE: 97.7 F | WEIGHT: 229.6 LBS | BODY MASS INDEX: 30.43 KG/M2

## 2024-06-24 LAB
ALBUMIN SERPL-MCNC: 3.4 G/DL (ref 3.5–5.2)
ALBUMIN/GLOB SERPL: 1.3 G/DL
ALP SERPL-CCNC: 49 U/L (ref 39–117)
ALT SERPL W P-5'-P-CCNC: 25 U/L (ref 1–41)
ANION GAP SERPL CALCULATED.3IONS-SCNC: 9 MMOL/L (ref 5–15)
AST SERPL-CCNC: 26 U/L (ref 1–40)
BH BB BLOOD EXPIRATION DATE: NORMAL
BH BB BLOOD EXPIRATION DATE: NORMAL
BH BB BLOOD TYPE BARCODE: 6200
BH BB BLOOD TYPE BARCODE: 6200
BH BB DISPENSE STATUS: NORMAL
BH BB DISPENSE STATUS: NORMAL
BH BB PRODUCT CODE: NORMAL
BH BB PRODUCT CODE: NORMAL
BH BB UNIT NUMBER: NORMAL
BH BB UNIT NUMBER: NORMAL
BILIRUB SERPL-MCNC: 0.3 MG/DL (ref 0–1.2)
BUN SERPL-MCNC: 28 MG/DL (ref 8–23)
BUN/CREAT SERPL: 36.8 (ref 7–25)
CALCIUM SPEC-SCNC: 8.5 MG/DL (ref 8.6–10.5)
CHLORIDE SERPL-SCNC: 101 MMOL/L (ref 98–107)
CO2 SERPL-SCNC: 28 MMOL/L (ref 22–29)
CREAT SERPL-MCNC: 0.76 MG/DL (ref 0.76–1.27)
CROSSMATCH INTERPRETATION: NORMAL
CROSSMATCH INTERPRETATION: NORMAL
CYTO UR: NORMAL
DEPRECATED RDW RBC AUTO: 47.7 FL (ref 37–54)
EGFRCR SERPLBLD CKD-EPI 2021: 101.6 ML/MIN/1.73
ERYTHROCYTE [DISTWIDTH] IN BLOOD BY AUTOMATED COUNT: 14.1 % (ref 12.3–15.4)
GLOBULIN UR ELPH-MCNC: 2.7 GM/DL
GLUCOSE BLDC GLUCOMTR-MCNC: 103 MG/DL (ref 70–130)
GLUCOSE SERPL-MCNC: 100 MG/DL (ref 65–99)
HCT VFR BLD AUTO: 37.6 % (ref 37.5–51)
HGB BLD-MCNC: 12.2 G/DL (ref 13–17.7)
LAB AP CASE REPORT: NORMAL
Lab: NORMAL
MCH RBC QN AUTO: 29.9 PG (ref 26.6–33)
MCHC RBC AUTO-ENTMCNC: 32.4 G/DL (ref 31.5–35.7)
MCV RBC AUTO: 92.2 FL (ref 79–97)
PATH REPORT.FINAL DX SPEC: NORMAL
PATH REPORT.GROSS SPEC: NORMAL
PLATELET # BLD AUTO: 193 10*3/MM3 (ref 140–450)
PMV BLD AUTO: 11.6 FL (ref 6–12)
POTASSIUM SERPL-SCNC: 4.2 MMOL/L (ref 3.5–5.2)
PROT SERPL-MCNC: 6.1 G/DL (ref 6–8.5)
RBC # BLD AUTO: 4.08 10*6/MM3 (ref 4.14–5.8)
SODIUM SERPL-SCNC: 138 MMOL/L (ref 136–145)
UNIT  ABO: NORMAL
UNIT  ABO: NORMAL
UNIT  RH: NORMAL
UNIT  RH: NORMAL
WBC NRBC COR # BLD AUTO: 11.38 10*3/MM3 (ref 3.4–10.8)

## 2024-06-24 PROCEDURE — 25810000003 LACTATED RINGERS SOLUTION: Performed by: THORACIC SURGERY (CARDIOTHORACIC VASCULAR SURGERY)

## 2024-06-24 PROCEDURE — 93321 DOPPLER ECHO F-UP/LMTD STD: CPT | Performed by: INTERNAL MEDICINE

## 2024-06-24 PROCEDURE — 82948 REAGENT STRIP/BLOOD GLUCOSE: CPT

## 2024-06-24 PROCEDURE — 25010000002 ONDANSETRON PER 1 MG: Performed by: NURSE PRACTITIONER

## 2024-06-24 PROCEDURE — 93325 DOPPLER ECHO COLOR FLOW MAPG: CPT

## 2024-06-24 PROCEDURE — 93325 DOPPLER ECHO COLOR FLOW MAPG: CPT | Performed by: INTERNAL MEDICINE

## 2024-06-24 PROCEDURE — 93356 MYOCRD STRAIN IMG SPCKL TRCK: CPT | Performed by: INTERNAL MEDICINE

## 2024-06-24 PROCEDURE — 97116 GAIT TRAINING THERAPY: CPT

## 2024-06-24 PROCEDURE — 85027 COMPLETE CBC AUTOMATED: CPT

## 2024-06-24 PROCEDURE — 93308 TTE F-UP OR LMTD: CPT

## 2024-06-24 PROCEDURE — 25010000002 FUROSEMIDE PER 20 MG: Performed by: NURSE PRACTITIONER

## 2024-06-24 PROCEDURE — 93308 TTE F-UP OR LMTD: CPT | Performed by: INTERNAL MEDICINE

## 2024-06-24 PROCEDURE — 93356 MYOCRD STRAIN IMG SPCKL TRCK: CPT

## 2024-06-24 PROCEDURE — 80053 COMPREHEN METABOLIC PANEL: CPT

## 2024-06-24 PROCEDURE — 71046 X-RAY EXAM CHEST 2 VIEWS: CPT

## 2024-06-24 PROCEDURE — 93321 DOPPLER ECHO F-UP/LMTD STD: CPT

## 2024-06-24 RX ORDER — CLOPIDOGREL BISULFATE 75 MG/1
75 TABLET ORAL DAILY
Qty: 30 TABLET | Refills: 2 | Status: SHIPPED | OUTPATIENT
Start: 2024-06-24

## 2024-06-24 RX ORDER — POTASSIUM CHLORIDE 750 MG/1
20 CAPSULE, EXTENDED RELEASE ORAL DAILY
Qty: 14 CAPSULE | Refills: 0 | Status: CANCELLED | OUTPATIENT
Start: 2024-06-24

## 2024-06-24 RX ORDER — FUROSEMIDE 40 MG/1
40 TABLET ORAL DAILY
Qty: 7 TABLET | Refills: 0 | Status: CANCELLED | OUTPATIENT
Start: 2024-06-24

## 2024-06-24 RX ORDER — TAMSULOSIN HYDROCHLORIDE 0.4 MG/1
0.4 CAPSULE ORAL NIGHTLY
Status: DISCONTINUED | OUTPATIENT
Start: 2024-06-25 | End: 2024-06-24 | Stop reason: HOSPADM

## 2024-06-24 RX ORDER — METOPROLOL SUCCINATE 25 MG/1
25 TABLET, EXTENDED RELEASE ORAL
Qty: 30 TABLET | Refills: 2 | Status: SHIPPED | OUTPATIENT
Start: 2024-06-25

## 2024-06-24 RX ORDER — ATORVASTATIN CALCIUM 40 MG/1
40 TABLET, FILM COATED ORAL NIGHTLY
Qty: 30 TABLET | Refills: 2 | Status: CANCELLED | OUTPATIENT
Start: 2024-06-24

## 2024-06-24 RX ADMIN — TAMSULOSIN HYDROCHLORIDE 0.4 MG: 0.4 CAPSULE ORAL at 09:01

## 2024-06-24 RX ADMIN — FUROSEMIDE 40 MG: 10 INJECTION, SOLUTION INTRAMUSCULAR; INTRAVENOUS at 08:59

## 2024-06-24 RX ADMIN — FUROSEMIDE 40 MG: 10 INJECTION, SOLUTION INTRAMUSCULAR; INTRAVENOUS at 13:15

## 2024-06-24 RX ADMIN — LIDOCAINE 2 PATCH: 4 PATCH TOPICAL at 09:01

## 2024-06-24 RX ADMIN — GUAIFENESIN 600 MG: 600 TABLET, EXTENDED RELEASE ORAL at 09:01

## 2024-06-24 RX ADMIN — CLOPIDOGREL BISULFATE 75 MG: 75 TABLET, FILM COATED ORAL at 17:28

## 2024-06-24 RX ADMIN — ACETAMINOPHEN 1000 MG: 500 TABLET, FILM COATED ORAL at 05:46

## 2024-06-24 RX ADMIN — NAPROXEN 250 MG: 250 TABLET ORAL at 09:00

## 2024-06-24 RX ADMIN — SODIUM CHLORIDE, SODIUM LACTATE, POTASSIUM CHLORIDE, AND CALCIUM CHLORIDE 1000 ML: .6; .31; .03; .02 INJECTION, SOLUTION INTRAVENOUS at 14:13

## 2024-06-24 RX ADMIN — ASPIRIN 81 MG: 81 TABLET, COATED ORAL at 08:59

## 2024-06-24 RX ADMIN — ONDANSETRON 4 MG: 2 INJECTION INTRAMUSCULAR; INTRAVENOUS at 03:54

## 2024-06-24 RX ADMIN — POTASSIUM CHLORIDE 20 MEQ: 750 CAPSULE, EXTENDED RELEASE ORAL at 09:00

## 2024-06-24 RX ADMIN — AMIODARONE HYDROCHLORIDE 200 MG: 200 TABLET ORAL at 08:59

## 2024-06-24 RX ADMIN — OXYCODONE HYDROCHLORIDE 10 MG: 10 TABLET ORAL at 01:51

## 2024-06-24 RX ADMIN — ACETAMINOPHEN 1000 MG: 500 TABLET, FILM COATED ORAL at 12:01

## 2024-06-24 RX ADMIN — PREGABALIN 25 MG: 25 CAPSULE ORAL at 09:01

## 2024-06-24 RX ADMIN — POTASSIUM CHLORIDE 20 MEQ: 750 CAPSULE, EXTENDED RELEASE ORAL at 12:01

## 2024-06-24 RX ADMIN — METOPROLOL SUCCINATE 25 MG: 25 TABLET, EXTENDED RELEASE ORAL at 09:01

## 2024-06-24 NOTE — THERAPY TREATMENT NOTE
Acute Care - Physical Therapy Treatment Note  [facility]     Patient Name: Michael Issa  : 1960  MRN: [id number]  Today's Date: 2024      Visit Dx:     ICD-10-CM ICD-9-CM   1. Impaired mobility [Z74.09]  Z74.09 799.89   2. Aneurysm of ascending aorta without rupture  I71.21 441.2     Patient Active Problem List   Diagnosis    Hypertension    Thyroid nodule    Former smoker    Family history of colonic polyps    Erectile dysfunction    Calcified granuloma of lung    Aorta aneurysm    Chronic seasonal allergic rhinitis due to pollen    Chest pressure    History of colon polyps    HUMPHRIES (dyspnea on exertion)    Thoracic aortic aneurysm without rupture    Class 1 obesity due to excess calories with body mass index (BMI) of 30.0 to 30.9 in adult    Aortic root aneurysm    Venous insufficiency (chronic) (peripheral)    Venous ulcer of right leg     Past Medical History:   Diagnosis Date    Aneurysm 2023    Arthritis     Disease of thyroid gland     NODULE PRESENT     ED (erectile dysfunction)     Elevated cholesterol     Hx of chest pain     Hypertension     Urgency of urination      Past Surgical History:   Procedure Laterality Date    CARDIAC CATHETERIZATION Left 2024    Procedure: Cardiac Catheterization/Vascular Study;  Surgeon: Nima Nickerson MD;  Location: [facility] CATH INVASIVE LOCATION;  Service: Cardiology;  Laterality: Left;    COLONOSCOPY N/A 2021    Procedure: COLONOSCOPY;  Surgeon: Eduardo Neil MD;  Location: [facility] ENDOSCOPY;  Service: Gastroenterology;  Laterality: N/A;  pre  post diverticulosis, hemorrhoids  Dr. cavazos    KNEE SURGERY      rt knee MENISCUS    PREPERITONEAL HERNIA REPAIR Bilateral 2017    Procedure: BILATERAL PREPERITONEAL INGUINAL HERNIA REPAIR LAPAROSCOPIC WITH MESH;  Surgeon: Rambo Church MD;  Location: [facility] OR;  Service:      PT Assessment (Last 12 Hours)       PT Evaluation and Treatment       Row Name 24 0924 24 0800  "      Physical Therapy Time and Intention    Subjective Information complains of  \"not feeling well\"  -WK --    Document Type therapy note (daily note)  -WK --    Mode of Treatment physical therapy  -WK --    Session Not Performed -- other (see comments)  -WK    Comment -- talking with Dr  -WK      Row Name 06/24/24 0924          General Information    Existing Precautions/Restrictions fall;sternal  1 chest tube  -WK       Row Name 06/24/24 0924          Bed Mobility    Comment, (Bed Mobility) in chair  -WK       Row Name 06/24/24 0924          Sit-Stand Transfer    Sit-Stand [employer] (Transfers) independent  -WK       Row Name 06/24/24 0924          Stand-Sit Transfer    Stand-Sit [employer] (Transfers) independent  -WK       Row Name 06/24/24 0924          Gait/Stairs (Locomotion)    [employer] Level (Gait) independent  -WK     Distance in Feet (Gait) 500  -WK     Comment, (Gait/Stairs) Reviewed sternal precautions for home and he has no concerns  -WK       Row Name             Wound 06/20/24 0845 midline sternal Incision    Wound - Properties Group Placement Date: 06/20/24  -SF Placement Time: 0845  -SF Present on Original Admission: N  -SF Orientation: midline  -SF Location: sternal  -SF Primary Wound Type: Incision  -SF    Retired Wound - Properties Group Placement Date: 06/20/24  -SF Placement Time: 0845  -SF Present on Original Admission: N  -SF Orientation: midline  -SF Location: sternal  -SF Primary Wound Type: Incision  -SF    Retired Wound - Properties Group Date first assessed: 06/20/24  -SF Time first assessed: 0845  -SF Present on Original Admission: N  -SF Location: sternal  -SF Primary Wound Type: Incision  -SF      Row Name             Wound 06/20/24 Right lower leg Venous Ulcer    Wound - Properties Group Placement Date: 06/20/24  -SF Present on Original Admission: Y  -SF Side: Right  -SF Orientation: lower  -SF Location: leg  -SF Primary Wound Type: Venous ulcer  -SF Additional Comments: " present upon arrival to [facility]    Retired Wound - Properties Group Placement Date: 06/20/24  -SF Present on Original Admission: Y  -SF Side: Right  -SF Orientation: lower  -SF Location: leg  -SF Primary Wound Type: Venous ulcer  -SF Additional Comments: present upon arrival to [facility]    Retired Wound - Properties Group Date first assessed: 06/20/24  -SF Present on Original Admission: Y  -SF Side: Right  -SF Location: leg  -SF Primary Wound Type: Venous ulcer  -SF Additional Comments: present upon arrival to [facility]      Row Name             Wound 06/20/24 1643 Left lower leg Venous Ulcer    Wound - Properties Group Placement Date: 06/20/24  -AB Placement Time: 1643  -AB Present on Original Admission: Y  -AB Side: Left  -AB Orientation: lower  -AB Location: leg  -AB Primary Wound Type: Venous ulcer  -AB    Retired Wound - Properties Group Placement Date: 06/20/24  -AB Placement Time: 1643  -AB Present on Original Admission: Y  -AB Side: Left  -AB Orientation: lower  -AB Location: leg  -AB Primary Wound Type: Venous ulcer  -AB    Retired Wound - Properties Group Date first assessed: 06/20/24  -AB Time first assessed: 1643  -AB Present on Original Admission: Y  -AB Side: Left  -AB Location: leg  -AB Primary Wound Type: Venous ulcer  -AB      Row Name 06/24/24 0924          Plan of Care Review    Plan of Care Reviewed With patient  -WK     Progress improving  -WK     Outcome Evaluation Pt stated he wasn't feeling well today. Amb 500' independent. Reviewed sternal precautions for home and pt had no other concerns to address.  -WK       Row Name 06/24/24 0924          Positioning and Restraints    Pre-Treatment Position sitting in chair/recliner  -WK     Post Treatment Position chair  -WK     In Chair reclined;call light within reach;encouraged to call for assist;with nsg  -WK               User Key  (r) = Recorded By, (t) = Taken By, (c) = Cosigned By      Initials Name Provider Type    WK Ana Chao, CAROLINA  Physical Therapist Assistant    Matti Vera, RN Registered Nurse    Quique Bryant, RN Registered Nurse                    Physical Therapy Education       Title: PT OT SLP Therapies (In Progress)       Topic: Physical Therapy (In Progress)       Point: Mobility training (Done)       Learning Progress Summary             Patient Acceptance, E, VU,DU,NR by SIMONE at 6/21/2024 0806    Comment: benefits of activity, progression of PT POC, sternal prec                         Point: Home exercise program (Not Started)       Learner Progress:  Not documented in this visit.              Point: Body mechanics (Not Started)       Learner Progress:  Not documented in this visit.              Point: Precautions (Done)       Learning Progress Summary             Patient Acceptance, E, VU,DU,NR by SIMONE at 6/21/2024 0806    Comment: benefits of activity, progression of PT POC, sternal prec                                         User Key       Initials Effective Dates Name Provider Type Discipline    SIMONE 02/03/23 -  Jos Chapa PT DPT Physical Therapist PT                  PT Recommendation and Plan     Plan of Care Reviewed With: patient  Progress: improving  Outcome Evaluation: Pt stated he wasn't feeling well today. Amb 500' independent. Reviewed sternal precautions for home and pt had no other concerns to address.   Outcome Measures       Row Name 06/24/24 0924 06/23/24 0805 06/22/24 0952       How much help from another person do you currently need...    Turning from your back to your side while in flat bed without using bedrails? 4  -WK 3  -WK 3  -WK    Moving from lying on back to sitting on the side of a flat bed without bedrails? 4  -WK 3  -WK 3  -WK    Moving to and from a bed to a chair (including a wheelchair)? 4  -WK 4  -WK 4  -WK    Standing up from a chair using your arms (e.g., wheelchair, bedside chair)? 4  -WK 4  -WK 4  -WK    Climbing 3-5 steps with a railing? 4  -WK 4  -WK 3  -WK    To walk in  hospital room? 4  -WK 4  -WK 3  -WK    AM-PAC 6 Clicks Score (PT) 24  -WK 22  -WK 20  -WK    Highest Level of Mobility Goal 8 --> Walked 250 feet or more  -WK 7 --> Walk 25 feet or more  -WK 6 --> Walk 10 steps or more  -WK       Functional Assessment    Outcome Measure Options AM-PAC 6 Clicks Basic Mobility (PT)  -WK AM-PAC 6 Clicks Basic Mobility (PT)  -WK AM-PAC 6 Clicks Basic Mobility (PT)  -WK              User Key  (r) = Recorded By, (t) = Taken By, (c) = Cosigned By      Initials Name Provider Type    WK Ana Chao PTA Physical Therapist Assistant                     Time Calculation:    PT Charges       Row Name 06/24/24 0938             Time Calculation    Start Time 0924  -WK      Stop Time 0935  -WK      Time Calculation (min) 11 min  -WK      PT Received On 06/24/24  -WK         Time Calculation- PT    Total Timed Code Minutes- PT 11 minute(s)  -WK         Timed Charges    43780 - Gait Training Minutes  11  -WK         Total Minutes    Timed Charges Total Minutes 11  -WK       Total Minutes 11  -WK                User Key  (r) = Recorded By, (t) = Taken By, (c) = Cosigned By      Initials Name Provider Type    WK Ana Chao PTA Physical Therapist Assistant                  Therapy Charges for Today       Code Description Service Date Service Provider Modifiers Qty    01730152435 HC GAIT TRAINING EA 15 MIN 6/23/2024 Ana Chao PTA GP 1    55551620324 HC GAIT TRAINING EA 15 MIN 6/23/2024 Ana Chao PTA GP 1    82658937795 HC GAIT TRAINING EA 15 MIN 6/24/2024 Ana Chao PTA GP 1            PT G-Codes  Outcome Measure Options: AM-PAC 6 Clicks Basic Mobility (PT)  AM-PAC 6 Clicks Score (PT): 24    Ana Chao PTA  6/24/2024

## 2024-06-24 NOTE — PLAN OF CARE
Problem: Adult Inpatient Plan of Care  Goal: Plan of Care Review  Outcome: Ongoing, Progressing  Flowsheets (Taken 6/24/2024 0401)  Progress: improving  Plan of Care Reviewed With: patient  Outcome Evaluation: Patient c/o pain at chest tube sites, prn medication given with relief. Patient c/o nausea with vomiting, prn zofran given with relief. Patient had multiple very large BM's this AM. NSR 70-75 on tele. VSS. Safety maintained. Will notify MD of any changes.

## 2024-06-24 NOTE — DISCHARGE SUMMARY
[facility] Cardiothoracic Surgery  DISCHARGE SUMMARY        Date of Admission: 6/20/2024  Date of Discharge: 6/24/2024  Primary Care Physician: Mansi Zapata, DNP, APRN    Discharge Diagnoses:  Active Hospital Problems    Diagnosis     **Thoracic aortic aneurysm without rupture     Venous insufficiency (chronic) (peripheral)     Venous ulcer of right leg     Class 1 obesity due to excess calories with body mass index (BMI) of 30.0 to 30.9 in adult     Aortic root aneurysm     Hypertension     Former smoker        Procedures Performed:   Modified Bentall procedure, creation of biologic valve conduit with 25 mm Inspiris aortic valve and 30 mm Hemashield platinum graft, resection of ascending aorta and proximal hemiarch aorta with 28 mm Hemashield platinum graft using deep hypothermic circulatory arrest, ultrasound-guided femoral arterial line placement, left atrial appendage exclusion with 35 mm AtriCure AtriClip, sternal rigid fixation using XP sternal plating system     HPI:  Mr. Michael Issa is a 64 y.o. male with a history of hypertension, hypercholesterolemia, erectile dysfunction and thyroid nodule.  He also is a former smoker.  He was referred to Dr. Gtz in October 2023 for known thoracic aneurysm disease with recent chest pain.  Chest pain was thought to be secondary to accelerated hypertension.  At that time Dr. Gtz recommended a new CT angiogram.  At that time the ascending aorta measured 4.8 cm in size and the aortic root measured 4.5 cm.  A repeat CT angiogram was done 6 months later with his aneurysm surveillance follow-up.  His ascending aorta measured 49.5 x 49.6.  The aortic root measured 4.1 cm.  Aneurysmectomy was recommended and patient elected to proceed forward with surgery.    Hospital Course: Mr. Issa was admitted on the day of surgery.  See separate operative note by Dr. Gtz for details of the surgery.  Following surgery he was taken to the intensive care  unit.  He is hemodynamically intact.  He was extubated overnight.  He is neurologically intact.  He was up in the chair and working with physical therapy.  He was transferred to  for ongoing recovery and care.  The rest of his hospital stay was remarkable for pulmonary toilet, ambulation and diuresis.  Oxygen was weaned off, oxygen saturation is acceptable on room air.  Chest tubes were removed without remark.  Pacing wires were removed without remark.  Patient did become hypotensive after pacing wires were removed.  Echocardiogram was performed and unremarkable.  He was resuscitated with IV fluids and blood pressure is now acceptable.  He meets criteria for discharge home on postop day 4 and he is agreeable to be discharged home with family.    Condition on Discharge:  Neurologically intact and has good pain control.  He is eating well and has demonstrable good bowel function.  The sternum is stable without clicks and the saphenectomy incisions are healing nicely.  The heart is in  normal sinus rhythm.  He has met all physical therapy criteria and verbalizes understanding of sternotomy precautions.   He verbalizes understanding of a separately handed out cardiac surgery handout.       Discharge Disposition:  Home or Self Care [1]    Discharge Medications:     Discharge Medications        New Medications        Instructions Start Date   clopidogrel 75 MG tablet  Commonly known as: PLAVIX   75 mg, Oral, Daily      metoprolol succinate XL 25 MG 24 hr tablet  Commonly known as: TOPROL-XL   25 mg, Oral, Every 24 Hours Scheduled             Continue These Medications        Instructions Start Date   aspirin 81 MG EC tablet   81 mg, Oral, Daily      rosuvastatin 5 MG tablet  Commonly known as: CRESTOR   5 mg, Oral, Daily      tamsulosin 0.4 MG capsule 24 hr capsule  Commonly known as: FLOMAX   1 capsule, Oral, Daily      valACYclovir 1000 MG tablet  Commonly known as: VALTREX   2,000 mg, Oral, 2 Times Daily      VITAMIN  C PO   1,000 mg, Oral, Daily             Stop These Medications      lisinopril-hydrochlorothiazide 20-12.5 MG per tablet  Commonly known as: PRINZIDE,ZESTORETIC     sildenafil 100 MG tablet  Commonly known as: VIAGRA          Cardiac rehabilitation deferred due to strict sternal precautions    Discharge Diet: Regular diet     Discharge Care Plan / Instructions:   Please see the separately handed out cardiac surgery handout     Activity at Discharge:   No heavy lifting greater than 5 pounds or a gallon of milk while maintaining sternal precautions.  Michael Issa has been instructed on an exercise  regiment as detailed in a handed out cardiac surgery handout.    Follow-up Appointments: Michael Issa  is requested to see Mansi Zapata DNP, APRNAYELI within 1-2 weeks from time of discharge,   to see Zhanna INGRAM in 1 week, to follow-up with Dr. Gtz in 4 weeks,  and to follow-up with Dr. Nickerson of the cardiology service in 6 weeks.

## 2024-06-24 NOTE — PLAN OF CARE
Goal Outcome Evaluation:  Plan of Care Reviewed With: patient        Progress: improving  Outcome Evaluation: Pt stated he wasn't feeling well today. Amb 500' independent. Reviewed sternal precautions for home and pt had no other concerns to address.

## 2024-06-24 NOTE — CASE MANAGEMENT/SOCIAL WORK
Continued Stay Note   [place]     Patient Name: Michael Issa  MRN: [id number]  Today's Date: 6/24/2024    Admit Date: 6/20/2024    Plan: Home   Discharge Plan       Row Name 06/24/24 1301       Plan    Plan Home    Final Discharge Disposition Code 01 - home or self-care    Final Note Anticipate discharge home soon. No dc planning needs identified.             MAGGI Galvez

## 2024-06-24 NOTE — PROGRESS NOTES
"Patient name: Michael Issa  Patient : 1960  VISIT # [id number]  MR #[id number]    Procedure:Procedure(s):  Modified Bentall procedure, creation of biologic valve conduit with 25 mm Inspiris aortic valve and 30 mm Hemashield platinum graft, resection of ascending aorta and proximal hemiarch aorta with 28 mm Hemashield platinum graft using deep hypothermic circulatory arrest, ultrasound-guided femoral arterial line placement, left atrial appendage exclusion with 35 mm AtriCure AtriClip, sternal rigid fixation using XP sternal plating system     Procedure Date:2024  POD:4 Days Post-Op    Sitting up in the chair.  Tolerating room air.  Reports several bowel movements last night.  Complaints of nausea and vomiting throughout the night, improved this morning.  Pain is well-controlled.  Walking well with physical therapy.  Weight is down 9 pounds over the last 24 hours and he remains 4 pounds above his baseline weight.    IV drips: None  Telemetry: Normal sinus rhythm 60s to 70s     Subjective   No chief complaint on file.    Objective     Visit Vitals  BP 97/63 (BP Location: Left arm, Patient Position: Lying)   Pulse 67   Temp 98.3 °F (36.8 °C) (Oral)   Resp 18   Ht 185.4 cm (73\")   Wt 104 kg (229 lb 9.6 oz)   SpO2 94%   BMI 30.29 kg/m²   Baseline weight: 225 pounds    Intake/Output Summary (Last 24 hours) at 2024 0920  Last data filed at 2024 0900  Gross per 24 hour   Intake 1200 ml   Output 2302 ml   Net -1102 ml       Lab:     CBC:  Results from last 7 days   Lab Units 24  0404 24  0353 24  0213   WBC 10*3/mm3 18.01* 20.62* 11.24*   HEMATOCRIT % 35.8* 36.7* 34.7*   PLATELETS 10*3/mm3 171 185 177          BMP:  Results from last 7 days   Lab Units 24  0404 24  0353 24  0213   SODIUM mmol/L 137 136 138   POTASSIUM mmol/L 4.7 5.0 4.6   CHLORIDE mmol/L 100 102 106   CO2 mmol/L 28.0 28.0 26.0   GLUCOSE mg/dL 126* 133* 158*   BUN mg/dL 23 19 19   CREATININE mg/dL " 0.77 0.72* 0.85          COAG:  Results from last 7 days   Lab Units 06/21/24  0213 06/20/24  1524   INR  1.10* 1.25*   APTT seconds  --  40.6*       IMAGES:       Imaging Results (Last 24 Hours)       ** No results found for the last 24 hours. **          Physical Exam:  General: Alert, oriented. No apparent distress.   Cardiovascular: Regular rate and rhythm without murmur, rubs, or gallops.    Pulmonary: Clear to auscultation bilaterally without wheezing, rubs, or rales.  Chest: Sternotomy incision clean, dry, and intact. Sternum stable. No clicks.  Sternal chest tube x 2, right drain x 1.  Fluid is serosanguineous   Abdomen: Soft, non distended, and non tender.  Extremities: Warm, moves all extremities.  Peripheral edema saphenectomy site clean, dry, and intact.   Neurologic:  Grossly intact with no focal deficits.       Impression:  Ascending aortic aneurysm without rupture  Aortic root aneurysm  Hypertension  Class I obesity  Former smoker  Chronic venous insufficiency, venous ulcer right leg present on admission     Plan:  CBC, CMP now  PA and lateral chest x-ray  DC mediastinal tubes, keep root drain for now  Routine postcardiac surgery care  Encourage pulmonary toilet and ambulation  Possible discharge home later today pending labs, x-ray and rate drain output  Discussed with patient and nursing    JUAN JOSE Horowitz  06/24/24  09:20 CDT

## 2024-06-25 ENCOUNTER — TRANSITIONAL CARE MANAGEMENT TELEPHONE ENCOUNTER (OUTPATIENT)
Dept: CALL CENTER | Facility: HOSPITAL | Age: 64
End: 2024-06-25
Payer: COMMERCIAL

## 2024-06-25 NOTE — THERAPY DISCHARGE NOTE
Acute Care - Physical Therapy Discharge Summary  [facility]       Patient Name: Michael Issa  : 1960  MRN: [id number]    Today's Date: 2024                 Admit Date: 2024      PT Recommendation and Plan    Visit Dx:    ICD-10-CM ICD-9-CM   1. Impaired mobility [Z74.09]  Z74.09 799.89   2. Aneurysm of ascending aorta without rupture  I71.21 441.2        Outcome Measures       Row Name 24 0924 24 0805 24 0952       How much help from another person do you currently need...    Turning from your back to your side while in flat bed without using bedrails? 4  -WK 3  -WK 3  -WK    Moving from lying on back to sitting on the side of a flat bed without bedrails? 4  -WK 3  -WK 3  -WK    Moving to and from a bed to a chair (including a wheelchair)? 4  -WK 4  -WK 4  -WK    Standing up from a chair using your arms (e.g., wheelchair, bedside chair)? 4  -WK 4  -WK 4  -WK    Climbing 3-5 steps with a railing? 4  -WK 4  -WK 3  -WK    To walk in hospital room? 4  -WK 4  -WK 3  -WK    AM-PAC 6 Clicks Score (PT) 24  -WK 22  -WK 20  -WK    Highest Level of Mobility Goal 8 --> Walked 250 feet or more  -WK 7 --> Walk 25 feet or more  -WK 6 --> Walk 10 steps or more  -WK       Functional Assessment    Outcome Measure Options AM-PAC 6 Clicks Basic Mobility (PT)  -WK AM-PAC 6 Clicks Basic Mobility (PT)  -WK AM-PAC 6 Clicks Basic Mobility (PT)  -WK              User Key  (r) = Recorded By, (t) = Taken By, (c) = Cosigned By      Initials Name Provider Type    Ana Diaz PTA Physical Therapist Assistant                         PT Rehab Goals       Row Name 24 0837             Bed Mobility Goal 1 (PT)    Activity/Assistive Device (Bed Mobility Goal 1, PT) sit to supine/supine to sit  -MF      Hand Level/Cues Needed (Bed Mobility Goal 1, PT) supervision required  -      Time Frame (Bed Mobility Goal 1, PT) long term goal (LTG);10 days  -      Progress/Outcomes (Bed Mobility Goal  1, PT) goal not met  -MF         Transfer Goal 1 (PT)    Activity/Assistive Device (Transfer Goal 1, PT) sit-to-stand/stand-to-sit;bed-to-chair/chair-to-bed  -MF      Dixie Level/Cues Needed (Transfer Goal 1, PT) supervision required  -MF      Time Frame (Transfer Goal 1, PT) long term goal (LTG);10 days  -MF      Progress/Outcome (Transfer Goal 1, PT) goal met  -MF         Gait Training Goal 1 (PT)    Activity/Assistive Device (Gait Training Goal 1, PT) gait (walking locomotion);decrease fall risk;improve balance and speed;increase endurance/gait distance  -MF      Dixie Level (Gait Training Goal 1, PT) supervision required  -MF      Distance (Gait Training Goal 1, PT) 300 ft  -MF      Time Frame (Gait Training Goal 1, PT) long term goal (LTG);10 days  -MF      Progress/Outcome (Gait Training Goal 1, PT) goal met  -MF         Stairs Goal 1 (PT)    Activity/Assistive Device (Stairs Goal 1, PT) ascending stairs;descending stairs;using handrail, left;using handrail, right  -MF      Dixie Level/Cues Needed (Stairs Goal 1, PT) supervision required  -MF      Number of Stairs (Stairs Goal 1, PT) 2-7 steps  -MF      Time Frame (Stairs Goal 1, PT) long term goal (LTG);10 days  -MF      Progress/Outcome (Stairs Goal 1, PT) goal met  -MF                User Key  (r) = Recorded By, (t) = Taken By, (c) = Cosigned By      Initials Name Provider Type Discipline     Lolita Fernando PTA Physical Therapist Assistant PT                        PT Discharge Summary  Anticipated Discharge Disposition (PT): home  Reason for Discharge: Discharge from facility  Outcomes Achieved: Patient able to partially acheive established goals  Discharge Destination: Home      Lolita Fernando PTA   6/25/2024

## 2024-06-25 NOTE — OUTREACH NOTE
Prep Survey      Flowsheet Row Responses   [facility] patient discharged from? [place]   Is LACE score < 7 ? No   Eligibility [facility]   Date of Admission 06/20/24   Date of Discharge 06/24/24   Discharge Disposition Home or Self Care   Discharge diagnosis Thoracic aortic aneurysm without rupture, aortic arch & root replacement, left atrial appendage exclusion, sternal rigid fixation   Does the patient have one of the following disease processes/diagnoses(primary or secondary)? Cardiothoracic surgery   Does the patient have Home health ordered? No   Is there a DME ordered? No   Prep survey completed? Yes            Karine SUBRAMANIAN - Registered Nurse

## 2024-06-25 NOTE — PAYOR COMM NOTE
"DC HOME 6-24-24      Michael Issa (64 y.o. Male)       Date of Birth   1960    Social Security Number       Address   [address] [place] [id number]    Home Phone   [phone]    MRN   [id number]       J[employer]ism   [employer] [facility]    Marital Status   Single                            Admission Date   6/20/24    Admission Type   Elective    Admitting Provider   Filiberto Gtz MD    Attending Provider       Department, Room/Bed   [facility] 4B, 437/1       Discharge Date   6/24/2024    Discharge Disposition   Home or Self Care    Discharge Destination   Home                              Attending Provider: (none)   Allergies: Penicillins    Isolation: None   Infection: None   Code Status: Prior    Ht: 185.4 cm (73\")   Wt: 104 kg (229 lb 9.6 oz)    Admission Cmt: None   Principal Problem: Thoracic aortic aneurysm without rupture [I71.20]                   Active Insurance as of 6/20/2024       Primary Coverage       Payor Plan Insurance Group Employer/Plan Group    [employer] [id number]       Payor Plan Address Payor Plan Phone Number Payor Plan Fax Number Effective Dates    [address] [address]   3/1/2024 - None Entered    [facility] [id number]         Subscriber Name Subscriber Birth Date Member ID       MICHAEL ISSA 1960 [id number]                     Emergency Contacts        (Rel.) Home Phone Work Phone Mobile Phone    Romario Issa Jr (Brother) [phone] -- --    Elicia Ramos (Friend) [phone] -- [phone]    Melia Phipps (Sister) [phone] -- --              Discharge Summary    No notes of this type exist for this encounter.       "

## 2024-06-25 NOTE — OUTREACH NOTE
Call Center TCM Note      Flowsheet Row Responses   [facility] patient discharged from? [place]   Does the patient have one of the following disease processes/diagnoses(primary or secondary)? Cardiothoracic surgery   TCM attempt successful? Yes  [DASH hess and Melia]   Call start time 1323   Call end time 1327   Discharge diagnosis Thoracic aortic aneurysm without rupture, aortic arch & root replacement, left atrial appendage exclusion, sternal rigid fixation   Meds reviewed with patient/caregiver? Yes   Is the patient having any side effects they believe may be caused by any medication additions or changes? No   Does the patient have all medications related to this admission filled (includes all antibiotics, pain medications, cardiac medications, etc.) Yes   Is the patient taking all medications as directed (includes completed medication regime)? Yes   Comments HOSP DC FU appt 7/1/24 215 pm.   Does the patient have an appointment with their PCP within 7-14 days of discharge? Yes   Has home health visited the patient within 72 hours of discharge? N/A   Psychosocial issues? No   Did the patient receive a copy of their discharge instructions? Yes   Nursing interventions Reviewed instructions with patient   What is the patient's perception of their health status since discharge? Improving   Nursing interventions Nurse provided patient education   Is the patient/caregiver able to teach back normal signs of recovery? Pain or discomfort at incisional site   Is the patient /caregiver able to teach back basic post-op care? Lifting as instructed by MD in discharge instructions, Shower daily, No tub bath, swimming, or hot tub until instructed by MD   Is the patient/caregiver able to teach back signs and symptoms of incisional infection? Increased redness, swelling or pain at the incisonal site, Increased drainage or bleeding, Incisional warmth, Pus or odor from incision, Fever   Is the patient/caregiver able to teach  back steps to recovery at home? Set small, achievable goals for return to baseline health, Eat a well-balance diet   If the patient is a current smoker, are they able to teach back resources for cessation? Not a smoker   Is the patient/caregiver able to teach back the hierarchy of who to call/visit for symptoms/problems? PCP, Specialist, Home health nurse, Urgent Care, ED, 911 Yes   TCM call completed? Yes   Wrap up additional comments Pt reports he is doing ok. a little sore but no s/s of infection.   Call end time 1327            July Betancourt RN    6/25/2024, 13:27 CDT

## 2024-06-26 LAB — BH CV ECHO MEAS - TR MAX VEL: 220 CM/SEC

## 2024-06-27 LAB — BH CV ECHO MEAS - AO ROOT DIAM: 4.3 CM

## 2024-07-01 ENCOUNTER — OFFICE VISIT (OUTPATIENT)
Dept: FAMILY MEDICINE CLINIC | Facility: CLINIC | Age: 64
End: 2024-07-01
Payer: COMMERCIAL

## 2024-07-01 VITALS
TEMPERATURE: 97.4 F | HEART RATE: 66 BPM | WEIGHT: 228 LBS | OXYGEN SATURATION: 99 % | BODY MASS INDEX: 30.22 KG/M2 | SYSTOLIC BLOOD PRESSURE: 114 MMHG | RESPIRATION RATE: 18 BRPM | HEIGHT: 73 IN | DIASTOLIC BLOOD PRESSURE: 68 MMHG

## 2024-07-01 DIAGNOSIS — Z98.890 S/P AORTIC ANEURYSM REPAIR: ICD-10-CM

## 2024-07-01 DIAGNOSIS — Z09 HOSPITAL DISCHARGE FOLLOW-UP: Primary | ICD-10-CM

## 2024-07-01 DIAGNOSIS — R60.0 LOWER EXTREMITY EDEMA: ICD-10-CM

## 2024-07-01 DIAGNOSIS — Z79.899 MEDICATION MANAGEMENT: ICD-10-CM

## 2024-07-01 DIAGNOSIS — Z86.79 S/P AORTIC ANEURYSM REPAIR: ICD-10-CM

## 2024-07-01 PROCEDURE — 99495 TRANSJ CARE MGMT MOD F2F 14D: CPT | Performed by: NURSE PRACTITIONER

## 2024-07-01 NOTE — PROGRESS NOTES
Transitional Care Follow Up Visit  Subjective     Michael Issa is a 64 y.o. male who presents for a transitional care management visit.    Within 48 business hours after discharge our office contacted him via telephone to coordinate his care and needs.      I reviewed and discussed the details of that call along with the discharge summary, hospital problems, inpatient lab results, inpatient diagnostic studies, and consultation reports with Michael.     Current outpatient and discharge medications have been reconciled for the patient.  Reviewed by: Mansi Zapata, JESU, APRN          6/24/2024     7:58 PM   Date of TCM Phone Call   [facility]   Date of Admission 6/20/2024   Date of Discharge 6/24/2024   Discharge Disposition Home or Self Care     Risk for Readmission (LACE) Score: 8 (6/24/2024  5:00 AM)      History of Present Illness   Course During Hospital Stay:       The following portions of the patient's history were reviewed and updated as appropriate: allergies, current medications, past family history, past medical history, past social history, past surgical history, and problem list.    Kirit had a CT angiogram (10/2023) that showed the aneurysm.  At that time Dr. Gtz felt he could wait on surgery and repeat CT Angiogram which showed the aneurysm was worsening and he had been having chest pain.  Dr. Gtz scheduled the surgery for thoracic aortic aneurysm and Kirit presented to the hospital for scheduled surgery.   He was  admitted on 06/20/2024 and was discharged on 06/24/2024. Dr. Gtz performed surgery on him, which also included venous insufficiency, venous ulcer of the right leg. His hospital course was uneventful.  After surgery he went to intensive care. He became hemodynamically stable and was extubated, neurologically intact and had been up in the chair and working with physical therapy. He went to the floor. The rest of the hospital stay was unremarkable. He was weaned  off his oxygen. Chest tubes were removed and an echocardiogram was performed and was unremarkable. Today, he is back, he is doing fine. He is continuing his clopidogrel 75 mg daily, metoprolol succinate XL 25 mg 1 tablet every 24 hours. He takes baby aspirin for cardiac improvement.  Since he has been home Kirit says that he is improving daily.  He states I have only had to take my pain medication 2's since I have been home.  I have been up exercising daily and increasing time of exercise daily. Kirit has been adhering to weight restriction since his discharge from the hospital.  Denies any chest pain, shortness of breath or palpitations. He does have significant edema in his lower extremities, a condition that predates his surgery.      He is scheduled to consult with Dr. Gtz's nurse tomorrow and a follow-up appointment with him on the 24th. Lasix was administered during his hospital stay, but it he was told not to continue at home. He is scheduled to return to work in approximately 1.5 to 2 months. His occupation is physically demanding.    Chronic problems include: htn stable with metoprolol, anticoagulant therapy controlled with aspirin and clopidogrel, hyperlipidemia stable with rosuvastatin, bph stable with tamsulosin, hypokalemia stable with potassium chloride.     The following portions of the patient's history were reviewed and updated as appropriate: allergies, current medications, past family history, past medical history, past social history, past surgical history and problem list.    BMI is >= 30 and <35. (Class 1 Obesity). The following options were offered after discussion;: exercise counseling/recommendations and nutrition counseling/recommendations    Review of Systems   Constitutional:  Positive for activity change and appetite change.   HENT: Negative.     Cardiovascular:  Positive for palpitations and leg swelling.   Gastrointestinal: Negative.    Endocrine: Negative.    Genitourinary:  "Negative.    Musculoskeletal: Negative.    Allergic/Immunologic: Negative.    Neurological: Negative.    Hematological: Negative.    Psychiatric/Behavioral: Negative.     All other systems reviewed and are negative.    Objective   /68 (BP Location: Left arm, Patient Position: Sitting, Cuff Size: Large Adult)   Pulse 66   Temp 97.4 °F (36.3 °C) (Oral)   Resp 18   Ht 185.4 cm (73\") Comment: per patient  Wt 103 kg (228 lb)   SpO2 99%   BMI 30.08 kg/m²     Physical Exam  Vitals and nursing note reviewed.   Constitutional:       General: He is awake.      Appearance: Normal appearance. He is well-developed and well-groomed.   HENT:      Head: Normocephalic and atraumatic.      Right Ear: Hearing, tympanic membrane, ear canal and external ear normal.      Left Ear: Hearing, tympanic membrane, ear canal and external ear normal.      Nose: Nose normal.      Mouth/Throat:      Lips: Pink.      Pharynx: Oropharynx is clear.   Eyes:      General: Lids are normal.      Conjunctiva/sclera: Conjunctivae normal.   Cardiovascular:      Rate and Rhythm: Normal rate and regular rhythm.      Heart sounds: Normal heart sounds.   Pulmonary:      Effort: Pulmonary effort is normal.      Breath sounds: Normal breath sounds and air entry.   Chest:          Comments: Midline incision healing well without redness, or drainage.   Musculoskeletal:      Cervical back: Full passive range of motion without pain.      Right lower leg: No edema.      Left lower leg: No edema.   Lymphadenopathy:      Head:      Right side of head: No submental, submandibular or tonsillar adenopathy.      Left side of head: No submental, submandibular or tonsillar adenopathy.   Skin:     General: Skin is warm and dry.   Neurological:      Mental Status: He is alert and oriented to person, place, and time.      Cranial Nerves: Cranial nerves 2-12 are intact.      Sensory: Sensation is intact.      Motor: Motor function is intact.      Coordination: " Coordination is intact.      Gait: Gait is intact.   Psychiatric:         Attention and Perception: Attention and perception normal.         Mood and Affect: Mood and affect normal.         Speech: Speech normal.         Behavior: Behavior normal. Behavior is cooperative.         Thought Content: Thought content normal.         Cognition and Memory: Cognition and memory normal.         Judgment: Judgment normal.       Assessment & Plan   Diagnoses and all orders for this visit:    1. Hospital discharge follow-up (Primary)    2. S/P aortic aneurysm repair    3. Lower extremity edema    4. Medication management      Cardivascular and aneurysm prevention continue clopidogrel and aspirin as prescribed      Hypokalemia continue potassium chloride      BPH continue tamsulosin as prescribed     Edema continue furosemide as prescribed      HTN continue metoprolol as prescribed     Hyperlipidemia continue rosuvastatin as prescribed        1. Hospital discharge follow-up.  The patient is recovering from cardiothoracic surgery and thoracic aortic aneurysm repair. His last colonoscopy was on 09/23/2021, and all his care gaps have been addressed. He has ceased smoking. The patient is advised to persist with his current medications as previously prescribed.    2. Hypertension.  The patient will maintain his daily regimen of aspirin 81 mg and metoprolol.    3. Hyperlipidemia.  He will maintain his current regimen of rosuvastatin.    4. BPH.  He will continue his current regimen of Flomax.    Follow-up  A follow-up appointment is scheduled for 10/2024.    Electronically signed by Mansi Zapata, JESU, APRN, 07/09/24, 4:10 PM CDT.

## 2024-07-01 NOTE — PROGRESS NOTES
{The following data was reviewed by (Optional):76866}  {Ambulatory Labs (Optional):74239}  {Data reviewed (Optional):41439:::1}  Results  Imaging  Echocardiogram was unremarkable.           Assessment & Plan      No diagnosis found.       {Time Spent (Optional):73105}      Follow Up {Instructions Charge Capture  Follow-up Communications :23}    No follow-ups on file.  Patient was given instructions and counseling regarding his condition or for health maintenance advice. Please see specific information pulled into the AVS if appropriate.       {[employer] Provider Statement:90976}

## 2024-07-02 ENCOUNTER — OFFICE VISIT (OUTPATIENT)
Dept: CARDIAC SURGERY | Facility: CLINIC | Age: 64
End: 2024-07-02
Payer: COMMERCIAL

## 2024-07-02 VITALS
BODY MASS INDEX: 30.75 KG/M2 | SYSTOLIC BLOOD PRESSURE: 115 MMHG | HEART RATE: 76 BPM | WEIGHT: 232 LBS | HEIGHT: 73 IN | OXYGEN SATURATION: 98 % | DIASTOLIC BLOOD PRESSURE: 70 MMHG

## 2024-07-02 DIAGNOSIS — E66.09 CLASS 1 OBESITY DUE TO EXCESS CALORIES WITH SERIOUS COMORBIDITY AND BODY MASS INDEX (BMI) OF 30.0 TO 30.9 IN ADULT: ICD-10-CM

## 2024-07-02 DIAGNOSIS — I71.21 ANEURYSM OF ASCENDING AORTA WITHOUT RUPTURE: Primary | ICD-10-CM

## 2024-07-02 DIAGNOSIS — I71.21 AORTIC ROOT ANEURYSM: ICD-10-CM

## 2024-07-02 DIAGNOSIS — I10 PRIMARY HYPERTENSION: ICD-10-CM

## 2024-07-02 PROCEDURE — 99024 POSTOP FOLLOW-UP VISIT: CPT | Performed by: NURSE PRACTITIONER

## 2024-07-02 RX ORDER — POTASSIUM CHLORIDE 750 MG/1
20 CAPSULE, EXTENDED RELEASE ORAL DAILY
Qty: 14 CAPSULE | Refills: 0 | Status: SHIPPED | OUTPATIENT
Start: 2024-07-02 | End: 2024-07-09

## 2024-07-02 RX ORDER — FUROSEMIDE 40 MG/1
40 TABLET ORAL DAILY
Qty: 7 TABLET | Refills: 0 | Status: SHIPPED | OUTPATIENT
Start: 2024-07-02 | End: 2024-07-09

## 2024-07-02 NOTE — PROGRESS NOTES
"Subjective   Chief Complaint   Patient presents with    Post-op Follow-up     Patient had Ascending Aorta & Proximal Hemiarch Resection on 6/20       Patient ID: Michael Issa is a 64 y.o. male who is here for follow-up having had Aortic hemiarch and ascending aortic replacement with deep hypothermic circulatory arrest using a 28 mm Hemashield platinum graft, Aortic root replacement with Modified Bentall procedure with valve conduit creation (CE 25 mm Inspiris with hemashield platinum 30 mm graft), Left atrial appendage exclusion (Atricure Atriclip 35 mm device), Ultrasound guided insertion of right femoral arterial line, Sternal Rigid fixation using XP sternal plating system performed on 6/20/2024 by Dr. Gtz.     History of Present Illness  Post operative recovery was uneventful without any major complications.  He was discharged home on postoperative day 4 and returns today for 1 week follow-up.  He is joined by his friend.  Indicates he has been doing well and is feeling much better.  Sleep habits are fair-reports he slept on his side 2 nights in a row and then felt like he had pleurisy.  He has walked around and been using his incentive spirometer.  Reports his \"lungs are improving.\" Pain control has been good overall. No fevers/sweats/chills. No drainage from incisions.  No sternal clicks.  He does have some shortness of breath with exertion.  Appetite is good and improving.  No tobacco use. Ambulating at least 5-10 minutes twice daily.  Weight is up 3 pounds since discharge home and 7 pounds from preoperative weight.  Has bilateral lower extremity edema.    Answers submitted by the patient for this visit:  Primary Reason for Visit (Submitted on 6/28/2024)  What is the primary reason for your visit?: Other  Other (Submitted on 6/28/2024)  Please describe your symptoms.: Post-Op  Have you had these symptoms before?: Yes  How long have you been having these symptoms?: 1-2 weeks      The following portions of " "the patient's history were reviewed and updated as appropriate: allergies, current medications, past family history, past medical history, past social history, past surgical history and problem list.    Review of Systems   Constitutional:  Negative for chills, diaphoresis and fever.   Respiratory:  Positive for shortness of breath (with exertion). Negative for wheezing.    Cardiovascular:  Positive for chest pain (incisional) and leg swelling.       Objective   Visit Vitals  /70 (BP Location: Right arm, Patient Position: Sitting, Cuff Size: Adult)   Pulse 76   Ht 185.4 cm (73\")   Wt 105 kg (232 lb)   SpO2 98%   BMI 30.61 kg/m²       Physical Exam  Vitals reviewed.   Constitutional:       General: He is not in acute distress.     Appearance: Normal appearance. He is well-developed. He is obese. He is not diaphoretic.   HENT:      Head: Normocephalic and atraumatic.   Eyes:      Pupils: Pupils are equal, round, and reactive to light.   Cardiovascular:      Rate and Rhythm: Normal rate and regular rhythm.      Heart sounds: Normal heart sounds. No murmur heard.     No friction rub.   Pulmonary:      Effort: Pulmonary effort is normal. No respiratory distress.      Breath sounds: Normal breath sounds. No wheezing or rales.   Abdominal:      General: There is no distension.      Palpations: Abdomen is soft.      Tenderness: There is no abdominal tenderness. There is no guarding.   Musculoskeletal:      Cervical back: Normal range of motion and neck supple.      Right lower leg: Edema present.      Left lower leg: Edema present.   Skin:     General: Skin is warm and dry.      Coloration: Skin is not pale.      Findings: No rash.      Comments: Sternotomy site clean, dry, and intact. Sternum stable. No clicks. Steri strips in place.   Chronic venous insufficiency bilaterally.   Neurological:      Mental Status: He is alert and oriented to person, place, and time.   Psychiatric:         Behavior: Behavior normal. "         Assessment & Plan       Diagnoses and all orders for this visit:    1. Aneurysm of ascending aorta without rupture (Primary)    2. Aortic root aneurysm    3. Primary hypertension    4. Class 1 obesity due to excess calories with serious comorbidity and body mass index (BMI) of 30.0 to 30.9 in adult    Other orders  -     furosemide (Lasix) 40 MG tablet; Take 1 tablet by mouth Daily for 7 days.  Dispense: 7 tablet; Refill: 0  -     potassium chloride (MICRO-K) 10 MEQ CR capsule; Take 2 capsules by mouth Daily for 7 days.  Dispense: 14 capsule; Refill: 0           Overall, Michael Issa is doing well.  Surgical incisions are clean and dry without evidence of infection. We discussed current sternotomy precautions and how these will not be advanced yet. Continue post op surgical wound care: shower daily with antibacterial soap and water, avoid use of lotions, creams, ointments, powders etc, allow steri strips to fall off on their own. Following post op cardiac surgery home instructions.     Will give short course of Lasix and potassium regimen for lower extremity edema.  We discussed blood pressure monitoring and daily weight monitoring.  Will call early next week for follow-up.  He is agreeable to this plan.    Provided support and encouragement. All questions have been answered to the best of my ability. Will discuss starting cardiac rehabilitation at official 1 month post op visit. Patient has follow up with Dr. Gtz in a few weeks. Patient has follow up with Cardiology in a few weeks. Patient has been instructed to contact our office with any questions or concerns should they arise prior to the next office visit.         Michael Issa  reports that he quit smoking about 7 years ago. His smoking use included cigarettes. He started smoking about 27 years ago. He has a 30 pack-year smoking history. He has been exposed to tobacco smoke. He has never used smokeless tobacco.        Advance Care Planning   ACP  discussion was declined by the patient.  Not applicable as patient is under 65 years of age.

## 2024-07-10 ENCOUNTER — DOCUMENTATION (OUTPATIENT)
Dept: CARDIAC SURGERY | Facility: CLINIC | Age: 64
End: 2024-07-10
Payer: COMMERCIAL

## 2024-07-10 NOTE — PROGRESS NOTES
Called patient to check on his weight, BP and lower extremity edema.  Reports he took his last dose of Lasix this morning.  Advised him to let me know Friday how he is doing and if needed I could send in a new prescription, probably restart his HCTZ that he was on preoperatively.    Also discussed that I did see several messages between him and office staff regarding disability paperwork. He says it is okay for us to speak to union personnel if needed, he will await update and was informed that paperwork is ready for review by Dr. Gtz.

## 2024-07-15 ENCOUNTER — TELEPHONE (OUTPATIENT)
Dept: CARDIAC SURGERY | Facility: CLINIC | Age: 64
End: 2024-07-15
Payer: COMMERCIAL

## 2024-07-15 NOTE — TELEPHONE ENCOUNTER
Sent a [employer] message to patient advising of this and that we would try to call again tomorrow.

## 2024-07-15 NOTE — TELEPHONE ENCOUNTER
Dr. Gtz called and discussed with patient. Attempted to reach his union  [employer]-no answer. Called [facility] [phone] and it rang for 10 minutes. No answer-need to question dates of total disability

## 2024-07-16 NOTE — TELEPHONE ENCOUNTER
Attempted to contact Em[employer] at [phone]. They state she is out of the office today. Will try back tomorrow.

## 2024-07-18 NOTE — TELEPHONE ENCOUNTER
"Called and spoke with Emile. She advised that \"dates of total disability\" means the start date to end date that Mr. Issa is unable to work. I informed her I will let Dr Gtz know and we will get this faxed as soon as we can as he is currently in the OR. She voiced understanding.     [employer] message sent to patient advising of the above.   "

## 2024-07-24 ENCOUNTER — OFFICE VISIT (OUTPATIENT)
Dept: CARDIAC SURGERY | Facility: CLINIC | Age: 64
End: 2024-07-24
Payer: COMMERCIAL

## 2024-07-24 VITALS
BODY MASS INDEX: 29.55 KG/M2 | OXYGEN SATURATION: 98 % | HEIGHT: 73 IN | SYSTOLIC BLOOD PRESSURE: 126 MMHG | DIASTOLIC BLOOD PRESSURE: 86 MMHG | HEART RATE: 70 BPM | WEIGHT: 223 LBS

## 2024-07-24 DIAGNOSIS — I71.21 ANEURYSM OF ASCENDING AORTA WITHOUT RUPTURE: ICD-10-CM

## 2024-07-24 DIAGNOSIS — I71.21 AORTIC ROOT ANEURYSM: ICD-10-CM

## 2024-07-24 DIAGNOSIS — Z98.890 H/O AORTIC ROOT REPAIR: Primary | ICD-10-CM

## 2024-07-24 PROCEDURE — 99024 POSTOP FOLLOW-UP VISIT: CPT | Performed by: THORACIC SURGERY (CARDIOTHORACIC VASCULAR SURGERY)

## 2024-07-24 RX ORDER — LISINOPRIL 5 MG/1
5 TABLET ORAL DAILY
Qty: 30 TABLET | Refills: 2 | Status: SHIPPED | OUTPATIENT
Start: 2024-07-24

## 2024-07-24 NOTE — PROGRESS NOTES
Subjective   Patient ID: Michael Issa is a 64 y.o. male.   Procedure:    1.  Aortic hemiarch and ascending aortic replacement with deep hypothermic circulatory arrest using a 28 mm Hemashield platinum graft  2.  Aortic root replacement with Modified Bentall procedure with valve conduit creation (CE 25 mm Inspiris with hemashield platinum 30 mm graft)  3.  Left atrial appendage exclusion (Atricure Atriclip 35 mm device)   4.  Ultrasound guided insertion of right femoral arterial line  5.  Sternal Rigid fixation using XP sternal plating system   6/20/2024        History of Present Illness  The patient is a 64-year-old male with a finding of thoracic aneurysmal disease. He did undergo aortic root replacement, ascending hemiarch and ascending growth placement utilizing deep epithermic circulatory arrest with rigid fixation with expose sternal plating system as well as ultrasound-guided right femoral arterial line placement. His postop course of care was fairly unremarkable for discharge as an inpatient recovery. He did see Zhanna Isaacs on 07/02/2024 and overall had been doing reasonably well. He felt he was breathing better. He did have some pain they felt was similar to pleurisy, but was now improving. He did have some lower extremity edema bilaterally. Zhanna did recommend ongoing Lasix and with that, he has had no other further concerns. He returns to the office visit today for formal postoperative visit.    The patient reports no significant pain, however, he occasionally experiences a clicking sensation when lying on his side, although this has not occurred recently. He denies the presence of fevers. The swelling in his legs has resolved, which he noticed approximately 2 weeks ago. He expresses a desire to resume his regular blood pressure medication, having previously been prescribed lisinopril 20/12.5 mg. His blood pressure reading today was 126/86. He attributes his previous use of blood pressure medication to  "his cessation of smoking. He had to discontinue his blood pressure medication following his cessation of smoking due to hypotension. His occupation involves lifting 50 to 75 pounds. He adheres to a heart-healthy diet and believes his ideal weight is 185 pounds.   He quit smoking years ago.   His father had heart problems.    The following portions of the patient's history were reviewed and updated as appropriate: allergies, current medications, past family history, past medical history, past social history, past surgical history and problem list.        Objective   Visit Vitals  /86 (BP Location: Right arm, Patient Position: Sitting, Cuff Size: Adult)   Pulse 70   Ht 185.4 cm (73\")   Wt 101 kg (223 lb)   SpO2 98%   BMI 29.42 kg/m²       Physical Exam  Physical Exam  Patient is in no acute distress, appropriate, and alert.  Lungs are clear to auscultation bilaterally. No wheezing, rubs, or rales. Sternum is stable. No clicks. Sternal incision is healing nicely.  Heart has a regular rate and rhythm without murmurs, rubs, or gallops.  Abdomen is soft, nondistended, nontender.  Extremities show no clubbing, cyanosis, or edema. Chronic venous insufficiency changes and brawny type skin changes are present.      Adult Transthoracic Echo Limited W/ Cont if Necessary Per Protocol    Result Date: 6/26/2024  Narrative:   Left ventricular ejection fraction appears to be 56 - 60%.   Left ventricular wall thickness is consistent with mild concentric hypertrophy.   Estimated right ventricular systolic pressure from tricuspid regurgitation is normal (<35 mmHg).   Normal expected hemodynamics across aortic valve with evidence of aortic root replacement ( modified Bentall procedure was performed )     XR Chest PA & Lateral    Result Date: 6/24/2024  Narrative: XR CHEST PA AND LATERAL-  HISTORY: cad, s/p cabg; Z74.09-Other reduced mobility; I71.21-Aneurysm of the ascending aorta, without rupture  COMPARISON: 6/23/2024  FINDINGS: " Frontal and lateral views the chest are obtained.  Mediastinal surgical changes again noted with retained mediastinal surgical drains. Prosthetic coronary valve and left atrial appendage clip are stable in position. Elevated left hemidiaphragm with gaseous distended stomach and splenic flexure below. Small left pleural effusion with basilar atelectasis. No pneumothorax. Stable mediastinal contours. Degenerative change thoracic spine.      Impression: 1. Mediastinal surgical changes with retained mediastinal drains. Stable mild elevation left hemidiaphragm with probable small left pleural effusion and basilar atelectasis.   This report was signed and finalized on 6/24/2024 9:50 AM by Dr. Marilyn Dela Cruz MD.     Results      Assessment & Plan       Diagnoses and all orders for this visit:    1. H/O aortic root repair (Primary)  -     Ambulatory Referral to Cardiac Rehab  -     CT Angiogram Chest; Future  -     Adult Transthoracic Echo Complete W/ Cont if Necessary Per Protocol; Future    2. Aneurysm of ascending aorta without rupture  -     CT Angiogram Chest; Future  -     Adult Transthoracic Echo Complete W/ Cont if Necessary Per Protocol; Future    3. Aortic root aneurysm  -     CT Angiogram Chest; Future  -     Adult Transthoracic Echo Complete W/ Cont if Necessary Per Protocol; Future    Other orders  -     lisinopril (PRINIVIL,ZESTRIL) 5 MG tablet; Take 1 tablet by mouth Daily.  Dispense: 30 tablet; Refill: 2      Assessment & Plan  1. Aortic root and ascending aortic aneurysmal disease, recent cessation of chronic tobacco use.  The patient was informed about precautions and their progression over the coming months. His employment requires heavy-duty lifting. His recovery will be reassessed to better understand his return to work date. The plan includes a CT of the chest and a 2D complete echo to evaluate his aortic resection and prosthetic valve function, respectively. While he underwent cardiac surgery for  non-cardiovascular risk factor disease, we discussed the importance of modest weight loss and a heart-healthy diet. Education was provided in written and oral form. He has been cleared to commence cardiac rehab, which will be performed here at [facility]. He is a non-smoker.    Follow-up  A follow-up appointment is scheduled for 6 weeks from now, which will include a CTA chest and 2D complete echocardiogram.    Transcribed from ambient dictation for Filiberto Gtz MD by Filiberto Gtz MD.  07/24/24   09:30 CDT    Patient or patient representative verbalized consent for the use of Ambient Listening during the visit with  Filiberto Gtz MD for chart documentation. 8/12/2024  09:12 CDT

## 2024-07-25 ENCOUNTER — TELEPHONE (OUTPATIENT)
Dept: CARDIAC SURGERY | Facility: CLINIC | Age: 64
End: 2024-07-25

## 2024-07-25 NOTE — TELEPHONE ENCOUNTER
LM for patient to return call re: appt date/time.     Tuesday, Sept 3  Arrive 07[facility] for Echo, then CTA Chest - NPO 6 hr prior     Wednesday, Sept 4   Appt with Dr Gtz @ 0800

## 2024-08-15 PROBLEM — I25.10 NON-OCCLUSIVE CORONARY ARTERY DISEASE: Status: ACTIVE | Noted: 2024-08-15

## 2024-08-15 PROBLEM — E78.2 MIXED HYPERLIPIDEMIA: Status: ACTIVE | Noted: 2024-08-15

## 2024-08-15 NOTE — PROGRESS NOTES
Chief Complaint  Aortic Aneurysm (S/P Cath)    Subjective          Michael Issa presents to [facility] CARDI[facility] for routine follow-up of hospital discharge on 6/24/2024 following aortic hemiarch, ascending aorta, aortic root and aortic valve replacement with left atrial appendage exclusion.  25 mm Emiliano Patel Inspiris valve, platinum 30 mm and 28 mm Hemashield grafts and 35mm atricure atriclip were used.  He has thoracic aortic aneurysm status post aortic hemiarch, ascending aorta, aortic root and aortic valve replacement with left atrial appendage exclusion using 25 mm Emiliano Patel Inspiris valve, platinum 30 mm and 28 mm Hemashield grafts and 35mm atricure atriclip per Dr. Filiberto Gtz at [facility] 6/20/24, nonocclusive coronary artery disease (20 to 30% stenosis of the mid LAD on left heart catheterization 5/14/2024), venous insufficiency, hypertension, hyperlipidemia and obesity. He reports dyspnea with moderate to heavy exertion. He continues to report bilateral lower extremity edema. Patient denies chest pain, palpitations, dizziness, syncope, orthopnea, PND or decreased stamina.  Patient denies any signs of bleeding.    Coronary Artery Disease  Presents for follow-up visit. Symptoms include leg swelling and shortness of breath. Pertinent negatives include no chest pain, chest pressure, chest tightness, dizziness, muscle weakness, palpitations or weight gain. Risk factors include hyperlipidemia and obesity. The symptoms have been stable. Compliance with diet is variable. Compliance with exercise is variable. Compliance with medications is good.   Hypertension  This is a chronic problem. The current episode started more than 1 year ago. The problem is controlled. Associated symptoms include shortness of breath. Pertinent negatives include no anxiety, blurred vision, chest pain, headaches, malaise/fatigue, neck pain, orthopnea, palpitations, peripheral edema, PND or sweats. Risk  "factors for coronary artery disease include male gender, obesity and dyslipidemia. Current antihypertension treatment includes ACE inhibitors and beta blockers. The current treatment provides significant improvement.   Hyperlipidemia  This is a chronic problem. The current episode started more than 1 year ago. Exacerbating diseases include obesity. Associated symptoms include shortness of breath. Pertinent negatives include no chest pain. Current antihyperlipidemic treatment includes statins. Risk factors for coronary artery disease include hypertension, male sex, obesity and dyslipidemia.       Objective     Current Outpatient Medications:     Ascorbic Acid (VITAMIN C PO), Take 1,000 mg by mouth Daily., Disp: , Rfl:     aspirin 81 MG EC tablet, Take 1 tablet by mouth Daily., Disp: , Rfl:     lisinopril (PRINIVIL,ZESTRIL) 5 MG tablet, Take 1 tablet by mouth Daily., Disp: 30 tablet, Rfl: 2    metoprolol succinate XL (TOPROL-XL) 25 MG 24 hr tablet, Take 1 tablet by mouth Daily., Disp: 30 tablet, Rfl: 2    rosuvastatin (CRESTOR) 5 MG tablet, Take 1 tablet by mouth Daily., Disp: 90 tablet, Rfl: 3    tamsulosin (FLOMAX) 0.4 MG capsule 24 hr capsule, Take 1 capsule by mouth Daily., Disp: , Rfl:     valACYclovir (VALTREX) 1000 MG tablet, Take 2 tablets by mouth 2 (Two) Times a Day. (Patient taking differently: Take 2 tablets by mouth 2 (Two) Times a Day As Needed (fever blisters).), Disp: 15 tablet, Rfl: 3    furosemide (Lasix) 40 MG tablet, Take 1 tablet by mouth Daily for 7 days., Disp: 7 tablet, Rfl: 0  Vital Signs:   /87   Pulse 60   Ht 185.4 cm (73\")   Wt 101 kg (222 lb)   SpO2 98%   BMI 29.29 kg/m²     Vitals and nursing note reviewed.   Constitutional:       General: Awake.      Appearance: Normal and healthy appearance. Well-developed, overweight and not in distress.   Eyes:      General: Lids are normal.      Conjunctiva/sclera: Conjunctivae normal.      Pupils: Pupils are equal, round, and reactive to " light.   HENT:      Head: Normocephalic and atraumatic.      Nose: Nose normal.   Neck:      Vascular: No JVR. JVD normal.   Pulmonary:      Effort: Pulmonary effort is normal.      Breath sounds: Normal breath sounds. No wheezing. No rhonchi. No rales.   Chest:      Chest wall: Not tender to palpatation.   Cardiovascular:      PMI at left midclavicular line. Normal rate. Regular rhythm. Normal S1. Normal S2.       Murmurs: There is a systolic murmur. There is a diastolic murmur.      No gallop.  No click. No rub.   Pulses:     Intact distal pulses.   Edema:     Peripheral edema present.     Pretibial: 2+ pitting edema of the left pretibial area and 3+ edema of the right pretibial area.     Ankle: 2+ pitting edema of the left ankle and 3+ edema of the right ankle.     Feet: 2+ pitting edema of the left foot and 3+ edema of the right foot.  Abdominal:      General: Bowel sounds are normal.      Palpations: Abdomen is soft.      Tenderness: There is no abdominal tenderness.   Musculoskeletal: Normal range of motion.         General: No tenderness.      Cervical back: Normal range of motion. Skin:     General: Skin is warm and dry.   Neurological:      General: No focal deficit present.      Mental Status: Alert, oriented to person, place, and time and oriented to person, place and time.   Psychiatric:         Attention and Perception: Attention and perception normal.         Mood and Affect: Mood and affect normal.         Speech: Speech normal.         Behavior: Behavior normal. Behavior is cooperative.         Thought Content: Thought content normal.         Cognition and Memory: Cognition and memory normal.         Judgment: Judgment normal.        Result Review :   The following data was reviewed by: JUAN JOSE Davis on 08/16/2024:  Common labs          6/22/2024    03:53 6/23/2024    04:04 6/24/2024    09:10   Common Labs   Glucose 133  126  100    BUN 19  23  28    Creatinine 0.72  0.77  0.76    Sodium  136  137  138    Potassium 5.0  4.7  4.2    Chloride 102  100  101    Calcium 8.4  8.4  8.5    Albumin 3.5  3.3  3.4    Total Bilirubin  0.3  0.3    Alkaline Phosphatase  36  49    AST (SGOT)  17  26    ALT (SGPT)  14  25    WBC 20.62  18.01  11.38    Hemoglobin 12.0  11.6  12.2    Hematocrit 36.7  35.8  37.6    Platelets 185  171  193      Data reviewed : Cardiology studies left heart catheterization 5/14/24 and 2d echo 6/24/24           Assessment and Plan    Diagnoses and all orders for this visit:    1. Aneurysm of ascending aorta without rupture (Primary)- s/p aortic hemiarch, ascending aorta, aortic root and aortic valve replacement with left atrial appendage exclusion using 25 mm Emiliano Patel Inspiris valve, platinum 30 mm and 28 mm Hemashield grafts and 35mm atricure atriclip per Dr. Filiberto Gtz at [facility] 6/20/24.     2. Primary hypertension-blood pressure is elevated in office today, however pt reports well controlled at home with readings consistently lower than 130/80. Continue lisinopril and metoprolol.  Monitor and record daily blood pressure. Report readings consistently higher than 130/80 or consistently lower than 100/60.     3. Mixed hyperlipidemia-management per PCP.  Continue rosuvastatin.    4. Venous insufficiency (chronic) (peripheral)-patient follows with vascular surgery.  Stable.        Follow Up   Return in about 3 months (around 11/16/2024) for Next scheduled follow up.  Patient was given instructions and counseling regarding his condition or for health maintenance advice. Please see specific information pulled into the AVS if appropriate.

## 2024-08-16 ENCOUNTER — OFFICE VISIT (OUTPATIENT)
Dept: CARDIOLOGY | Facility: CLINIC | Age: 64
End: 2024-08-16
Payer: COMMERCIAL

## 2024-08-16 VITALS
OXYGEN SATURATION: 98 % | HEIGHT: 73 IN | DIASTOLIC BLOOD PRESSURE: 87 MMHG | WEIGHT: 222 LBS | HEART RATE: 60 BPM | BODY MASS INDEX: 29.42 KG/M2 | SYSTOLIC BLOOD PRESSURE: 147 MMHG

## 2024-08-16 DIAGNOSIS — I71.21 ANEURYSM OF ASCENDING AORTA WITHOUT RUPTURE: Primary | ICD-10-CM

## 2024-08-16 DIAGNOSIS — I87.2 VENOUS INSUFFICIENCY (CHRONIC) (PERIPHERAL): ICD-10-CM

## 2024-08-16 DIAGNOSIS — E78.2 MIXED HYPERLIPIDEMIA: ICD-10-CM

## 2024-08-16 DIAGNOSIS — E66.09 CLASS 1 OBESITY DUE TO EXCESS CALORIES WITH SERIOUS COMORBIDITY AND BODY MASS INDEX (BMI) OF 30.0 TO 30.9 IN ADULT: ICD-10-CM

## 2024-08-16 DIAGNOSIS — I10 PRIMARY HYPERTENSION: ICD-10-CM

## 2024-09-03 ENCOUNTER — HOSPITAL ENCOUNTER (OUTPATIENT)
Dept: CARDIOLOGY | Facility: HOSPITAL | Age: 64
Discharge: HOME | End: 2024-09-03
Payer: COMMERCIAL

## 2024-09-03 ENCOUNTER — HOSPITAL ENCOUNTER (OUTPATIENT)
Dept: CT IMAGING | Facility: HOSPITAL | Age: 64
Discharge: HOME | End: 2024-09-03
Payer: COMMERCIAL

## 2024-09-03 VITALS
WEIGHT: 222 LBS | HEIGHT: 73 IN | DIASTOLIC BLOOD PRESSURE: 87 MMHG | SYSTOLIC BLOOD PRESSURE: 147 MMHG | BODY MASS INDEX: 29.42 KG/M2

## 2024-09-03 DIAGNOSIS — I71.21 AORTIC ROOT ANEURYSM: ICD-10-CM

## 2024-09-03 DIAGNOSIS — I71.21 ANEURYSM OF ASCENDING AORTA WITHOUT RUPTURE: ICD-10-CM

## 2024-09-03 DIAGNOSIS — Z98.890 H/O AORTIC ROOT REPAIR: ICD-10-CM

## 2024-09-03 LAB
BH CV ECHO LEFT VENTRICLE GLOBAL LONGITUDINAL STRAIN: -16.6 %
BH CV ECHO MEAS - AO MAX PG: 21.7 MMHG
BH CV ECHO MEAS - AO MEAN PG: 10.2 MMHG
BH CV ECHO MEAS - AO ROOT DIAM: 3.3 CM
BH CV ECHO MEAS - AO V2 MAX: 233 CM/SEC
BH CV ECHO MEAS - AO V2 VTI: 42.5 CM
BH CV ECHO MEAS - AVA(I,D): 2.4 CM2
BH CV ECHO MEAS - EDV(CUBED): 117.6 ML
BH CV ECHO MEAS - EDV(MOD-SP2): 77.8 ML
BH CV ECHO MEAS - EDV(MOD-SP4): 73 ML
BH CV ECHO MEAS - EF(MOD-BP): 60 %
BH CV ECHO MEAS - EF(MOD-SP2): 58.6 %
BH CV ECHO MEAS - EF(MOD-SP4): 60.3 %
BH CV ECHO MEAS - ESV(CUBED): 27 ML
BH CV ECHO MEAS - ESV(MOD-SP2): 32.2 ML
BH CV ECHO MEAS - ESV(MOD-SP4): 29 ML
BH CV ECHO MEAS - FS: 38.8 %
BH CV ECHO MEAS - IVS/LVPW: 0.91 CM
BH CV ECHO MEAS - IVSD: 1 CM
BH CV ECHO MEAS - LAT PEAK E' VEL: 10.4 CM/SEC
BH CV ECHO MEAS - LV DIASTOLIC VOL/BSA (35-75): 32.5 CM2
BH CV ECHO MEAS - LV MASS(C)D: 188.1 GRAMS
BH CV ECHO MEAS - LV MAX PG: 6.7 MMHG
BH CV ECHO MEAS - LV MEAN PG: 3 MMHG
BH CV ECHO MEAS - LV SYSTOLIC VOL/BSA (12-30): 12.9 CM2
BH CV ECHO MEAS - LV V1 MAX: 129.7 CM/SEC
BH CV ECHO MEAS - LV V1 VTI: 24.5 CM
BH CV ECHO MEAS - LVIDD: 4.9 CM
BH CV ECHO MEAS - LVIDS: 3 CM
BH CV ECHO MEAS - LVOT AREA: 4.2 CM2
BH CV ECHO MEAS - LVOT DIAM: 2.3 CM
BH CV ECHO MEAS - LVPWD: 1.1 CM
BH CV ECHO MEAS - MED PEAK E' VEL: 6 CM/SEC
BH CV ECHO MEAS - MV A MAX VEL: 75.8 CM/SEC
BH CV ECHO MEAS - MV DEC TIME: 0.22 SEC
BH CV ECHO MEAS - MV E MAX VEL: 76.3 CM/SEC
BH CV ECHO MEAS - MV E/A: 1.01
BH CV ECHO MEAS - PA V2 MAX: 106 CM/SEC
BH CV ECHO MEAS - RAP SYSTOLE: 3 MMHG
BH CV ECHO MEAS - RVSP: 27.2 MMHG
BH CV ECHO MEAS - SV(LVOT): 101.8 ML
BH CV ECHO MEAS - SV(MOD-SP2): 45.6 ML
BH CV ECHO MEAS - SV(MOD-SP4): 44 ML
BH CV ECHO MEAS - SVI(LVOT): 45.3 ML/M2
BH CV ECHO MEAS - SVI(MOD-SP2): 20.3 ML/M2
BH CV ECHO MEAS - SVI(MOD-SP4): 19.6 ML/M2
BH CV ECHO MEAS - TAPSE (>1.6): 1.82 CM
BH CV ECHO MEAS - TR MAX PG: 24.2 MMHG
BH CV ECHO MEAS - TR MAX VEL: 246 CM/SEC
BH CV ECHO MEASUREMENTS AVERAGE E/E' RATIO: 9.3
BH CV XLRA - TDI S': 10.1 CM/SEC
LEFT ATRIUM VOLUME INDEX: 24.5 ML/M2
LEFT ATRIUM VOLUME: 55.1 ML

## 2024-09-03 PROCEDURE — 71275 CT ANGIOGRAPHY CHEST: CPT

## 2024-09-03 PROCEDURE — 25510000001 IOPAMIDOL PER 1 ML: Performed by: NURSE PRACTITIONER

## 2024-09-03 PROCEDURE — 93306 TTE W/DOPPLER COMPLETE: CPT

## 2024-09-03 PROCEDURE — 93356 MYOCRD STRAIN IMG SPCKL TRCK: CPT

## 2024-09-03 RX ORDER — IOPAMIDOL 755 MG/ML
100 INJECTION, SOLUTION INTRAVASCULAR
Status: COMPLETED | OUTPATIENT
Start: 2024-09-03 | End: 2024-09-03

## 2024-09-03 RX ADMIN — IOPAMIDOL 100 ML: 755 INJECTION, SOLUTION INTRAVENOUS at 09:29

## 2024-09-04 ENCOUNTER — OFFICE VISIT (OUTPATIENT)
Dept: CARDIAC SURGERY | Facility: CLINIC | Age: 64
End: 2024-09-04
Payer: COMMERCIAL

## 2024-09-04 VITALS
WEIGHT: 217 LBS | HEART RATE: 64 BPM | DIASTOLIC BLOOD PRESSURE: 83 MMHG | BODY MASS INDEX: 28.76 KG/M2 | OXYGEN SATURATION: 95 % | HEIGHT: 73 IN | SYSTOLIC BLOOD PRESSURE: 128 MMHG

## 2024-09-04 DIAGNOSIS — I71.20 THORACIC AORTIC ANEURYSM WITHOUT RUPTURE, UNSPECIFIED PART: Primary | ICD-10-CM

## 2024-09-04 PROCEDURE — 99024 POSTOP FOLLOW-UP VISIT: CPT | Performed by: THORACIC SURGERY (CARDIOTHORACIC VASCULAR SURGERY)

## 2024-09-16 DIAGNOSIS — N40.1 BENIGN PROSTATIC HYPERPLASIA WITH URINARY RETENTION: Primary | ICD-10-CM

## 2024-09-16 DIAGNOSIS — R33.8 BENIGN PROSTATIC HYPERPLASIA WITH URINARY RETENTION: Primary | ICD-10-CM

## 2024-09-17 DIAGNOSIS — N40.1 BENIGN PROSTATIC HYPERPLASIA WITH URINARY RETENTION: ICD-10-CM

## 2024-09-17 DIAGNOSIS — R33.8 BENIGN PROSTATIC HYPERPLASIA WITH URINARY RETENTION: ICD-10-CM

## 2024-09-17 RX ORDER — METOPROLOL SUCCINATE 25 MG/1
25 TABLET, EXTENDED RELEASE ORAL
Qty: 30 TABLET | Refills: 2 | Status: SHIPPED | OUTPATIENT
Start: 2024-09-17 | End: 2024-09-19 | Stop reason: SDUPTHER

## 2024-09-17 RX ORDER — TAMSULOSIN HYDROCHLORIDE 0.4 MG/1
1 CAPSULE ORAL DAILY
Qty: 30 CAPSULE | Refills: 1 | Status: SHIPPED | OUTPATIENT
Start: 2024-09-17 | End: 2024-09-19 | Stop reason: SDUPTHER

## 2024-09-17 RX ORDER — TAMSULOSIN HYDROCHLORIDE 0.4 MG/1
1 CAPSULE ORAL DAILY
Qty: 90 CAPSULE | OUTPATIENT
Start: 2024-09-17

## 2024-09-19 ENCOUNTER — OFFICE VISIT (OUTPATIENT)
Dept: FAMILY MEDICINE CLINIC | Facility: CLINIC | Age: 64
End: 2024-09-19
Payer: COMMERCIAL

## 2024-09-19 VITALS
BODY MASS INDEX: 28.63 KG/M2 | TEMPERATURE: 98 F | RESPIRATION RATE: 16 BRPM | DIASTOLIC BLOOD PRESSURE: 86 MMHG | WEIGHT: 216 LBS | OXYGEN SATURATION: 99 % | HEIGHT: 73 IN | HEART RATE: 65 BPM | SYSTOLIC BLOOD PRESSURE: 143 MMHG

## 2024-09-19 DIAGNOSIS — N40.1 BENIGN PROSTATIC HYPERPLASIA WITH URINARY RETENTION: Primary | ICD-10-CM

## 2024-09-19 DIAGNOSIS — R33.8 BENIGN PROSTATIC HYPERPLASIA WITH URINARY RETENTION: Primary | ICD-10-CM

## 2024-09-19 DIAGNOSIS — I10 PRIMARY HYPERTENSION: ICD-10-CM

## 2024-09-19 DIAGNOSIS — E78.2 MIXED HYPERLIPIDEMIA: ICD-10-CM

## 2024-09-19 PROCEDURE — 99214 OFFICE O/P EST MOD 30 MIN: CPT | Performed by: FAMILY MEDICINE

## 2024-09-19 RX ORDER — CLOPIDOGREL BISULFATE 75 MG/1
75 TABLET ORAL DAILY
Qty: 90 TABLET | Refills: 3 | Status: SHIPPED | OUTPATIENT
Start: 2024-09-19

## 2024-09-19 RX ORDER — TAMSULOSIN HYDROCHLORIDE 0.4 MG/1
1 CAPSULE ORAL DAILY
Qty: 30 CAPSULE | Refills: 5 | Status: SHIPPED | OUTPATIENT
Start: 2024-09-19

## 2024-09-19 RX ORDER — ROSUVASTATIN CALCIUM 5 MG/1
5 TABLET, COATED ORAL DAILY
Qty: 90 TABLET | Refills: 5 | Status: SHIPPED | OUTPATIENT
Start: 2024-09-19

## 2024-09-19 RX ORDER — METOPROLOL SUCCINATE 25 MG/1
25 TABLET, EXTENDED RELEASE ORAL
Qty: 30 TABLET | Refills: 5 | Status: SHIPPED | OUTPATIENT
Start: 2024-09-19

## 2024-09-19 RX ORDER — ASPIRIN 81 MG/1
81 TABLET ORAL DAILY
Qty: 30 TABLET | Refills: 5 | Status: SHIPPED | OUTPATIENT
Start: 2024-09-19

## 2024-09-19 RX ORDER — LISINOPRIL 5 MG/1
5 TABLET ORAL DAILY
Qty: 30 TABLET | Refills: 5 | Status: SHIPPED | OUTPATIENT
Start: 2024-09-19

## 2024-09-19 RX ORDER — CLOPIDOGREL BISULFATE 75 MG/1
75 TABLET ORAL DAILY
COMMUNITY
End: 2024-09-19 | Stop reason: SDUPTHER

## 2024-09-19 RX ORDER — CLOPIDOGREL BISULFATE 75 MG/1
75 TABLET ORAL DAILY
Qty: 30 TABLET | Refills: 5 | Status: SHIPPED | OUTPATIENT
Start: 2024-09-19 | End: 2024-09-19 | Stop reason: SDUPTHER

## 2024-09-23 ENCOUNTER — TREATMENT (OUTPATIENT)
Dept: CARDIAC REHAB | Facility: HOSPITAL | Age: 64
End: 2024-09-23
Payer: COMMERCIAL

## 2024-09-23 DIAGNOSIS — Z98.890 H/O AORTIC ROOT REPAIR: Primary | ICD-10-CM

## 2024-09-23 PROCEDURE — 93798 PHYS/QHP OP CAR RHAB W/ECG: CPT

## 2024-09-24 DIAGNOSIS — R33.8 BENIGN PROSTATIC HYPERPLASIA WITH URINARY RETENTION: ICD-10-CM

## 2024-09-24 DIAGNOSIS — N40.1 BENIGN PROSTATIC HYPERPLASIA WITH URINARY RETENTION: ICD-10-CM

## 2024-09-25 ENCOUNTER — TREATMENT (OUTPATIENT)
Dept: CARDIAC REHAB | Facility: HOSPITAL | Age: 64
End: 2024-09-25
Payer: COMMERCIAL

## 2024-09-25 DIAGNOSIS — Z98.890 H/O AORTIC ROOT REPAIR: Primary | ICD-10-CM

## 2024-09-25 PROCEDURE — 93798 PHYS/QHP OP CAR RHAB W/ECG: CPT

## 2024-09-25 RX ORDER — TAMSULOSIN HYDROCHLORIDE 0.4 MG/1
1 CAPSULE ORAL DAILY
Qty: 90 CAPSULE | OUTPATIENT
Start: 2024-09-25

## 2024-09-27 ENCOUNTER — TREATMENT (OUTPATIENT)
Dept: CARDIAC REHAB | Facility: HOSPITAL | Age: 64
End: 2024-09-27
Payer: COMMERCIAL

## 2024-09-27 DIAGNOSIS — Z98.890 H/O AORTIC ROOT REPAIR: Primary | ICD-10-CM

## 2024-09-27 PROCEDURE — 93798 PHYS/QHP OP CAR RHAB W/ECG: CPT

## 2024-09-30 ENCOUNTER — TREATMENT (OUTPATIENT)
Dept: CARDIAC REHAB | Facility: HOSPITAL | Age: 64
End: 2024-09-30
Payer: COMMERCIAL

## 2024-09-30 DIAGNOSIS — Z98.890 H/O AORTIC ROOT REPAIR: Primary | ICD-10-CM

## 2024-09-30 PROCEDURE — 93798 PHYS/QHP OP CAR RHAB W/ECG: CPT

## 2024-10-02 ENCOUNTER — TREATMENT (OUTPATIENT)
Dept: CARDIAC REHAB | Facility: HOSPITAL | Age: 64
End: 2024-10-02
Payer: COMMERCIAL

## 2024-10-02 DIAGNOSIS — Z98.890 H/O AORTIC ROOT REPAIR: Primary | ICD-10-CM

## 2024-10-02 PROCEDURE — 93798 PHYS/QHP OP CAR RHAB W/ECG: CPT

## 2024-10-04 ENCOUNTER — TREATMENT (OUTPATIENT)
Dept: CARDIAC REHAB | Facility: HOSPITAL | Age: 64
End: 2024-10-04
Payer: COMMERCIAL

## 2024-10-04 DIAGNOSIS — Z98.890 H/O AORTIC ROOT REPAIR: Primary | ICD-10-CM

## 2024-10-04 PROCEDURE — 93798 PHYS/QHP OP CAR RHAB W/ECG: CPT

## 2024-11-12 ENCOUNTER — OFFICE VISIT (OUTPATIENT)
Dept: FAMILY MEDICINE CLINIC | Facility: CLINIC | Age: 64
End: 2024-11-12
Payer: COMMERCIAL

## 2024-11-12 VITALS
TEMPERATURE: 97 F | SYSTOLIC BLOOD PRESSURE: 163 MMHG | RESPIRATION RATE: 16 BRPM | BODY MASS INDEX: 28.63 KG/M2 | HEART RATE: 66 BPM | DIASTOLIC BLOOD PRESSURE: 97 MMHG | HEIGHT: 73 IN | WEIGHT: 216 LBS | OXYGEN SATURATION: 98 %

## 2024-11-12 DIAGNOSIS — D17.1 LIPOMA OF TORSO: ICD-10-CM

## 2024-11-12 DIAGNOSIS — N52.8 OTHER MALE ERECTILE DYSFUNCTION: ICD-10-CM

## 2024-11-12 DIAGNOSIS — E78.2 MIXED HYPERLIPIDEMIA: ICD-10-CM

## 2024-11-12 DIAGNOSIS — Z98.890 STATUS POST OPERATION INVOLVING AORTIC VALVE: Primary | ICD-10-CM

## 2024-11-12 DIAGNOSIS — I10 PRIMARY HYPERTENSION: ICD-10-CM

## 2024-11-12 DIAGNOSIS — Z86.79 STATUS POST OPERATION INVOLVING AORTIC VALVE: Primary | ICD-10-CM

## 2024-11-12 RX ORDER — SILDENAFIL 100 MG/1
100 TABLET, FILM COATED ORAL DAILY PRN
Qty: 30 TABLET | Refills: 1 | Status: SHIPPED | OUTPATIENT
Start: 2024-11-12

## 2024-11-12 NOTE — PROGRESS NOTES
Chief Complaint  Hypertension (Pt is here for a follow up)    Subjective        Michael Issa presents to [facility]    History of Present Illness  History of Present Illness      Aspirin use is indicated based on review of current medical condition/s. Pros and cons of this therapy have been discussed today. Benefits of this medication outweigh potential harm.  Patient has been encouraged to continue taking this medication.          The patient is a 64-year-old male who presents today for a return to work release. He has a previous work release letter from Dr. Gtz, dated 09/20/2024, stating he can return to work without any restrictions. However, he requires an updated one. Dr. Gtz insists on a follow-up visit before providing a new note.    He underwent an aortic valve replacement on 06/20/2024. He reports no chest pain, shortness of breath, or palpitations since his surgery. He quit smoking three months prior to the surgery and has resumed his normal activities post-surgery. His cardiologist has cleared him for surgery.    He mentions that his blood pressure was high today, possibly due to taking his medication 45 minutes before the reading. He is currently on sildenafil and requires a refill. He was advised to stop taking sildenafil immediately after the surgery and has not taken it for the past 3 to 4 months. He is also on aspirin, which was prescribed by Dr. Jose Ho and filled at [facility]. He has an appointment with Abundio in a week.  The following portions of the patient's history were reviewed and updated as appropriate: allergies, current medications, past family history, past medical history, past social history, past surgical history and problem list.    Presents for follow up of chronic problems and needs a note to return to work.  He had cardiothoracic surgery per Dr. Gtz June 20, 2024.  (Thoracic aortic aneurysm without rupture, venous insufficiency, venous  "ulcer right leg).  He progressed well and got a return to work for Sept 9, 2024.  He did not return to work because he was trying to retire  And found out he couldn't retire.  He contracted Dr. Gtz's office and had several messages between him and Zhanna INGRAM. See below:      I don't understand this message. Need more info and will have to talk to Dr. Gtz. Per his last note he was cleared to RTW 9/20 and was given letter then.        Reviewed with Dr. Gtz. We have already provided a letter and we are unable to go back on our word        He is here today wanting me to give hm a return to work.  I explained he has one for Sept 9,2024 and I can not extend that until today.  I explained that the Heart Surgeon did the surgery and has to release him.  He is upset because he states, \"Dr. Gtz refuses to give me a work release so I came here to my PCP get one.\" I tried to explain to pt that I can not supercede the surgeon and he will have to discuss with Dr. Gtz.  Pt got very upset said he would never be back after being here 15 yrs and stormed out.     Objective   Vital Signs:  /97 (BP Location: Left arm, Patient Position: Sitting, Cuff Size: Adult)   Pulse 66   Temp 97 °F (36.1 °C) (Temporal)   Resp 16   Ht 185.4 cm (72.99\")   Wt 98 kg (216 lb)   SpO2 98%   BMI 28.50 kg/m²   Estimated body mass index is 28.5 kg/m² as calculated from the following:    Height as of this encounter: 185.4 cm (72.99\").    Weight as of this encounter: 98 kg (216 lb).       BMI is >= 25 and <30. (Overweight) The following options were offered after discussion;: exercise counseling/recommendations and nutrition counseling/recommendations        Physical Exam  Vitals and nursing note reviewed.   Constitutional:       General: He is awake.      Appearance: Normal appearance. He is well-developed and well-groomed.   HENT:      Head: Normocephalic and atraumatic.      Right Ear: Hearing, tympanic membrane, ear canal and " external ear normal.      Left Ear: Hearing, tympanic membrane, ear canal and external ear normal.      Nose: Nose normal.      Mouth/Throat:      Lips: Pink.      Pharynx: Oropharynx is clear.   Eyes:      General: Lids are normal.      Conjunctiva/sclera: Conjunctivae normal.   Cardiovascular:      Rate and Rhythm: Normal rate and regular rhythm.      Heart sounds: Normal heart sounds.   Pulmonary:      Effort: Pulmonary effort is normal.      Breath sounds: Normal air entry.   Musculoskeletal:      Cervical back: Full passive range of motion without pain.      Right lower leg: No edema.      Left lower leg: No edema.   Lymphadenopathy:      Head:      Right side of head: No submental, submandibular or tonsillar adenopathy.      Left side of head: No submental, submandibular or tonsillar adenopathy.   Skin:     General: Skin is warm and dry.   Neurological:      Mental Status: He is alert.      Sensory: Sensation is intact.      Motor: Motor function is intact.      Coordination: Coordination is intact.      Gait: Gait is intact.   Psychiatric:         Attention and Perception: Attention and perception normal.         Mood and Affect: Mood and affect normal.         Speech: Speech normal.         Behavior: Behavior normal. Behavior is cooperative.         Thought Content: Thought content normal.         Cognition and Memory: Cognition and memory normal.         Judgment: Judgment normal.        Physical Exam  Vital Signs  Blood pressure is 163/97, heart rate is 66.    Result Review :          Results  Imaging  CT angiogram showed ascending aorta measured 4.8 cm in size and the root measured 4.5 cm. Repeat CT angiogram 6 months later showed an increase in size.           Assessment and Plan     Diagnoses and all orders for this visit:    1. Primary hypertension (Primary)  -     CBC (No Diff)  -     Comprehensive metabolic panel    2. Mixed hyperlipidemia  -     Lipid panel    3. Lipoma of torso  -     Ambulatory  Referral to General Surgery    4. Other male erectile dysfunction  -     sildenafil (Viagra) 100 MG tablet; Take 1 tablet by mouth Daily As Needed for Erectile Dysfunction.  Dispense: 30 tablet; Refill: 1      Assessment & Plan  1. Post-aortic valve replacement status.  He underwent a modified dental procedure involving the creation of a biological valve with a 25 mm Inspiris aortic valve and a 30 mm Hemashield platinum graft. This included resection of the ascending aorta and proximal hemiarch aorta with a 28 mm Hemashield platinum graft using deep hypothermic circulatory arrest, ultrasound-guided femoral artery line replacement, left atrial appendage exclusion with a 35 mm Articure clip, sternal ridge fixation, and XPS sternal plate and cyst. A CT angiogram performed by Dr. Gtz in October 2023 revealed an ascending aorta measuring 4.8 cm and a root measuring 4.5 cm. A repeat CT angiogram six months later showed an increase in size, prompting the decision for surgery. Post-surgery, he has returned to his normal activities. His glucose levels were slightly elevated post-surgery, necessitating a repeat of the labs to ensure all parameters are within normal limits.    2. Hypertension.  His blood pressure was 163/97 today, likely due to taking his medication 45 minutes prior to the reading. Blood pressure will be rechecked before he leaves.    3. Hyperlipidemia.  Continue current medication regimen.    4. Dyslipidemia.  Continue current medication regimen.    5. Erectile dysfunction.  Sildenafil was removed from his medication list, possibly due to the surgery. He was advised to discontinue sildenafil for 3 to 4 months post-surgery.    6. Thyroid nodule.  Thyroid function is normal. No further action required at this time.    7. Lipoma.  He needs to consult with general surgery due to a large lipoma in the middle of his upper back, near the spine at the base of the neck, which is increasing in size and causing more  pressure.    8. Medication Management.  Upon discharge from the hospital, Abundio ensured refills on all his medications: Plavix (3 refills), lisinopril (5 refills), metoprolol (5 refills), statin (5 refills), tamsulosin (5 refills), and Valtrex (3 refills). He is also on aspirin, which was prescribed by Dr. Jose Ho and filled at [facility].        ICD-10-CM ICD-9-CM   1. Primary hypertension  I10 401.9   2. Mixed hyperlipidemia  E78.2 272.2   3. Lipoma of torso  D17.1 214.1   4. Other male erectile dysfunction  N52.8 607.84     Pt left office visit mad because I could not give him a note to return to work today.  He already was released to rtw in sept 2024           Follow Up     Return in about 3 months (around 2/12/2025).  Patient was given instructions and counseling regarding his condition or for health maintenance advice. Please see specific information pulled into the AVS if appropriate.       Patient or patient representative verbalized consent for the use of Ambient Listening during the visit with  Mansi Zapata DNP, APRN for chart documentation. 11/12/2024  13:30 CST    Electronically signed by Mansi Zapata DNP, APRN, 11/12/24, 1:30 PM CST.    Electronically signed by Mansi Zapata DNP, APRN, 11/12/24, 1:52 PM CST.      Answers submitted by the patient for this visit:  Other (Submitted on 11/12/2024)  Please describe your symptoms.: Needing cleared for work. Discuss knot on back of my neck.  Have you had these symptoms before?: No  How long have you been having these symptoms?: Greater than 2 weeks  Primary Reason for Visit (Submitted on 11/12/2024)  What is the primary reason for your visit?: Problem Not Listed

## 2024-12-02 ENCOUNTER — OFFICE VISIT (OUTPATIENT)
Dept: SURGERY | Facility: CLINIC | Age: 64
End: 2024-12-02
Payer: COMMERCIAL

## 2024-12-02 ENCOUNTER — PATIENT ROUNDING (BHMG ONLY) (OUTPATIENT)
Dept: SURGERY | Facility: CLINIC | Age: 64
End: 2024-12-02
Payer: COMMERCIAL

## 2024-12-02 VITALS
SYSTOLIC BLOOD PRESSURE: 150 MMHG | BODY MASS INDEX: 28.63 KG/M2 | OXYGEN SATURATION: 96 % | HEART RATE: 62 BPM | HEIGHT: 73 IN | DIASTOLIC BLOOD PRESSURE: 90 MMHG | WEIGHT: 216 LBS

## 2024-12-02 DIAGNOSIS — L72.0 EPIDERMAL INCLUSION CYST: Primary | ICD-10-CM

## 2024-12-02 DIAGNOSIS — E66.3 OVERWEIGHT WITH BODY MASS INDEX (BMI) OF 28 TO 28.9 IN ADULT: ICD-10-CM

## 2024-12-02 RX ORDER — HEPARIN SODIUM 5000 [USP'U]/ML
5000 INJECTION, SOLUTION INTRAVENOUS; SUBCUTANEOUS EVERY 8 HOURS SCHEDULED
OUTPATIENT
Start: 2024-12-02

## 2024-12-02 NOTE — PROGRESS NOTES
Office New Patient History and Physical:     Referring Provider: Mansi Zapata DNP*    No chief complaint on file.      Subjective .     History of present illness:    History of Present Illness  The patient presents for evaluation of a lipoma.    He has been living with a lipoma on the back of his neck for approximately 30 years. Over time, he has noticed an increase in its size. Although it does not cause him pain, it has become a source of irritation due to its contact with his work coveralls. He has never undergone any procedure to remove it. His current medication includes aspirin 81 mg. He has a history of aortic valve replacement surgery and is currently doing well. He has no restrictions and has returned to work.    SOCIAL HISTORY  He denies smoking.       History  Past Medical History:   Diagnosis Date    Allergic     Aneurysm October 4th 2023    Arthritis     Disease of thyroid gland     NODULE PRESENT     ED (erectile dysfunction)     Elevated cholesterol     Hx of chest pain     Hypertension     Pneumonia January 2023    Urgency of urination    ,   Past Surgical History:   Procedure Laterality Date    AORTIC VALVE REPAIR/REPLACEMENT N/A 6/20/2024    Procedure: MODIFIED BENTALL PROCEDURE, CREATION OF BIOLOGIC VALVE CONDUITwith 25mm INSPIRIS AORTIC VALVE, RESECTION OF ASCENDING AORTA AND PROXIMAL HEMIARCH AORTA USING DEEP HYPOTHERMIC CIRCULATORY ARREST, ULTRASOUND GUIDED FEMORAL ARTERIAL LINE PLACEMENT, ATRIAL APPENDAGE EXCLUSION LEFT with 35mm ATRICLIP, TRANSESOPHAGEAL ECHOCARDIOGRAM, XP STERNAL PLATING;  Surgeon: Filiberto Gtz MD;  Locati    ASCENDING ARCH/HEMIARCH REPLACEMENT N/A 6/20/2024    Procedure: ASCENDING AORTIC ARCH/HEMIARCH REPLACEMENT WITH CIRCULATORY ARREST;  Surgeon: Filiberto Gtz MD;  Location: [facility];  Service: Cardiothoracic;  Laterality: N/A;    ATRIAL APPENDAGE EXCLUSION LEFT WITH TRANSESOPHAGEAL ECHOCARDIOGRAM N/A 6/20/2024    Procedure: ATRIAL APPENDAGE EXCLUSION  LEFT WITH TRANSESOPHAGEAL ECHOCARDIOGRAM;  Surgeon: Filiberto Gtz MD;  Location:  [facility] OR;  Service: Cardiothoracic;  Laterality: N/A;    CARDIAC CATHETERIZATION Left 2024    Procedure: Cardiac Catheterization/Vascular Study;  Surgeon: Nima Nickerson MD;  Location: [facility] CATH INVASIVE LOCATION;  Service: Cardiology;  Laterality: Left;    COLONOSCOPY N/A 2021    Procedure: COLONOSCOPY;  Surgeon: Eduardo Neil MD;  Location: [facility] ENDOSCOPY;  Service: Gastroenterology;  Laterality: N/A;  pre  post diverticulosis, hemorrhoids  Dr. cavazos    KNEE SURGERY      rt knee MENISCUS    PREPERITONEAL HERNIA REPAIR Bilateral 2017    Procedure: BILATERAL PREPERITONEAL INGUINAL HERNIA REPAIR LAPAROSCOPIC WITH MESH;  Surgeon: Rambo Church MD;  Location:  [facility] OR;  Service:    ,   Family History   Problem Relation Age of Onset    Alzheimer's disease Mother     Diabetes Father     Heart disease Father     Heart attack Father     Heart failure Father     Hypertension Father     No Known Problems Sister     No Known Problems Brother     No Known Problems Daughter     No Known Problems Maternal Grandmother     No Known Problems Maternal Grandfather     No Known Problems Paternal Grandmother     No Known Problems Paternal Grandfather     Hypertension Brother     No Known Problems Brother     Cancer Brother    ,   Social History     Tobacco Use    Smoking status: Former     Average packs/day: 0.7 packs/day for 45.0 years (30.0 ttl pk-yrs)     Types: Cigarettes     Start date: 1997     Quit date: 2017     Years since quittin.6     Passive exposure: Past    Smokeless tobacco: Never    Tobacco comments:     Ex smoker   Vaping Use    Vaping status: Never Used   Substance Use Topics    Alcohol use: Not Currently     Comment: very rarely    Drug use: Not Currently     Comment: recreational drug use no use in last 2-3 years   , (Not in a hospital admission)   and Allergies:   "Penicillins    Current Outpatient Medications:     Ascorbic Acid (VITAMIN C PO), Take 1,000 mg by mouth Daily., Disp: , Rfl:     aspirin 81 MG EC tablet, Take 1 tablet by mouth Daily., Disp: 30 tablet, Rfl: 5    clopidogrel (PLAVIX) 75 MG tablet, TAKE 1 TABLET BY MOUTH EVERY DAY, Disp: 90 tablet, Rfl: 3    lisinopril (PRINIVIL,ZESTRIL) 5 MG tablet, Take 1 tablet by mouth Daily., Disp: 30 tablet, Rfl: 5    metoprolol succinate XL (TOPROL-XL) 25 MG 24 hr tablet, Take 1 tablet by mouth Daily., Disp: 30 tablet, Rfl: 5    rosuvastatin (CRESTOR) 5 MG tablet, Take 1 tablet by mouth Daily., Disp: 90 tablet, Rfl: 5    sildenafil (Viagra) 100 MG tablet, Take 1 tablet by mouth Daily As Needed for Erectile Dysfunction., Disp: 30 tablet, Rfl: 1    tamsulosin (FLOMAX) 0.4 MG capsule 24 hr capsule, Take 1 capsule by mouth Daily., Disp: 30 capsule, Rfl: 5    valACYclovir (VALTREX) 1000 MG tablet, Take 2 tablets by mouth 2 (Two) Times a Day. (Patient taking differently: Take 2 tablets by mouth 2 (Two) Times a Day As Needed (fever blisters).), Disp: 15 tablet, Rfl: 3      Review of Systems    Review of Systems - General ROS: negative  ENT ROS: negative  Respiratory ROS: no cough, shortness of breath, or wheezing  Cardiovascular ROS: no chest pain or dyspnea on exertion  Gastrointestinal ROS: no abdominal pain, change in bowel habits, or black or bloody stools  Genito-Urinary ROS: no dysuria, trouble voiding, or hematuria  Dermatological ROS:  positive for lipoma     Breast ROS: negative for breast lumps  Hematological and Lymphatic ROS: negative  Musculoskeletal ROS: negative   Neurological ROS: no TIA or stroke symptoms    Psychological ROS: negative  Endocrine ROS: negative    Objective     Vital Signs   /90   Pulse 62   Ht 185.4 cm (72.99\")   Wt 98 kg (216 lb)   SpO2 96%   BMI 28.51 kg/m²      Physical Exam:  General appearance - alert, well appearing, and in no distress  Mental status - alert, oriented to person, " place, and time  Eyes - pupils equal and reactive, extraocular eye movements intact  Neck - supple, no significant adenopathy  Chest - clear to auscultation, no wheezes, rales or rhonchi, symmetric air entry  Neurological - alert, oriented, normal speech, no focal findings or movement disorder noted  Skin - 5 cm epidermal inclusion cyst of the back of the neck.   Physical Exam       Results Review:     The following data was reviewed by: Krys Burnett MD on 12/02/2024:  Progress Notes by Mansi Zapata DNP, APRN (11/12/2024 13:00)     Assessment & Plan       Diagnoses and all orders for this visit:    1. Epidermal inclusion cyst (Primary)  -     Case Request; Standing  -     XR chest 1 vw; Future  -     ECG 12 Lead; Future  -     heparin (porcine) 5000 UNIT/ML injection 5,000 Units  -     CBC & Differential; Future  -     Comprehensive Metabolic Panel; Future  -     ceFAZolin (ANCEF) 2,000 mg in sodium chloride 0.9 % 100 mL IVPB  -     Case Request    2. Overweight with body mass index (BMI) of 28 to 28.9 in adult    Other orders  -     Follow Anesthesia Guidelines / Protocol; Future  -     Follow Anesthesia Guidelines / Protocol; Standing  -     Verify / Perform Chlorhexidine Skin Prep; Standing  -     Provide NPO Instructions to Patient; Future  -     Chlorhexidine Skin Prep; Future  -     Notify physician (specify); Standing  -     Instructions on coughing, deep breathing, and incentive spirometry.; Standing  -     Oxygen Therapy-; Standing  -     Place Sequential Compression Device; Standing  -     Maintain Sequential Compression Device; Standing       Assessment & Plan  1. Cyst on the back of the neck.  A cyst on the back of the neck was confirmed upon examination. The patient reported that it has been present for approximately 30 years and has been increasing in size. It does not cause pain but is bothersome due to rubbing against clothing. Surgical removal in the operating room was recommended due  to the large size of the cyst, which makes it difficult to numb the area sufficiently in the office. The patient agreed to proceed with the surgery in the operating room. The incision will be closed with dissolvable stitches and glue. There will be no significant restrictions post-surgery, except for the day of the procedure due to anesthesia. He is scheduled for surgery on 12/20/24.     2. Status post aortic valve replacement.  The patient had an aortic valve replacement and is currently on aspirin 81 mg. He is doing well post-surgery and has no restrictions. He is back to work and has met his deductible for the year.     This is a chronic problem with progression (present for years and enlarging). I have reviewed the note above and ordered the above prework. He is at increased risk of perioperative complications 2/2 elevated BMI.     Krys Burnett MD  12/05/24  06:59 CST    Patient or patient representative verbalized consent for the use of Ambient Listening during the visit with  Krys Burnett MD for chart documentation. 12/5/2024  07:00 CST

## 2024-12-05 NOTE — PATIENT INSTRUCTIONS

## 2024-12-10 ENCOUNTER — OFFICE VISIT (OUTPATIENT)
Dept: CARDIOLOGY | Facility: CLINIC | Age: 64
End: 2024-12-10
Payer: COMMERCIAL

## 2024-12-10 VITALS
WEIGHT: 210 LBS | HEIGHT: 72 IN | BODY MASS INDEX: 28.44 KG/M2 | SYSTOLIC BLOOD PRESSURE: 145 MMHG | DIASTOLIC BLOOD PRESSURE: 85 MMHG | HEART RATE: 70 BPM

## 2024-12-10 DIAGNOSIS — I87.2 VENOUS INSUFFICIENCY (CHRONIC) (PERIPHERAL): ICD-10-CM

## 2024-12-10 DIAGNOSIS — I71.21 ANEURYSM OF ASCENDING AORTA WITHOUT RUPTURE: Primary | ICD-10-CM

## 2024-12-10 DIAGNOSIS — I10 PRIMARY HYPERTENSION: ICD-10-CM

## 2024-12-10 DIAGNOSIS — E78.2 MIXED HYPERLIPIDEMIA: ICD-10-CM

## 2024-12-10 PROCEDURE — 99214 OFFICE O/P EST MOD 30 MIN: CPT | Performed by: NURSE PRACTITIONER

## 2024-12-10 NOTE — PROGRESS NOTES
Subjective:     Encounter Date:12/10/2024      Patient ID: Michael Issa is a 64 y.o. male with thoracic aortic aneurysm status post aortic hemiarch, ascending aorta, aortic root and aortic valve replacement with left atrial appendage exclusion using 25 mm Emiliano Patel Inspiris valve, platinum 30 mm and 28 mm Hemashield grafts and 35mm atricure atriclip per Dr. Filiberto Gtz at [facility] 6/20/24, nonocclusive coronary artery disease (20 to 30% stenosis of the mid LAD on left heart catheterization 5/14/2024), venous insufficiency, hypertension, hyperlipidemia and obesity.    Chief Complaint: No complaints  History of Present Illness  Patient presents today for management of hypertension. He reports that he has been doing well. He has been released and is back to work. He denies any chest pain. He reports some dyspnea on exertion especially with walking up a hill or at work. He denies any heart racing or palpitations. He denies any dizziness or near syncope. He reports leg swelling. He reports that his right is worse than his left. He denies any orthopnea or PND. He reports that his BP has remained 120/80 at home. He has some hip pain that has been worse. Patient follows with Mansi Zapata DNP as PCP    The following portions of the patient's history were reviewed and updated as appropriate: allergies, current medications, past family history, past medical history, past social history, past surgical history and problem list.    Allergies   Allergen Reactions    Penicillins Rash     Childhood allergy       Current Outpatient Medications:     Ascorbic Acid (VITAMIN C PO), Take 1,000 mg by mouth Daily., Disp: , Rfl:     aspirin 81 MG EC tablet, Take 1 tablet by mouth Daily., Disp: 30 tablet, Rfl: 5    clopidogrel (PLAVIX) 75 MG tablet, TAKE 1 TABLET BY MOUTH EVERY DAY, Disp: 90 tablet, Rfl: 3    lisinopril (PRINIVIL,ZESTRIL) 5 MG tablet, Take 1 tablet by mouth Daily., Disp: 30 tablet, Rfl: 5    metoprolol succinate  XL (TOPROL-XL) 25 MG 24 hr tablet, Take 1 tablet by mouth Daily., Disp: 30 tablet, Rfl: 5    rosuvastatin (CRESTOR) 5 MG tablet, Take 1 tablet by mouth Daily., Disp: 90 tablet, Rfl: 5    sildenafil (Viagra) 100 MG tablet, Take 1 tablet by mouth Daily As Needed for Erectile Dysfunction., Disp: 30 tablet, Rfl: 1    tamsulosin (FLOMAX) 0.4 MG capsule 24 hr capsule, Take 1 capsule by mouth Daily., Disp: 30 capsule, Rfl: 5    valACYclovir (VALTREX) 1000 MG tablet, Take 2 tablets by mouth 2 (Two) Times a Day. (Patient taking differently: Take 2 tablets by mouth 2 (Two) Times a Day As Needed (fever blisters).), Disp: 15 tablet, Rfl: 3      Past Medical History:   Diagnosis Date    Allergic     Aneurysm 2023    Arthritis     Disease of thyroid gland     NODULE PRESENT     ED (erectile dysfunction)     Elevated cholesterol     Hx of chest pain     Hypertension     Pneumonia 2023    Urgency of urination      Social History     Socioeconomic History    Marital status:    Tobacco Use    Smoking status: Former     Average packs/day: 0.7 packs/day for 45.0 years (30.0 ttl pk-yrs)     Types: Cigarettes     Start date: 1997     Quit date: 2017     Years since quittin.6     Passive exposure: Past    Smokeless tobacco: Never    Tobacco comments:     Ex smoker   Vaping Use    Vaping status: Never Used   Substance and Sexual Activity    Alcohol use: Not Currently     Comment: very rarely    Drug use: Not Currently     Comment: recreational drug use no use in last 2-3 years    Sexual activity: Yes     Partners: Female     Birth control/protection: Birth control pill       Review of Systems   Constitutional: Negative for malaise/fatigue.   Cardiovascular:  Positive for dyspnea on exertion. Negative for chest pain, irregular heartbeat, leg swelling, near-syncope, orthopnea, palpitations, paroxysmal nocturnal dyspnea and syncope.   Hematologic/Lymphatic: Bruises/bleeds easily.   Neurological:   "Negative for dizziness and weakness.   All other systems reviewed and are negative.         Objective:     Vitals reviewed.   Constitutional:       General: Not in acute distress.     Appearance: Normal appearance. Well-developed.   Eyes:      Pupils: Pupils are equal, round, and reactive to light.   HENT:      Head: Normocephalic and atraumatic.      Nose: Nose normal.   Neck:      Vascular: No carotid bruit.   Pulmonary:      Effort: Pulmonary effort is normal. No respiratory distress.      Breath sounds: Normal breath sounds. No wheezing. No rales.   Cardiovascular:      Normal rate. Regular rhythm.      Murmurs: There is a grade 2/6 systolic murmur.   Edema:     Peripheral edema absent.   Abdominal:      General: There is no distension.      Palpations: Abdomen is soft.   Musculoskeletal: Normal range of motion.      Cervical back: Normal range of motion and neck supple. Skin:     General: Skin is warm.      Findings: No erythema or rash.   Neurological:      General: No focal deficit present.      Mental Status: Alert and oriented to person, place, and time.   Psychiatric:         Attention and Perception: Attention normal.         Mood and Affect: Mood normal.         Speech: Speech normal.         Behavior: Behavior normal.         Thought Content: Thought content normal.         Judgment: Judgment normal.         /85   Pulse 70   Ht 182.9 cm (72\")   Wt 95.3 kg (210 lb)   BMI 28.48 kg/m²     Procedures    Lab Review:       Results for orders placed during the hospital encounter of 09/03/24    Adult Transthoracic Echo Complete W/ Cont if Necessary Per Protocol    Interpretation Summary    Left ventricular ejection fraction appears to be 56 - 60%.    Left ventricular diastolic function was normal.    There is a normally functioning bioprosthetic aortic valve present.    Estimated right ventricular systolic pressure from tricuspid regurgitation is normal (<35 mmHg).    Normal global longitudinal LV " strain (GLS) = -16.6%    Evidence of aortic root repair    Conclusion 5/14/2024     Slightly angulated Superior takeoff of left main coronary artery  There is no dampening  Good reflux of contrast  This appears to be a normal anatomical variant  No obstructive disease of left main coronary artery  Mid left anterior descending coronary artery has mild atherosclerotic changes with 20 to 30% nonobstructive stenosis  Normal diagonal branches  Normal left circumflex coronary artery and obtuse marginal branches  Normal dominant right coronary artery     Summary     No evidence of any significant epicardial coronary artery stenosis with superior takeoff of left main coronary artery at an angulation without any obstructive disease        Plan     Follow-up with Dr. Gtz for thoracic aortic  Hydration   Observation  Risk factor modifications    Lab Results   Component Value Date    CHLPL 175 04/23/2018    TRIG 96 04/23/2018    HDL 55 04/23/2018     (H) 04/23/2018     I have personally reviewed echo, LHC, and past office notes prior to patients visit  Assessment:          Diagnosis Plan   1. Aneurysm of ascending aorta without rupture        2. Primary hypertension        3. Mixed hyperlipidemia        4. Venous insufficiency (chronic) (peripheral)               Plan:       Aneurysm of ascending aorta without rupture: s/p aortic hemiarch, ascending aorta, aortic root and aortic valve replacement with left atrial appendage exclusion using 25 mm Emiliano Patel Inspiris valve, platinum 30 mm and 28 mm Hemashield grafts and 35 mm atricure atriclip per Dr Gtz 6/20/2024. Continue aspirin and plavix    2. Hypertension: Slightly elevated in office but patient reports better control at home. Continue current medications    3. Hyperlipidemia: monitored and followed by PCP. Continue rosuvastatin    4. Venous insufficiency: Stable. Patient follows with Vascular     I spent 34 minutes caring for Michael on this date of  service. This time includes time spent by me in the following activities:preparing for the visit, reviewing tests, obtaining and/or reviewing a separately obtained history, performing a medically appropriate examination and/or evaluation , counseling and educating the patient/family/caregiver, and documenting information in the medical record     Patient follows with 6 months or sooner if needed      52-year-old male with past medical history of alcohol abuse, fatty liver disease in by EMS status post cardiac arrest.  Per EMS family found patient in bed at home, unknown downtime, called EMS.  Upon arrival patient was pulseless and in asystole, EMS gave 3 epi in the field, no shocks, intubated in the field, Trevor in place for CPR in progress.  Upon arrival to ED patient continued to have asystole, ACLS pursued with 3 rounds, and received 2 additional epi, magnesium, bicarb, calcium, tube was confirmed with end-tidal CO2, NG tube placed for distended abdomen, patient persisted to be in asystole despite medications, compressions, no cardiac activity noted on bedside ultrasound. Time of death called at 10:26PM.

## 2024-12-10 NOTE — PROGRESS NOTES
Discussed patient with CTS. He needed to take aspirin and plavix for 3 months after surgery. At this time he can discontinue plavix and stay on aspirin. Will notify patient.

## 2024-12-11 ENCOUNTER — TELEPHONE (OUTPATIENT)
Dept: CARDIOLOGY | Facility: CLINIC | Age: 64
End: 2024-12-11
Payer: COMMERCIAL

## 2024-12-11 NOTE — TELEPHONE ENCOUNTER
OKAY FOR HUB TO RELAY:    Patient left a detailed voicemail advising him at this time Michael can discontinue his Plavix 75 mg but must stay on Aspirin 81 mg once daily for life. Aspirin 81 mg is sold over the counter.     Thanks    WF

## 2024-12-11 NOTE — TELEPHONE ENCOUNTER
----- Message from Edward Camejo sent at 12/10/2024  3:37 PM CST -----  Please notify patient that I discussed aspirin and plavix with CTS. He needed to take aspirin and plavix for 3 months after surgery. At this time he can discontinue plavix and stay on aspirin only.    Thanks  ----- Message -----  From: Zhanna Isaacs APRN  Sent: 12/10/2024   3:29 PM CST  To: JUAN JOSE Stafford    Just 3 months after surgery so he can discontinue now  ----- Message -----  From: Edward Camejo APRN  Sent: 12/10/2024   3:16 PM CST  To: JUAN JOSE Johnson    I saw patient today and he was questioning how long he would continue the plavix for

## 2024-12-13 ENCOUNTER — PRE-ADMISSION TESTING (OUTPATIENT)
Dept: PREADMISSION TESTING | Facility: HOSPITAL | Age: 64
End: 2024-12-13

## 2024-12-13 ENCOUNTER — HOSPITAL ENCOUNTER (OUTPATIENT)
Dept: GENERAL RADIOLOGY | Facility: HOSPITAL | Age: 64
Discharge: HOME | End: 2024-12-13

## 2024-12-13 VITALS
SYSTOLIC BLOOD PRESSURE: 130 MMHG | HEIGHT: 73 IN | BODY MASS INDEX: 28.22 KG/M2 | WEIGHT: 212.96 LBS | OXYGEN SATURATION: 100 % | DIASTOLIC BLOOD PRESSURE: 81 MMHG | HEART RATE: 67 BPM | RESPIRATION RATE: 16 BRPM

## 2024-12-13 DIAGNOSIS — L72.0 EPIDERMAL INCLUSION CYST: ICD-10-CM

## 2024-12-13 LAB
ALBUMIN SERPL-MCNC: 4 G/DL (ref 3.5–5.2)
ALBUMIN/GLOB SERPL: 1.3 G/DL
ALP SERPL-CCNC: 48 U/L (ref 39–117)
ALT SERPL W P-5'-P-CCNC: 16 U/L (ref 1–41)
ANION GAP SERPL CALCULATED.3IONS-SCNC: 7 MMOL/L (ref 5–15)
ANISOCYTOSIS BLD QL: ABNORMAL
AST SERPL-CCNC: 22 U/L (ref 1–40)
BASOPHILS # BLD MANUAL: 0 10*3/MM3 (ref 0–0.2)
BASOPHILS NFR BLD MANUAL: 0 % (ref 0–1.5)
BILIRUB SERPL-MCNC: 0.4 MG/DL (ref 0–1.2)
BUN SERPL-MCNC: 15 MG/DL (ref 8–23)
BUN/CREAT SERPL: 16.3 (ref 7–25)
CALCIUM SPEC-SCNC: 8.9 MG/DL (ref 8.6–10.5)
CHLORIDE SERPL-SCNC: 104 MMOL/L (ref 98–107)
CO2 SERPL-SCNC: 28 MMOL/L (ref 22–29)
CREAT SERPL-MCNC: 0.92 MG/DL (ref 0.76–1.27)
DEPRECATED RDW RBC AUTO: 50.2 FL (ref 37–54)
EGFRCR SERPLBLD CKD-EPI 2021: 94.1 ML/MIN/1.73
EOSINOPHIL # BLD MANUAL: 0 10*3/MM3 (ref 0–0.4)
EOSINOPHIL NFR BLD MANUAL: 0 % (ref 0.3–6.2)
ERYTHROCYTE [DISTWIDTH] IN BLOOD BY AUTOMATED COUNT: 16.1 % (ref 12.3–15.4)
GLOBULIN UR ELPH-MCNC: 3.2 GM/DL
GLUCOSE SERPL-MCNC: 93 MG/DL (ref 65–99)
HCT VFR BLD AUTO: 38.4 % (ref 37.5–51)
HGB BLD-MCNC: 12.4 G/DL (ref 13–17.7)
LYMPHOCYTES # BLD MANUAL: 2 10*3/MM3 (ref 0.7–3.1)
LYMPHOCYTES NFR BLD MANUAL: 12.1 % (ref 5–12)
MCH RBC QN AUTO: 27.8 PG (ref 26.6–33)
MCHC RBC AUTO-ENTMCNC: 32.3 G/DL (ref 31.5–35.7)
MCV RBC AUTO: 86.1 FL (ref 79–97)
MONOCYTES # BLD: 0.53 10*3/MM3 (ref 0.1–0.9)
NEUTROPHILS # BLD AUTO: 1.87 10*3/MM3 (ref 1.7–7)
NEUTROPHILS NFR BLD MANUAL: 39.4 % (ref 42.7–76)
NEUTS BAND NFR BLD MANUAL: 3 % (ref 0–5)
PLATELET # BLD AUTO: 132 10*3/MM3 (ref 140–450)
PMV BLD AUTO: 11.2 FL (ref 6–12)
POIKILOCYTOSIS BLD QL SMEAR: ABNORMAL
POTASSIUM SERPL-SCNC: 4 MMOL/L (ref 3.5–5.2)
PROT SERPL-MCNC: 7.2 G/DL (ref 6–8.5)
RBC # BLD AUTO: 4.46 10*6/MM3 (ref 4.14–5.8)
SMALL PLATELETS BLD QL SMEAR: ABNORMAL
SODIUM SERPL-SCNC: 139 MMOL/L (ref 136–145)
VARIANT LYMPHS NFR BLD MANUAL: 22.2 % (ref 19.6–45.3)
VARIANT LYMPHS NFR BLD MANUAL: 23.2 % (ref 0–5)
WBC MORPH BLD: NORMAL
WBC NRBC COR # BLD AUTO: 4.41 10*3/MM3 (ref 3.4–10.8)

## 2024-12-13 PROCEDURE — 85007 BL SMEAR W/DIFF WBC COUNT: CPT

## 2024-12-13 PROCEDURE — 93005 ELECTROCARDIOGRAM TRACING: CPT

## 2024-12-13 PROCEDURE — 71045 X-RAY EXAM CHEST 1 VIEW: CPT

## 2024-12-13 PROCEDURE — 36415 COLL VENOUS BLD VENIPUNCTURE: CPT

## 2024-12-13 PROCEDURE — 80053 COMPREHEN METABOLIC PANEL: CPT

## 2024-12-13 PROCEDURE — 85025 COMPLETE CBC W/AUTO DIFF WBC: CPT

## 2024-12-13 NOTE — DISCHARGE INSTRUCTIONS
Preparing for Surgery  Follow these instructions before the procedure:  Several days or weeks before your procedure      Ask your health care provider about:  Changing or stopping your regular medicines. This is especially important if you are taking diabetes medicines or blood thinners.  Taking medicines such as aspirin and ibuprofen. These medicines can thin your blood. Do not take these medicines unless your health care provider tells you to take them.  Taking over-the-counter medicines, vitamins, herbs, and supplements.    Contact your surgeon if you:  Develop a fever of more than 100.4°F (38°C) or other feelings of illness during the 48 hours before your surgery.  Have symptoms that get worse.  Have questions or concerns about your surgery.  If you are going home the same day of your surgery you will need to arrange for a responsible adult, age 18 years old or older, to drive you home from the hospital and stay with you for 24 hours. Verification of the  will be made prior to any procedure requiring sedation. You may not go home in a taxi or any form of public transportation by yourself.     Day before your procedure  Medication(s) you need to stop the day before your surgery:lisinopril    24 hours before your procedure DO NOT drink alcoholic beverages or smoke.  24 hours before your procedure STOP taking Erectile Dysfunction medication (i.e.,Cialis, Viagra)   You may be asked to shower with a germ-killing soap.  Day of your procedure   You may take the following medication(s) the morning of surgery with a sip of water: metoprolol      8 hours before your scheduled arrival time, STOP all food, any dairy products, and full liquids. This includes hard candy, chewing gum or mints. This is extremely important to prevent serious complications.     Up to 2 hours before your scheduled arrival time, you may have clear liquids no cream, powder, or pulp of any kind. Safe options are water, black coffee, plain tea,  soda, Gatorade/Powerade, clear broth, apple juice.    2 hours before your scheduled arrival time, STOP drinking clear liquids.    You may need to take another shower with a germ-killing soap before you leave home in the morning. Do not use perfumes, colognes, or body lotions.  Wear comfortable loose-fitting clothing.  Remove all jewelry including body piercing and rings, dark colored nail polish, and make up prior to arrival at the hospital. Leave all valuables at home.   Bring your hearing aids if you rely on them.  Do not wear contact lenses. If you wear eyeglasses remember to bring a case to store them in while you are in surgery.  Do not use denture adhesives since you will be asked to remove them during your surgery.    You do not need to bring your home medications into the hospital.   Bring your sleep apnea device with you on the day of your surgery (if this applies to you).  If you have an Inspire implant for sleep apnea, please bring the remote with you on the day of surgery.  If you wear portable oxygen, bring it with you.   If you are staying overnight, you may bring a bag of items you may need such as slippers, robe and a change of clothes for your discharge. You may want to leave these items in the car until you are ready for them since your family will take your belongings when you leave the pre-operative area.  Arrive at the hospital as scheduled by the office. You will be asked to arrive 2 hours prior to your surgery time in order to prepare for your procedure.  When you arrive at the hospital  Go to the registration desk located at the main entrance of the hospital.  After registration is completed, you will be given a beeper and a sticker sheet. Take the stickers to Outpatient Surgery and place in the tray at the end of the desk to notify the staff that you have arrived and registered.   Return to the lobby to wait. You are not always called back according to the time of arrival but rather the time  your doctor will be ready.  When your beeper lights up and vibrates proceed through the double doors, under the stairs, and a member of the Outpatient Surgery staff will escort you to your preoperative room.   How to Use Chlorhexidine Before Surgery  Chlorhexidine gluconate (CHG) is a germ-killing (antiseptic) solution that is used to clean the skin. It can get rid of the bacteria that normally live on the skin and can keep them away for about 24 hours. To clean your skin with CHG, you may be given:  A CHG solution to use in the shower or as part of a sponge bath.  A prepackaged cloth that contains CHG.  Cleaning your skin with CHG may help lower the risk for infection:  While you are staying in the intensive care unit of the hospital.  If you have a vascular access, such as a central line, to provide short-term or long-term access to your veins.  If you have a catheter to drain urine from your bladder.  If you are on a ventilator. A ventilator is a machine that helps you breathe by moving air in and out of your lungs.  After surgery.  What are the risks?  Risks of using CHG include:  A skin reaction.  Hearing loss, if CHG gets in your ears and you have a perforated eardrum.  Eye injury, if CHG gets in your eyes and is not rinsed out.  The CHG product catching fire.  Make sure that you avoid smoking and flames after applying CHG to your skin.  Do not use CHG:  If you have a chlorhexidine allergy or have previously reacted to chlorhexidine.  On babies younger than 2 months of age.  How to use CHG solution  Use CHG only as told by your health care provider, and follow the instructions on the label.  Use the full amount of CHG as directed. Usually, this is one bottle.  During a shower    Follow these steps when using CHG solution during a shower (unless your health care provider gives you different instructions):  Start the shower.  Use your normal soap and shampoo to wash your face and hair.  Turn off the shower or  move out of the shower stream.  Pour the CHG onto a clean washcloth. Do not use any type of brush or rough-edged sponge.  Starting at your neck, lather your body down to your toes. Make sure you follow these instructions:  If you will be having surgery, pay special attention to the part of your body where you will be having surgery. Scrub this area for at least 1 minute.  Do not use CHG on your head or face. If the solution gets into your ears or eyes, rinse them well with water.  Avoid your genital area.  Avoid any areas of skin that have broken skin, cuts, or scrapes.  Scrub your back and under your arms. Make sure to wash skin folds.  Let the lather sit on your skin for 1-2 minutes or as long as told by your health care provider.  Thoroughly rinse your entire body in the shower. Make sure that all body creases and crevices are rinsed well.  Dry off with a clean towel. Do not put any substances on your body afterward--such as powder, lotion, or perfume--unless you are told to do so by your health care provider. Only use lotions that are recommended by the .  Put on clean clothes or pajamas.  If it is the night before your surgery, sleep in clean sheets.     During a sponge bath  Follow these steps when using CHG solution during a sponge bath (unless your health care provider gives you different instructions):  Use your normal soap and shampoo to wash your face and hair.  Pour the CHG onto a clean washcloth.  Starting at your neck, lather your body down to your toes. Make sure you follow these instructions:  If you will be having surgery, pay special attention to the part of your body where you will be having surgery. Scrub this area for at least 1 minute.  Do not use CHG on your head or face. If the solution gets into your ears or eyes, rinse them well with water.  Avoid your genital area.  Avoid any areas of skin that have broken skin, cuts, or scrapes.  Scrub your back and under your arms. Make sure  to wash skin folds.  Let the lather sit on your skin for 1-2 minutes or as long as told by your health care provider.  Using a different clean, wet washcloth, thoroughly rinse your entire body. Make sure that all body creases and crevices are rinsed well.  Dry off with a clean towel. Do not put any substances on your body afterward--such as powder, lotion, or perfume--unless you are told to do so by your health care provider. Only use lotions that are recommended by the .  Put on clean clothes or pajamas.  If it is the night before your surgery, sleep in clean sheets.  How to use CHG prepackaged cloths  Only use CHG cloths as told by your health care provider, and follow the instructions on the label.  Use the CHG cloth on clean, dry skin.  Do not use the CHG cloth on your head or face unless your health care provider tells you to.  When washing with the CHG cloth:  Avoid your genital area.  Avoid any areas of skin that have broken skin, cuts, or scrapes.  Before surgery    Follow these steps when using a CHG cloth to clean before surgery (unless your health care provider gives you different instructions):  Using the CHG cloth, vigorously scrub the part of your body where you will be having surgery. Scrub using a back-and-forth motion for 3 minutes. The area on your body should be completely wet with CHG when you are done scrubbing.  Do not rinse. Discard the cloth and let the area air-dry. Do not put any substances on the area afterward, such as powder, lotion, or perfume.  Put on clean clothes or pajamas.  If it is the night before your surgery, sleep in clean sheets.     For general bathing  Follow these steps when using CHG cloths for general bathing (unless your health care provider gives you different instructions).  Use a separate CHG cloth for each area of your body. Make sure you wash between any folds of skin and between your fingers and toes. Wash your body in the following order, switching to  a new cloth after each step:  The front of your neck, shoulders, and chest.  Both of your arms, under your arms, and your hands.  Your stomach and groin area, avoiding the genitals.  Your right leg and foot.  Your left leg and foot.  The back of your neck, your back, and your buttocks.  Do not rinse. Discard the cloth and let the area air-dry. Do not put any substances on your body afterward--such as powder, lotion, or perfume--unless you are told to do so by your health care provider. Only use lotions that are recommended by the .  Put on clean clothes or pajamas.  Contact a health care provider if:  Your skin gets irritated after scrubbing.  You have questions about using your solution or cloth.  You swallow any chlorhexidine. Call your local poison control center (1-753.411.2998 in the [place]  Get help right away if:  Your eyes itch badly, or they become very red or swollen.  Your skin itches badly and is red or swollen.  Your hearing changes.  You have trouble seeing.  You have swelling or tingling in your mouth or throat.  You have trouble breathing.  These symptoms may represent a serious problem that is an emergency. Do not wait to see if the symptoms will go away. Get medical help right away. Call your local emergency services (703 in the [place] Do not drive yourself to the hospital.  Summary  Chlorhexidine gluconate (CHG) is a germ-killing (antiseptic) solution that is used to clean the skin. Cleaning your skin with CHG may help to lower your risk for infection.  You may be given CHG to use for bathing. It may be in a bottle or in a prepackaged cloth to use on your skin. Carefully follow your health care provider's instructions and the instructions on the product label.  Do not use CHG if you have a chlorhexidine allergy.  Contact your health care provider if your skin gets irritated after scrubbing.  This information is not intended to replace advice given to you by your health care provider.  Make sure you discuss any questions you have with your health care provider.  Document Revised: 04/17/2023 Document Reviewed: 02/28/2022  [employer]vier Patient Education © 2023 [employer]

## 2024-12-16 LAB
QT INTERVAL: 470 MS
QTC INTERVAL: 473 MS

## 2024-12-19 ENCOUNTER — TELEPHONE (OUTPATIENT)
Dept: SURGERY | Facility: CLINIC | Age: 64
End: 2024-12-19
Payer: COMMERCIAL

## 2024-12-19 NOTE — TELEPHONE ENCOUNTER
Called Michael to confirm his surgery date 12/20/2024 with an arrival time of 7:00Am.   Reminded him not to eat or drink after midnight.   Let him know to come through the main entrance of the hospital and check in at main registration.     Left message with patient.

## 2025-01-30 ENCOUNTER — TELEPHONE (OUTPATIENT)
Dept: VASCULAR SURGERY | Facility: CLINIC | Age: 65
End: 2025-01-30
Payer: COMMERCIAL

## 2025-01-31 ENCOUNTER — OFFICE VISIT (OUTPATIENT)
Dept: VASCULAR SURGERY | Facility: CLINIC | Age: 65
End: 2025-01-31
Payer: COMMERCIAL

## 2025-01-31 ENCOUNTER — HOSPITAL ENCOUNTER (OUTPATIENT)
Dept: ULTRASOUND IMAGING | Facility: HOSPITAL | Age: 65
Discharge: HOME | End: 2025-01-31
Admitting: NURSE PRACTITIONER
Payer: COMMERCIAL

## 2025-01-31 VITALS
HEART RATE: 75 BPM | DIASTOLIC BLOOD PRESSURE: 74 MMHG | WEIGHT: 209 LBS | HEIGHT: 73 IN | SYSTOLIC BLOOD PRESSURE: 128 MMHG | BODY MASS INDEX: 27.7 KG/M2 | OXYGEN SATURATION: 96 %

## 2025-01-31 DIAGNOSIS — I10 PRIMARY HYPERTENSION: ICD-10-CM

## 2025-01-31 DIAGNOSIS — E78.2 MIXED HYPERLIPIDEMIA: ICD-10-CM

## 2025-01-31 DIAGNOSIS — I87.323 CHRONIC VENOUS HYPERTENSION WITH INFLAMMATION INVOLVING BOTH SIDES: ICD-10-CM

## 2025-01-31 DIAGNOSIS — I65.23 BILATERAL CAROTID ARTERY STENOSIS: ICD-10-CM

## 2025-01-31 DIAGNOSIS — I65.23 BILATERAL CAROTID ARTERY STENOSIS: Primary | ICD-10-CM

## 2025-01-31 PROCEDURE — 93880 EXTRACRANIAL BILAT STUDY: CPT

## 2025-01-31 PROCEDURE — 99214 OFFICE O/P EST MOD 30 MIN: CPT | Performed by: NURSE PRACTITIONER

## 2025-01-31 NOTE — LETTER
January 31, 2025     Mansi Zapata DNP, APRNAYELI  [address] [address]    Patient: Michael Issa   YOB: 1960   Date of Visit: 1/31/2025     Dear Mansi Zapata DNP, APRN:       Thank you for referring Michael Issa to me for evaluation. Below are the relevant portions of my assessment and plan of care.    If you have questions, please do not hesitate to call me. I look forward to following Michael along with you.         Sincerely,        JUAN JOSE Allen        CC: No Recipients    Marti Tellez APRN  01/31/25 1525  Sign when Signing Visit  1/31/2025      Mansi Zapata DNP, APRNAYELI  [address] [address]      Michael Issa  1960    Chief Complaint   Patient presents with   • Chronic venous hypertension with inflammation involving bot   • Bilateral carotid artery stenos     Testing here this am. No complaints , denies stroke symptoms       Dear Mansi Zapata DNP, APRNAYELI       HPI  I had the pleasure of seeing your patient Michael Issa in the office today.   As you recall, Michael Issa is a 64 y.o.  male who you are currently following for routine health maintenance.  We are following for carotid disease and venous insufficiency to his left lower extremity.  Currently he is doing well and denies any strokelike symptoms.  He did undergo aortic root replacement with Dr. Gtz about a year ago.  He is maintained on aspirin and Crestor.  He did have noninvasive testing performed today, which I did review in office.        Review of Systems   Constitutional: Negative.    HENT: Negative.     Eyes: Negative.    Respiratory: Negative.     Cardiovascular: Negative.    Gastrointestinal: Negative.    Endocrine: Negative.    Genitourinary: Negative.    Musculoskeletal: Negative.    Skin: Negative.    Allergic/Immunologic: Negative.    Neurological: Negative.    Hematological: Negative.    Psychiatric/Behavioral: Negative.     All other  "systems reviewed and are negative.      /74   Pulse 75   Ht 185.4 cm (73\")   Wt 94.8 kg (209 lb)   SpO2 96%   BMI 27.57 kg/m²       Physical Exam  Vitals and nursing note reviewed.   Constitutional:       General: He is not in acute distress.     Appearance: Normal appearance. He is well-developed, well-groomed and overweight. He is not diaphoretic.   HENT:      Head: Normocephalic and atraumatic.   Neck:      Vascular: No carotid bruit or JVD.   Cardiovascular:      Rate and Rhythm: Normal rate and regular rhythm.      Pulses: Normal pulses.           Femoral pulses are 2+ on the right side and 2+ on the left side.       Popliteal pulses are 2+ on the right side and 2+ on the left side.        Dorsalis pedis pulses are 2+ on the right side and 2+ on the left side.        Posterior tibial pulses are 2+ on the right side and 2+ on the left side.      Heart sounds: Normal heart sounds, S1 normal and S2 normal. No murmur heard.     No friction rub. No gallop.   Pulmonary:      Effort: Pulmonary effort is normal.      Breath sounds: Normal breath sounds.   Abdominal:      General: Bowel sounds are normal. There is no abdominal bruit.      Palpations: Abdomen is soft.      Tenderness: There is no abdominal tenderness.   Musculoskeletal:         General: Normal range of motion.   Skin:     General: Skin is warm and dry.      Comments: Hyperpigmentation   Neurological:      Mental Status: He is alert and oriented to person, place, and time.      Cranial Nerves: No cranial nerve deficit.   Psychiatric:         Behavior: Behavior normal. Behavior is cooperative.         Thought Content: Thought content normal.         Judgment: Judgment normal.         DIAGNOSTIC DATA:  Noninvasive test including a carotid duplex shows less than 50% carotid stenosis bilaterally with bilateral antegrade vertebral flow.    Patient Active Problem List   Diagnosis   • Hypertension   • Thyroid nodule   • Former smoker   • Family " history of colonic polyps   • Erectile dysfunction   • Calcified granuloma of lung   • Aorta aneurysm   • Chronic seasonal allergic rhinitis due to pollen   • Chest pressure   • History of colon polyps   • HUMPHRIES (dyspnea on exertion)   • Thoracic aortic aneurysm without rupture   • Class 1 obesity due to excess calories with body mass index (BMI) of 30.0 to 30.9 in adult   • Aortic root aneurysm   • Venous insufficiency (chronic) (peripheral)   • Venous ulcer of right leg   • Non-occlusive coronary artery disease   • Mixed hyperlipidemia   • Epidermal inclusion cyst         ICD-10-CM ICD-9-CM   1. Bilateral carotid artery stenosis  I65.23 433.10     433.30   2. Primary hypertension  I10 401.9   3. Chronic venous hypertension with inflammation involving both sides  I87.323 459.32   4. Mixed hyperlipidemia  E78.2 272.2             Plan: After thoroughly evaluating Michael Issa, I believe the best course of action is to remain conservative from vascular surgery standpoint.  Currently he is doing well and denies any strokelike symptoms.  I did review his testing which shows less than 50% carotid stenosis bilaterally.  We will see him back in 1 year with repeat noninvasive testing including a carotid duplex for continued surveillance.  I did discuss vascular risk factors as they pertain to the progression of vascular disease including controlling his hypertension and hyperlipidemia.  His blood pressure stable on his current medications.  He should continue his aspirin 81 mg daily and Crestor 5 mg daily in addition to his other medications.  Body mass index is 27.57 kg/m².    This was all discussed in full with complete understanding.    Thank you for allowing me to participate in the care of your patient.  Please do not hesitate with any questions or concerns.  I will keep you aware of any further encounters with Michael Issa.        Sincerely yours,         JUAN JOSE Allen

## 2025-02-06 ENCOUNTER — TELEPHONE (OUTPATIENT)
Dept: SURGERY | Facility: CLINIC | Age: 65
End: 2025-02-06
Payer: COMMERCIAL

## 2025-02-06 NOTE — TELEPHONE ENCOUNTER
Attempted to contact PT to talk with him about a new appointment regarding a surgery that he missed due to not having insurance at that time. I left a detailed voicemail stating that PT needed to come and be seen in office another time due to the last appointment being in December. I left our office phone number to call back to get that rescheduled.     ECC  2/6/25

## 2025-02-23 DIAGNOSIS — I10 PRIMARY HYPERTENSION: ICD-10-CM

## 2025-02-23 DIAGNOSIS — N52.8 OTHER MALE ERECTILE DYSFUNCTION: ICD-10-CM

## 2025-02-24 RX ORDER — LISINOPRIL 5 MG/1
5 TABLET ORAL DAILY
Qty: 90 TABLET | Refills: 1 | Status: SHIPPED | OUTPATIENT
Start: 2025-02-24

## 2025-02-26 RX ORDER — SILDENAFIL 100 MG/1
100 TABLET, FILM COATED ORAL DAILY PRN
Qty: 30 TABLET | Refills: 0 | Status: SHIPPED | OUTPATIENT
Start: 2025-02-26

## 2025-03-08 DIAGNOSIS — R33.8 BENIGN PROSTATIC HYPERPLASIA WITH URINARY RETENTION: ICD-10-CM

## 2025-03-08 DIAGNOSIS — N40.1 BENIGN PROSTATIC HYPERPLASIA WITH URINARY RETENTION: ICD-10-CM

## 2025-03-12 RX ORDER — TAMSULOSIN HYDROCHLORIDE 0.4 MG/1
1 CAPSULE ORAL DAILY
Qty: 30 CAPSULE | Refills: 0 | Status: SHIPPED | OUTPATIENT
Start: 2025-03-12

## 2025-03-25 DIAGNOSIS — N52.8 OTHER MALE ERECTILE DYSFUNCTION: ICD-10-CM

## 2025-03-26 RX ORDER — SILDENAFIL 100 MG/1
100 TABLET, FILM COATED ORAL DAILY PRN
Qty: 30 TABLET | Refills: 0 | Status: SHIPPED | OUTPATIENT
Start: 2025-03-26

## 2025-03-31 RX ORDER — CLOPIDOGREL BISULFATE 75 MG/1
75 TABLET ORAL DAILY
Qty: 90 TABLET | Refills: 1 | Status: SHIPPED | OUTPATIENT
Start: 2025-03-31

## 2025-03-31 NOTE — TELEPHONE ENCOUNTER
Rx Refill Note  Requested Prescriptions     Pending Prescriptions Disp Refills    clopidogrel (PLAVIX) 75 MG tablet [Pharmacy Med Name: CLOPIDOGREL 75 MG TABLET] 90 tablet 1     Sig: TAKE 1 TABLET BY MOUTH EVERY DAY      Last office visit with prescribing clinician: 9/19/2024       Galina Koehler MA  03/31/25, 10:06 CDT

## 2025-04-06 DIAGNOSIS — R33.8 BENIGN PROSTATIC HYPERPLASIA WITH URINARY RETENTION: ICD-10-CM

## 2025-04-06 DIAGNOSIS — N40.1 BENIGN PROSTATIC HYPERPLASIA WITH URINARY RETENTION: ICD-10-CM

## 2025-04-07 RX ORDER — TAMSULOSIN HYDROCHLORIDE 0.4 MG/1
1 CAPSULE ORAL DAILY
Qty: 30 CAPSULE | Refills: 0 | Status: SHIPPED | OUTPATIENT
Start: 2025-04-07

## 2025-04-07 NOTE — TELEPHONE ENCOUNTER
Rx Refill Note  Requested Prescriptions     Pending Prescriptions Disp Refills    tamsulosin (FLOMAX) 0.4 MG capsule 24 hr capsule [Pharmacy Med Name: TAMSULOSIN HCL 0.4 MG CAPSULE] 30 capsule 0     Sig: TAKE 1 CAPSULE BY MOUTH EVERY DAY      Last office visit with prescribing clinician: 9/19/2024       Galina Koehler MA  04/07/25, 10:41 CDT

## 2025-05-05 DIAGNOSIS — N52.8 OTHER MALE ERECTILE DYSFUNCTION: ICD-10-CM

## 2025-05-05 RX ORDER — SILDENAFIL 100 MG/1
100 TABLET, FILM COATED ORAL DAILY PRN
Qty: 30 TABLET | Refills: 0 | Status: SHIPPED | OUTPATIENT
Start: 2025-05-05

## 2025-05-14 DIAGNOSIS — N40.1 BENIGN PROSTATIC HYPERPLASIA WITH URINARY RETENTION: ICD-10-CM

## 2025-05-14 DIAGNOSIS — R33.8 BENIGN PROSTATIC HYPERPLASIA WITH URINARY RETENTION: ICD-10-CM

## 2025-05-14 RX ORDER — TAMSULOSIN HYDROCHLORIDE 0.4 MG/1
1 CAPSULE ORAL DAILY
Qty: 30 CAPSULE | Refills: 0 | Status: SHIPPED | OUTPATIENT
Start: 2025-05-14

## 2025-05-30 DIAGNOSIS — N52.8 OTHER MALE ERECTILE DYSFUNCTION: ICD-10-CM

## 2025-06-01 RX ORDER — SILDENAFIL 100 MG/1
100 TABLET, FILM COATED ORAL DAILY PRN
Qty: 30 TABLET | Refills: 0 | Status: SHIPPED | OUTPATIENT
Start: 2025-06-01

## 2025-06-05 DIAGNOSIS — I10 PRIMARY HYPERTENSION: ICD-10-CM

## 2025-06-05 RX ORDER — METOPROLOL SUCCINATE 25 MG/1
25 TABLET, EXTENDED RELEASE ORAL
Qty: 90 TABLET | Refills: 0 | Status: SHIPPED | OUTPATIENT
Start: 2025-06-05

## 2025-06-10 ENCOUNTER — OFFICE VISIT (OUTPATIENT)
Dept: CARDIOLOGY | Facility: CLINIC | Age: 65
End: 2025-06-10
Payer: COMMERCIAL

## 2025-06-10 VITALS
DIASTOLIC BLOOD PRESSURE: 77 MMHG | SYSTOLIC BLOOD PRESSURE: 117 MMHG | BODY MASS INDEX: 27.3 KG/M2 | HEART RATE: 64 BPM | WEIGHT: 206 LBS | HEIGHT: 73 IN

## 2025-06-10 DIAGNOSIS — E78.2 MIXED HYPERLIPIDEMIA: ICD-10-CM

## 2025-06-10 DIAGNOSIS — I87.2 VENOUS INSUFFICIENCY (CHRONIC) (PERIPHERAL): ICD-10-CM

## 2025-06-10 DIAGNOSIS — I10 PRIMARY HYPERTENSION: ICD-10-CM

## 2025-06-10 DIAGNOSIS — I71.21 ANEURYSM OF ASCENDING AORTA WITHOUT RUPTURE: Primary | ICD-10-CM

## 2025-06-10 PROCEDURE — 99214 OFFICE O/P EST MOD 30 MIN: CPT | Performed by: NURSE PRACTITIONER

## 2025-06-10 NOTE — PROGRESS NOTES
Subjective:     Encounter Date:06/10/2025      Patient ID: Michael Issa is a 65 y.o. male with thoracic aortic aneurysm status post aortic hemiarch, ascending aorta, aortic root and aortic valve replacement with left atrial appendage exclusion using 25 mm Emiliano Patel Inspiris valve, platinum 30 mm and 28 mm Hemashield grafts and 35mm atricure atriclip per Dr. Filiberto Gtz at [facility] 6/20/24, nonocclusive coronary artery disease (20 to 30% stenosis of the mid LAD on left heart catheterization 5/14/2024), venous insufficiency, hypertension, hyperlipidemia and obesity.    Chief Complaint: No complaints  History of Present Illness  Patient presents today for management of hypertension. He reports that he has been doing well. He denies any chest pain. He reports some dyspnea on exertion especially with walking up a hill or at work. He denies any heart racing or palpitations. He denies any dizziness or near syncope. He reports leg swelling. He reports that his right is worse than his left. He bought some Dr Nicholson socks about a month ago and they have been helping. He reports that it resolves with leg elevation after work. He denies any orthopnea or PND. He reports that his BP has remained 120/80 at home.  Patient follows with Mansi Zapata DNP as PCP    The following portions of the patient's history were reviewed and updated as appropriate: allergies, current medications, past family history, past medical history, past social history, past surgical history and problem list.    Allergies   Allergen Reactions    Penicillins Rash     Childhood allergy  Beta lactam allergy details  Antibiotic reaction: unknown  Age at reaction: infant  Dose to reaction time: unknown  Reason for antibiotic: unknown  Epinephrine required for reaction?: no  Tolerated antibiotics: unknown           Current Outpatient Medications:     Ascorbic Acid (VITAMIN C PO), Take 1,000 mg by mouth Daily., Disp: , Rfl:     aspirin 81 MG EC tablet,  Take 1 tablet by mouth Daily., Disp: 30 tablet, Rfl: 5    lisinopril (PRINIVIL,ZESTRIL) 5 MG tablet, TAKE 1 TABLET BY MOUTH EVERY DAY, Disp: 90 tablet, Rfl: 1    metoprolol succinate XL (TOPROL-XL) 25 MG 24 hr tablet, TAKE 1 TABLET BY MOUTH EVERY DAY, Disp: 90 tablet, Rfl: 0    rosuvastatin (CRESTOR) 5 MG tablet, Take 1 tablet by mouth Daily., Disp: 90 tablet, Rfl: 5    sildenafil (VIAGRA) 100 MG tablet, Take 1 tablet by mouth daily as needed for erectile dysfunction., Disp: 30 tablet, Rfl: 0    tamsulosin (FLOMAX) 0.4 MG capsule 24 hr capsule, TAKE 1 CAPSULE BY MOUTH EVERY DAY, Disp: 30 capsule, Rfl: 0      Past Medical History:   Diagnosis Date    Allergic     Aneurysm 2023    Arthritis     Disease of thyroid gland     NODULE PRESENT     ED (erectile dysfunction)     Elevated cholesterol     Hernia     Hx of chest pain     Hypertension     Pneumonia 2023    Shortness of breath     since heart surgery    Urgency of urination      Social History     Socioeconomic History    Marital status:    Tobacco Use    Smoking status: Former     Average packs/day: 0.8 packs/day for 60.0 years (45.0 ttl pk-yrs)     Types: Cigarettes     Start date: 1997     Quit date: 2017     Years since quittin.1     Passive exposure: Past    Smokeless tobacco: Never    Tobacco comments:     Ex smoker   Vaping Use    Vaping status: Never Used   Substance and Sexual Activity    Alcohol use: Not Currently     Comment: very rarely    Drug use: Not Currently     Comment: recreational drug use no use in last 2-3 years    Sexual activity: Yes     Partners: Female     Birth control/protection: Birth control pill       Review of Systems   Constitutional: Negative for malaise/fatigue.   Cardiovascular:  Positive for dyspnea on exertion and leg swelling (improving with socks). Negative for chest pain, irregular heartbeat, near-syncope, orthopnea, palpitations, paroxysmal nocturnal dyspnea and syncope.  "  Hematologic/Lymphatic: Bruises/bleeds easily.   Neurological:  Negative for dizziness and weakness.   All other systems reviewed and are negative.         Objective:     Vitals reviewed.   Constitutional:       General: Not in acute distress.     Appearance: Normal appearance. Well-developed.   Eyes:      Pupils: Pupils are equal, round, and reactive to light.   HENT:      Head: Normocephalic and atraumatic.      Nose: Nose normal.   Neck:      Vascular: No carotid bruit.   Pulmonary:      Effort: Pulmonary effort is normal. No respiratory distress.      Breath sounds: Normal breath sounds. No wheezing. No rales.   Cardiovascular:      Normal rate. Regular rhythm.      Murmurs: There is a grade 2/6 systolic murmur.   Edema:     Peripheral edema absent.   Abdominal:      General: There is no distension.      Palpations: Abdomen is soft.   Musculoskeletal: Normal range of motion.      Cervical back: Normal range of motion and neck supple. Skin:     General: Skin is warm.      Findings: No erythema or rash.   Neurological:      General: No focal deficit present.      Mental Status: Alert and oriented to person, place, and time.   Psychiatric:         Attention and Perception: Attention normal.         Mood and Affect: Mood normal.         Speech: Speech normal.         Behavior: Behavior normal.         Thought Content: Thought content normal.         Judgment: Judgment normal.         /77   Pulse 64   Ht 185.4 cm (73\")   Wt 93.4 kg (206 lb)   BMI 27.18 kg/m²     Procedures    Lab Review:       Results for orders placed during the hospital encounter of 09/03/24    Adult Transthoracic Echo Complete W/ Cont if Necessary Per Protocol    Interpretation Summary    Left ventricular ejection fraction appears to be 56 - 60%.    Left ventricular diastolic function was normal.    There is a normally functioning bioprosthetic aortic valve present.    Estimated right ventricular systolic pressure from tricuspid " regurgitation is normal (<35 mmHg).    Normal global longitudinal LV strain (GLS) = -16.6%    Evidence of aortic root repair    Conclusion 5/14/2024     Slightly angulated Superior takeoff of left main coronary artery  There is no dampening  Good reflux of contrast  This appears to be a normal anatomical variant  No obstructive disease of left main coronary artery  Mid left anterior descending coronary artery has mild atherosclerotic changes with 20 to 30% nonobstructive stenosis  Normal diagonal branches  Normal left circumflex coronary artery and obtuse marginal branches  Normal dominant right coronary artery     Summary     No evidence of any significant epicardial coronary artery stenosis with superior takeoff of left main coronary artery at an angulation without any obstructive disease        Plan     Follow-up with Dr. Gtz for thoracic aortic  Hydration   Observation  Risk factor modifications    Lab Results   Component Value Date    CHLPL 175 04/23/2018    TRIG 96 04/23/2018    HDL 55 04/23/2018     (H) 04/23/2018     I have personally reviewed echo, LHC, and past office notes prior to patients visit  Assessment:          Diagnosis Plan   1. Aneurysm of ascending aorta without rupture        2. Primary hypertension        3. Mixed hyperlipidemia        4. Venous insufficiency (chronic) (peripheral)                 Plan:       Aneurysm of ascending aorta without rupture: s/p aortic hemiarch, ascending aorta, aortic root and aortic valve replacement with left atrial appendage exclusion using 25 mm Emiliano Patel Inspiris valve, platinum 30 mm and 28 mm Hemashield grafts and 35 mm atricure atriclip per Dr Gtz 6/20/2024. Echo 9/2024 showed normal functioning bioprosthetic aortic valve. Continue aspirin    2. Hypertension: well controlled in office. Continue current medications    3. Hyperlipidemia: monitored and followed by PCP. Continue rosuvastatin.     4. Venous insufficiency: Stable. Patient  follows with Vascular     I attest that all portions of this note reviewed and all information has been updated by myself to reflect the patient's current status.      I spent 35 minutes caring for Michael on this date of service. This time includes time spent by me in the following activities:preparing for the visit, reviewing tests, obtaining and/or reviewing a separately obtained history, performing a medically appropriate examination and/or evaluation , counseling and educating the patient/family/caregiver, and documenting information in the medical record    Patient follows with 6 months or sooner if needed

## 2025-06-15 DIAGNOSIS — R33.8 BENIGN PROSTATIC HYPERPLASIA WITH URINARY RETENTION: ICD-10-CM

## 2025-06-15 DIAGNOSIS — N40.1 BENIGN PROSTATIC HYPERPLASIA WITH URINARY RETENTION: ICD-10-CM

## 2025-06-16 RX ORDER — TAMSULOSIN HYDROCHLORIDE 0.4 MG/1
1 CAPSULE ORAL DAILY
Qty: 30 CAPSULE | Refills: 0 | OUTPATIENT
Start: 2025-06-16

## 2025-06-24 DIAGNOSIS — N52.8 OTHER MALE ERECTILE DYSFUNCTION: ICD-10-CM

## 2025-06-25 RX ORDER — SILDENAFIL 100 MG/1
100 TABLET, FILM COATED ORAL DAILY PRN
Qty: 10 TABLET | Refills: 0 | Status: SHIPPED | OUTPATIENT
Start: 2025-06-25

## 2025-06-25 NOTE — TELEPHONE ENCOUNTER
Rx Refill Note  Requested Prescriptions     Pending Prescriptions Disp Refills    sildenafil (VIAGRA) 100 MG tablet [Pharmacy Med Name: Sildenafil Citrate 100mg Tablet] 30 tablet 0     Sig: Take 1 tablet by mouth daily as needed for erectile dysfunction.      Last office visit with prescribing clinician: 11/12/2024   Next office visit with prescribing clinician: Visit date not found     Carmen Mckeon MA  06/25/25, 07:44 CDT

## 2025-07-09 ENCOUNTER — OFFICE VISIT (OUTPATIENT)
Dept: FAMILY MEDICINE CLINIC | Facility: CLINIC | Age: 65
End: 2025-07-09
Payer: COMMERCIAL

## 2025-07-09 ENCOUNTER — OFFICE VISIT (OUTPATIENT)
Dept: SURGERY | Facility: CLINIC | Age: 65
End: 2025-07-09
Payer: COMMERCIAL

## 2025-07-09 VITALS
WEIGHT: 208 LBS | HEIGHT: 73 IN | DIASTOLIC BLOOD PRESSURE: 80 MMHG | BODY MASS INDEX: 27.57 KG/M2 | SYSTOLIC BLOOD PRESSURE: 140 MMHG

## 2025-07-09 VITALS
OXYGEN SATURATION: 97 % | SYSTOLIC BLOOD PRESSURE: 139 MMHG | HEIGHT: 73 IN | BODY MASS INDEX: 27.59 KG/M2 | DIASTOLIC BLOOD PRESSURE: 79 MMHG | WEIGHT: 208.2 LBS | HEART RATE: 71 BPM | TEMPERATURE: 98.6 F

## 2025-07-09 DIAGNOSIS — L02.91 ABSCESS: Primary | ICD-10-CM

## 2025-07-09 DIAGNOSIS — L03.115 CELLULITIS AND ABSCESS OF RIGHT LEG: Primary | ICD-10-CM

## 2025-07-09 DIAGNOSIS — L02.415 CELLULITIS AND ABSCESS OF RIGHT LEG: Primary | ICD-10-CM

## 2025-07-09 DIAGNOSIS — E66.3 OVERWEIGHT (BMI 25.0-29.9): ICD-10-CM

## 2025-07-09 PROCEDURE — 87147 CULTURE TYPE IMMUNOLOGIC: CPT | Performed by: STUDENT IN AN ORGANIZED HEALTH CARE EDUCATION/TRAINING PROGRAM

## 2025-07-09 PROCEDURE — 87186 SC STD MICRODIL/AGAR DIL: CPT | Performed by: STUDENT IN AN ORGANIZED HEALTH CARE EDUCATION/TRAINING PROGRAM

## 2025-07-09 PROCEDURE — 87070 CULTURE OTHR SPECIMN AEROBIC: CPT | Performed by: STUDENT IN AN ORGANIZED HEALTH CARE EDUCATION/TRAINING PROGRAM

## 2025-07-09 PROCEDURE — 87205 SMEAR GRAM STAIN: CPT | Performed by: STUDENT IN AN ORGANIZED HEALTH CARE EDUCATION/TRAINING PROGRAM

## 2025-07-09 RX ORDER — LEVOCETIRIZINE DIHYDROCHLORIDE 5 MG/1
5 TABLET, FILM COATED ORAL EVERY EVENING
Qty: 10 TABLET | Refills: 0 | Status: SHIPPED | OUTPATIENT
Start: 2025-07-09 | End: 2025-07-19

## 2025-07-09 RX ORDER — KETOROLAC TROMETHAMINE 30 MG/ML
30 INJECTION, SOLUTION INTRAMUSCULAR; INTRAVENOUS ONCE
Status: COMPLETED | OUTPATIENT
Start: 2025-07-09 | End: 2025-07-09

## 2025-07-09 RX ORDER — SULFAMETHOXAZOLE AND TRIMETHOPRIM 800; 160 MG/1; MG/1
1 TABLET ORAL 2 TIMES DAILY
Qty: 10 TABLET | Refills: 0 | Status: SHIPPED | OUTPATIENT
Start: 2025-07-09 | End: 2025-07-14

## 2025-07-09 RX ORDER — FAMOTIDINE 20 MG/1
20 TABLET, FILM COATED ORAL 2 TIMES DAILY
Qty: 20 TABLET | Refills: 0 | Status: SHIPPED | OUTPATIENT
Start: 2025-07-09 | End: 2025-07-19

## 2025-07-09 RX ADMIN — KETOROLAC TROMETHAMINE 30 MG: 30 INJECTION, SOLUTION INTRAMUSCULAR; INTRAVENOUS at 11:42

## 2025-07-09 NOTE — PROGRESS NOTES
"Chief Complaint  Insect Bite (Rt top thigh, happened over the weekend. Possible spider or chigger bite. Swollen, red, can't remove bandaid)    Subjective        Michael Issa presents to [facility]  History of Present Illness  Mr. Issa presents with concerns of an insect bite.  He has a large red area over his anterior right thigh.  It is hard, warm, and tender to touch.  There is a central area that looks like a boil    Objective   Vital Signs:  /79 (BP Location: Left arm, Patient Position: Sitting, Cuff Size: Adult)   Pulse 71   Temp 98.6 °F (37 °C) (Temporal)   Ht 185.4 cm (73\")   Wt 94.4 kg (208 lb 3.2 oz)   SpO2 97%   BMI 27.47 kg/m²   Estimated body mass index is 27.47 kg/m² as calculated from the following:    Height as of this encounter: 185.4 cm (73\").    Weight as of this encounter: 94.4 kg (208 lb 3.2 oz).            Physical Exam  Vitals reviewed.   Constitutional:       General: He is not in acute distress.     Appearance: Normal appearance. He is normal weight. He is not ill-appearing, toxic-appearing or diaphoretic.   HENT:      Head: Normocephalic and atraumatic.      Right Ear: External ear normal.      Left Ear: External ear normal.      Nose: Nose normal.   Eyes:      Extraocular Movements: Extraocular movements intact.   Cardiovascular:      Rate and Rhythm: Normal rate and regular rhythm.   Pulmonary:      Effort: Pulmonary effort is normal. No respiratory distress.      Breath sounds: Normal breath sounds.   Skin:     General: Skin is warm and dry.      Coloration: Skin is not jaundiced or pale.      Comments: Right Anterior thigh abscess with extensive cellulitis    Neurological:      Mental Status: He is alert and oriented to person, place, and time.   Psychiatric:         Mood and Affect: Mood normal.         Behavior: Behavior normal.         Thought Content: Thought content normal.         Judgment: Judgment normal.            Result Review " :                Assessment and Plan   Diagnoses and all orders for this visit:    1. Cellulitis and abscess of right leg (Primary)    Placed urgent referral to Gen surg  They have been gracious enough to work him in today to evaluate for I&D either in office or OR.  Continue current medications    Patient saw Dr. Burnett later the same day.  I have since discussed patient's care with Dr. Burnett.  Dr. Burnett performed I&D in office and wrote for PO abx.  I agree with this plan.           Follow Up   No follow-ups on file.  Patient was given instructions and counseling regarding his condition or for health maintenance advice. Please see specific information pulled into the AVS if appropriate.

## 2025-07-09 NOTE — PROGRESS NOTES
Office New Patient History and Physical:     Referring Provider: No ref. provider found    Chief Complaint   Patient presents with    Insect Bite       Subjective .     History of present illness:   History of Present Illness  The patient presents for a spider bite vs abscess.     He reports that the bite occurred over the weekend on his right groin/thigh area. The condition has been deteriorating, causing him significant discomfort. He is not experiencing any fevers or chills and has no prior history of similar incidents. He has not been prescribed any antibiotics for this condition. Additionally, he mentions the presence of 3 to 4 more bites that resemble chiggers.    SOCIAL HISTORY  Tobacco: Does not smoke.       History  Past Medical History:   Diagnosis Date    Allergic     Aneurysm October 4th 2023    Arthritis     Disease of thyroid gland     NODULE PRESENT     ED (erectile dysfunction)     Elevated cholesterol     Hernia     Hx of chest pain     Hypertension     Pneumonia January 2023    Shortness of breath     since heart surgery    Urgency of urination    ,   Past Surgical History:   Procedure Laterality Date    AORTIC VALVE REPAIR/REPLACEMENT N/A 06/20/2024    Procedure: MODIFIED BENTALL PROCEDURE, CREATION OF BIOLOGIC VALVE CONDUITwith 25mm INSPIRIS AORTIC VALVE, RESECTION OF ASCENDING AORTA AND PROXIMAL HEMIARCH AORTA USING DEEP HYPOTHERMIC CIRCULATORY ARREST, ULTRASOUND GUIDED FEMORAL ARTERIAL LINE PLACEMENT, ATRIAL APPENDAGE EXCLUSION LEFT with 35mm ATRICLIP, TRANSESOPHAGEAL ECHOCARDIOGRAM, XP STERNAL PLATING;  Surgeon: Filiberto Gtz MD;  Locati    AORTIC VALVE REPAIR/REPLACEMENT  6/20/2024    ASCENDING ARCH/HEMIARCH REPLACEMENT N/A 06/20/2024    Procedure: ASCENDING AORTIC ARCH/HEMIARCH REPLACEMENT WITH CIRCULATORY ARREST;  Surgeon: Filiberto Gtz MD;  Location: [facility];  Service: Cardiothoracic;  Laterality: N/A;    ATRIAL APPENDAGE EXCLUSION LEFT WITH TRANSESOPHAGEAL ECHOCARDIOGRAM N/A  2024    Procedure: ATRIAL APPENDAGE EXCLUSION LEFT WITH TRANSESOPHAGEAL ECHOCARDIOGRAM;  Surgeon: Filiberto Gtz MD;  Location:  [facility] OR;  Service: Cardiothoracic;  Laterality: N/A;    CARDIAC CATHETERIZATION Left 2024    Procedure: Cardiac Catheterization/Vascular Study;  Surgeon: Nima Nickerson MD;  Location:  [facility];  Service: Cardiology;  Laterality: Left;    CARDIAC CATHETERIZATION  2024    COLONOSCOPY N/A 2021    Procedure: COLONOSCOPY;  Surgeon: Eduardo Neil MD;  Location:  [facility];  Service: Gastroenterology;  Laterality: N/A;  pre  post diverticulosis, hemorrhoids  Dr. cavazos    KNEE SURGERY      rt knee MENISCUS    PREPERITONEAL HERNIA REPAIR Bilateral 2017    Procedure: BILATERAL PREPERITONEAL INGUINAL HERNIA REPAIR LAPAROSCOPIC WITH MESH;  Surgeon: Rambo Church MD;  Location:  [facility] OR;  Service:    ,   Family History   Problem Relation Age of Onset    Alzheimer's disease Mother     Diabetes Father     Heart disease Father     Heart attack Father     Heart failure Father     Hypertension Father     No Known Problems Sister     No Known Problems Brother     No Known Problems Daughter     No Known Problems Maternal Grandmother     No Known Problems Maternal Grandfather     No Known Problems Paternal Grandmother     No Known Problems Paternal Grandfather     Hypertension Brother     No Known Problems Brother     Cancer Brother    ,   Social History     Tobacco Use    Smoking status: Former     Average packs/day: 0.8 packs/day for 60.0 years (45.0 ttl pk-yrs)     Types: Cigarettes     Start date: 1997     Quit date: 2017     Years since quittin.2     Passive exposure: Past    Smokeless tobacco: Never    Tobacco comments:     Ex smoker   Vaping Use    Vaping status: Never Used   Substance Use Topics    Alcohol use: Not Currently     Comment: very rarely    Drug use: Not Currently     Comment: recreational drug use no  "use in last 2-3 years   , (Not in a hospital admission)   and Allergies:  Penicillins    Current Outpatient Medications:     Ascorbic Acid (VITAMIN C PO), Take 1,000 mg by mouth Daily., Disp: , Rfl:     aspirin 81 MG EC tablet, Take 1 tablet by mouth Daily., Disp: 30 tablet, Rfl: 5    famotidine (PEPCID) 20 MG tablet, Take 1 tablet by mouth 2 (Two) Times a Day for 10 days., Disp: 20 tablet, Rfl: 0    levocetirizine (XYZAL) 5 MG tablet, Take 1 tablet by mouth Every Evening for 10 days., Disp: 10 tablet, Rfl: 0    lisinopril (PRINIVIL,ZESTRIL) 5 MG tablet, TAKE 1 TABLET BY MOUTH EVERY DAY, Disp: 90 tablet, Rfl: 1    metoprolol succinate XL (TOPROL-XL) 25 MG 24 hr tablet, TAKE 1 TABLET BY MOUTH EVERY DAY, Disp: 90 tablet, Rfl: 0    rosuvastatin (CRESTOR) 5 MG tablet, Take 1 tablet by mouth Daily., Disp: 90 tablet, Rfl: 5    sildenafil (VIAGRA) 100 MG tablet, Take 1 tablet by mouth daily as needed for erectile dysfunction., Disp: 10 tablet, Rfl: 0    sulfamethoxazole-trimethoprim (Bactrim DS) 800-160 MG per tablet, Take 1 tablet by mouth 2 (Two) Times a Day for 5 days., Disp: 10 tablet, Rfl: 0    tamsulosin (FLOMAX) 0.4 MG capsule 24 hr capsule, TAKE 1 CAPSULE BY MOUTH EVERY DAY, Disp: 30 capsule, Rfl: 0      Objective     Vital Signs   /80   Ht 185.4 cm (72.99\")   Wt 94.3 kg (208 lb)   BMI 27.45 kg/m²      Physical Exam:  General appearance - alert, well appearing, and in no distress  Mental status - alert, oriented to person, place, and time  Eyes - pupils equal and reactive, extraocular eye movements intact  Neurological - alert, oriented, normal speech, no focal findings or movement disorder noted  Skin - Right groin abscess. Does not look like a spider bite. Drainage from the site.   Physical Exam  Skin: Abscess noted on the right groin/thigh area.     Results Review:    The following data was reviewed by: Krys Burnett MD on 07/09/2025:  Progress Notes by Phu Ho MD (07/09/2025 09:15) "     Assessment & Plan       Diagnoses and all orders for this visit:    1. Abscess (Primary)  -     sulfamethoxazole-trimethoprim (Bactrim DS) 800-160 MG per tablet; Take 1 tablet by mouth 2 (Two) Times a Day for 5 days.  Dispense: 10 tablet; Refill: 0  -     famotidine (PEPCID) 20 MG tablet; Take 1 tablet by mouth 2 (Two) Times a Day for 10 days.  Dispense: 20 tablet; Refill: 0  -     levocetirizine (XYZAL) 5 MG tablet; Take 1 tablet by mouth Every Evening for 10 days.  Dispense: 10 tablet; Refill: 0    2. Overweight (BMI 25.0-29.9)       Assessment & Plan  1. Abscess.   The patient reports a worsening condition with significant pain but no fevers or chills. There is no history of similar incidents, and he is not currently on any antibiotics. The wound will be numbed, opened, and cleaned, and a wound culture will be obtained. A prescription for Bactrim has been sent to the pharmacy. Pepcid is recommended twice daily for 10 days to reduce inflammation. Additionally, Xyzal will be prescribed to help decrease the body's inflammatory response. If insurance does not cover Pepcid and Xyzal, they may be purchased over the counter.    PROCEDURE  Procedure: Incision and drainage of abscess on the right groin/thigh    All questions were answered and agreement to proceed was given after the following Pre-Procedure details were reviewed:  - Risks and Benefits: Infection control, pain relief, potential for scarring, risk of bleeding, risk of infection  - Alternative Options: Oral antibiotics alone, observation  - Side effects: Pain, bleeding, infection  - Consent: Verbal consent obtained    Intra-Procedure:  - Time-Out: Confirmed patient identity, procedure, and site  - Site Preparation: Cleansed with antiseptic solution  - Procedure: The area was prepped with betadine and draped in sterile fashion. A timeout was performed. Local anesthetic was infiltrated. An incision was made and purulence was expressed. A wound culture was  taken. I irrigated the cavity. A dressing was placed. He tolerated the procedure wlel.     Post-Procedure:  - Tolerance Level: Tolerated procedure well  - Home Care Instructions: Keep the area clean and dry, change dressing BID to TID and wash with gold dial soap, monitor for signs of infection, complete prescribed antibiotic course, take Pepcid and Xyzal as directed     Toradol shot in office today for pain control. Follow up with Angie Greene PA-C on Monday.     This is a new problem. I have reviewed the office note above. I have prescribed antibiotics, xyzal and pepcid.             Krys Burnett MD  07/09/25  11:28 CDT      Patient or patient representative verbalized consent for the use of Ambient Listening during the visit with  Krys Burnett MD for chart documentation. 7/9/2025  12:22 CDT

## 2025-07-09 NOTE — PATIENT INSTRUCTIONS

## 2025-07-11 LAB
BACTERIA FLD CULT: ABNORMAL
GRAM STN SPEC: ABNORMAL
GRAM STN SPEC: ABNORMAL

## 2025-07-14 ENCOUNTER — TELEPHONE (OUTPATIENT)
Dept: FAMILY MEDICINE CLINIC | Facility: CLINIC | Age: 65
End: 2025-07-14
Payer: COMMERCIAL

## 2025-07-14 ENCOUNTER — OFFICE VISIT (OUTPATIENT)
Dept: SURGERY | Facility: CLINIC | Age: 65
End: 2025-07-14
Payer: COMMERCIAL

## 2025-07-14 VITALS
HEIGHT: 73 IN | DIASTOLIC BLOOD PRESSURE: 64 MMHG | BODY MASS INDEX: 27.57 KG/M2 | WEIGHT: 208 LBS | SYSTOLIC BLOOD PRESSURE: 110 MMHG

## 2025-07-14 DIAGNOSIS — L02.91 ABSCESS: Primary | ICD-10-CM

## 2025-07-16 ENCOUNTER — OFFICE VISIT (OUTPATIENT)
Dept: FAMILY MEDICINE CLINIC | Facility: CLINIC | Age: 65
End: 2025-07-16
Payer: COMMERCIAL

## 2025-07-16 VITALS
TEMPERATURE: 98.6 F | SYSTOLIC BLOOD PRESSURE: 99 MMHG | RESPIRATION RATE: 18 BRPM | HEART RATE: 59 BPM | DIASTOLIC BLOOD PRESSURE: 65 MMHG | OXYGEN SATURATION: 99 % | WEIGHT: 206 LBS | BODY MASS INDEX: 27.3 KG/M2 | HEIGHT: 73 IN

## 2025-07-16 DIAGNOSIS — I10 PRIMARY HYPERTENSION: Primary | ICD-10-CM

## 2025-07-16 DIAGNOSIS — K21.9 GASTROESOPHAGEAL REFLUX DISEASE WITHOUT ESOPHAGITIS: ICD-10-CM

## 2025-07-16 DIAGNOSIS — N52.8 OTHER MALE ERECTILE DYSFUNCTION: ICD-10-CM

## 2025-07-16 DIAGNOSIS — N40.1 BENIGN PROSTATIC HYPERPLASIA WITH URINARY RETENTION: ICD-10-CM

## 2025-07-16 DIAGNOSIS — E78.2 MIXED HYPERLIPIDEMIA: ICD-10-CM

## 2025-07-16 DIAGNOSIS — R33.8 BENIGN PROSTATIC HYPERPLASIA WITH URINARY RETENTION: ICD-10-CM

## 2025-07-16 RX ORDER — FAMOTIDINE 20 MG/1
20 TABLET, FILM COATED ORAL 2 TIMES DAILY
Qty: 20 TABLET | Refills: 0 | Status: SHIPPED | OUTPATIENT
Start: 2025-07-16 | End: 2025-07-26

## 2025-07-16 RX ORDER — METOPROLOL SUCCINATE 25 MG/1
25 TABLET, EXTENDED RELEASE ORAL
Qty: 90 TABLET | Refills: 0 | Status: SHIPPED | OUTPATIENT
Start: 2025-07-16

## 2025-07-16 RX ORDER — TAMSULOSIN HYDROCHLORIDE 0.4 MG/1
1 CAPSULE ORAL DAILY
Qty: 90 CAPSULE | Refills: 1 | Status: SHIPPED | OUTPATIENT
Start: 2025-07-16

## 2025-07-16 RX ORDER — METOPROLOL SUCCINATE 25 MG/1
25 TABLET, EXTENDED RELEASE ORAL
Qty: 90 TABLET | Refills: 0 | Status: SHIPPED | OUTPATIENT
Start: 2025-07-16 | End: 2025-07-16 | Stop reason: SDUPTHER

## 2025-07-16 RX ORDER — TAMSULOSIN HYDROCHLORIDE 0.4 MG/1
1 CAPSULE ORAL DAILY
Qty: 30 CAPSULE | Refills: 0 | Status: SHIPPED | OUTPATIENT
Start: 2025-07-16 | End: 2025-07-16 | Stop reason: SDUPTHER

## 2025-07-16 RX ORDER — ROSUVASTATIN CALCIUM 5 MG/1
5 TABLET, COATED ORAL DAILY
Qty: 90 TABLET | Refills: 5 | Status: SHIPPED | OUTPATIENT
Start: 2025-07-16

## 2025-07-16 RX ORDER — LISINOPRIL 5 MG/1
5 TABLET ORAL DAILY
Qty: 90 TABLET | Refills: 1 | Status: SHIPPED | OUTPATIENT
Start: 2025-07-16

## 2025-07-16 RX ORDER — SILDENAFIL 100 MG/1
100 TABLET, FILM COATED ORAL DAILY PRN
Qty: 10 TABLET | Refills: 0 | Status: SHIPPED | OUTPATIENT
Start: 2025-07-16

## 2025-07-16 NOTE — PROGRESS NOTES
Chief Complaint  Hypertension (Pt here for follow up)    Subjective        Michael Issa presents to [facility]  Hypertension  Chronicity:  Chronic  Onset:  More than 1 year ago  Progression since onset:  Improved  Condition status:  Controlled  Associated symptoms: no blurred vision, no chest pain, no headaches, no orthopnea, no palpitations and no peripheral edema    Agents associated with hypertension:  No associated agents  CAD risks:  Dyslipidemia, male gender, stress and family history  Past treatments:  ACE inhibitors and beta blockers  Current therapy:  ACE inhibitors and beta blockers  Improvement on treatment:  Mild  Compliance problems:  No compliance problems  End-organ damage: no kidney disease, no heart failure and no retinopathy    Hyperlipidemia  This is a chronic problem. The current episode started more than 1 year ago. The problem is uncontrolled. Recent lipid tests were reviewed and are high. He has no history of hypothyroidism or obesity. Factors aggravating his hyperlipidemia include beta blockers. Pertinent negatives include no chest pain. Current antihyperlipidemic treatment includes statins. The current treatment provides mild improvement of lipids. There are no compliance problems.  Risk factors for coronary artery disease include dyslipidemia, hypertension and male sex.   Erectile Dysfunction  This is a chronic problem. The current episode started more than 1 year ago. The problem has been gradually improving since onset. The nature of his difficulty is achieving erection. Non-physiologic factors contributing to erectile dysfunction are a decreased libido. He reports no anxiety or performance anxiety. Obstructive symptoms include dribbling. Obstructive symptoms do not include an intermittent stream or straining. Associated symptoms include genital pain. Pertinent negatives include no chills or hesitancy. Nothing aggravates the symptoms. Past treatments  "include sildenafil. The treatment provided mild relief. He has been using treatment for 1 to 2 years. He has had no adverse reactions caused by medications. Risk factors include hypertension.     History of Present Illness      The following portions of the patient's history were reviewed and updated as appropriate: allergies, current medications, past family history, past medical history, past social history, past surgical history and problem list.    Objective   Vital Signs:  BP 99/65 (BP Location: Left arm, Patient Position: Sitting, Cuff Size: Large Adult)   Pulse 59   Temp 98.6 °F (37 °C) (Infrared)   Resp 18   Ht 185.4 cm (72.99\")   Wt 93.4 kg (206 lb)   SpO2 99%   BMI 27.19 kg/m²   Estimated body mass index is 27.19 kg/m² as calculated from the following:    Height as of this encounter: 185.4 cm (72.99\").    Weight as of this encounter: 93.4 kg (206 lb).       BMI is >= 25 and <30. (Overweight) The following options were offered after discussion;: exercise counseling/recommendations and nutrition counseling/recommendations        Physical Exam  Vitals and nursing note reviewed.   Constitutional:       General: He is awake.      Appearance: Normal appearance. He is well-developed and well-groomed.   HENT:      Head: Normocephalic and atraumatic.      Right Ear: Hearing, tympanic membrane, ear canal and external ear normal.      Left Ear: Hearing, tympanic membrane, ear canal and external ear normal.      Nose: Nose normal.      Mouth/Throat:      Lips: Pink.      Pharynx: Oropharynx is clear.   Eyes:      General: Lids are normal.      Conjunctiva/sclera: Conjunctivae normal.   Cardiovascular:      Rate and Rhythm: Normal rate and regular rhythm.      Heart sounds: Normal heart sounds.   Pulmonary:      Effort: Pulmonary effort is normal.      Breath sounds: Normal breath sounds and air entry.   Musculoskeletal:      Cervical back: Full passive range of motion without pain.      Right lower leg: No " edema.      Left lower leg: No edema.   Lymphadenopathy:      Head:      Right side of head: No submental, submandibular or tonsillar adenopathy.      Left side of head: No submental, submandibular or tonsillar adenopathy.   Skin:     General: Skin is warm and dry.             Comments: Has a dressing to the right groin area where Dr. Burnett drained an abscess has a follow up in her office on Monday    Neurological:      Mental Status: He is alert and oriented to person, place, and time.      Sensory: Sensation is intact.      Motor: Motor function is intact.      Coordination: Coordination is intact.      Gait: Gait is intact.   Psychiatric:         Attention and Perception: Attention and perception normal.         Mood and Affect: Mood and affect normal.         Speech: Speech normal.         Behavior: Behavior normal. Behavior is cooperative.         Thought Content: Thought content normal.         Cognition and Memory: Cognition and memory normal.         Judgment: Judgment normal.        Physical Exam      Result Review :          Results             Assessment and Plan     Diagnoses and all orders for this visit:    1. Primary hypertension (Primary)  -     lisinopril (PRINIVIL,ZESTRIL) 5 MG tablet; Take 1 tablet by mouth Daily.  Dispense: 90 tablet; Refill: 1  -     Discontinue: metoprolol succinate XL (TOPROL-XL) 25 MG 24 hr tablet; Take 1 tablet by mouth Daily.  Dispense: 90 tablet; Refill: 0  -     CBC (No Diff); Future  -     Comprehensive metabolic panel; Future  -     metoprolol succinate XL (TOPROL-XL) 25 MG 24 hr tablet; Take 1 tablet by mouth Daily.  Dispense: 90 tablet; Refill: 0    2. Mixed hyperlipidemia  -     rosuvastatin (CRESTOR) 5 MG tablet; Take 1 tablet by mouth Daily.  Dispense: 90 tablet; Refill: 5  -     Lipid panel; Future    3. Other male erectile dysfunction  -     sildenafil (VIAGRA) 100 MG tablet; Take 1 tablet by mouth Daily As Needed for Erectile Dysfunction.  Dispense: 10  tablet; Refill: 0    4. Benign prostatic hyperplasia with urinary retention  -     Discontinue: tamsulosin (FLOMAX) 0.4 MG capsule 24 hr capsule; Take 1 capsule by mouth Daily.  Dispense: 30 capsule; Refill: 0  -     tamsulosin (FLOMAX) 0.4 MG capsule 24 hr capsule; Take 1 capsule by mouth Daily.  Dispense: 90 capsule; Refill: 1    5. Gastroesophageal reflux disease without esophagitis  -     famotidine (PEPCID) 20 MG tablet; Take 1 tablet by mouth 2 (Two) Times a Day for 10 days.  Dispense: 20 tablet; Refill: 0      Assessment & Plan        ICD-10-CM ICD-9-CM   1. Primary hypertension  I10 401.9   2. Mixed hyperlipidemia  E78.2 272.2   3. Other male erectile dysfunction  N52.8 607.84   4. Benign prostatic hyperplasia with urinary retention  N40.1 600.01    R33.8 788.20   5. Gastroesophageal reflux disease without esophagitis  K21.9 530.81                Follow Up     Return in about 6 months (around 1/16/2026).  Patient was given instructions and counseling regarding his condition or for health maintenance advice. Please see specific information pulled into the AVS if appropriate.       Patient or patient representative verbalized consent for the use of Ambient Listening during the visit with  Mansi Zapata DNP, APRN for chart documentation. 7/16/2025  16:03 CDT    Electronically signed by Mansi Zapata DNP, APRN, 07/16/25, 4:04 PM CDT.

## 2025-07-21 ENCOUNTER — OFFICE VISIT (OUTPATIENT)
Dept: SURGERY | Facility: CLINIC | Age: 65
End: 2025-07-21
Payer: COMMERCIAL

## 2025-07-21 VITALS
BODY MASS INDEX: 27.3 KG/M2 | SYSTOLIC BLOOD PRESSURE: 126 MMHG | DIASTOLIC BLOOD PRESSURE: 70 MMHG | WEIGHT: 206 LBS | HEIGHT: 73 IN

## 2025-07-21 DIAGNOSIS — L02.91 ABSCESS: Primary | ICD-10-CM

## 2025-07-21 PROCEDURE — 99213 OFFICE O/P EST LOW 20 MIN: CPT

## 2025-07-21 RX ORDER — DOXYCYCLINE 100 MG/1
100 CAPSULE ORAL 2 TIMES DAILY
Qty: 14 CAPSULE | Refills: 0 | Status: SHIPPED | OUTPATIENT
Start: 2025-07-21 | End: 2025-07-28

## 2025-07-21 NOTE — PROGRESS NOTES
"Patient: Michael Issa    YOB: 1960    Date: 07/21/2025    Primary Care Provider: Mansi Zapata, DNP, APRN    Vital Signs:   Vitals:    07/21/25 1530   BP: 126/70   BP Location: Left arm   Patient Position: Sitting   Cuff Size: Adult   Weight: 93.4 kg (206 lb)   Height: 185.4 cm (72.99\")       The patient is tolerating a regular diet and has no complaints s/p I&D of right lower extremity abscess. The patient denies fevers, chills, nausea, vomiting, and excessive pain.  He states that there is less swelling and less drainage.  However, he states it has not started to heal up.  He states it is still red.    Upon physical exam, the swelling and redness does seem to have improved but only marginally.  He does have some purulent drainage present in the wound concerning for staph.    Assessment / Plan:    Diagnoses and all orders for this visit:    1. Abscess (Primary)  -     doxycycline (VIBRAMYCIN) 100 MG capsule; Take 1 capsule by mouth 2 (Two) Times a Day for 7 days.  Dispense: 14 capsule; Refill: 0    Mr. Issa is a 65-year-old male who presents to the clinic for recheck of right lower extremity abscess/wound.  On physical exam, it does appear that the wound is improving.  However due to continued purulent material in wound bed, I have decided to put the patient on another course of antibiotics.  I have called in 7 days worth of doxycycline.  I will have the patient follow-up in office in 1 week for recheck.  I have recommended continued cleaning with antibacterial soap and water.  He voices understanding and is agreeable to the plan.  He will call for an appointment sooner if he has any new problems or concerns.    Follow up:     Return in about 1 week (around 7/28/2025) for Recheck.        Electronically signed by Angie Greene PA-C  07/21/25  15:49 CDT                  "

## 2025-07-28 ENCOUNTER — OFFICE VISIT (OUTPATIENT)
Dept: SURGERY | Facility: CLINIC | Age: 65
End: 2025-07-28
Payer: COMMERCIAL

## 2025-07-28 VITALS
HEIGHT: 73 IN | BODY MASS INDEX: 27.17 KG/M2 | SYSTOLIC BLOOD PRESSURE: 168 MMHG | WEIGHT: 205 LBS | DIASTOLIC BLOOD PRESSURE: 88 MMHG

## 2025-07-28 DIAGNOSIS — L72.0 EPIDERMAL INCLUSION CYST: Primary | ICD-10-CM

## 2025-07-28 PROCEDURE — 99212 OFFICE O/P EST SF 10 MIN: CPT

## 2025-07-28 NOTE — PROGRESS NOTES
"Patient: Michael Issa    YOB: 1960    Date: 07/28/2025    Primary Care Provider: Mansi Zapata, DNP, APRN    Vital Signs:   Vitals:    07/28/25 1531   BP: 168/88   BP Location: Left arm   Patient Position: Sitting   Cuff Size: Adult   Weight: 93 kg (205 lb)   Height: 185.4 cm (72.99\")       The patient is tolerating a regular diet and has no complaints s/p I&D of right leg abscess by Dr. Burnett on 7/9/25. The patient denies fevers, chills, nausea, vomiting, and excessive pain. He has had continual drainage with improvement but no resolution. I placed him on another course of antibiotics at his last visit to assist in this. He is here today for recheck.     Area has still not completely healed but is now a superficial wound and does not have tracking.       Assessment / Plan:    Diagnoses and all orders for this visit:    1. Epidermal inclusion cyst (Primary)    Mr. Issa is a 65-year-old male who presents to the clinic for recheck of right lower extremity wound.  The area continues to show symptomatic and clinical improvement.  I have recommended for the patient to finish the course of antibiotics in its entirety as well as wash it with antibacterial soap and keep it as clean and dry as possible.  I will have the patient return to clinic in 2 weeks for recheck.  He is to call for an appointment if he has any new problems or concerns.  He voiced understanding and is agreeable to the plan.    Follow up:     Return in about 2 weeks (around 8/11/2025) for Recheck.        Electronically signed by Angie Greene PA-C  07/28/25  15:52 CDT                  "

## 2025-08-06 DIAGNOSIS — N52.8 OTHER MALE ERECTILE DYSFUNCTION: ICD-10-CM

## 2025-08-06 RX ORDER — SILDENAFIL 100 MG/1
100 TABLET, FILM COATED ORAL DAILY PRN
Qty: 10 TABLET | Refills: 0 | Status: SHIPPED | OUTPATIENT
Start: 2025-08-06

## (undated) DEVICE — PAD LAP CHOLE: Brand: MEDLINE INDUSTRIES, INC.

## (undated) DEVICE — SUT VIC 3/0 RB1 27IN UD VCP215H

## (undated) DEVICE — THE CHANNEL CLEANING BRUSH IS A NYLON FLEXI BRUSH ATTACHED TO A FLEXIBLE PLASTIC SHEATH DESIGNED TO SAFELY REMOVE DEBRIS FROM FLEXIBLE ENDOSCOPES.

## (undated) DEVICE — CANN NASL ETCO2 LO/FLO A/

## (undated) DEVICE — YANKAUER,BULB TIP WITH VENT: Brand: ARGYLE

## (undated) DEVICE — PK TURNOVER RM ADV

## (undated) DEVICE — TRY PREP SCRB VAG PVP

## (undated) DEVICE — KT NDL GUIDE STRL 18GA

## (undated) DEVICE — 2, DISPOSABLE SUCTION/IRRIGATOR WITHOUT DISPOSABLE TIP: Brand: STRYKEFLOW

## (undated) DEVICE — GW STARTER FXD CORE J .035 3X260CM 3MM

## (undated) DEVICE — PAD, DEFIB, ADULT, RADIOTRANS, PHYSIO: Brand: MEDLINE

## (undated) DEVICE — MINI ENDOCUT SCISSOR TIP, DISPOSABLE: Brand: RENEW

## (undated) DEVICE — LAPAROSCOPIC MONOPOLAR CORD: Brand: VALLEYLAB

## (undated) DEVICE — GLV SURG TRIUMPH MICRO PF LTX 7.5 STRL

## (undated) DEVICE — CUFF,BP,DISP,1 TUBE,ADULT,HP: Brand: MEDLINE

## (undated) DEVICE — TBG SMPL FLTR LINE NASL 02/C02 A/ BX/100

## (undated) DEVICE — SYR LUERLOK 50ML

## (undated) DEVICE — ELECTRD BLD EDGE/INSUL1P 2.4X5.1MM STRL

## (undated) DEVICE — SUT VIC 4/0 P3 18IN UD VCP494H

## (undated) DEVICE — SUT VIC 0 UR6 27IN VCP603H

## (undated) DEVICE — SUP ARMBRD ART/LINE BLU

## (undated) DEVICE — MASK,OXYGEN,MED CONC,ADLT,7' TUB, UC: Brand: PENDING

## (undated) DEVICE — SOLIDIFIER LIQUI LOC PLUS 2000CC

## (undated) DEVICE — STRUCTURAL BALLOON TROCAR: Brand: AUTO SUTURE

## (undated) DEVICE — GOWN,NON-REINFORCED,SIRUS,SET IN SLV,XL: Brand: MEDLINE

## (undated) DEVICE — MODEL AT P65, P/N 701554-001KIT CONTENTS: HAND CONTROLLER, 3-WAY HIGH-PRESSURE STOPCOCK WITH ROTATING END AND PREMIUM HIGH-PRESSURE TUBING: Brand: ANGIOTOUCH® KIT

## (undated) DEVICE — GLIDESHEATH SLENDER STAINLESS STEEL KIT: Brand: GLIDESHEATH SLENDER

## (undated) DEVICE — 3M™ IOBAN™ 2 ANTIMICROBIAL INCISE DRAPE 6650EZ: Brand: IOBAN™ 2

## (undated) DEVICE — SOL IRR NACL 0.9PCT BT 1000ML

## (undated) DEVICE — PK CATH CARD 30 CA/4

## (undated) DEVICE — TOTAL TRAY, 16FR 10ML SIL FOLEY, URN: Brand: MEDLINE

## (undated) DEVICE — TR BAND RADIAL ARTERY COMPRESSION DEVICE: Brand: TR BAND

## (undated) DEVICE — MODEL BT2000 P/N 700287-012KIT CONTENTS: MANIFOLD WITH SALINE AND CONTRAST PORTS, SALINE TUBING WITH SPIKE AND HAND SYRINGE, TRANSDUCER: Brand: BT2000 AUTOMATED MANIFOLD KIT

## (undated) DEVICE — SENSR O2 OXIMAX FNGR A/ 18IN NONSTR

## (undated) DEVICE — Device: Brand: DEFENDO AIR/WATER/SUCTION AND BIOPSY VALVE

## (undated) DEVICE — KIDNEY SHAPE BALLOON: Brand: PDB

## (undated) DEVICE — RADIFOCUS OPTITORQUE ANGIOGRAPHIC CATHETER: Brand: OPTITORQUE

## (undated) DEVICE — SPNG GZ WOVN 4X4IN 12PLY 10/BX STRL

## (undated) DEVICE — WIPE THERAWASH SLV SPEC CARE 2PK

## (undated) DEVICE — DRAPE,ANGIO,BRACH,STERILE,38X44: Brand: MEDLINE